# Patient Record
Sex: FEMALE | Race: WHITE | NOT HISPANIC OR LATINO | Employment: OTHER | URBAN - METROPOLITAN AREA
[De-identification: names, ages, dates, MRNs, and addresses within clinical notes are randomized per-mention and may not be internally consistent; named-entity substitution may affect disease eponyms.]

---

## 2017-04-19 ENCOUNTER — TRANSCRIBE ORDERS (OUTPATIENT)
Dept: ADMINISTRATIVE | Facility: HOSPITAL | Age: 77
End: 2017-04-19

## 2017-04-19 ENCOUNTER — HOSPITAL ENCOUNTER (OUTPATIENT)
Dept: MAMMOGRAPHY | Facility: MEDICAL CENTER | Age: 77
Discharge: HOME | End: 2017-04-19
Payer: MEDICARE

## 2017-04-19 DIAGNOSIS — Z12.31 ENCOUNTER FOR MAMMOGRAM TO ESTABLISH BASELINE MAMMOGRAM: Primary | ICD-10-CM

## 2017-04-19 DIAGNOSIS — Z12.31 VISIT FOR SCREENING MAMMOGRAM: ICD-10-CM

## 2017-04-19 PROCEDURE — G0202 SCR MAMMO BI INCL CAD: HCPCS

## 2017-04-19 PROCEDURE — 77063 BREAST TOMOSYNTHESIS BI: CPT

## 2017-06-02 ENCOUNTER — TRANSCRIBE ORDERS (OUTPATIENT)
Dept: ADMINISTRATIVE | Facility: HOSPITAL | Age: 77
End: 2017-06-02

## 2017-06-02 DIAGNOSIS — Z13.820 SCREENING FOR OSTEOPOROSIS: Primary | ICD-10-CM

## 2017-08-09 ENCOUNTER — HOSPITAL ENCOUNTER (OUTPATIENT)
Dept: RADIOLOGY | Age: 77
Discharge: HOME | End: 2017-08-09
Payer: MEDICARE

## 2017-08-09 DIAGNOSIS — Z13.820 SCREENING FOR OSTEOPOROSIS: ICD-10-CM

## 2017-08-09 PROCEDURE — 77080 DXA BONE DENSITY AXIAL: CPT

## 2018-04-20 ENCOUNTER — HOSPITAL ENCOUNTER (OUTPATIENT)
Dept: MAMMOGRAPHY | Facility: MEDICAL CENTER | Age: 78
Discharge: HOME | End: 2018-04-20
Payer: MEDICARE

## 2018-04-20 DIAGNOSIS — Z12.31 ENCOUNTER FOR MAMMOGRAM TO ESTABLISH BASELINE MAMMOGRAM: ICD-10-CM

## 2018-04-20 PROCEDURE — 77063 BREAST TOMOSYNTHESIS BI: CPT

## 2018-04-20 PROCEDURE — 77067 SCR MAMMO BI INCL CAD: CPT

## 2018-05-23 ENCOUNTER — APPOINTMENT (OUTPATIENT)
Dept: RADIOLOGY | Facility: MEDICAL CENTER | Age: 78
End: 2018-05-23
Payer: MEDICARE

## 2018-05-23 ENCOUNTER — TRANSCRIBE ORDERS (OUTPATIENT)
Dept: ADMINISTRATIVE | Facility: HOSPITAL | Age: 78
End: 2018-05-23

## 2018-05-23 DIAGNOSIS — M24.80 OTHER SPECIFIC JOINT DERANGEMENTS OF UNSPECIFIED JOINT, NOT ELSEWHERE CLASSIFIED: Primary | ICD-10-CM

## 2018-05-23 DIAGNOSIS — M24.80 OTHER SPECIFIC JOINT DERANGEMENTS OF UNSPECIFIED JOINT, NOT ELSEWHERE CLASSIFIED: ICD-10-CM

## 2018-05-23 PROCEDURE — 73130 X-RAY EXAM OF HAND: CPT

## 2018-06-14 ENCOUNTER — HOSPITAL ENCOUNTER (OUTPATIENT)
Dept: INFUSION CENTER | Facility: CLINIC | Age: 78
Discharge: HOME | End: 2018-06-14
Payer: MEDICARE

## 2018-06-14 VITALS
DIASTOLIC BLOOD PRESSURE: 68 MMHG | TEMPERATURE: 96.7 F | SYSTOLIC BLOOD PRESSURE: 136 MMHG | RESPIRATION RATE: 18 BRPM | HEART RATE: 73 BPM

## 2018-06-14 PROCEDURE — 96401 CHEMO ANTI-NEOPL SQ/IM: CPT

## 2018-06-14 RX ORDER — QUINIDINE GLUCONATE 324 MG
27 TABLET, EXTENDED RELEASE ORAL DAILY
COMMUNITY

## 2018-06-14 RX ORDER — MELATONIN
1000 DAILY
Status: ON HOLD | COMMUNITY
End: 2019-03-27

## 2018-06-14 RX ORDER — CHOLECALCIFEROL (VITAMIN D3) 125 MCG
TABLET ORAL
COMMUNITY
End: 2018-10-08

## 2018-06-14 RX ORDER — VIT C/HESPERIDIN/BIOFLAVONOIDS 500-100 MG
50 TABLET ORAL DAILY
COMMUNITY

## 2018-06-14 RX ORDER — CLOBETASOL PROPIONATE 0.05 MG/G
GEL TOPICAL 2 TIMES DAILY
COMMUNITY
End: 2018-10-08 | Stop reason: SDUPTHER

## 2018-06-14 RX ORDER — LEVOTHYROXINE SODIUM 0.1 MG/1
100 TABLET ORAL DAILY
COMMUNITY
End: 2020-10-21

## 2018-06-14 RX ORDER — ESTRADIOL 0.03 MG/D
1 FILM, EXTENDED RELEASE TRANSDERMAL 2 TIMES WEEKLY
COMMUNITY
End: 2018-06-14 | Stop reason: CLARIF

## 2018-06-14 RX ORDER — AMOXICILLIN 500 MG
2400 CAPSULE ORAL DAILY
COMMUNITY
End: 2021-01-22 | Stop reason: HOSPADM

## 2018-06-14 RX ADMIN — DENOSUMAB 60 MG: 60 INJECTION SUBCUTANEOUS at 12:15

## 2018-06-14 NOTE — PROGRESS NOTES
Pt  Denies new symptoms or concerns  Prolia ordered today  Prolia given in [facility]  Tolerating well  Future appointment scheduled as ordered  AVS provided  Appointment written on Summary  Annelise Martin

## 2018-06-14 NOTE — PLAN OF CARE

## 2018-10-03 RX ORDER — SIMVASTATIN 20 MG
TABLET ORAL DAILY
COMMUNITY
Start: 2003-09-02 | End: 2018-10-08

## 2018-10-03 RX ORDER — ESTRADIOL 0.1 MG/G
CREAM VAGINAL
COMMUNITY
Start: 2018-02-26 | End: 2018-10-08 | Stop reason: SDUPTHER

## 2018-10-08 ENCOUNTER — ANNUAL EXAM (OUTPATIENT)
Dept: GYNECOLOGY | Facility: CLINIC | Age: 78
End: 2018-10-08
Payer: MEDICARE

## 2018-10-08 VITALS
RESPIRATION RATE: 16 BRPM | SYSTOLIC BLOOD PRESSURE: 130 MMHG | HEIGHT: 63 IN | BODY MASS INDEX: 21.09 KG/M2 | HEART RATE: 72 BPM | DIASTOLIC BLOOD PRESSURE: 68 MMHG | WEIGHT: 119 LBS

## 2018-10-08 DIAGNOSIS — Z12.31 ENCOUNTER FOR SCREENING MAMMOGRAM FOR MALIGNANT NEOPLASM OF BREAST: Primary | ICD-10-CM

## 2018-10-08 DIAGNOSIS — N90.4 LICHEN SCLEROSUS OF FEMALE GENITALIA: ICD-10-CM

## 2018-10-08 DIAGNOSIS — N95.2 POSTMENOPAUSAL ATROPHIC VAGINITIS: ICD-10-CM

## 2018-10-08 PROCEDURE — 99213 OFFICE O/P EST LOW 20 MIN: CPT | Performed by: OBSTETRICS & GYNECOLOGY

## 2018-10-08 RX ORDER — CLOBETASOL PROPIONATE 0.05 MG/G
GEL TOPICAL
Qty: 30 EACH | Refills: 0 | Status: SHIPPED | OUTPATIENT
Start: 2018-10-08 | End: 2019-03-18

## 2018-10-08 RX ORDER — ESTRADIOL 0.1 MG/G
1 CREAM VAGINAL 2 TIMES WEEKLY
Qty: 42.5 G | Refills: 3 | Status: SHIPPED | OUTPATIENT
Start: 2018-10-08 | End: 2019-03-18 | Stop reason: SDUPTHER

## 2018-10-08 NOTE — PROGRESS NOTES
Assessment/Plan:       Diagnoses and all orders for this visit:    Encounter for screening mammogram for malignant neoplasm of breast  -     Mammo screening bilateral w 3d & cad; Future    Lichen sclerosus of female genitalia  -     clobetasol (TEMOVATE) 0 05 % GEL; Apply small amount to affected area once weekly    Postmenopausal atrophic vaginitis  -     estradiol (ESTRACE VAGINAL) 0 1 mg/g vaginal cream; Insert 1 g into the vagina 2 (two) times a week    Other orders  -     CHOLESTYRAMINE PO;   -     Discontinue: estradiol (ESTRACE VAGINAL) 0 1 mg/g vaginal cream; USE EXTERNALLY TWICE WEEKLY  -     Olopatadine HCl (PAZEO) 0 7 % SOLN;   -     Discontinue: simvastatin (ZOCOR) 20 mg tablet; Take by mouth daily          Subjective:      Patient ID: Jayy Hoang is a 70 y o  female  Patient is a 70-year-old who has had a previous hysterectomy for fibroids  She denies any vaginal bleeding, breast or bladder problems  She is not sexually active  She has been using topical estrogen cream and clobetasol ointment for treatment of lichen sclerosis and labial agglutination  The lichen sclerosis is no longer visible and the agglutination has improved  She is no longer taking oral estrogen  The following portions of the patient's history were reviewed and updated as appropriate: allergies, current medications, past family history, past medical history, past social history, past surgical history and problem list     Review of Systems   Constitutional: Negative  Respiratory: Negative for shortness of breath  Cardiovascular: Negative for chest pain  Gastrointestinal: Negative  Genitourinary: Negative  Skin: Negative  Psychiatric/Behavioral: Negative for agitation, behavioral problems and confusion           Objective:      /68 (Cuff Size: Adult)   Pulse 72   Resp 16   Ht 5' 2 5" (1 588 m)   Wt 54 kg (119 lb)   BMI 21 42 kg/m²          Physical Exam   Constitutional: She appears well-developed and well-nourished  No distress  HENT:   Head: Normocephalic  Pulmonary/Chest: Effort normal  No respiratory distress  Abdominal: She exhibits no distension and no mass  There is no tenderness  There is no rebound and no guarding  Genitourinary: Rectal exam shows anal tone abnormal  No breast swelling, tenderness, discharge or bleeding  There is no rash, tenderness, lesion or injury on the right labia  There is no rash, tenderness, lesion or injury on the left labia  No erythema, tenderness or bleeding in the vagina  No foreign body in the vagina  No signs of injury around the vagina  No vaginal discharge found  Genitourinary Comments: There is minimal adhesion of the labia minora to the labia majora  There is no visible lichen sclerosis  The vagina is atrophic  The cuff is clean and well supported  The cervix uterus are surgically absent  There are no adnexal masses  She reports that she has part of 1 ovary, but she is not sure which side  The anal sphincter has no tone  Lymphadenopathy:        Right axillary: No pectoral and no lateral adenopathy present  Left axillary: No pectoral and no lateral adenopathy present  Skin: Skin is warm, dry and intact  She is not diaphoretic  No cyanosis  Nails show no clubbing  Psychiatric: She has a normal mood and affect   Her speech is normal and behavior is normal

## 2018-12-13 ENCOUNTER — HOSPITAL ENCOUNTER (OUTPATIENT)
Dept: INFUSION CENTER | Facility: CLINIC | Age: 78
Discharge: HOME | End: 2018-12-13

## 2019-01-07 ENCOUNTER — HOSPITAL ENCOUNTER (OUTPATIENT)
Dept: INFUSION CENTER | Facility: CLINIC | Age: 79
Discharge: HOME | End: 2019-01-07
Payer: MEDICARE

## 2019-01-07 PROCEDURE — 96401 CHEMO ANTI-NEOPL SQ/IM: CPT

## 2019-01-07 RX ADMIN — DENOSUMAB 60 MG: 60 INJECTION SUBCUTANEOUS at 13:07

## 2019-01-07 NOTE — PROGRESS NOTES
Pt was given Prolia as ordered  Calcium level = 8 9 from [facility] labs    Pt declined AVS but was given future appt in 6 months from today

## 2019-02-04 ENCOUNTER — TRANSCRIBE ORDERS (OUTPATIENT)
Dept: ADMINISTRATIVE | Facility: HOSPITAL | Age: 79
End: 2019-02-04

## 2019-02-04 DIAGNOSIS — I65.21 OCCLUSION OF RIGHT CAROTID ARTERY: ICD-10-CM

## 2019-02-04 DIAGNOSIS — E78.5 HYPERLIPIDEMIA, UNSPECIFIED HYPERLIPIDEMIA TYPE: Primary | ICD-10-CM

## 2019-02-15 ENCOUNTER — HOSPITAL ENCOUNTER (OUTPATIENT)
Dept: NON INVASIVE DIAGNOSTICS | Facility: CLINIC | Age: 79
Discharge: HOME | End: 2019-02-15
Payer: MEDICARE

## 2019-02-15 DIAGNOSIS — E78.5 HYPERLIPIDEMIA, UNSPECIFIED HYPERLIPIDEMIA TYPE: ICD-10-CM

## 2019-02-15 PROCEDURE — 93880 EXTRACRANIAL BILAT STUDY: CPT

## 2019-02-16 PROCEDURE — 93880 EXTRACRANIAL BILAT STUDY: CPT | Performed by: SURGERY

## 2019-03-15 ENCOUNTER — HOSPITAL ENCOUNTER (OUTPATIENT)
Dept: MRI IMAGING | Facility: HOSPITAL | Age: 79
Discharge: HOME | End: 2019-03-15
Attending: INTERNAL MEDICINE
Payer: MEDICARE

## 2019-03-15 DIAGNOSIS — I65.21 OCCLUSION OF RIGHT CAROTID ARTERY: ICD-10-CM

## 2019-03-15 PROCEDURE — 70549 MR ANGIOGRAPH NECK W/O&W/DYE: CPT

## 2019-03-15 PROCEDURE — A9585 GADOBUTROL INJECTION: HCPCS | Performed by: INTERNAL MEDICINE

## 2019-03-15 RX ADMIN — GADOBUTROL 5 ML: 604.72 INJECTION INTRAVENOUS at 13:26

## 2019-03-18 ENCOUNTER — TELEPHONE (OUTPATIENT)
Dept: VASCULAR SURGERY | Facility: CLINIC | Age: 79
End: 2019-03-18

## 2019-03-18 ENCOUNTER — CONSULT (OUTPATIENT)
Dept: VASCULAR SURGERY | Facility: CLINIC | Age: 79
End: 2019-03-18
Payer: MEDICARE

## 2019-03-18 VITALS
TEMPERATURE: 97.9 F | HEART RATE: 68 BPM | HEIGHT: 62 IN | WEIGHT: 113 LBS | SYSTOLIC BLOOD PRESSURE: 118 MMHG | BODY MASS INDEX: 20.8 KG/M2 | DIASTOLIC BLOOD PRESSURE: 60 MMHG

## 2019-03-18 DIAGNOSIS — I65.21 STENOSIS OF RIGHT INTERNAL CAROTID ARTERY: Primary | ICD-10-CM

## 2019-03-18 DIAGNOSIS — N90.4 LICHEN SCLEROSUS OF FEMALE GENITALIA: ICD-10-CM

## 2019-03-18 DIAGNOSIS — N95.2 POSTMENOPAUSAL ATROPHIC VAGINITIS: ICD-10-CM

## 2019-03-18 PROCEDURE — 99204 OFFICE O/P NEW MOD 45 MIN: CPT | Performed by: SURGERY

## 2019-03-18 RX ORDER — CLOBETASOL PROPIONATE 0.05 MG/G
GEL TOPICAL
Qty: 30 EACH | Refills: 0 | Status: ON HOLD | OUTPATIENT
Start: 2019-03-18 | End: 2019-03-27

## 2019-03-18 RX ORDER — ESTRADIOL 0.1 MG/G
1 CREAM VAGINAL 2 TIMES WEEKLY
Qty: 42.5 G | Refills: 3 | Status: SHIPPED | OUTPATIENT
Start: 2019-03-18 | End: 2019-10-16 | Stop reason: SDUPTHER

## 2019-03-18 RX ORDER — CEFAZOLIN SODIUM 2 G/50ML
2000 SOLUTION INTRAVENOUS ONCE
Status: CANCELLED | OUTPATIENT
Start: 2019-03-18 | End: 2019-03-18

## 2019-03-18 NOTE — ASSESSMENT & PLAN NOTE
70yo Female with PMHx of distant gastric bypass, hypothyroidism with Significant Right ICA stenosis noted on ultrasound with PSV//112  This was also confirmed on MRA, demonstrates >80% stenosis  Patient is currently asymptomatic and denies any history, signs or symptoms consistent with stroke or TIA  She lives with her  and is independent able to carry-out all of her own activities  She denies any chest pain or sob with activity or one flight of stairs  She reports allergies to statin medications   -Recommend initiation of 81mg asa daily   -Had an extensive discussion regarding the 5-year risk of stroke being ~12% which can be reduced to <5% with surgical intervention, she unfortunately cannot be on optimal medical therapy given her allergic history to statins  She will benefit from carotid revascularization to minimize risk of stroke  Patient demonstrated understanding and consented to proceed  -She will need cardiac risk stratification prior to her procedure

## 2019-03-18 NOTE — TELEPHONE ENCOUNTER
Patient came into the office today to see Dr Akins Friendly  She will have a R carotid endarterectomy with neuro monitering  How ever patient needs to have a cardiac clearance  Spoke with Vamsi Kuo in office @ [facility] and she scheduled clearance for 4/22/2019 @ T @ 3pm w/ Dr Sandra Espinosa  Gave clearance in hand to CLAUDIA Broussard  Any questions or concerns Cardiology can be reached at [phone]  Patient was given soap and all instructions at check out

## 2019-03-18 NOTE — PROGRESS NOTES
Assessment/Plan:    Stenosis of right internal carotid artery  70yo Female with PMHx of distant gastric bypass, hypothyroidism with Significant Right ICA stenosis noted on ultrasound with PSV//112  This was also confirmed on MRA, demonstrates >80% stenosis  Patient is currently asymptomatic and denies any history, signs or symptoms consistent with stroke or TIA  She lives with her  and is independent able to carry-out all of her own activities  She denies any chest pain or sob with activity or one flight of stairs  She reports allergies to statin medications   -Recommend initiation of 81mg asa daily   -Had an extensive discussion regarding the 5-year risk of stroke being ~12% which can be reduced to <5% with surgical intervention, she unfortunately cannot be on optimal medical therapy given her allergic history to statins  She will benefit from carotid revascularization to minimize risk of stroke  Patient demonstrated understanding and consented to proceed  -She will need cardiac risk stratification prior to her procedure  Problem List Items Addressed This Visit        Cardiovascular and Mediastinum    Stenosis of right internal carotid artery - Primary     70yo Female with PMHx of distant gastric bypass, hypothyroidism with Significant Right ICA stenosis noted on ultrasound with PSV//112  This was also confirmed on MRA, demonstrates >80% stenosis  Patient is currently asymptomatic and denies any history, signs or symptoms consistent with stroke or TIA  She lives with her  and is independent able to carry-out all of her own activities  She denies any chest pain or sob with activity or one flight of stairs     She reports allergies to statin medications   -Recommend initiation of 81mg asa daily   -Had an extensive discussion regarding the 5-year risk of stroke being ~12% which can be reduced to <5% with surgical intervention, she unfortunately cannot be on optimal medical therapy given her allergic history to statins  She will benefit from carotid revascularization to minimize risk of stroke  Patient demonstrated understanding and consented to proceed  -She will need cardiac risk stratification prior to her procedure  Relevant Orders    Case request operating room: ENDARTERECTOMY ARTERY CAROTID with neuro monitoring (Completed)    Type and screen    UA w Reflex to Microscopic w Reflex to Culture    Basic metabolic panel    CBC and differential    Protime-INR    EKG 12 lead    Ambulatory referral to Cardiology            Subjective:      Patient ID: Landon Salvador is a 70 y o  female  Patient is new to the practice and presents today to discuss results of carotid doppler and also MRA carotids  Patient has no history of stroke and denies all TIA/CVA symptoms at this time  The following portions of the patient's history were reviewed and updated as appropriate: allergies, current medications, past family history, past medical history, past social history, past surgical history and problem list     Review of Systems   Constitutional: Negative  HENT: Negative  Eyes: Negative  Respiratory: Negative  Cardiovascular: Negative  Gastrointestinal: Negative  Endocrine: Negative  Genitourinary: Negative  Musculoskeletal: Negative  Skin: Negative  Allergic/Immunologic: Negative  Neurological: Negative  Hematological: Negative  Psychiatric/Behavioral: Negative  Reviewed    Objective:      /60 (BP Location: Left arm, Patient Position: Sitting)   Pulse 68   Temp 97 9 °F (36 6 °C) (Tympanic)   Ht 5' 2" (1 575 m)   Wt 51 3 kg (113 lb)   BMI 20 67 kg/m²          Physical Exam   Constitutional: She is oriented to person, place, and time  She appears well-developed and well-nourished  HENT:   Head: Normocephalic and atraumatic  Neck: Normal range of motion  Neck supple   No thyromegaly present    +carotid bruit b/l, R>L Cardiovascular: Normal rate, regular rhythm, normal heart sounds and intact distal pulses  Pulses:       Carotid pulses are 2+ on the right side, and 2+ on the left side  Radial pulses are 2+ on the right side, and 2+ on the left side  Pulmonary/Chest: Effort normal and breath sounds normal    Abdominal: Soft  Bowel sounds are normal    Musculoskeletal: Normal range of motion  She exhibits no edema or deformity  Neurological: She is alert and oriented to person, place, and time  She displays normal reflexes  No cranial nerve deficit or sensory deficit  She exhibits normal muscle tone  Coordination normal    Skin: Skin is warm and dry  Capillary refill takes less than 2 seconds  Psychiatric: She has a normal mood and affect  Her behavior is normal  Judgment and thought content normal        Operative Scheduling Information:    Hospital:  [facility]    Physician:  Me    Surgery: Right Carotid Endarterectomy with Neuromonitoring    Urgency:  Standard    Case Length:  Normal    Post-op Bed:  Stepdown    OR Table:  Standard    Equipment Needs:  Neuro-monitoring and associated Techs    Medication Instructions: Take all meds including asa 81mg regularly    Hydration:  No    I have spent 35 minutes with Patient  today in which greater than 50% of this time was spent in counseling/coordination of care regarding Diagnostic results, Prognosis, Risks and benefits of tx options, Intructions for management, Importance of tx compliance and Risk factor reductions

## 2019-03-27 ENCOUNTER — APPOINTMENT (EMERGENCY)
Dept: RADIOLOGY | Facility: HOSPITAL | Age: 79
DRG: 092 | End: 2019-03-27
Payer: MEDICARE

## 2019-03-27 ENCOUNTER — APPOINTMENT (OUTPATIENT)
Dept: RADIOLOGY | Facility: HOSPITAL | Age: 79
DRG: 092 | End: 2019-03-27
Payer: MEDICARE

## 2019-03-27 ENCOUNTER — TELEPHONE (OUTPATIENT)
Dept: VASCULAR SURGERY | Facility: CLINIC | Age: 79
End: 2019-03-27

## 2019-03-27 ENCOUNTER — HOSPITAL ENCOUNTER (INPATIENT)
Facility: HOSPITAL | Age: 79
LOS: 1 days | Discharge: HOME | DRG: 092 | End: 2019-03-29
Attending: EMERGENCY MEDICINE | Admitting: INTERNAL MEDICINE
Payer: MEDICARE

## 2019-03-27 DIAGNOSIS — I65.21 STENOSIS OF RIGHT INTERNAL CAROTID ARTERY: ICD-10-CM

## 2019-03-27 DIAGNOSIS — Z98.84 S/P GASTRIC BYPASS: ICD-10-CM

## 2019-03-27 DIAGNOSIS — R20.0 NUMBNESS AND TINGLING IN LEFT ARM: Primary | ICD-10-CM

## 2019-03-27 DIAGNOSIS — R63.6 UNDERWEIGHT: ICD-10-CM

## 2019-03-27 DIAGNOSIS — R20.2 NUMBNESS AND TINGLING IN LEFT ARM: Primary | ICD-10-CM

## 2019-03-27 DIAGNOSIS — D61.818 PANCYTOPENIA (HCC): ICD-10-CM

## 2019-03-27 DIAGNOSIS — G45.9 TIA (TRANSIENT ISCHEMIC ATTACK): ICD-10-CM

## 2019-03-27 PROBLEM — E03.9 HYPOTHYROIDISM: Status: ACTIVE | Noted: 2019-03-27

## 2019-03-27 LAB
ALBUMIN SERPL BCP-MCNC: 2.9 G/DL (ref 3.5–5)
ALP SERPL-CCNC: 99 U/L (ref 46–116)
ALT SERPL W P-5'-P-CCNC: 73 U/L (ref 12–78)
ANION GAP SERPL CALCULATED.3IONS-SCNC: 6 MMOL/L (ref 4–13)
AST SERPL W P-5'-P-CCNC: 75 U/L (ref 5–45)
ATRIAL RATE: 60 BPM
BASOPHILS # BLD AUTO: 0.01 THOUSANDS/ΜL (ref 0–0.1)
BASOPHILS NFR BLD AUTO: 0 % (ref 0–1)
BILIRUB SERPL-MCNC: 0.31 MG/DL (ref 0.2–1)
BUN SERPL-MCNC: 14 MG/DL (ref 5–25)
CALCIUM SERPL-MCNC: 8.1 MG/DL (ref 8.3–10.1)
CHLORIDE SERPL-SCNC: 109 MMOL/L (ref 100–108)
CO2 SERPL-SCNC: 27 MMOL/L (ref 21–32)
CREAT SERPL-MCNC: 0.48 MG/DL (ref 0.6–1.3)
EOSINOPHIL # BLD AUTO: 0.07 THOUSAND/ΜL (ref 0–0.61)
EOSINOPHIL NFR BLD AUTO: 2 % (ref 0–6)
ERYTHROCYTE [DISTWIDTH] IN BLOOD BY AUTOMATED COUNT: 14.6 % (ref 11.6–15.1)
GFR SERPL CREATININE-BSD FRML MDRD: 95 ML/MIN/1.73SQ M
GLUCOSE SERPL-MCNC: 79 MG/DL (ref 65–140)
HCT VFR BLD AUTO: 35.3 % (ref 34.8–46.1)
HGB BLD-MCNC: 11.2 G/DL (ref 11.5–15.4)
IMM GRANULOCYTES # BLD AUTO: 0.02 THOUSAND/UL (ref 0–0.2)
IMM GRANULOCYTES NFR BLD AUTO: 1 % (ref 0–2)
LYMPHOCYTES # BLD AUTO: 1.05 THOUSANDS/ΜL (ref 0.6–4.47)
LYMPHOCYTES NFR BLD AUTO: 31 % (ref 14–44)
MCH RBC QN AUTO: 30.2 PG (ref 26.8–34.3)
MCHC RBC AUTO-ENTMCNC: 31.7 G/DL (ref 31.4–37.4)
MCV RBC AUTO: 95 FL (ref 82–98)
MONOCYTES # BLD AUTO: 0.38 THOUSAND/ΜL (ref 0.17–1.22)
MONOCYTES NFR BLD AUTO: 11 % (ref 4–12)
NEUTROPHILS # BLD AUTO: 1.82 THOUSANDS/ΜL (ref 1.85–7.62)
NEUTS SEG NFR BLD AUTO: 55 % (ref 43–75)
NRBC BLD AUTO-RTO: 0 /100 WBCS
P AXIS: 78 DEGREES
PLATELET # BLD AUTO: 138 THOUSANDS/UL (ref 149–390)
PMV BLD AUTO: 10.9 FL (ref 8.9–12.7)
POTASSIUM SERPL-SCNC: 3.8 MMOL/L (ref 3.5–5.3)
PR INTERVAL: 156 MS
PROT SERPL-MCNC: 6.2 G/DL (ref 6.4–8.2)
QRS AXIS: 73 DEGREES
QRSD INTERVAL: 76 MS
QT INTERVAL: 398 MS
QTC INTERVAL: 398 MS
RBC # BLD AUTO: 3.71 MILLION/UL (ref 3.81–5.12)
SODIUM SERPL-SCNC: 142 MMOL/L (ref 136–145)
T WAVE AXIS: 81 DEGREES
TROPONIN I SERPL-MCNC: <0.02 NG/ML
TSH SERPL DL<=0.05 MIU/L-ACNC: 0.29 UIU/ML (ref 0.36–3.74)
VENTRICULAR RATE: 60 BPM
WBC # BLD AUTO: 3.35 THOUSAND/UL (ref 4.31–10.16)

## 2019-03-27 PROCEDURE — 80053 COMPREHEN METABOLIC PANEL: CPT | Performed by: EMERGENCY MEDICINE

## 2019-03-27 PROCEDURE — 99204 OFFICE O/P NEW MOD 45 MIN: CPT | Performed by: PSYCHIATRY & NEUROLOGY

## 2019-03-27 PROCEDURE — 70496 CT ANGIOGRAPHY HEAD: CPT

## 2019-03-27 PROCEDURE — 93010 ELECTROCARDIOGRAM REPORT: CPT | Performed by: INTERNAL MEDICINE

## 2019-03-27 PROCEDURE — 99285 EMERGENCY DEPT VISIT HI MDM: CPT

## 2019-03-27 PROCEDURE — 85025 COMPLETE CBC W/AUTO DIFF WBC: CPT | Performed by: EMERGENCY MEDICINE

## 2019-03-27 PROCEDURE — 84484 ASSAY OF TROPONIN QUANT: CPT | Performed by: EMERGENCY MEDICINE

## 2019-03-27 PROCEDURE — 84443 ASSAY THYROID STIM HORMONE: CPT | Performed by: STUDENT IN AN ORGANIZED HEALTH CARE EDUCATION/TRAINING PROGRAM

## 2019-03-27 PROCEDURE — 70498 CT ANGIOGRAPHY NECK: CPT

## 2019-03-27 PROCEDURE — 93005 ELECTROCARDIOGRAM TRACING: CPT

## 2019-03-27 PROCEDURE — 36415 COLL VENOUS BLD VENIPUNCTURE: CPT | Performed by: EMERGENCY MEDICINE

## 2019-03-27 PROCEDURE — 70551 MRI BRAIN STEM W/O DYE: CPT

## 2019-03-27 PROCEDURE — 99214 OFFICE O/P EST MOD 30 MIN: CPT | Performed by: PHYSICIAN ASSISTANT

## 2019-03-27 PROCEDURE — 71046 X-RAY EXAM CHEST 2 VIEWS: CPT

## 2019-03-27 RX ORDER — ASPIRIN 81 MG/1
81 TABLET, CHEWABLE ORAL DAILY
Status: DISCONTINUED | OUTPATIENT
Start: 2019-03-28 | End: 2019-03-29 | Stop reason: HOSPADM

## 2019-03-27 RX ORDER — FERROUS SULFATE 325(65) MG
325 TABLET ORAL DAILY
Status: DISCONTINUED | OUTPATIENT
Start: 2019-03-28 | End: 2019-03-29 | Stop reason: HOSPADM

## 2019-03-27 RX ORDER — B-COMPLEX WITH VITAMIN C
1 TABLET ORAL
Status: DISCONTINUED | OUTPATIENT
Start: 2019-03-28 | End: 2019-03-29 | Stop reason: HOSPADM

## 2019-03-27 RX ORDER — ZINC GLUCONATE 50 MG
25 TABLET ORAL DAILY
Status: DISCONTINUED | OUTPATIENT
Start: 2019-03-28 | End: 2019-03-29 | Stop reason: HOSPADM

## 2019-03-27 RX ORDER — ASPIRIN 81 MG/1
81 TABLET, CHEWABLE ORAL DAILY
Status: ON HOLD | COMMUNITY
End: 2021-10-15

## 2019-03-27 RX ORDER — CHOLECALCIFEROL (VITAMIN D3) 125 MCG
500 CAPSULE ORAL DAILY
Status: DISCONTINUED | OUTPATIENT
Start: 2019-03-28 | End: 2019-03-29 | Stop reason: HOSPADM

## 2019-03-27 RX ORDER — ACETAMINOPHEN 325 MG/1
650 TABLET ORAL EVERY 4 HOURS PRN
Status: DISCONTINUED | OUTPATIENT
Start: 2019-03-27 | End: 2019-03-29 | Stop reason: HOSPADM

## 2019-03-27 RX ORDER — HEPARIN SODIUM 5000 [USP'U]/ML
5000 INJECTION, SOLUTION INTRAVENOUS; SUBCUTANEOUS EVERY 8 HOURS SCHEDULED
Status: DISCONTINUED | OUTPATIENT
Start: 2019-03-27 | End: 2019-03-29 | Stop reason: HOSPADM

## 2019-03-27 RX ORDER — LEVOTHYROXINE SODIUM 0.1 MG/1
100 TABLET ORAL DAILY
Status: DISCONTINUED | OUTPATIENT
Start: 2019-03-28 | End: 2019-03-29 | Stop reason: HOSPADM

## 2019-03-27 RX ORDER — CLOPIDOGREL BISULFATE 75 MG/1
75 TABLET ORAL DAILY
Status: DISCONTINUED | OUTPATIENT
Start: 2019-03-28 | End: 2019-03-28

## 2019-03-27 RX ADMIN — HEPARIN SODIUM 5000 UNITS: 5000 INJECTION INTRAVENOUS; SUBCUTANEOUS at 22:48

## 2019-03-27 RX ADMIN — IOHEXOL 85 ML: 350 INJECTION, SOLUTION INTRAVENOUS at 17:57

## 2019-03-27 NOTE — TELEPHONE ENCOUNTER
Patient called with new onset of left arm and hand numbness that lasted about one hour and she had 2 episodes   I called Tressa Rosado NP and then she spoke with DR Prince, and we are going to send patient to ER at [facility] to be evaluated

## 2019-03-28 ENCOUNTER — APPOINTMENT (OUTPATIENT)
Dept: NON INVASIVE DIAGNOSTICS | Facility: HOSPITAL | Age: 79
DRG: 092 | End: 2019-03-28
Payer: MEDICARE

## 2019-03-28 LAB
CHOLEST SERPL-MCNC: 165 MG/DL (ref 50–200)
ERYTHROCYTE [DISTWIDTH] IN BLOOD BY AUTOMATED COUNT: 14.7 % (ref 11.6–15.1)
EST. AVERAGE GLUCOSE BLD GHB EST-MCNC: 105 MG/DL
HBA1C MFR BLD: 5.3 % (ref 4.2–6.3)
HCT VFR BLD AUTO: 34.2 % (ref 34.8–46.1)
HDLC SERPL-MCNC: 51 MG/DL (ref 40–60)
HGB BLD-MCNC: 11 G/DL (ref 11.5–15.4)
LDLC SERPL CALC-MCNC: 94 MG/DL (ref 0–100)
MCH RBC QN AUTO: 30.4 PG (ref 26.8–34.3)
MCHC RBC AUTO-ENTMCNC: 32.2 G/DL (ref 31.4–37.4)
MCV RBC AUTO: 95 FL (ref 82–98)
PLATELET # BLD AUTO: 139 THOUSANDS/UL (ref 149–390)
PMV BLD AUTO: 11.4 FL (ref 8.9–12.7)
RBC # BLD AUTO: 3.62 MILLION/UL (ref 3.81–5.12)
T4 FREE SERPL-MCNC: 1.32 NG/DL (ref 0.76–1.46)
TRIGL SERPL-MCNC: 98 MG/DL
WBC # BLD AUTO: 2.58 THOUSAND/UL (ref 4.31–10.16)

## 2019-03-28 PROCEDURE — 93306 TTE W/DOPPLER COMPLETE: CPT | Performed by: INTERNAL MEDICINE

## 2019-03-28 PROCEDURE — 99225 PR SBSQ OBSERVATION CARE/DAY 25 MINUTES: CPT | Performed by: PSYCHIATRY & NEUROLOGY

## 2019-03-28 PROCEDURE — 99222 1ST HOSP IP/OBS MODERATE 55: CPT | Performed by: INTERNAL MEDICINE

## 2019-03-28 PROCEDURE — 84439 ASSAY OF FREE THYROXINE: CPT | Performed by: INTERNAL MEDICINE

## 2019-03-28 PROCEDURE — 83036 HEMOGLOBIN GLYCOSYLATED A1C: CPT | Performed by: STUDENT IN AN ORGANIZED HEALTH CARE EDUCATION/TRAINING PROGRAM

## 2019-03-28 PROCEDURE — 97166 OT EVAL MOD COMPLEX 45 MIN: CPT

## 2019-03-28 PROCEDURE — G8980 MOBILITY D/C STATUS: HCPCS

## 2019-03-28 PROCEDURE — 97162 PT EVAL MOD COMPLEX 30 MIN: CPT

## 2019-03-28 PROCEDURE — G8987 SELF CARE CURRENT STATUS: HCPCS

## 2019-03-28 PROCEDURE — 80061 LIPID PANEL: CPT | Performed by: STUDENT IN AN ORGANIZED HEALTH CARE EDUCATION/TRAINING PROGRAM

## 2019-03-28 PROCEDURE — G8978 MOBILITY CURRENT STATUS: HCPCS

## 2019-03-28 PROCEDURE — 85027 COMPLETE CBC AUTOMATED: CPT | Performed by: STUDENT IN AN ORGANIZED HEALTH CARE EDUCATION/TRAINING PROGRAM

## 2019-03-28 PROCEDURE — G8979 MOBILITY GOAL STATUS: HCPCS

## 2019-03-28 PROCEDURE — 93306 TTE W/DOPPLER COMPLETE: CPT

## 2019-03-28 RX ADMIN — CYANOCOBALAMIN TAB 500 MCG 500 MCG: 500 TAB at 08:27

## 2019-03-28 RX ADMIN — Medication 1 TABLET: at 08:27

## 2019-03-28 RX ADMIN — FERROUS SULFATE TAB 325 MG (65 MG ELEMENTAL FE) 325 MG: 325 (65 FE) TAB at 08:27

## 2019-03-28 RX ADMIN — LEVOTHYROXINE SODIUM 100 MCG: 100 TABLET ORAL at 08:27

## 2019-03-28 RX ADMIN — HEPARIN SODIUM 5000 UNITS: 5000 INJECTION INTRAVENOUS; SUBCUTANEOUS at 21:37

## 2019-03-28 RX ADMIN — CALCIUM CARBONATE 500 MG (1,250 MG)-VITAMIN D3 200 UNIT TABLET 1 TABLET: at 08:27

## 2019-03-28 RX ADMIN — CLOPIDOGREL BISULFATE 75 MG: 75 TABLET ORAL at 08:27

## 2019-03-28 RX ADMIN — ASPIRIN 81 MG 81 MG: 81 TABLET ORAL at 08:27

## 2019-03-28 RX ADMIN — HEPARIN SODIUM 5000 UNITS: 5000 INJECTION INTRAVENOUS; SUBCUTANEOUS at 14:20

## 2019-03-28 RX ADMIN — HEPARIN SODIUM 5000 UNITS: 5000 INJECTION INTRAVENOUS; SUBCUTANEOUS at 05:29

## 2019-03-28 RX ADMIN — Medication 25 MG: at 08:28

## 2019-03-29 VITALS
HEART RATE: 67 BPM | SYSTOLIC BLOOD PRESSURE: 141 MMHG | DIASTOLIC BLOOD PRESSURE: 60 MMHG | WEIGHT: 113 LBS | BODY MASS INDEX: 20.02 KG/M2 | OXYGEN SATURATION: 98 % | TEMPERATURE: 98.1 F | HEIGHT: 63 IN | RESPIRATION RATE: 20 BRPM

## 2019-03-29 PROBLEM — G45.9 TIA (TRANSIENT ISCHEMIC ATTACK): Status: RESOLVED | Noted: 2019-03-27 | Resolved: 2019-03-29

## 2019-03-29 LAB
ABO GROUP BLD: NORMAL
ANION GAP SERPL CALCULATED.3IONS-SCNC: 5 MMOL/L (ref 4–13)
BASOPHILS # BLD AUTO: 0.01 THOUSANDS/ΜL (ref 0–0.1)
BASOPHILS NFR BLD AUTO: 0 % (ref 0–1)
BLD GP AB SCN SERPL QL: NEGATIVE
BUN SERPL-MCNC: 13 MG/DL (ref 5–25)
CALCIUM SERPL-MCNC: 7.6 MG/DL (ref 8.3–10.1)
CHLORIDE SERPL-SCNC: 110 MMOL/L (ref 100–108)
CO2 SERPL-SCNC: 27 MMOL/L (ref 21–32)
CREAT SERPL-MCNC: 0.42 MG/DL (ref 0.6–1.3)
EOSINOPHIL # BLD AUTO: 0.07 THOUSAND/ΜL (ref 0–0.61)
EOSINOPHIL NFR BLD AUTO: 3 % (ref 0–6)
ERYTHROCYTE [DISTWIDTH] IN BLOOD BY AUTOMATED COUNT: 14.8 % (ref 11.6–15.1)
GFR SERPL CREATININE-BSD FRML MDRD: 99 ML/MIN/1.73SQ M
GLUCOSE SERPL-MCNC: 104 MG/DL (ref 65–140)
GLUCOSE SERPL-MCNC: 40 MG/DL (ref 65–140)
GLUCOSE SERPL-MCNC: 82 MG/DL (ref 65–140)
HCT VFR BLD AUTO: 35.2 % (ref 34.8–46.1)
HGB BLD-MCNC: 11.4 G/DL (ref 11.5–15.4)
IMM GRANULOCYTES # BLD AUTO: 0 THOUSAND/UL (ref 0–0.2)
IMM GRANULOCYTES NFR BLD AUTO: 0 % (ref 0–2)
LYMPHOCYTES # BLD AUTO: 1.1 THOUSANDS/ΜL (ref 0.6–4.47)
LYMPHOCYTES NFR BLD AUTO: 41 % (ref 14–44)
MCH RBC QN AUTO: 30.6 PG (ref 26.8–34.3)
MCHC RBC AUTO-ENTMCNC: 32.4 G/DL (ref 31.4–37.4)
MCV RBC AUTO: 94 FL (ref 82–98)
MONOCYTES # BLD AUTO: 0.35 THOUSAND/ΜL (ref 0.17–1.22)
MONOCYTES NFR BLD AUTO: 13 % (ref 4–12)
NEUTROPHILS # BLD AUTO: 1.17 THOUSANDS/ΜL (ref 1.85–7.62)
NEUTS SEG NFR BLD AUTO: 43 % (ref 43–75)
NRBC BLD AUTO-RTO: 0 /100 WBCS
PLATELET # BLD AUTO: 134 THOUSANDS/UL (ref 149–390)
PMV BLD AUTO: 11.6 FL (ref 8.9–12.7)
POTASSIUM SERPL-SCNC: 3.5 MMOL/L (ref 3.5–5.3)
RBC # BLD AUTO: 3.73 MILLION/UL (ref 3.81–5.12)
RH BLD: POSITIVE
SODIUM SERPL-SCNC: 142 MMOL/L (ref 136–145)
SPECIMEN EXPIRATION DATE: NORMAL
WBC # BLD AUTO: 2.7 THOUSAND/UL (ref 4.31–10.16)

## 2019-03-29 PROCEDURE — 99231 SBSQ HOSP IP/OBS SF/LOW 25: CPT | Performed by: SURGERY

## 2019-03-29 PROCEDURE — 86901 BLOOD TYPING SEROLOGIC RH(D): CPT | Performed by: PHYSICIAN ASSISTANT

## 2019-03-29 PROCEDURE — 82948 REAGENT STRIP/BLOOD GLUCOSE: CPT

## 2019-03-29 PROCEDURE — 80048 BASIC METABOLIC PNL TOTAL CA: CPT | Performed by: INTERNAL MEDICINE

## 2019-03-29 PROCEDURE — 85025 COMPLETE CBC W/AUTO DIFF WBC: CPT | Performed by: INTERNAL MEDICINE

## 2019-03-29 PROCEDURE — 86900 BLOOD TYPING SEROLOGIC ABO: CPT | Performed by: PHYSICIAN ASSISTANT

## 2019-03-29 PROCEDURE — 86850 RBC ANTIBODY SCREEN: CPT | Performed by: PHYSICIAN ASSISTANT

## 2019-03-29 RX ORDER — CLOPIDOGREL BISULFATE 75 MG/1
75 TABLET ORAL DAILY
Qty: 30 TABLET | Refills: 3 | Status: SHIPPED | OUTPATIENT
Start: 2019-03-29 | End: 2019-05-18 | Stop reason: SDUPTHER

## 2019-03-29 RX ADMIN — CYANOCOBALAMIN TAB 500 MCG 500 MCG: 500 TAB at 08:40

## 2019-03-29 RX ADMIN — HEPARIN SODIUM 5000 UNITS: 5000 INJECTION INTRAVENOUS; SUBCUTANEOUS at 06:15

## 2019-03-29 RX ADMIN — CALCIUM CARBONATE 500 MG (1,250 MG)-VITAMIN D3 200 UNIT TABLET 1 TABLET: at 08:40

## 2019-03-29 RX ADMIN — Medication 1 TABLET: at 08:40

## 2019-03-29 RX ADMIN — LEVOTHYROXINE SODIUM 100 MCG: 100 TABLET ORAL at 08:39

## 2019-03-29 RX ADMIN — Medication 25 MG: at 08:39

## 2019-03-29 RX ADMIN — ASPIRIN 81 MG 81 MG: 81 TABLET ORAL at 08:40

## 2019-03-29 RX ADMIN — FERROUS SULFATE TAB 325 MG (65 MG ELEMENTAL FE) 325 MG: 325 (65 FE) TAB at 08:40

## 2019-04-01 ENCOUNTER — ANESTHESIA EVENT (OUTPATIENT)
Dept: PERIOP | Facility: HOSPITAL | Age: 79
DRG: 038 | End: 2019-04-01
Payer: MEDICARE

## 2019-04-01 ENCOUNTER — TELEPHONE (OUTPATIENT)
Dept: VASCULAR SURGERY | Facility: CLINIC | Age: 79
End: 2019-04-01

## 2019-04-02 ENCOUNTER — TELEPHONE (OUTPATIENT)
Dept: NEUROLOGY | Facility: CLINIC | Age: 79
End: 2019-04-02

## 2019-04-03 ENCOUNTER — ANESTHESIA (OUTPATIENT)
Dept: PERIOP | Facility: HOSPITAL | Age: 79
DRG: 038 | End: 2019-04-03
Payer: MEDICARE

## 2019-04-03 ENCOUNTER — HOSPITAL ENCOUNTER (INPATIENT)
Facility: HOSPITAL | Age: 79
LOS: 1 days | Discharge: HOME | DRG: 038 | End: 2019-04-04
Attending: SURGERY | Admitting: SURGERY
Payer: MEDICARE

## 2019-04-03 ENCOUNTER — APPOINTMENT (OUTPATIENT)
Dept: NON INVASIVE DIAGNOSTICS | Facility: HOSPITAL | Age: 79
DRG: 038 | End: 2019-04-03
Payer: MEDICARE

## 2019-04-03 DIAGNOSIS — I65.21 STENOSIS OF RIGHT INTERNAL CAROTID ARTERY: Primary | ICD-10-CM

## 2019-04-03 LAB
ABO GROUP BLD: NORMAL
BLD GP AB SCN SERPL QL: NEGATIVE
GLUCOSE SERPL-MCNC: 149 MG/DL (ref 65–140)
KCT BLD-ACNC: 200 SEC (ref 89–137)
KCT BLD-ACNC: 236 SEC (ref 89–137)
RH BLD: POSITIVE
SPECIMEN EXPIRATION DATE: NORMAL
SPECIMEN SOURCE: ABNORMAL
SPECIMEN SOURCE: ABNORMAL

## 2019-04-03 PROCEDURE — 85347 COAGULATION TIME ACTIVATED: CPT

## 2019-04-03 PROCEDURE — 86850 RBC ANTIBODY SCREEN: CPT | Performed by: SURGERY

## 2019-04-03 PROCEDURE — C1781 MESH (IMPLANTABLE): HCPCS | Performed by: SURGERY

## 2019-04-03 PROCEDURE — 35301 RECHANNELING OF ARTERY: CPT | Performed by: PHYSICIAN ASSISTANT

## 2019-04-03 PROCEDURE — 86900 BLOOD TYPING SEROLOGIC ABO: CPT | Performed by: SURGERY

## 2019-04-03 PROCEDURE — 03CK0ZZ EXTIRPATION OF MATTER FROM RIGHT INTERNAL CAROTID ARTERY, OPEN APPROACH: ICD-10-PCS | Performed by: SURGERY

## 2019-04-03 PROCEDURE — 82948 REAGENT STRIP/BLOOD GLUCOSE: CPT

## 2019-04-03 PROCEDURE — 86901 BLOOD TYPING SEROLOGIC RH(D): CPT | Performed by: SURGERY

## 2019-04-03 PROCEDURE — 03UK0KZ SUPPLEMENT RIGHT INTERNAL CAROTID ARTERY WITH NONAUTOLOGOUS TISSUE SUBSTITUTE, OPEN APPROACH: ICD-10-PCS | Performed by: SURGERY

## 2019-04-03 PROCEDURE — 35301 RECHANNELING OF ARTERY: CPT | Performed by: SURGERY

## 2019-04-03 PROCEDURE — 93882 EXTRACRANIAL UNI/LTD STUDY: CPT

## 2019-04-03 PROCEDURE — ERR1 ERRONEOUS ENCOUNTER-DISREGARD: Performed by: SURGERY

## 2019-04-03 DEVICE — XENOSURE BIOLOGIC PATCH, 0.8CM X 8CM, EIFU
Type: IMPLANTABLE DEVICE | Site: CAROTID | Status: FUNCTIONAL
Brand: XENOSURE BIOLOGIC PATCH

## 2019-04-03 RX ORDER — ACETAMINOPHEN 325 MG/1
650 TABLET ORAL EVERY 6 HOURS PRN
Status: DISCONTINUED | OUTPATIENT
Start: 2019-04-03 | End: 2019-04-04 | Stop reason: HOSPADM

## 2019-04-03 RX ORDER — CLOPIDOGREL BISULFATE 75 MG/1
75 TABLET ORAL DAILY
Status: DISCONTINUED | OUTPATIENT
Start: 2019-04-03 | End: 2019-04-04 | Stop reason: HOSPADM

## 2019-04-03 RX ORDER — ONDANSETRON 2 MG/ML
4 INJECTION INTRAMUSCULAR; INTRAVENOUS EVERY 6 HOURS PRN
Status: DISCONTINUED | OUTPATIENT
Start: 2019-04-03 | End: 2019-04-04 | Stop reason: HOSPADM

## 2019-04-03 RX ORDER — HEPARIN SODIUM 5000 [USP'U]/ML
5000 INJECTION, SOLUTION INTRAVENOUS; SUBCUTANEOUS EVERY 8 HOURS SCHEDULED
Status: DISCONTINUED | OUTPATIENT
Start: 2019-04-03 | End: 2019-04-04 | Stop reason: HOSPADM

## 2019-04-03 RX ORDER — HYDRALAZINE HYDROCHLORIDE 20 MG/ML
15 INJECTION INTRAMUSCULAR; INTRAVENOUS
Status: DISCONTINUED | OUTPATIENT
Start: 2019-04-03 | End: 2019-04-04 | Stop reason: HOSPADM

## 2019-04-03 RX ORDER — OXYCODONE HYDROCHLORIDE 10 MG/1
10 TABLET ORAL EVERY 4 HOURS PRN
Status: DISCONTINUED | OUTPATIENT
Start: 2019-04-03 | End: 2019-04-04

## 2019-04-03 RX ORDER — CEFAZOLIN SODIUM 2 G/50ML
2000 SOLUTION INTRAVENOUS ONCE
Status: COMPLETED | OUTPATIENT
Start: 2019-04-03 | End: 2019-04-03

## 2019-04-03 RX ORDER — NEOSTIGMINE METHYLSULFATE 1 MG/ML
INJECTION INTRAVENOUS AS NEEDED
Status: DISCONTINUED | OUTPATIENT
Start: 2019-04-03 | End: 2019-04-03 | Stop reason: SURG

## 2019-04-03 RX ORDER — FERROUS GLUCONATE 324(37.5)
324 TABLET ORAL DAILY
Status: DISCONTINUED | OUTPATIENT
Start: 2019-04-03 | End: 2019-04-04 | Stop reason: HOSPADM

## 2019-04-03 RX ORDER — OXYCODONE HYDROCHLORIDE 5 MG/1
5 TABLET ORAL EVERY 4 HOURS PRN
Status: DISCONTINUED | OUTPATIENT
Start: 2019-04-03 | End: 2019-04-04 | Stop reason: HOSPADM

## 2019-04-03 RX ORDER — SODIUM CHLORIDE 9 MG/ML
75 INJECTION, SOLUTION INTRAVENOUS CONTINUOUS
Status: DISCONTINUED | OUTPATIENT
Start: 2019-04-03 | End: 2019-04-04

## 2019-04-03 RX ORDER — PROPOFOL 10 MG/ML
INJECTION, EMULSION INTRAVENOUS AS NEEDED
Status: DISCONTINUED | OUTPATIENT
Start: 2019-04-03 | End: 2019-04-03 | Stop reason: SURG

## 2019-04-03 RX ORDER — EPHEDRINE SULFATE 50 MG/ML
INJECTION INTRAVENOUS AS NEEDED
Status: DISCONTINUED | OUTPATIENT
Start: 2019-04-03 | End: 2019-04-03 | Stop reason: SURG

## 2019-04-03 RX ORDER — FENTANYL CITRATE/PF 50 MCG/ML
25 SYRINGE (ML) INJECTION
Status: COMPLETED | OUTPATIENT
Start: 2019-04-03 | End: 2019-04-03

## 2019-04-03 RX ORDER — FENTANYL CITRATE 50 UG/ML
INJECTION, SOLUTION INTRAMUSCULAR; INTRAVENOUS AS NEEDED
Status: DISCONTINUED | OUTPATIENT
Start: 2019-04-03 | End: 2019-04-03

## 2019-04-03 RX ORDER — PROTAMINE SULFATE 10 MG/ML
INJECTION, SOLUTION INTRAVENOUS AS NEEDED
Status: DISCONTINUED | OUTPATIENT
Start: 2019-04-03 | End: 2019-04-03 | Stop reason: SURG

## 2019-04-03 RX ORDER — FENTANYL CITRATE 50 UG/ML
INJECTION, SOLUTION INTRAMUSCULAR; INTRAVENOUS AS NEEDED
Status: DISCONTINUED | OUTPATIENT
Start: 2019-04-03 | End: 2019-04-03 | Stop reason: SURG

## 2019-04-03 RX ORDER — HYDROMORPHONE HCL/PF 1 MG/ML
0.5 SYRINGE (ML) INJECTION
Status: DISCONTINUED | OUTPATIENT
Start: 2019-04-03 | End: 2019-04-04

## 2019-04-03 RX ORDER — SODIUM CHLORIDE, SODIUM LACTATE, POTASSIUM CHLORIDE, CALCIUM CHLORIDE 600; 310; 30; 20 MG/100ML; MG/100ML; MG/100ML; MG/100ML
INJECTION, SOLUTION INTRAVENOUS CONTINUOUS PRN
Status: DISCONTINUED | OUTPATIENT
Start: 2019-04-03 | End: 2019-04-03

## 2019-04-03 RX ORDER — ASPIRIN 81 MG/1
81 TABLET, CHEWABLE ORAL DAILY
Status: DISCONTINUED | OUTPATIENT
Start: 2019-04-03 | End: 2019-04-04 | Stop reason: HOSPADM

## 2019-04-03 RX ORDER — LIDOCAINE HYDROCHLORIDE 10 MG/ML
INJECTION, SOLUTION INFILTRATION; PERINEURAL AS NEEDED
Status: DISCONTINUED | OUTPATIENT
Start: 2019-04-03 | End: 2019-04-03 | Stop reason: SURG

## 2019-04-03 RX ORDER — SODIUM CHLORIDE 9 MG/ML
INJECTION, SOLUTION INTRAVENOUS CONTINUOUS PRN
Status: DISCONTINUED | OUTPATIENT
Start: 2019-04-03 | End: 2019-04-03

## 2019-04-03 RX ORDER — LABETALOL 20 MG/4 ML (5 MG/ML) INTRAVENOUS SYRINGE
5
Status: DISCONTINUED | OUTPATIENT
Start: 2019-04-03 | End: 2019-04-04 | Stop reason: HOSPADM

## 2019-04-03 RX ORDER — ONDANSETRON 2 MG/ML
4 INJECTION INTRAMUSCULAR; INTRAVENOUS ONCE AS NEEDED
Status: COMPLETED | OUTPATIENT
Start: 2019-04-03 | End: 2019-04-03

## 2019-04-03 RX ORDER — SODIUM CHLORIDE, SODIUM LACTATE, POTASSIUM CHLORIDE, CALCIUM CHLORIDE 600; 310; 30; 20 MG/100ML; MG/100ML; MG/100ML; MG/100ML
75 INJECTION, SOLUTION INTRAVENOUS CONTINUOUS
Status: DISCONTINUED | OUTPATIENT
Start: 2019-04-03 | End: 2019-04-04

## 2019-04-03 RX ORDER — BUPIVACAINE HYDROCHLORIDE AND EPINEPHRINE 5; 5 MG/ML; UG/ML
INJECTION, SOLUTION PERINEURAL AS NEEDED
Status: DISCONTINUED | OUTPATIENT
Start: 2019-04-03 | End: 2019-04-03 | Stop reason: HOSPADM

## 2019-04-03 RX ORDER — HEPARIN SODIUM 1000 [USP'U]/ML
INJECTION, SOLUTION INTRAVENOUS; SUBCUTANEOUS AS NEEDED
Status: DISCONTINUED | OUTPATIENT
Start: 2019-04-03 | End: 2019-04-03 | Stop reason: SURG

## 2019-04-03 RX ORDER — CHLORHEXIDINE GLUCONATE 0.12 MG/ML
15 RINSE ORAL ONCE
Status: COMPLETED | OUTPATIENT
Start: 2019-04-03 | End: 2019-04-03

## 2019-04-03 RX ORDER — ROCURONIUM BROMIDE 10 MG/ML
INJECTION, SOLUTION INTRAVENOUS AS NEEDED
Status: DISCONTINUED | OUTPATIENT
Start: 2019-04-03 | End: 2019-04-03 | Stop reason: SURG

## 2019-04-03 RX ORDER — GLYCOPYRROLATE 0.2 MG/ML
INJECTION INTRAMUSCULAR; INTRAVENOUS AS NEEDED
Status: DISCONTINUED | OUTPATIENT
Start: 2019-04-03 | End: 2019-04-03 | Stop reason: SURG

## 2019-04-03 RX ORDER — LEVOTHYROXINE SODIUM 0.1 MG/1
100 TABLET ORAL
Status: DISCONTINUED | OUTPATIENT
Start: 2019-04-04 | End: 2019-04-04 | Stop reason: HOSPADM

## 2019-04-03 RX ADMIN — SODIUM CHLORIDE, SODIUM LACTATE, POTASSIUM CHLORIDE, AND CALCIUM CHLORIDE: .6; .31; .03; .02 INJECTION, SOLUTION INTRAVENOUS at 08:10

## 2019-04-03 RX ADMIN — HEPARIN SODIUM 6000 UNITS: 1000 INJECTION INTRAVENOUS; SUBCUTANEOUS at 09:38

## 2019-04-03 RX ADMIN — FENTANYL CITRATE 25 MCG: 50 INJECTION, SOLUTION INTRAMUSCULAR; INTRAVENOUS at 17:19

## 2019-04-03 RX ADMIN — PHENYLEPHRINE HYDROCHLORIDE 50 MCG/MIN: 10 INJECTION INTRAVENOUS at 09:11

## 2019-04-03 RX ADMIN — SODIUM CHLORIDE 75 ML/HR: 0.9 INJECTION, SOLUTION INTRAVENOUS at 17:00

## 2019-04-03 RX ADMIN — CHLORHEXIDINE GLUCONATE 15 ML: 1.2 RINSE ORAL at 06:44

## 2019-04-03 RX ADMIN — PROTAMINE SULFATE 40 MG: 10 INJECTION, SOLUTION INTRAVENOUS at 10:45

## 2019-04-03 RX ADMIN — FENTANYL CITRATE 25 MCG: 50 INJECTION, SOLUTION INTRAMUSCULAR; INTRAVENOUS at 12:30

## 2019-04-03 RX ADMIN — EPHEDRINE SULFATE 2.5 MG: 50 INJECTION, SOLUTION INTRAVENOUS at 10:36

## 2019-04-03 RX ADMIN — PROPOFOL 100 MG: 10 INJECTION, EMULSION INTRAVENOUS at 08:22

## 2019-04-03 RX ADMIN — LIDOCAINE HYDROCHLORIDE 100 MG: 10 INJECTION, SOLUTION INFILTRATION; PERINEURAL at 08:22

## 2019-04-03 RX ADMIN — FENTANYL CITRATE 50 MCG: 50 INJECTION, SOLUTION INTRAMUSCULAR; INTRAVENOUS at 08:22

## 2019-04-03 RX ADMIN — FENTANYL CITRATE 50 MCG: 50 INJECTION, SOLUTION INTRAMUSCULAR; INTRAVENOUS at 09:39

## 2019-04-03 RX ADMIN — EPHEDRINE SULFATE 5 MG: 50 INJECTION, SOLUTION INTRAVENOUS at 09:11

## 2019-04-03 RX ADMIN — SODIUM CHLORIDE: 0.9 INJECTION, SOLUTION INTRAVENOUS at 08:45

## 2019-04-03 RX ADMIN — ACETAMINOPHEN 650 MG: 325 TABLET ORAL at 20:01

## 2019-04-03 RX ADMIN — EPHEDRINE SULFATE 5 MG: 50 INJECTION, SOLUTION INTRAVENOUS at 07:20

## 2019-04-03 RX ADMIN — NEOSTIGMINE METHYLSULFATE 3 MG: 1 INJECTION, SOLUTION INTRAVENOUS at 11:01

## 2019-04-03 RX ADMIN — PHENYLEPHRINE HYDROCHLORIDE 50 MCG: 10 INJECTION INTRAVENOUS at 09:45

## 2019-04-03 RX ADMIN — ROCURONIUM BROMIDE 40 MG: 10 INJECTION, SOLUTION INTRAVENOUS at 08:22

## 2019-04-03 RX ADMIN — FENTANYL CITRATE 50 MCG: 50 INJECTION, SOLUTION INTRAMUSCULAR; INTRAVENOUS at 09:04

## 2019-04-03 RX ADMIN — GLYCOPYRROLATE 0.4 MG: 0.2 INJECTION, SOLUTION INTRAMUSCULAR; INTRAVENOUS at 11:01

## 2019-04-03 RX ADMIN — HEPARIN SODIUM 5000 UNITS: 5000 INJECTION INTRAVENOUS; SUBCUTANEOUS at 21:21

## 2019-04-03 RX ADMIN — ASPIRIN 81 MG 81 MG: 81 TABLET ORAL at 19:42

## 2019-04-03 RX ADMIN — FENTANYL CITRATE 50 MCG: 50 INJECTION, SOLUTION INTRAMUSCULAR; INTRAVENOUS at 11:03

## 2019-04-03 RX ADMIN — FENTANYL CITRATE 25 MCG: 50 INJECTION, SOLUTION INTRAMUSCULAR; INTRAVENOUS at 12:05

## 2019-04-03 RX ADMIN — CLOPIDOGREL BISULFATE 75 MG: 75 TABLET ORAL at 19:42

## 2019-04-03 RX ADMIN — CEFAZOLIN SODIUM 2000 MG: 2 SOLUTION INTRAVENOUS at 08:30

## 2019-04-03 RX ADMIN — ONDANSETRON 4 MG: 2 INJECTION INTRAMUSCULAR; INTRAVENOUS at 17:24

## 2019-04-03 RX ADMIN — SODIUM CHLORIDE, SODIUM LACTATE, POTASSIUM CHLORIDE, AND CALCIUM CHLORIDE: .6; .31; .03; .02 INJECTION, SOLUTION INTRAVENOUS at 10:01

## 2019-04-03 RX ADMIN — FENTANYL CITRATE 25 MCG: 50 INJECTION, SOLUTION INTRAMUSCULAR; INTRAVENOUS at 12:00

## 2019-04-04 VITALS
HEART RATE: 56 BPM | DIASTOLIC BLOOD PRESSURE: 60 MMHG | TEMPERATURE: 97.8 F | HEIGHT: 62 IN | RESPIRATION RATE: 18 BRPM | SYSTOLIC BLOOD PRESSURE: 125 MMHG | WEIGHT: 121.03 LBS | BODY MASS INDEX: 22.27 KG/M2 | OXYGEN SATURATION: 98 %

## 2019-04-04 LAB
ANION GAP SERPL CALCULATED.3IONS-SCNC: 3 MMOL/L (ref 4–13)
APTT PPP: 26 SECONDS (ref 26–38)
BUN SERPL-MCNC: 7 MG/DL (ref 5–25)
CALCIUM SERPL-MCNC: 6.9 MG/DL (ref 8.3–10.1)
CHLORIDE SERPL-SCNC: 111 MMOL/L (ref 100–108)
CO2 SERPL-SCNC: 27 MMOL/L (ref 21–32)
CREAT SERPL-MCNC: 0.48 MG/DL (ref 0.6–1.3)
ERYTHROCYTE [DISTWIDTH] IN BLOOD BY AUTOMATED COUNT: 15.3 % (ref 11.6–15.1)
GFR SERPL CREATININE-BSD FRML MDRD: 95 ML/MIN/1.73SQ M
GLUCOSE SERPL-MCNC: 101 MG/DL (ref 65–140)
HCT VFR BLD AUTO: 32.6 % (ref 34.8–46.1)
HGB BLD-MCNC: 10.2 G/DL (ref 11.5–15.4)
INR PPP: 1.07 (ref 0.86–1.17)
MCH RBC QN AUTO: 30.5 PG (ref 26.8–34.3)
MCHC RBC AUTO-ENTMCNC: 31.3 G/DL (ref 31.4–37.4)
MCV RBC AUTO: 98 FL (ref 82–98)
PLATELET # BLD AUTO: 142 THOUSANDS/UL (ref 149–390)
PMV BLD AUTO: 11.6 FL (ref 8.9–12.7)
POTASSIUM SERPL-SCNC: 4.2 MMOL/L (ref 3.5–5.3)
PROTHROMBIN TIME: 14 SECONDS (ref 11.8–14.2)
RBC # BLD AUTO: 3.34 MILLION/UL (ref 3.81–5.12)
SODIUM SERPL-SCNC: 141 MMOL/L (ref 136–145)
WBC # BLD AUTO: 3.5 THOUSAND/UL (ref 4.31–10.16)

## 2019-04-04 PROCEDURE — 99024 POSTOP FOLLOW-UP VISIT: CPT | Performed by: PHYSICIAN ASSISTANT

## 2019-04-04 PROCEDURE — NC001 PR NO CHARGE: Performed by: PHYSICIAN ASSISTANT

## 2019-04-04 PROCEDURE — 80048 BASIC METABOLIC PNL TOTAL CA: CPT | Performed by: PHYSICIAN ASSISTANT

## 2019-04-04 PROCEDURE — 85610 PROTHROMBIN TIME: CPT | Performed by: PHYSICIAN ASSISTANT

## 2019-04-04 PROCEDURE — 85730 THROMBOPLASTIN TIME PARTIAL: CPT | Performed by: PHYSICIAN ASSISTANT

## 2019-04-04 PROCEDURE — 85027 COMPLETE CBC AUTOMATED: CPT | Performed by: PHYSICIAN ASSISTANT

## 2019-04-04 RX ORDER — ACETAMINOPHEN 325 MG/1
650 TABLET ORAL EVERY 6 HOURS PRN
Qty: 30 TABLET | Refills: 0 | Status: SHIPPED | OUTPATIENT
Start: 2019-04-04 | End: 2020-12-21

## 2019-04-04 RX ORDER — OXYCODONE HYDROCHLORIDE 5 MG/1
5 TABLET ORAL EVERY 4 HOURS PRN
Qty: 30 TABLET | Refills: 0 | Status: SHIPPED | OUTPATIENT
Start: 2019-04-04 | End: 2019-04-14

## 2019-04-04 RX ADMIN — HEPARIN SODIUM 5000 UNITS: 5000 INJECTION INTRAVENOUS; SUBCUTANEOUS at 05:49

## 2019-04-04 RX ADMIN — ASPIRIN 81 MG 81 MG: 81 TABLET ORAL at 08:31

## 2019-04-04 RX ADMIN — LEVOTHYROXINE SODIUM 100 MCG: 100 TABLET ORAL at 05:49

## 2019-04-04 RX ADMIN — FERROUS GLUCONATE 324 MG: 324 TABLET ORAL at 08:31

## 2019-04-04 RX ADMIN — ACETAMINOPHEN 650 MG: 325 TABLET ORAL at 04:38

## 2019-04-04 RX ADMIN — SODIUM CHLORIDE 75 ML/HR: 0.9 INJECTION, SOLUTION INTRAVENOUS at 04:35

## 2019-04-04 RX ADMIN — Medication 1 TABLET: at 08:31

## 2019-04-04 RX ADMIN — CLOPIDOGREL BISULFATE 75 MG: 75 TABLET ORAL at 08:30

## 2019-04-04 RX ADMIN — ESTROGENS, CONJUGATED 0.62 MG: 0.62 TABLET, FILM COATED ORAL at 08:30

## 2019-04-05 ENCOUNTER — TELEPHONE (OUTPATIENT)
Dept: VASCULAR SURGERY | Facility: CLINIC | Age: 79
End: 2019-04-05

## 2019-04-12 ENCOUNTER — TELEPHONE (OUTPATIENT)
Dept: VASCULAR SURGERY | Facility: CLINIC | Age: 79
End: 2019-04-12

## 2019-04-16 PROBLEM — R20.2 NUMBNESS AND TINGLING IN LEFT ARM: Status: RESOLVED | Noted: 2019-03-27 | Resolved: 2019-04-16

## 2019-04-16 PROBLEM — R20.0 NUMBNESS AND TINGLING IN LEFT ARM: Status: RESOLVED | Noted: 2019-03-27 | Resolved: 2019-04-16

## 2019-04-17 ENCOUNTER — OFFICE VISIT (OUTPATIENT)
Dept: VASCULAR SURGERY | Facility: CLINIC | Age: 79
End: 2019-04-17

## 2019-04-17 VITALS
BODY MASS INDEX: 20.02 KG/M2 | HEIGHT: 63 IN | HEART RATE: 87 BPM | WEIGHT: 113 LBS | DIASTOLIC BLOOD PRESSURE: 68 MMHG | TEMPERATURE: 98.3 F | SYSTOLIC BLOOD PRESSURE: 150 MMHG

## 2019-04-17 DIAGNOSIS — Z98.890 POSTOPERATIVE STATE: ICD-10-CM

## 2019-04-17 DIAGNOSIS — I65.21 SYMPTOMATIC CAROTID ARTERY STENOSIS WITHOUT INFARCTION, RIGHT: Primary | ICD-10-CM

## 2019-04-17 PROCEDURE — 99024 POSTOP FOLLOW-UP VISIT: CPT | Performed by: PHYSICIAN ASSISTANT

## 2019-04-22 ENCOUNTER — HOSPITAL ENCOUNTER (OUTPATIENT)
Dept: MAMMOGRAPHY | Facility: MEDICAL CENTER | Age: 79
Discharge: HOME | End: 2019-04-22
Payer: MEDICARE

## 2019-04-22 VITALS — BODY MASS INDEX: 20.02 KG/M2 | WEIGHT: 113 LBS | HEIGHT: 63 IN

## 2019-04-22 DIAGNOSIS — Z12.31 ENCOUNTER FOR SCREENING MAMMOGRAM FOR MALIGNANT NEOPLASM OF BREAST: ICD-10-CM

## 2019-04-22 PROCEDURE — 77067 SCR MAMMO BI INCL CAD: CPT

## 2019-04-22 PROCEDURE — 77063 BREAST TOMOSYNTHESIS BI: CPT

## 2019-05-18 DIAGNOSIS — G45.9 TIA (TRANSIENT ISCHEMIC ATTACK): ICD-10-CM

## 2019-05-20 RX ORDER — CLOPIDOGREL BISULFATE 75 MG/1
TABLET ORAL
Qty: 60 TABLET | Refills: 0 | Status: SHIPPED | OUTPATIENT
Start: 2019-05-20 | End: 2020-12-21

## 2019-07-08 ENCOUNTER — HOSPITAL ENCOUNTER (OUTPATIENT)
Dept: INFUSION CENTER | Facility: CLINIC | Age: 79
Discharge: HOME | End: 2019-07-08
Payer: MEDICARE

## 2019-07-08 VITALS
SYSTOLIC BLOOD PRESSURE: 122 MMHG | TEMPERATURE: 97.7 F | HEART RATE: 61 BPM | RESPIRATION RATE: 16 BRPM | DIASTOLIC BLOOD PRESSURE: 68 MMHG

## 2019-07-08 PROCEDURE — 96401 CHEMO ANTI-NEOPL SQ/IM: CPT

## 2019-07-08 RX ADMIN — DENOSUMAB 60 MG: 60 INJECTION SUBCUTANEOUS at 14:00

## 2019-07-08 NOTE — PROGRESS NOTES
Prolia given as ordered  Pt offers no complaints  Pt is aware of all future appointments   Refused AVS

## 2019-07-09 ENCOUNTER — HOSPITAL ENCOUNTER (OUTPATIENT)
Dept: NON INVASIVE DIAGNOSTICS | Facility: CLINIC | Age: 79
Discharge: HOME | End: 2019-07-09
Payer: MEDICARE

## 2019-07-09 DIAGNOSIS — I65.21 SYMPTOMATIC CAROTID ARTERY STENOSIS WITHOUT INFARCTION, RIGHT: ICD-10-CM

## 2019-07-09 DIAGNOSIS — Z98.890 POSTOPERATIVE STATE: ICD-10-CM

## 2019-07-09 PROCEDURE — 93880 EXTRACRANIAL BILAT STUDY: CPT

## 2019-07-09 PROCEDURE — 1123F ACP DISCUSS/DSCN MKR DOCD: CPT | Performed by: SURGERY

## 2019-07-09 PROCEDURE — 93880 EXTRACRANIAL BILAT STUDY: CPT | Performed by: SURGERY

## 2019-10-16 ENCOUNTER — ANNUAL EXAM (OUTPATIENT)
Dept: GYNECOLOGY | Facility: CLINIC | Age: 79
End: 2019-10-16
Payer: MEDICARE

## 2019-10-16 DIAGNOSIS — Z01.419 ENCOUNTER FOR GYNECOLOGICAL EXAMINATION (GENERAL) (ROUTINE) WITHOUT ABNORMAL FINDINGS: Primary | ICD-10-CM

## 2019-10-16 DIAGNOSIS — Z12.39 BREAST CANCER SCREENING: ICD-10-CM

## 2019-10-16 DIAGNOSIS — Z11.51 SCREENING FOR HUMAN PAPILLOMAVIRUS (HPV): ICD-10-CM

## 2019-10-16 DIAGNOSIS — N95.2 POSTMENOPAUSAL ATROPHIC VAGINITIS: ICD-10-CM

## 2019-10-16 DIAGNOSIS — N90.4 LICHEN SCLEROSUS ET ATROPHICUS OF THE VULVA: ICD-10-CM

## 2019-10-16 PROCEDURE — G0145 SCR C/V CYTO,THINLAYER,RESCR: HCPCS | Performed by: OBSTETRICS & GYNECOLOGY

## 2019-10-16 PROCEDURE — 87624 HPV HI-RISK TYP POOLED RSLT: CPT | Performed by: OBSTETRICS & GYNECOLOGY

## 2019-10-16 PROCEDURE — G0101 CA SCREEN;PELVIC/BREAST EXAM: HCPCS | Performed by: OBSTETRICS & GYNECOLOGY

## 2019-10-16 RX ORDER — ESTRADIOL 0.1 MG/G
1 CREAM VAGINAL 2 TIMES WEEKLY
Qty: 42.5 G | Refills: 3 | Status: SHIPPED | OUTPATIENT
Start: 2019-10-17 | End: 2021-11-15 | Stop reason: SDUPTHER

## 2019-10-16 RX ORDER — CLOBETASOL PROPIONATE 0.5 MG/G
OINTMENT TOPICAL WEEKLY
Qty: 30 G | Refills: 0 | Status: ON HOLD | OUTPATIENT
Start: 2019-10-16 | End: 2021-10-15 | Stop reason: ALTCHOICE

## 2019-10-16 RX ORDER — AMLODIPINE BESYLATE 2.5 MG/1
2.5 TABLET ORAL DAILY
COMMUNITY
End: 2020-09-02

## 2019-10-16 NOTE — PROGRESS NOTES
Assessment/Plan:       Diagnoses and all orders for this visit:    Encounter for gynecological examination (general) (routine) without abnormal findings    Lichen sclerosus et atrophicus of the vulva  -     clobetasol (TEMOVATE) 0 05 % ointment; Apply topically once a week    Postmenopausal atrophic vaginitis  -     estradiol (ESTRACE VAGINAL) 0 1 mg/g vaginal cream; Insert 1 g into the vagina 2 (two) times a week  -     conjugated estrogens (PREMARIN) 0 625 mg tablet; Take 1 tablet (0 625 mg total) by mouth daily    Breast cancer screening  -     Mammo screening bilateral w 3d & cad; Future    Other orders  -     amLODIPine (NORVASC) 2 5 mg tablet; Take 2 5 mg by mouth daily          Subjective:      Patient ID: Bonny Nelson is a 70 y o  female  The patient is a 22-year-old who presents for a biannual gyn exam    She has had a previous hysterectomy and removal of all but part of 1 ovary  The hysterectomy was done for fibroids  She denies any vaginal bleeding or breast problems  Her only bladder complaint is that she had a recent urinary tract infection  She was placed on antibiotics  Her symptoms improved but the pressure in her bladder recurred several days later  This improved again and then recurred a 3rd time  It has now resolved for the past week  The patient has a history of lichen sclerosis in labial agglutination  She is currently using clobetasol once a week to prevent recurrence  This is working well and she would like to continue  She is also using estradiol cream externally and intravaginally for dryness and the labial agglutination  This also has improved significantly  She will continue to use this also  The patient is back on Premarin 0 625 mg daily  She had stopped last year, but her primary care physician put her back on  [facility] mails away to [facility])  She is aware of a generic but wants to stay on the Premarin    She gets this prescription from her primary care physician  The following portions of the patient's history were reviewed and updated as appropriate: allergies, current medications, past family history, past medical history, past social history, past surgical history and problem list     Review of Systems   Constitutional: Negative  Respiratory: Negative for shortness of breath  Cardiovascular: Negative for chest pain  Gastrointestinal: Positive for diarrhea  Chronic diarrhea   Genitourinary: Positive for dysuria  Negative for pelvic pain, vaginal bleeding, vaginal discharge and vaginal pain  Recent UTI   Skin: Negative  Psychiatric/Behavioral: Negative for agitation, behavioral problems and confusion  Objective: There were no vitals taken for this visit  Physical Exam   Constitutional: She appears well-developed and well-nourished  No distress  HENT:   Head: Normocephalic  Pulmonary/Chest: Effort normal  No respiratory distress  Right breast exhibits no inverted nipple, no mass, no nipple discharge, no skin change and no tenderness  Left breast exhibits no inverted nipple, no mass, no nipple discharge, no skin change and no tenderness  Breasts are symmetrical    Abdominal: She exhibits no distension and no mass  There is no tenderness  There is no rebound and no guarding  Genitourinary: Rectal exam shows anal tone abnormal  Rectal exam shows no external hemorrhoid, no internal hemorrhoid, no fissure, no mass and no tenderness  No labial fusion  There is no rash, tenderness, lesion or injury on the right labia  There is no rash, tenderness, lesion or injury on the left labia  No erythema, tenderness or bleeding in the vagina  No foreign body in the vagina  No signs of injury around the vagina  No vaginal discharge found  Genitourinary Comments: The urethral meatus is normal   Atrophic vaginal changes are present  The cervix and uterus are surgically absent  Adnexa are not palpable     Anal sphincter with poor tone  Lymphadenopathy:        Right axillary: No pectoral and no lateral adenopathy present  Left axillary: No pectoral and no lateral adenopathy present  Skin: Skin is warm, dry and intact  She is not diaphoretic  No cyanosis  Nails show no clubbing  Psychiatric: She has a normal mood and affect   Her speech is normal and behavior is normal

## 2019-10-20 LAB
HPV HR 12 DNA CVX QL NAA+PROBE: NEGATIVE
HPV16 DNA CVX QL NAA+PROBE: NEGATIVE
HPV18 DNA CVX QL NAA+PROBE: NEGATIVE

## 2019-10-21 ENCOUNTER — TELEPHONE (OUTPATIENT)
Dept: GYNECOLOGY | Facility: CLINIC | Age: 79
End: 2019-10-21

## 2019-10-21 NOTE — TELEPHONE ENCOUNTER
Dozier Boeck called and wanted to know why her medications were not called into [facility]  Looks like scripts were sent to [facility] in [place]  stated Dozier Boeck should call [facility] and have transferred to [facility] rx

## 2019-10-23 LAB
LAB AP GYN PRIMARY INTERPRETATION: NORMAL
Lab: NORMAL
PATH INTERP SPEC-IMP: NORMAL

## 2019-10-30 DIAGNOSIS — B37.3 MONILIAL VAGINITIS: Primary | ICD-10-CM

## 2019-10-30 RX ORDER — FLUCONAZOLE 150 MG/1
150 TABLET ORAL ONCE
Qty: 1 TABLET | Refills: 0 | Status: SHIPPED | OUTPATIENT
Start: 2019-10-30 | End: 2019-10-30

## 2019-11-09 ENCOUNTER — TELEPHONE (OUTPATIENT)
Dept: GYNECOLOGY | Facility: CLINIC | Age: 79
End: 2019-11-09

## 2019-11-09 NOTE — TELEPHONE ENCOUNTER
----- Message from Ewa Lambert MD sent at 10/30/2019  5:22 PM EDT -----  Notify pt that her pap and HPV are normal   There is some yeast seen on the pap smear  If she has any burning or itching, she may fill a prescription for fluconazole  Rx sent to her pharmacy

## 2019-12-19 ENCOUNTER — TRANSCRIBE ORDERS (OUTPATIENT)
Dept: ADMINISTRATIVE | Facility: HOSPITAL | Age: 79
End: 2019-12-19

## 2019-12-19 DIAGNOSIS — Z13.820 ENCOUNTER FOR OSTEOPOROSIS SCREENING IN ASYMPTOMATIC POSTMENOPAUSAL PATIENT: ICD-10-CM

## 2019-12-19 DIAGNOSIS — Z13.820 SCREENING FOR OSTEOPOROSIS: Primary | ICD-10-CM

## 2019-12-19 DIAGNOSIS — Z78.0 ENCOUNTER FOR OSTEOPOROSIS SCREENING IN ASYMPTOMATIC POSTMENOPAUSAL PATIENT: ICD-10-CM

## 2019-12-23 ENCOUNTER — HOSPITAL ENCOUNTER (OUTPATIENT)
Dept: RADIOLOGY | Age: 79
Discharge: HOME | End: 2019-12-23
Payer: MEDICARE

## 2019-12-23 DIAGNOSIS — Z13.820 SCREENING FOR OSTEOPOROSIS: ICD-10-CM

## 2019-12-23 DIAGNOSIS — Z13.820 ENCOUNTER FOR OSTEOPOROSIS SCREENING IN ASYMPTOMATIC POSTMENOPAUSAL PATIENT: ICD-10-CM

## 2019-12-23 DIAGNOSIS — Z78.0 ENCOUNTER FOR OSTEOPOROSIS SCREENING IN ASYMPTOMATIC POSTMENOPAUSAL PATIENT: ICD-10-CM

## 2019-12-23 PROCEDURE — 77080 DXA BONE DENSITY AXIAL: CPT

## 2020-01-08 ENCOUNTER — HOSPITAL ENCOUNTER (OUTPATIENT)
Dept: NON INVASIVE DIAGNOSTICS | Facility: CLINIC | Age: 80
Discharge: HOME | End: 2020-01-08
Payer: MEDICARE

## 2020-01-08 DIAGNOSIS — Z98.890 POSTOPERATIVE STATE: ICD-10-CM

## 2020-01-08 DIAGNOSIS — I65.21 SYMPTOMATIC CAROTID ARTERY STENOSIS WITHOUT INFARCTION, RIGHT: ICD-10-CM

## 2020-01-08 PROCEDURE — 93880 EXTRACRANIAL BILAT STUDY: CPT

## 2020-01-08 PROCEDURE — 93880 EXTRACRANIAL BILAT STUDY: CPT | Performed by: SURGERY

## 2020-01-09 ENCOUNTER — TELEPHONE (OUTPATIENT)
Dept: VASCULAR SURGERY | Facility: CLINIC | Age: 80
End: 2020-01-09

## 2020-01-13 ENCOUNTER — OFFICE VISIT (OUTPATIENT)
Dept: VASCULAR SURGERY | Facility: CLINIC | Age: 80
End: 2020-01-13
Payer: MEDICARE

## 2020-01-13 VITALS
SYSTOLIC BLOOD PRESSURE: 118 MMHG | HEIGHT: 63 IN | BODY MASS INDEX: 20.55 KG/M2 | DIASTOLIC BLOOD PRESSURE: 50 MMHG | WEIGHT: 116 LBS | TEMPERATURE: 97.4 F | HEART RATE: 64 BPM

## 2020-01-13 DIAGNOSIS — I65.21 SYMPTOMATIC CAROTID ARTERY STENOSIS WITHOUT INFARCTION, RIGHT: Primary | ICD-10-CM

## 2020-01-13 DIAGNOSIS — I10 ESSENTIAL HYPERTENSION: ICD-10-CM

## 2020-01-13 PROCEDURE — 99214 OFFICE O/P EST MOD 30 MIN: CPT | Performed by: NURSE PRACTITIONER

## 2020-01-13 NOTE — PROGRESS NOTES
Assessment/Plan:    Symptomatic carotid artery stenosis without infarction, right  68-year-old female with a history of HTN, hypothyroidism, gastric bypass, pancytopenia and symptomatic high-grade right carotid artery stenosis with preoperative TIA 3/27/2019 s/p R CEA by Dr Kirstie Hoover 4/3/2019 returns the vascular office for long-term follow-up and review of recent carotid study/risk factor modification  Patient denies any new signs and symptoms of TIA/CVA including amaurosis fugax, unilateral upper or lower extremity weaknesses, speech or swallowing difficulties  She is maintained on aspirin  Statin intolerant  Review of recent carotid artery ultrasound shows that the right CEA site is widely patent and the left ICA with less than 50% stenosis  This was reviewed with the patient at today's visit  Recommendations:  -continue medical management with daily ASA  -continue with BP control per PCP  -continue with healthy lifestyle including low-fat, low-chol diet  -Lipid panel reviewed; statin intolerant  -exercise regularly  -reviewed s/s of TIA/CVA and when to call 911 - pt expressed understanding  -CV duplex in 1 yr  -OV annually after CV duplex unless symptomatic    Essential hypertension  HTN  -stable  -continue medical management per PCP         Diagnoses and all orders for this visit:    Symptomatic carotid artery stenosis without infarction, right  -     VAS carotid complete study; Future    Essential hypertension          Subjective:      Patient ID: Ese Benitez is a 70 y o  female  Patient presents in office to review the results of her  CV done on 1/8/2020  Patient had CEA done on 4/3/2019 by Dr Ting Cerna  Patient denies any TIA stroke symptoms  Patient denies any chills or fever  Patient is taking aspirin      Jesse Powell is a 68-year-old female with a history of HTN, hypothyroidism, gastric bypass, pancytopenia and symptomatic high-grade right carotid artery stenosis with preoperative TIA 3/27/2019 s/p R CEA by Dr Emily French 4/3/2019 returns the vascular office for long-term follow-up and review of recent carotid study/risk factor modification  Patient denies any new signs and symptoms of TIA/CVA including amaurosis fugax, unilateral upper or lower extremity weaknesses, speech or swallowing difficulties  She is maintained on aspirin  Statin intolerant  The following portions of the patient's history were reviewed and updated as appropriate: allergies, current medications, past family history, past medical history, past social history, past surgical history and problem list     Review of Systems   Constitutional: Negative  Negative for chills and fever  HENT: Negative  Eyes: Negative  Negative for visual disturbance  Respiratory: Negative  Cardiovascular: Negative  Gastrointestinal: Negative  Endocrine: Negative  Genitourinary: Negative  Musculoskeletal: Negative  Skin: Negative  Negative for wound  Allergic/Immunologic: Negative  Neurological: Negative  Negative for facial asymmetry, speech difficulty, weakness and headaches  Hematological: Negative  Psychiatric/Behavioral: Negative  Objective:      /50 (BP Location: Right arm, Patient Position: Sitting, Cuff Size: Adult)   Pulse 64   Temp (!) 97 4 °F (36 3 °C) (Tympanic)   Ht 5' 2 5" (1 588 m)   Wt 52 6 kg (116 lb)   BMI 20 88 kg/m²          Physical Exam   Constitutional: She is oriented to person, place, and time  She appears well-developed and well-nourished  HENT:   Head: Normocephalic and atraumatic  Eyes: Pupils are equal, round, and reactive to light  EOM are normal  No scleral icterus  Neck: Normal range of motion  Carotid bruit is present (LEFT)  Cardiovascular: Normal rate, regular rhythm and normal heart sounds  Pulses:       Carotid pulses are 2+ on the right side, and 2+ on the left side  Radial pulses are 2+ on the right side, and 2+ on the left side  Pulmonary/Chest: Effort normal and breath sounds normal    Abdominal: Soft  Normal appearance and bowel sounds are normal    Musculoskeletal: Normal range of motion  Neurological: She is alert and oriented to person, place, and time  She has normal strength  No neuro deficits noted   Skin: Skin is warm, dry and intact  Capillary refill takes less than 2 seconds  Psychiatric: She has a normal mood and affect  Her speech is normal and behavior is normal  Judgment and thought content normal  Cognition and memory are normal    Nursing note and vitals reviewed  I have reviewed and made appropriate changes to the review of systems input by the medical assistant      Vitals:    01/13/20 1151 01/13/20 1153   BP: 114/58 118/50   BP Location: Left arm Right arm   Patient Position: Sitting Sitting   Cuff Size: Adult Adult   Pulse: 64    Temp: (!) 97 4 °F (36 3 °C)    TempSrc: Tympanic    Weight: 52 6 kg (116 lb)    Height: 5' 2 5" (1 588 m)        Patient Active Problem List   Diagnosis    Symptomatic carotid artery stenosis without infarction, right    Hypothyroidism    Pancytopenia ([address] )    Essential hypertension       Past Surgical History:   Procedure Laterality Date    ABDOMINOPLASTY  07/29/2002    TUMMY TUCK    APPENDECTOMY  1970    AUGMENTATION BREAST  01/25/2002    BASAL CELL CARCINOMA EXCISION Left 05/04/2011    cheek    CATARACT EXTRACTION W/  INTRAOCULAR LENS IMPLANT Right 04/15/2014    CATARACT EXTRACTION W/  INTRAOCULAR LENS IMPLANT Left 04/22/2014    COLONOSCOPY W/ POLYPECTOMY  12/17/2008    COLONOSCOPY W/ POLYPECTOMY  04/28/2011    COLONOSCOPY W/ POLYPECTOMY  07/09/2014    COLONOSCOPY W/ POLYPECTOMY  07/26/2017    CYST REMOVAL  1975    vaginal     CYST REMOVAL  10/26/2006    R vulva- Sebaceous    EYE SURGERY Left 08/09/2014    Yag Laser    EYE SURGERY Right 08/26/2014    Yag laser    FINGER SURGERY Right     4th- cyst excision w/ bone debridement    GASTRIC BYPASS  09/28/2000    INCONTINENCE SURGERY  04/19/2014    Solesta bulking agent for fecal incontinence    OOPHORECTOMY Right     age 31    DC Ryan Coyle Right 4/3/2019    Procedure: ENDARTERECTOMY ARTERY CAROTID;  Surgeon: José Miguel Duncan DO;  Location: [facility];  Service: Vascular    RHYTIDECTOMY NECK / Karen Chris / Taffy Little  12/04/2001    Chin & neck    ROTATOR CUFF REPAIR Right 05/01/2003    SQUAMOUS CELL CARCINOMA EXCISION Left 03/05/2013    ABOVE LIP ON LEFT SIDE    SUPERIOR OBLIQUE TUCK  12/30/2002    BUTTOCK TUCK    THIGH LIFT  10/29/2002    THIGH TUCK    TONSILLECTOMY      TOTAL ABDOMINAL HYSTERECTOMY      USO FOR FIBROIDS, OVARIAN CYST - PART OF ONE OVARY LEFT IN     VULVA / PERINEUM BIOPSY  05/27/2015    labia-- "negative"       Family History   Problem Relation Age of Onset    Hodgkin's lymphoma Sister 22    Stroke Mother 36       Social History     Socioeconomic History    Marital status: /Civil Union     Spouse name: Not on file    Number of children: Not on file    Years of education: Not on file    Highest education level: Not on file   Occupational History    Not on file   Social Needs    Financial resource strain: Not on file    Food insecurity:     Worry: Not on file     Inability: Not on file    Transportation needs:     Medical: Not on file     Non-medical: Not on file   Tobacco Use    Smoking status: Never Smoker    Smokeless tobacco: Never Used    Tobacco comment: NO TOBACCO USE   Substance and Sexual Activity    Alcohol use:  Yes     Alcohol/week: 1 0 standard drinks     Types: 1 Glasses of wine per week     Binge frequency: Less than monthly     Comment: monthly occasionally    Drug use: No    Sexual activity: Never   Lifestyle    Physical activity:     Days per week: Not on file     Minutes per session: Not on file    Stress: Not on file   Relationships    Social connections:     Talks on phone: Not on file     Gets together: Not on file     Attends L[employer]-day service: Not on file     Active member of club or organization: Not on file     Attends meetings of clubs or organizations: Not on file     Relationship status: Not on file    Intimate partner violence:     Fear of current or ex partner: Not on file     Emotionally abused: Not on file     Physically abused: Not on file     Forced sexual activity: Not on file   Other Topics Concern    Not on file   Social History Narrative    Not on file       Allergies   Allergen Reactions    Atorvastatin Confusion     Fogginess + disorientation    Codeine Vomiting     Reaction Date: 02Sep2003;     Promethazine Other (See Comments)     Very dry mouth and throat which causes swallowing problems    Statins Other (See Comments)     Bad mental fogginess & disorientation         Current Outpatient Medications:     acetaminophen (TYLENOL) 325 mg tablet, Take 2 tablets (650 mg total) by mouth every 6 (six) hours as needed for mild pain, Disp: 30 tablet, Rfl: 0    amLODIPine (NORVASC) 2 5 mg tablet, Take 2 5 mg by mouth daily, Disp: , Rfl:     aspirin 81 mg chewable tablet, Chew 81 mg daily, Disp: , Rfl:     Bioflavonoid Products (C COMPLEX PO), Take 1,500 mg by mouth daily, Disp: , Rfl:     calcium-vitamin D 250-100 MG-UNIT per tablet, Take 1 tablet by mouth daily , Disp: , Rfl:     clobetasol (TEMOVATE) 0 05 % ointment, Apply topically once a week, Disp: 30 g, Rfl: 0    conjugated estrogens (PREMARIN) 0 625 mg tablet, Take 1 tablet (0 625 mg total) by mouth daily, Disp: 30 tablet, Rfl: 2    CRANBERRY PO, Take 1 tablet by mouth daily, Disp: , Rfl:     Cyanocobalamin (VITAMIN B 12 PO), Place 500 mcg under the tongue daily, Disp: , Rfl:     estradiol (ESTRACE VAGINAL) 0 1 mg/g vaginal cream, Insert 1 g into the vagina 2 (two) times a week, Disp: 42 5 g, Rfl: 3    ferrous gluconate (FERGON) 240 (27 FE) MG tablet, Take 27 mg by mouth daily, Disp: , Rfl:     Garlic 3945 MG CAPS, Take 1,000 mg by mouth daily, Disp: , Rfl:    levothyroxine 100 mcg tablet, Take 100 mcg by mouth daily, Disp: , Rfl:     Multiple Vitamins-Minerals (CENTRUM SILVER PO), Take 1 tablet by mouth daily, Disp: , Rfl:     Omega-3 Fatty Acids (FISH OIL) 1200 MG CAPS, Take 2,400 mg by mouth daily, Disp: , Rfl:     Probiotic Product (DIGESTIVE ADVANTAGE PO), Take 1 capsule by mouth daily, Disp: , Rfl:     Zinc 30 MG TABS, Take 30 mg by mouth daily, Disp: , Rfl:     clopidogrel (PLAVIX) 75 mg tablet, TAKE 1 TABLET BY MOUTH EVERY DAY (Patient not taking: Reported on 1/13/2020), Disp: 60 tablet, Rfl: 0

## 2020-01-13 NOTE — PATIENT INSTRUCTIONS
Your right carotid artery is widely patent since your surgery on 4/3/2019  Continue with Aspirin daily  Continue healthy lifestyle including low-fat, low-cholesterol diet and regular exercise,  Continue with good blood pressure control  If you have any signs or symptoms of stroke, please call 911 and go to the ER for evaluation  Carotid Artery Disease   AMBULATORY CARE:   Carotid artery disease  is a condition that causes narrow or blocked carotid arteries  Your carotid arteries are the blood vessels that supply your brain with most of the blood it needs to work  You have 2 carotid arteries, one on each side of your neck  Call 911 for any of the following:   · You have any of the following signs of a stroke:      ¨ Numbness or drooping on one side of your face     ¨ Weakness in an arm or leg    ¨ Confusion or difficulty speaking    ¨ Dizziness, a severe headache, or vision loss    · You have any of the following signs of a heart attack:      ¨ Squeezing, pressure, or pain in your chest that lasts longer than 5 minutes or returns    ¨ Discomfort or pain in your back, neck, jaw, stomach, or arm     ¨ Trouble breathing    ¨ Nausea or vomiting    ¨ Lightheadedness or a sudden cold sweat, especially with chest pain or trouble breathing  Contact your healthcare provider if:   · You have questions or concerns about your condition or care  Signs and symptoms of carotid artery disease: You may have no signs or symptoms  Most commonly, carotid artery disease causes transient ischemic attacks (TIAs), or mini-strokes  You may have numbness, weakness, lack of movement, or vision or speech problems  A TIA goes away quickly and does not cause permanent damage  A TIA may be a warning sign that you are about to have a stroke  If you have any symptoms of a TIA or stroke, seek care immediately  Warning signs of a stroke: The word F A S T  can help you remember and recognize warning signs of a stroke    · F = Face:  One side of the face droops  · A = Arms:  One arm starts to drop when both arms are raised  · S = Speech:  Speech is slurred or sounds different than usual     · T = Time:  A person who is having a stroke needs to be seen immediately  A stroke is a medical emergency that needs immediate treatment  Some medicines and treatments work best if given within a few hours of a stroke  Treatment  for carotid artery disease depends on how narrow your arteries have become, your symptoms, and your general health  The goal of treatment is to lower your risk for a stroke  You may need any of the following:  · Take aspirin if directed  Your healthcare provider may suggest that you take an aspirin a day to prevent blood clots from forming in the carotid arteries  If your healthcare provider wants you to take aspirin daily, do not take acetaminophen or ibuprofen instead  · Control risk factors  High blood pressure, high cholesterol, heart disease, diabetes, and being overweight increase your risk for atherosclerosis  · Procedures can help open blocked arteries  A carotid endarterectomy is used to cut plaque out of the artery  An angioplasty is used to push the plaque against the artery wall with a balloon device  Sometimes a stent is placed during an angioplasty  A stent is a metal mesh tube that is placed in the artery to keep it open  Manage carotid artery disease:   · Eat a variety of healthy foods  Healthy foods include fruit, vegetables, whole-grain breads, low-fat dairy products, lean meat, and fish  Choose fish that are high in omega-3 fatty acids, such as salmon and fresh tuna  Ask your healthcare provider for more information on a heart healthy diet and the DASH eating plan  · Limit sodium (salt)  Sodium may increase your blood pressure  Add less table salt to your foods  Read food labels and choose foods that are low in sodium   Your healthcare provider may suggest you follow a low sodium diet     · Reach or maintain a healthy weight  Extra weight makes your heart work harder  Ask your healthcare provider how much you should weight  He can help you create a safe weight loss plan  Even a weight loss of 10% of your body weight can help your heart function better  · Exercise as directed  Exercise helps improve heart function and can help you manage your weight  Exercise can also help lower your cholesterol and blood sugar levels  Try to get at least 30 minutes of exercise at least 5 times each week  Try to be active every day  Your healthcare provider can help you create an exercise plan that works best for you  · Limit alcohol  Alcohol can increase your blood pressure and triglyceride levels  Men should limit alcohol to 2 drinks per day  Women should limit alcohol to 1 drink per day  A drink of alcohol is 12 ounces of beer, 5 ounces of wine, or 1½ ounces of liquor  · Do not smoke  Nicotine and other chemicals in cigarettes and cigars can cause heart and lung damage  Ask your healthcare provider for information if you currently smoke and need help to quit  E-cigarettes or smokeless tobacco still contain nicotine  Talk to your healthcare provider before you use these products  Follow up with your healthcare provider as directed:  Write down your questions so you remember to ask them during your visits  © 2017 [employer] Information is for End User's use only and may not be sold, redistributed or otherwise used for commercial purposes  All illustrations and images included in CareNotes® are the copyrighted property of [employer]  or G[employer] Amador  The above information is an  only  It is not intended as medical advice for individual conditions or treatments  Talk to your doctor, nurse or pharmacist before following any medical regimen to see if it is safe and effective for you

## 2020-01-13 NOTE — ASSESSMENT & PLAN NOTE
46-year-old female with a history of HTN, hypothyroidism, gastric bypass, pancytopenia and symptomatic high-grade right carotid artery stenosis with preoperative TIA 3/27/2019 s/p R CEA by Dr Leroy Mccoy 4/3/2019 returns the vascular office for long-term follow-up and review of recent carotid study/risk factor modification  Patient denies any new signs and symptoms of TIA/CVA including amaurosis fugax, unilateral upper or lower extremity weaknesses, speech or swallowing difficulties  She is maintained on aspirin  Statin intolerant  Review of recent carotid artery ultrasound shows that the right CEA site is widely patent and the left ICA with less than 50% stenosis  This was reviewed with the patient at today's visit      Recommendations:  -continue medical management with daily ASA  -continue with BP control per PCP  -continue with healthy lifestyle including low-fat, low-chol diet  -Lipid panel reviewed; statin intolerant  -exercise regularly  -reviewed s/s of TIA/CVA and when to call 911 - pt expressed understanding  -CV duplex in 1 yr  -OV annually after CV duplex unless symptomatic

## 2020-01-15 ENCOUNTER — HOSPITAL ENCOUNTER (OUTPATIENT)
Dept: INFUSION CENTER | Facility: CLINIC | Age: 80
Discharge: HOME | End: 2020-01-15
Payer: MEDICARE

## 2020-01-15 VITALS
DIASTOLIC BLOOD PRESSURE: 65 MMHG | RESPIRATION RATE: 16 BRPM | SYSTOLIC BLOOD PRESSURE: 119 MMHG | TEMPERATURE: 97.6 F | HEART RATE: 61 BPM

## 2020-01-15 LAB
ALBUMIN SERPL BCP-MCNC: 3 G/DL (ref 3.5–5.7)
ALP SERPL-CCNC: 59 U/L (ref 46–116)
ALT SERPL W P-5'-P-CCNC: 48 U/L (ref 12–78)
ANION GAP SERPL CALCULATED.3IONS-SCNC: 8 MMOL/L (ref 4–13)
AST SERPL W P-5'-P-CCNC: 61 U/L (ref 5–45)
BILIRUB SERPL-MCNC: 0.5 MG/DL (ref 0.2–1)
BUN SERPL-MCNC: 17 MG/DL (ref 5–25)
CALCIUM SERPL-MCNC: 9.2 MG/DL (ref 8.3–10.1)
CHLORIDE SERPL-SCNC: 108 MMOL/L (ref 98–108)
CO2 SERPL-SCNC: 26 MMOL/L (ref 21–32)
CREAT SERPL-MCNC: 0.6 MG/DL (ref 0.6–1.3)
GFR SERPL CREATININE-BSD FRML MDRD: 88 ML/MIN/1.73SQ M
GLUCOSE SERPL-MCNC: 77 MG/DL (ref 65–140)
POTASSIUM SERPL-SCNC: 3.8 MMOL/L (ref 3.5–5.3)
PROT SERPL-MCNC: 6.2 G/DL (ref 6.4–8.2)
SODIUM SERPL-SCNC: 142 MMOL/L (ref 136–145)

## 2020-01-15 PROCEDURE — 96401 CHEMO ANTI-NEOPL SQ/IM: CPT

## 2020-01-15 PROCEDURE — 80053 COMPREHEN METABOLIC PANEL: CPT | Performed by: INTERNAL MEDICINE

## 2020-01-15 RX ADMIN — DENOSUMAB 60 MG: 60 INJECTION SUBCUTANEOUS at 14:13

## 2020-01-15 NOTE — PROGRESS NOTES
Patient to the unit for Prolia injection  Called Dr Redd Estrada office spoke with Dheeraj Savage CMP ordered collected, and sent to the lab  Ca+ is 9 2  Patient tolerated Prolia 60mg SQ in right upper arm without adverse reaction  AVS given, aware of future appointment  Voices no questions or concerns at discharge

## 2020-01-15 NOTE — PLAN OF CARE
Problem: PAIN - ADULT  Goal: Verbalizes/displays adequate comfort level or baseline comfort level  Description  Interventions:  - Encourage patient to monitor pain and request assistance  - Assess pain using appropriate pain scale  - Administer analgesics based on type and severity of pain and evaluate response  - Implement non-pharmacological measures as appropriate and evaluate response  - Consider cultural and social influences on pain and pain management  - Notify physician/advanced practitioner if interventions unsuccessful or patient reports new pain  Outcome: Progressing     Problem: SAFETY ADULT  Goal: Patient will remain free of falls  Description  INTERVENTIONS:  - Assess patient frequently for physical needs  -  Identify cognitive and physical deficits and behaviors that affect risk of falls    -  [place] fall precautions as indicated by assessment   - Educate patient/family on patient safety including physical limitations  - Instruct patient to call for assistance with activity based on assessment  - Modify environment to reduce risk of injury  - Consider OT/PT consult to assist with strengthening/mobility  Outcome: Progressing     Problem: DISCHARGE PLANNING  Goal: Discharge to home or other facility with appropriate resources  Description  INTERVENTIONS:  - Identify barriers to discharge w/patient and caregiver  - Arrange for needed discharge resources and transportation as appropriate  - Identify discharge learning needs (meds, wound care, etc )  - Arrange for interpretive services to assist at discharge as needed  - Refer to Case Management Department for coordinating discharge planning if the patient needs post-hospital services based on physician/advanced practitioner order or complex needs related to functional status, cognitive ability, or social support system  Outcome: Progressing     Problem: Knowledge Deficit  Goal: Patient/family/caregiver demonstrates understanding of disease process, treatment plan, medications, and discharge instructions  Description  Complete learning assessment and assess knowledge base    Interventions:  - Provide teaching at level of understanding  - Provide teaching via preferred learning methods  Outcome: Progressing

## 2020-05-28 ENCOUNTER — HOSPITAL ENCOUNTER (OUTPATIENT)
Dept: MAMMOGRAPHY | Facility: MEDICAL CENTER | Age: 80
Discharge: HOME | End: 2020-05-28
Payer: MEDICARE

## 2020-05-28 VITALS — BODY MASS INDEX: 20.55 KG/M2 | HEIGHT: 63 IN | WEIGHT: 116 LBS

## 2020-05-28 DIAGNOSIS — Z12.31 ENCOUNTER FOR SCREENING MAMMOGRAM FOR MALIGNANT NEOPLASM OF BREAST: ICD-10-CM

## 2020-05-28 PROCEDURE — 77063 BREAST TOMOSYNTHESIS BI: CPT

## 2020-05-28 PROCEDURE — 77067 SCR MAMMO BI INCL CAD: CPT

## 2020-07-15 ENCOUNTER — HOSPITAL ENCOUNTER (OUTPATIENT)
Dept: INFUSION CENTER | Facility: CLINIC | Age: 80
Discharge: HOME | End: 2020-07-15
Payer: MEDICARE

## 2020-07-15 VITALS
TEMPERATURE: 97.5 F | DIASTOLIC BLOOD PRESSURE: 66 MMHG | RESPIRATION RATE: 16 BRPM | HEART RATE: 65 BPM | SYSTOLIC BLOOD PRESSURE: 149 MMHG

## 2020-07-15 PROCEDURE — 96401 CHEMO ANTI-NEOPL SQ/IM: CPT

## 2020-07-15 RX ADMIN — DENOSUMAB 60 MG: 60 INJECTION SUBCUTANEOUS at 13:11

## 2020-07-15 NOTE — PLAN OF CARE
Problem: PAIN - ADULT  Goal: Verbalizes/displays adequate comfort level or baseline comfort level  Description  Interventions:  - Encourage patient to monitor pain and request assistance  - Assess pain using appropriate pain scale  - Administer analgesics based on type and severity of pain and evaluate response  - Implement non-pharmacological measures as appropriate and evaluate response  - Consider cultural and social influences on pain and pain management  - Notify physician/advanced practitioner if interventions unsuccessful or patient reports new pain  Outcome: Progressing     Problem: SAFETY ADULT  Goal: Patient will remain free of falls  Description  INTERVENTIONS:  - Assess patient frequently for physical needs  -  Identify cognitive and physical deficits and behaviors that affect risk of falls    -  [place] fall precautions as indicated by assessment   - Educate patient/family on patient safety including physical limitations  - Instruct patient to call for assistance with activity based on assessment  - Modify environment to reduce risk of injury  - Consider OT/PT consult to assist with strengthening/mobility  Outcome: Progressing     Problem: DISCHARGE PLANNING  Goal: Discharge to home or other facility with appropriate resources  Description  INTERVENTIONS:  - Identify barriers to discharge w/patient and caregiver  - Arrange for needed discharge resources and transportation as appropriate  - Identify discharge learning needs (meds, wound care, etc )  - Arrange for interpretive services to assist at discharge as needed  - Refer to Case Management Department for coordinating discharge planning if the patient needs post-hospital services based on physician/advanced practitioner order or complex needs related to functional status, cognitive ability, or social support system  Outcome: Progressing     Problem: Knowledge Deficit  Goal: Patient/family/caregiver demonstrates understanding of disease process, treatment plan, medications, and discharge instructions  Description  Complete learning assessment and assess knowledge base    Interventions:  - Provide teaching at level of understanding  - Provide teaching via preferred learning methods  Outcome: Progressing

## 2020-07-15 NOTE — PROGRESS NOTES
Patient to the unit for Prolia injection  Denies any recent dental work or any upcoming dental procedures  No CMP ordered  Prolia given in the left upper arm  Patient tolerated without adverse reaction  AVS given, aware of future appointments  Voices no questions or concerns at discharge

## 2020-09-02 DIAGNOSIS — I10 ESSENTIAL HYPERTENSION: Primary | ICD-10-CM

## 2020-09-02 RX ORDER — AMLODIPINE BESYLATE 2.5 MG/1
TABLET ORAL
Qty: 90 TABLET | Refills: 3 | Status: SHIPPED | OUTPATIENT
Start: 2020-09-02 | End: 2020-09-29

## 2020-09-15 ENCOUNTER — OFFICE VISIT (OUTPATIENT)
Dept: INTERNAL MEDICINE CLINIC | Facility: CLINIC | Age: 80
End: 2020-09-15
Payer: MEDICARE

## 2020-09-15 VITALS
WEIGHT: 112 LBS | DIASTOLIC BLOOD PRESSURE: 72 MMHG | HEIGHT: 63 IN | HEART RATE: 80 BPM | BODY MASS INDEX: 19.84 KG/M2 | TEMPERATURE: 97.3 F | SYSTOLIC BLOOD PRESSURE: 130 MMHG

## 2020-09-15 DIAGNOSIS — E28.319 MENOPAUSE, PREMATURE: ICD-10-CM

## 2020-09-15 DIAGNOSIS — E03.9 ACQUIRED HYPOTHYROIDISM: ICD-10-CM

## 2020-09-15 DIAGNOSIS — K21.9 GASTROESOPHAGEAL REFLUX DISEASE WITHOUT ESOPHAGITIS: ICD-10-CM

## 2020-09-15 DIAGNOSIS — K58.0 IRRITABLE BOWEL SYNDROME WITH DIARRHEA: ICD-10-CM

## 2020-09-15 DIAGNOSIS — Z23 NEED FOR INFLUENZA VACCINATION: ICD-10-CM

## 2020-09-15 DIAGNOSIS — H90.11 CONDUCTIVE HEARING LOSS OF RIGHT EAR, UNSPECIFIED HEARING STATUS ON CONTRALATERAL SIDE: ICD-10-CM

## 2020-09-15 DIAGNOSIS — I10 ESSENTIAL HYPERTENSION: Primary | ICD-10-CM

## 2020-09-15 DIAGNOSIS — K91.2 POSTSURGICAL MALABSORPTION: ICD-10-CM

## 2020-09-15 PROCEDURE — 90653 IIV ADJUVANT VACCINE IM: CPT

## 2020-09-15 PROCEDURE — 99214 OFFICE O/P EST MOD 30 MIN: CPT | Performed by: INTERNAL MEDICINE

## 2020-09-15 PROCEDURE — G0008 ADMIN INFLUENZA VIRUS VAC: HCPCS

## 2020-09-15 RX ORDER — CHOLECALCIFEROL (VITAMIN D3) 1250 MCG
CAPSULE ORAL
COMMUNITY
End: 2020-10-19 | Stop reason: SDUPTHER
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
## 2020-09-16 ENCOUNTER — TELEPHONE (OUTPATIENT)
Dept: INTERNAL MEDICINE CLINIC | Facility: CLINIC | Age: 80
End: 2020-09-16

## 2020-09-16 NOTE — TELEPHONE ENCOUNTER
Patient send me an email about the medication Premarin   625mg  which she receives from [facility], the pill is a white tablet and the [place] medication is maroon tablet  Dr Vanessa Kirby said it is okay for her to take the medication that she gets from [facility])  Left this message on patient phone

## 2020-09-20 ENCOUNTER — APPOINTMENT (EMERGENCY)
Dept: RADIOLOGY | Facility: HOSPITAL | Age: 80
End: 2020-09-20
Payer: MEDICARE

## 2020-09-20 ENCOUNTER — APPOINTMENT (EMERGENCY)
Dept: CT IMAGING | Facility: HOSPITAL | Age: 80
End: 2020-09-20
Payer: MEDICARE

## 2020-09-20 ENCOUNTER — HOSPITAL ENCOUNTER (EMERGENCY)
Facility: HOSPITAL | Age: 80
Discharge: HOME | End: 2020-09-20
Attending: EMERGENCY MEDICINE | Admitting: EMERGENCY MEDICINE
Payer: MEDICARE

## 2020-09-20 VITALS
TEMPERATURE: 97.5 F | RESPIRATION RATE: 16 BRPM | HEART RATE: 61 BPM | WEIGHT: 114.42 LBS | BODY MASS INDEX: 20.59 KG/M2 | SYSTOLIC BLOOD PRESSURE: 181 MMHG | DIASTOLIC BLOOD PRESSURE: 79 MMHG | OXYGEN SATURATION: 100 %

## 2020-09-20 DIAGNOSIS — R51.9 HEADACHE: Primary | ICD-10-CM

## 2020-09-20 DIAGNOSIS — N39.0 URINARY TRACT INFECTION: ICD-10-CM

## 2020-09-20 DIAGNOSIS — H81.399 PERIPHERAL VERTIGO: ICD-10-CM

## 2020-09-20 LAB
ALBUMIN SERPL BCP-MCNC: 3 G/DL (ref 3.5–5)
ALP SERPL-CCNC: 74 U/L (ref 46–116)
ALT SERPL W P-5'-P-CCNC: 67 U/L (ref 12–78)
ANION GAP SERPL CALCULATED.3IONS-SCNC: 7 MMOL/L (ref 4–13)
AST SERPL W P-5'-P-CCNC: 76 U/L (ref 5–45)
ATRIAL RATE: 60 BPM
BACTERIA UR QL AUTO: ABNORMAL /HPF
BASOPHILS # BLD AUTO: 0.01 THOUSANDS/ΜL (ref 0–0.1)
BASOPHILS NFR BLD AUTO: 0 % (ref 0–1)
BILIRUB SERPL-MCNC: 0.36 MG/DL (ref 0.2–1)
BILIRUB UR QL STRIP: NEGATIVE
BUN SERPL-MCNC: 13 MG/DL (ref 5–25)
CALCIUM ALBUM COR SERPL-MCNC: 9 MG/DL (ref 8.3–10.1)
CALCIUM SERPL-MCNC: 8.2 MG/DL (ref 8.3–10.1)
CHLORIDE SERPL-SCNC: 105 MMOL/L (ref 100–108)
CLARITY UR: ABNORMAL
CLARITY, POC: CLEAR
CO2 SERPL-SCNC: 26 MMOL/L (ref 21–32)
COLOR UR: YELLOW
COLOR, POC: YELLOW
CREAT SERPL-MCNC: 0.61 MG/DL (ref 0.6–1.3)
EOSINOPHIL # BLD AUTO: 0.06 THOUSAND/ΜL (ref 0–0.61)
EOSINOPHIL NFR BLD AUTO: 2 % (ref 0–6)
ERYTHROCYTE [DISTWIDTH] IN BLOOD BY AUTOMATED COUNT: 13.5 % (ref 11.6–15.1)
GFR SERPL CREATININE-BSD FRML MDRD: 87 ML/MIN/1.73SQ M
GLUCOSE SERPL-MCNC: 89 MG/DL (ref 65–140)
GLUCOSE UR STRIP-MCNC: NEGATIVE MG/DL
HCT VFR BLD AUTO: 41.3 % (ref 34.8–46.1)
HGB BLD-MCNC: 13 G/DL (ref 11.5–15.4)
HGB UR QL STRIP.AUTO: ABNORMAL
IMM GRANULOCYTES # BLD AUTO: 0.01 THOUSAND/UL (ref 0–0.2)
IMM GRANULOCYTES NFR BLD AUTO: 0 % (ref 0–2)
KETONES UR STRIP-MCNC: NEGATIVE MG/DL
LEUKOCYTE ESTERASE UR QL STRIP: ABNORMAL
LYMPHOCYTES # BLD AUTO: 1.17 THOUSANDS/ΜL (ref 0.6–4.47)
LYMPHOCYTES NFR BLD AUTO: 32 % (ref 14–44)
MCH RBC QN AUTO: 30.4 PG (ref 26.8–34.3)
MCHC RBC AUTO-ENTMCNC: 31.5 G/DL (ref 31.4–37.4)
MCV RBC AUTO: 97 FL (ref 82–98)
MONOCYTES # BLD AUTO: 0.35 THOUSAND/ΜL (ref 0.17–1.22)
MONOCYTES NFR BLD AUTO: 9 % (ref 4–12)
NEUTROPHILS # BLD AUTO: 2.11 THOUSANDS/ΜL (ref 1.85–7.62)
NEUTS SEG NFR BLD AUTO: 57 % (ref 43–75)
NITRITE UR QL STRIP: POSITIVE
NON-SQ EPI CELLS URNS QL MICRO: ABNORMAL /HPF
NRBC BLD AUTO-RTO: 0 /100 WBCS
P AXIS: 83 DEGREES
PH UR STRIP.AUTO: 5 [PH] (ref 4.5–8)
PLATELET # BLD AUTO: 177 THOUSANDS/UL (ref 149–390)
PMV BLD AUTO: 11.2 FL (ref 8.9–12.7)
POTASSIUM SERPL-SCNC: 4 MMOL/L (ref 3.5–5.3)
PR INTERVAL: 156 MS
PROT SERPL-MCNC: 6.7 G/DL (ref 6.4–8.2)
PROT UR STRIP-MCNC: NEGATIVE MG/DL
QRS AXIS: 73 DEGREES
QRSD INTERVAL: 76 MS
QT INTERVAL: 416 MS
QTC INTERVAL: 416 MS
RBC # BLD AUTO: 4.28 MILLION/UL (ref 3.81–5.12)
RBC #/AREA URNS AUTO: ABNORMAL /HPF
SODIUM SERPL-SCNC: 138 MMOL/L (ref 136–145)
SP GR UR STRIP.AUTO: 1.01 (ref 1–1.03)
T WAVE AXIS: 77 DEGREES
TROPONIN I SERPL-MCNC: <0.02 NG/ML
UROBILINOGEN UR QL STRIP.AUTO: 0.2 E.U./DL
VENTRICULAR RATE: 60 BPM
WBC # BLD AUTO: 3.71 THOUSAND/UL (ref 4.31–10.16)
WBC #/AREA URNS AUTO: ABNORMAL /HPF

## 2020-09-20 PROCEDURE — 93010 ELECTROCARDIOGRAM REPORT: CPT | Performed by: INTERNAL MEDICINE

## 2020-09-20 PROCEDURE — 70450 CT HEAD/BRAIN W/O DYE: CPT

## 2020-09-20 PROCEDURE — 84484 ASSAY OF TROPONIN QUANT: CPT | Performed by: EMERGENCY MEDICINE

## 2020-09-20 PROCEDURE — 36415 COLL VENOUS BLD VENIPUNCTURE: CPT

## 2020-09-20 PROCEDURE — 85025 COMPLETE CBC W/AUTO DIFF WBC: CPT | Performed by: EMERGENCY MEDICINE

## 2020-09-20 PROCEDURE — G1004 CDSM NDSC: HCPCS

## 2020-09-20 PROCEDURE — 71045 X-RAY EXAM CHEST 1 VIEW: CPT

## 2020-09-20 PROCEDURE — 99285 EMERGENCY DEPT VISIT HI MDM: CPT

## 2020-09-20 PROCEDURE — 96374 THER/PROPH/DIAG INJ IV PUSH: CPT

## 2020-09-20 PROCEDURE — 93005 ELECTROCARDIOGRAM TRACING: CPT

## 2020-09-20 PROCEDURE — 80053 COMPREHEN METABOLIC PANEL: CPT | Performed by: EMERGENCY MEDICINE

## 2020-09-20 PROCEDURE — 81001 URINALYSIS AUTO W/SCOPE: CPT

## 2020-09-20 PROCEDURE — 99285 EMERGENCY DEPT VISIT HI MDM: CPT | Performed by: EMERGENCY MEDICINE

## 2020-09-20 RX ORDER — ONDANSETRON 2 MG/ML
4 INJECTION INTRAMUSCULAR; INTRAVENOUS ONCE
Status: COMPLETED | OUTPATIENT
Start: 2020-09-20 | End: 2020-09-20

## 2020-09-20 RX ORDER — CEPHALEXIN 250 MG/1
500 CAPSULE ORAL ONCE
Status: COMPLETED | OUTPATIENT
Start: 2020-09-20 | End: 2020-09-20

## 2020-09-20 RX ORDER — ACETAMINOPHEN 325 MG/1
975 TABLET ORAL ONCE
Status: COMPLETED | OUTPATIENT
Start: 2020-09-20 | End: 2020-09-20

## 2020-09-20 RX ORDER — MECLIZINE HCL 12.5 MG/1
25 TABLET ORAL ONCE
Status: COMPLETED | OUTPATIENT
Start: 2020-09-20 | End: 2020-09-20

## 2020-09-20 RX ORDER — CEPHALEXIN 500 MG/1
500 CAPSULE ORAL EVERY 8 HOURS SCHEDULED
Qty: 15 CAPSULE | Refills: 0 | Status: SHIPPED | OUTPATIENT
Start: 2020-09-20 | End: 2020-09-25

## 2020-09-20 RX ORDER — MECLIZINE HYDROCHLORIDE 25 MG/1
25 TABLET ORAL 3 TIMES DAILY PRN
Qty: 30 TABLET | Refills: 0 | Status: SHIPPED | OUTPATIENT
Start: 2020-09-20 | End: 2020-12-21

## 2020-09-20 RX ADMIN — ACETAMINOPHEN 975 MG: 325 TABLET ORAL at 21:29

## 2020-09-20 RX ADMIN — MECLIZINE 25 MG: 12.5 TABLET ORAL at 20:46

## 2020-09-20 RX ADMIN — CEPHALEXIN 500 MG: 250 CAPSULE ORAL at 21:29

## 2020-09-20 RX ADMIN — ONDANSETRON 4 MG: 2 INJECTION INTRAMUSCULAR; INTRAVENOUS at 20:47
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
## 2020-09-21 NOTE — ED ATTENDING ATTESTATION
9/20/2020  ILety DO, saw and evaluated the patient  I have discussed the patient with the resident/non-physician practitioner and agree with the resident's/non-physician practitioner's findings, Plan of Care, and MDM as documented in the resident's/non-physician practitioner's note, except where noted  All available labs and Radiology studies were reviewed  I was present for key portions of any procedure(s) performed by the resident/non-physician practitioner and I was immediately available to provide assistance  At this point I agree with the current assessment done in the Emergency Department  I have conducted an independent evaluation of this patient a history and physical is as follows:    ED Course         Critical Care Time  Procedures    72-year-old female presents to the emergency department complaining of dizziness and headache  Patient states she woke in her normal state of health today and when she tried to get out of bed felt like everything was spinning  She states when she closed her eyes and felt a little better  She states when she looked down or up she felt like everything was spinning  No ear complaints  She has had she did have some nausea without vomiting  No recent URI  No ear complaints  No prior history of vertigo  She states she is starting to feel slightly better  On exam she is awake alert oriented x3 with normal speech  Pupils are equal and reactive to light  Extraocular muscles are intact  No nystagmus  TMs are normal   Neck is supple without meningeal signs  Heart is regular without murmur  Lungs are clear  Abdomen is soft and nontender  Neurologically, the patient ambulated normally from the bathroom to her room without any assistance  She has normal strength and reflexes of the extremities and normal cerebellar signs    I suspect her symptoms are secondary to peripheral vertigo but given her age will do basic labs, rule out UTI, head CT since she is complaining of headache  Will treat symptoms and reassess

## 2020-09-29 ENCOUNTER — OFFICE VISIT (OUTPATIENT)
Dept: INTERNAL MEDICINE CLINIC | Facility: CLINIC | Age: 80
End: 2020-09-29
Payer: MEDICARE

## 2020-09-29 VITALS
WEIGHT: 116 LBS | DIASTOLIC BLOOD PRESSURE: 90 MMHG | BODY MASS INDEX: 20.55 KG/M2 | SYSTOLIC BLOOD PRESSURE: 155 MMHG | HEART RATE: 79 BPM | TEMPERATURE: 97.2 F | HEIGHT: 63 IN

## 2020-09-29 DIAGNOSIS — K91.2 POSTSURGICAL MALABSORPTION: ICD-10-CM

## 2020-09-29 DIAGNOSIS — K21.9 GASTROESOPHAGEAL REFLUX DISEASE WITHOUT ESOPHAGITIS: ICD-10-CM

## 2020-09-29 DIAGNOSIS — R42 VERTIGO: ICD-10-CM

## 2020-09-29 DIAGNOSIS — D61.818 PANCYTOPENIA (HCC): ICD-10-CM

## 2020-09-29 DIAGNOSIS — D69.6 THROMBOCYTOPENIA, UNSPECIFIED (HCC): ICD-10-CM

## 2020-09-29 DIAGNOSIS — K58.0 IRRITABLE BOWEL SYNDROME WITH DIARRHEA: ICD-10-CM

## 2020-09-29 DIAGNOSIS — I10 ESSENTIAL HYPERTENSION: Primary | ICD-10-CM

## 2020-09-29 DIAGNOSIS — I65.21 SYMPTOMATIC CAROTID ARTERY STENOSIS WITHOUT INFARCTION, RIGHT: ICD-10-CM

## 2020-09-29 PROCEDURE — 99214 OFFICE O/P EST MOD 30 MIN: CPT | Performed by: INTERNAL MEDICINE

## 2020-09-29 RX ORDER — AMLODIPINE BESYLATE 5 MG/1
5 TABLET ORAL DAILY
Qty: 30 TABLET | Refills: 3 | Status: SHIPPED | OUTPATIENT
Start: 2020-09-29 | End: 2020-12-23
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
## 2020-09-29 NOTE — PATIENT INSTRUCTIONS

## 2020-10-02 ENCOUNTER — TELEPHONE (OUTPATIENT)
Dept: INTERNAL MEDICINE CLINIC | Facility: CLINIC | Age: 80
End: 2020-10-02

## 2020-10-02 DIAGNOSIS — R30.0 DYSURIA: Primary | ICD-10-CM

## 2020-10-14 ENCOUNTER — OFFICE VISIT (OUTPATIENT)
Dept: INTERNAL MEDICINE CLINIC | Facility: CLINIC | Age: 80
End: 2020-10-14
Payer: MEDICARE

## 2020-10-14 VITALS
DIASTOLIC BLOOD PRESSURE: 80 MMHG | HEIGHT: 63 IN | SYSTOLIC BLOOD PRESSURE: 126 MMHG | BODY MASS INDEX: 20.02 KG/M2 | TEMPERATURE: 97.3 F | HEART RATE: 74 BPM | WEIGHT: 113 LBS

## 2020-10-14 DIAGNOSIS — R15.9 INCONTINENCE OF FECES, UNSPECIFIED FECAL INCONTINENCE TYPE: ICD-10-CM

## 2020-10-14 DIAGNOSIS — K21.9 GASTROESOPHAGEAL REFLUX DISEASE WITHOUT ESOPHAGITIS: ICD-10-CM

## 2020-10-14 DIAGNOSIS — M81.0 AGE-RELATED OSTEOPOROSIS WITHOUT CURRENT PATHOLOGICAL FRACTURE: ICD-10-CM

## 2020-10-14 DIAGNOSIS — E03.9 ACQUIRED HYPOTHYROIDISM: ICD-10-CM

## 2020-10-14 DIAGNOSIS — Z00.00 MEDICARE ANNUAL WELLNESS VISIT, SUBSEQUENT: Primary | ICD-10-CM

## 2020-10-14 DIAGNOSIS — I10 ESSENTIAL HYPERTENSION: ICD-10-CM

## 2020-10-14 DIAGNOSIS — K91.2 POSTSURGICAL MALABSORPTION: ICD-10-CM

## 2020-10-14 DIAGNOSIS — K58.0 IRRITABLE BOWEL SYNDROME WITH DIARRHEA: ICD-10-CM

## 2020-10-14 PROCEDURE — 99214 OFFICE O/P EST MOD 30 MIN: CPT | Performed by: INTERNAL MEDICINE

## 2020-10-19 ENCOUNTER — ANNUAL EXAM (OUTPATIENT)
Dept: OBGYN CLINIC | Facility: CLINIC | Age: 80
End: 2020-10-19
Payer: MEDICARE

## 2020-10-19 VITALS
DIASTOLIC BLOOD PRESSURE: 72 MMHG | BODY MASS INDEX: 20.91 KG/M2 | HEART RATE: 74 BPM | WEIGHT: 118 LBS | HEIGHT: 63 IN | TEMPERATURE: 97.8 F | SYSTOLIC BLOOD PRESSURE: 118 MMHG

## 2020-10-19 DIAGNOSIS — Z90.710 S/P HYSTERECTOMY: ICD-10-CM

## 2020-10-19 DIAGNOSIS — Z12.31 ENCOUNTER FOR SCREENING MAMMOGRAM FOR BREAST CANCER: ICD-10-CM

## 2020-10-19 DIAGNOSIS — Z01.411 ENCNTR FOR GYN EXAM (GENERAL) (ROUTINE) W ABNORMAL FINDINGS: Primary | ICD-10-CM

## 2020-10-19 DIAGNOSIS — N95.2 VAGINAL ATROPHY: ICD-10-CM

## 2020-10-19 DIAGNOSIS — L90.0 LICHEN SCLEROSUS ET ATROPHICUS: ICD-10-CM

## 2020-10-19 PROCEDURE — G0101 CA SCREEN;PELVIC/BREAST EXAM: HCPCS | Performed by: OBSTETRICS & GYNECOLOGY

## 2020-10-19 RX ORDER — LEVOTHYROXINE SODIUM 0.1 MG/1
TABLET ORAL
COMMUNITY
End: 2020-10-19 | Stop reason: SDUPTHER

## 2020-10-19 RX ORDER — AMLODIPINE BESYLATE 2.5 MG/1
TABLET ORAL
COMMUNITY
End: 2021-01-18 | Stop reason: ALTCHOICE

## 2020-10-19 RX ORDER — MOMETASONE FUROATE 1 MG/ML
SOLUTION TOPICAL
COMMUNITY
Start: 2020-10-10 | End: 2021-11-29 | Stop reason: ALTCHOICE

## 2020-10-19 RX ORDER — DIAZEPAM 2 MG/1
2 TABLET ORAL DAILY PRN
Status: ON HOLD | COMMUNITY
Start: 2020-07-23 | End: 2021-10-15

## 2020-10-19 RX ORDER — ERGOCALCIFEROL 1.25 MG/1
50000 CAPSULE ORAL
COMMUNITY
Start: 2020-08-18 | End: 2021-05-03

## 2020-10-21 ENCOUNTER — TELEPHONE (OUTPATIENT)
Dept: INTERNAL MEDICINE CLINIC | Facility: CLINIC | Age: 80
End: 2020-10-21

## 2020-10-21 DIAGNOSIS — E03.9 ACQUIRED HYPOTHYROIDISM: Primary | ICD-10-CM

## 2020-10-21 RX ORDER — LEVOTHYROXINE SODIUM 0.1 MG/1
TABLET ORAL
Qty: 90 TABLET | Refills: 0 | Status: SHIPPED | OUTPATIENT
Start: 2020-10-21 | End: 2021-03-08

## 2020-12-11 ENCOUNTER — OFFICE VISIT (OUTPATIENT)
Dept: INTERNAL MEDICINE CLINIC | Facility: CLINIC | Age: 80
End: 2020-12-11
Payer: MEDICARE

## 2020-12-11 VITALS
HEIGHT: 63 IN | OXYGEN SATURATION: 98 % | SYSTOLIC BLOOD PRESSURE: 138 MMHG | TEMPERATURE: 97.6 F | HEART RATE: 78 BPM | BODY MASS INDEX: 20.38 KG/M2 | WEIGHT: 115 LBS | DIASTOLIC BLOOD PRESSURE: 70 MMHG

## 2020-12-11 DIAGNOSIS — R11.0 NAUSEA: Primary | ICD-10-CM

## 2020-12-11 DIAGNOSIS — Z20.828 EXPOSURE TO SARS-ASSOCIATED CORONAVIRUS: ICD-10-CM

## 2020-12-11 DIAGNOSIS — K29.70 GASTRITIS WITHOUT BLEEDING, UNSPECIFIED CHRONICITY, UNSPECIFIED GASTRITIS TYPE: ICD-10-CM

## 2020-12-11 DIAGNOSIS — G44.209 TENSION-TYPE HEADACHE, NOT INTRACTABLE, UNSPECIFIED CHRONICITY PATTERN: ICD-10-CM

## 2020-12-11 DIAGNOSIS — I10 ESSENTIAL HYPERTENSION: ICD-10-CM

## 2020-12-11 PROCEDURE — 99214 OFFICE O/P EST MOD 30 MIN: CPT | Performed by: INTERNAL MEDICINE

## 2020-12-11 PROCEDURE — U0003 INFECTIOUS AGENT DETECTION BY NUCLEIC ACID (DNA OR RNA); SEVERE ACUTE RESPIRATORY SYNDROME CORONAVIRUS 2 (SARS-COV-2) (CORONAVIRUS DISEASE [COVID-19]), AMPLIFIED PROBE TECHNIQUE, MAKING USE OF HIGH THROUGHPUT TECHNOLOGIES AS DESCRIBED BY CMS-2020-01-R: HCPCS | Performed by: INTERNAL MEDICINE

## 2020-12-11 RX ORDER — FAMOTIDINE 20 MG/1
20 TABLET, FILM COATED ORAL 2 TIMES DAILY
Qty: 60 TABLET | Refills: 1 | Status: SHIPPED | OUTPATIENT
Start: 2020-12-11 | End: 2021-01-04

## 2020-12-12 LAB — SARS-COV-2 RNA SPEC QL NAA+PROBE: NOT DETECTED

## 2020-12-21 ENCOUNTER — TELEMEDICINE (OUTPATIENT)
Dept: INTERNAL MEDICINE CLINIC | Facility: CLINIC | Age: 80
End: 2020-12-21
Payer: MEDICARE

## 2020-12-21 VITALS
WEIGHT: 115 LBS | SYSTOLIC BLOOD PRESSURE: 148 MMHG | BODY MASS INDEX: 21.16 KG/M2 | HEIGHT: 62 IN | DIASTOLIC BLOOD PRESSURE: 70 MMHG

## 2020-12-21 DIAGNOSIS — M81.0 AGE-RELATED OSTEOPOROSIS WITHOUT CURRENT PATHOLOGICAL FRACTURE: ICD-10-CM

## 2020-12-21 DIAGNOSIS — E03.9 ACQUIRED HYPOTHYROIDISM: ICD-10-CM

## 2020-12-21 DIAGNOSIS — K29.70 GASTRITIS WITHOUT BLEEDING, UNSPECIFIED CHRONICITY, UNSPECIFIED GASTRITIS TYPE: Primary | ICD-10-CM

## 2020-12-21 DIAGNOSIS — I10 ESSENTIAL HYPERTENSION: ICD-10-CM

## 2020-12-21 PROCEDURE — 99214 OFFICE O/P EST MOD 30 MIN: CPT | Performed by: INTERNAL MEDICINE

## 2020-12-23 DIAGNOSIS — I10 ESSENTIAL HYPERTENSION: ICD-10-CM

## 2020-12-23 RX ORDER — AMLODIPINE BESYLATE 5 MG/1
TABLET ORAL
Qty: 90 TABLET | Refills: 1 | Status: SHIPPED | OUTPATIENT
Start: 2020-12-23 | End: 2021-04-28 | Stop reason: SDUPTHER

## 2021-01-03 DIAGNOSIS — K29.70 GASTRITIS WITHOUT BLEEDING, UNSPECIFIED CHRONICITY, UNSPECIFIED GASTRITIS TYPE: ICD-10-CM

## 2021-01-04 RX ORDER — FAMOTIDINE 20 MG/1
TABLET, FILM COATED ORAL
Qty: 60 TABLET | Refills: 1 | Status: SHIPPED | OUTPATIENT
Start: 2021-01-04 | End: 2021-03-10 | Stop reason: SDUPTHER

## 2021-01-05 ENCOUNTER — TRANSCRIBE ORDERS (OUTPATIENT)
Dept: ADMINISTRATIVE | Facility: HOSPITAL | Age: 81
End: 2021-01-05

## 2021-01-05 ENCOUNTER — LAB (OUTPATIENT)
Dept: LAB | Facility: MEDICAL CENTER | Age: 81
End: 2021-01-05
Payer: MEDICARE

## 2021-01-05 DIAGNOSIS — M81.0 SENILE OSTEOPOROSIS: Primary | ICD-10-CM

## 2021-01-05 DIAGNOSIS — M81.0 SENILE OSTEOPOROSIS: ICD-10-CM

## 2021-01-05 LAB — CALCIUM SERPL-MCNC: 9.2 MG/DL (ref 8.3–10.1)

## 2021-01-05 PROCEDURE — 82310 ASSAY OF CALCIUM: CPT

## 2021-01-05 PROCEDURE — 36415 COLL VENOUS BLD VENIPUNCTURE: CPT

## 2021-01-11 ENCOUNTER — HOSPITAL ENCOUNTER (OUTPATIENT)
Dept: NON INVASIVE DIAGNOSTICS | Facility: CLINIC | Age: 81
Discharge: HOME | End: 2021-01-11
Payer: MEDICARE

## 2021-01-11 DIAGNOSIS — I65.21 SYMPTOMATIC CAROTID ARTERY STENOSIS WITHOUT INFARCTION, RIGHT: ICD-10-CM

## 2021-01-11 PROCEDURE — 93880 EXTRACRANIAL BILAT STUDY: CPT | Performed by: SURGERY

## 2021-01-11 PROCEDURE — 93880 EXTRACRANIAL BILAT STUDY: CPT

## 2021-01-18 ENCOUNTER — HOSPITAL ENCOUNTER (OUTPATIENT)
Dept: INFUSION CENTER | Facility: CLINIC | Age: 81
Discharge: HOME | End: 2021-01-18
Payer: MEDICARE

## 2021-01-18 VITALS
RESPIRATION RATE: 18 BRPM | SYSTOLIC BLOOD PRESSURE: 147 MMHG | TEMPERATURE: 97.4 F | DIASTOLIC BLOOD PRESSURE: 72 MMHG | HEART RATE: 67 BPM

## 2021-01-18 PROCEDURE — 96401 CHEMO ANTI-NEOPL SQ/IM: CPT

## 2021-01-18 RX ADMIN — DENOSUMAB 60 MG: 60 INJECTION SUBCUTANEOUS at 13:28

## 2021-01-18 NOTE — PROGRESS NOTES
Patient arrived on unit for Prolia  Denies any present issues/concerns  Denies any dental pain/issues/upcoming appointments  Tolerated injection in [place]  Aware of next appointment

## 2021-01-21 ENCOUNTER — TELEPHONE (OUTPATIENT)
Dept: VASCULAR SURGERY | Facility: CLINIC | Age: 81
End: 2021-01-21

## 2021-01-21 NOTE — ASSESSMENT & PLAN NOTE
70year old female w/ hx of HTN, hypothyroidism, RYGB, pancytopenia and symptomatic high-grade right carotid artery stenosis with preoperative TIA (3/27/2019) s/p R CEA by Dr Gladis Brooks (4/3/2019)  Patient presents for     1/11/21 Carotid Duplex  -FABIÁN widely patent without evidence of restenosis  -LICA with <47% stenosis     -Duplex reviewed with pt   R ICA s/p endarterectomy widely patent without evidence of reccurrence   -Continue ASA 81mg   -Patient is statin intolerant   -Continue healthy diet, regular exercise  -Surveillance duplex in 1 yr

## 2021-01-22 ENCOUNTER — TELEMEDICINE (OUTPATIENT)
Dept: INTERNAL MEDICINE CLINIC | Facility: CLINIC | Age: 81
End: 2021-01-22
Payer: MEDICARE

## 2021-01-22 ENCOUNTER — TELEPHONE (OUTPATIENT)
Dept: INTERNAL MEDICINE CLINIC | Facility: CLINIC | Age: 81
End: 2021-01-22

## 2021-01-22 ENCOUNTER — TELEMEDICINE (OUTPATIENT)
Dept: VASCULAR SURGERY | Facility: CLINIC | Age: 81
End: 2021-01-22
Payer: MEDICARE

## 2021-01-22 VITALS
DIASTOLIC BLOOD PRESSURE: 79 MMHG | SYSTOLIC BLOOD PRESSURE: 147 MMHG | HEIGHT: 62 IN | TEMPERATURE: 97 F | BODY MASS INDEX: 20.72 KG/M2 | WEIGHT: 112.6 LBS | HEART RATE: 91 BPM

## 2021-01-22 VITALS
SYSTOLIC BLOOD PRESSURE: 147 MMHG | TEMPERATURE: 97 F | DIASTOLIC BLOOD PRESSURE: 79 MMHG | BODY MASS INDEX: 20.72 KG/M2 | WEIGHT: 112.6 LBS | HEIGHT: 62 IN

## 2021-01-22 DIAGNOSIS — Z20.828 EXPOSURE TO SARS-ASSOCIATED CORONAVIRUS: Primary | ICD-10-CM

## 2021-01-22 DIAGNOSIS — I10 ESSENTIAL HYPERTENSION: ICD-10-CM

## 2021-01-22 DIAGNOSIS — I65.21 SYMPTOMATIC CAROTID ARTERY STENOSIS WITHOUT INFARCTION, RIGHT: Primary | ICD-10-CM

## 2021-01-22 PROCEDURE — 99213 OFFICE O/P EST LOW 20 MIN: CPT | Performed by: PHYSICIAN ASSISTANT

## 2021-01-22 PROCEDURE — 99213 OFFICE O/P EST LOW 20 MIN: CPT | Performed by: INTERNAL MEDICINE
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
## 2021-01-22 NOTE — PATIENT INSTRUCTIONS
Carotid Artery Disease   AMBULATORY CARE:   Carotid artery disease  is a condition that causes narrow or blocked carotid arteries  Your carotid arteries are the blood vessels that supply your brain with most of the blood it needs to work  You have 2 carotid arteries, one on each side of your neck  Call 911 for any of the following:   · You have any of the following signs of a stroke:      ? Numbness or drooping on one side of your face     ? Weakness in an arm or leg    ? Confusion or difficulty speaking    ? Dizziness, a severe headache, or vision loss    · You have any of the following signs of a heart attack:      ? Squeezing, pressure, or pain in your chest    ? You may  also have any of the following:     § Discomfort or pain in your back, neck, jaw, stomach, or arm    § Shortness of breath    § Nausea or vomiting    § Lightheadedness or a sudden cold sweat    Contact your healthcare provider if:   · You have questions or concerns about your condition or care  Signs and symptoms of carotid artery disease: You may have no signs or symptoms  Most commonly, carotid artery disease causes transient ischemic attacks (TIAs), or mini-strokes  You may have numbness, weakness, lack of movement, or vision or speech problems  A TIA goes away quickly and does not cause permanent damage  A TIA may be a warning sign that you are about to have a stroke  If you have any symptoms of a TIA or stroke, seek care immediately  Warning signs of a stroke: The word F A S T  can help you remember and recognize warning signs of a stroke  · F = Face:  One side of the face droops  · A = Arms:  One arm starts to drop when both arms are raised  · S = Speech:  Speech is slurred or sounds different than usual     · T = Time:  A person who is having a stroke needs to be seen immediately  A stroke is a medical emergency that needs immediate treatment   Some medicines and treatments work best if given within a few hours of a stroke  Treatment  for carotid artery disease depends on how narrow your arteries have become, your symptoms, and your general health  The goal of treatment is to lower your risk for a stroke  You may need any of the following:  · Take aspirin if directed  Your healthcare provider may suggest that you take an aspirin a day to prevent blood clots from forming in the carotid arteries  If your healthcare provider wants you to take aspirin daily, do not take acetaminophen or ibuprofen instead  · Control risk factors  High blood pressure, high cholesterol, heart disease, diabetes, and being overweight increase your risk for atherosclerosis  · Procedures can help open blocked arteries  A carotid endarterectomy is used to cut plaque out of the artery  An angioplasty is used to push the plaque against the artery wall with a balloon device  Sometimes a stent is placed during an angioplasty  A stent is a metal mesh tube that is placed in the artery to keep it open  Manage carotid artery disease:   · Eat a variety of healthy foods  Healthy foods include fruit, vegetables, whole-grain breads, low-fat dairy products, lean meat, and fish  Choose fish that are high in omega-3 fatty acids, such as salmon and fresh tuna  Ask your healthcare provider for more information on a heart healthy diet and the DASH eating plan  · Limit sodium (salt)  Sodium may increase your blood pressure  Add less table salt to your foods  Read food labels and choose foods that are low in sodium  Your healthcare provider may suggest you follow a low sodium diet  · Reach or maintain a healthy weight  Extra weight makes your heart work harder  Ask your healthcare provider how much you should weight  He can help you create a safe weight loss plan  Even a weight loss of 10% of your body weight can help your heart function better  · Exercise as directed    Exercise helps improve heart function and can help you manage your weight  Exercise can also help lower your cholesterol and blood sugar levels  Try to get at least 30 minutes of exercise at least 5 times each week  Try to be active every day  Your healthcare provider can help you create an exercise plan that works best for you  · Limit alcohol  Alcohol can increase your blood pressure and triglyceride levels  Men should limit alcohol to 2 drinks per day  Women should limit alcohol to 1 drink per day  A drink of alcohol is 12 ounces of beer, 5 ounces of wine, or 1½ ounces of liquor  · Do not smoke  Nicotine and other chemicals in cigarettes and cigars can cause heart and lung damage  Ask your healthcare provider for information if you currently smoke and need help to quit  E-cigarettes or smokeless tobacco still contain nicotine  Talk to your healthcare provider before you use these products  Follow up with your healthcare provider as directed:  Write down your questions so you remember to ask them during your visits  © Copyright [address] Information is for End User's use only and may not be sold, redistributed or otherwise used for commercial purposes  All illustrations and images included in CareNotes® are the copyrighted property of A D A M [employer] Inc  or [facility]   The above information is an  only  It is not intended as medical advice for individual conditions or treatments  Talk to your doctor, nurse or pharmacist before following any medical regimen to see if it is safe and effective for you

## 2021-01-22 NOTE — TELEPHONE ENCOUNTER
Patient came in contact with someone who was COVID positive about 5 days ago  She would like to get a covid test done as soon as possible  Would I be able to put her in today?
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
## 2021-01-26 ENCOUNTER — TELEPHONE (OUTPATIENT)
Dept: ADMINISTRATIVE | Facility: OTHER | Age: 81
End: 2021-01-26

## 2021-01-26 NOTE — TELEPHONE ENCOUNTER
Upon review of the In Basket request we were able to locate, review, and update the patient chart as requested for Medicare AW  Any additional questions or concerns should be emailed to the Practice Liaisons via [email or web address] email, please do not reply via In Basket      Thank you  Lexi Cope

## 2021-01-26 NOTE — TELEPHONE ENCOUNTER
----- Message from Tayo Flores sent at 1/25/2021  4:48 PM EST -----  Regarding: AWV  01/25/21 4:48 PM    Hello, our patient Lucina List has had Medicare AWV completed/performed  Please assist in updating the patient chart by pulling the document from ENCOUNTERS Tab within Chart Review  The date of service is 10/14/20 DONE BY DR Ana Ambrose       Thank you,  Wenceslao Medina  [address]

## 2021-01-29 DIAGNOSIS — Z20.828 EXPOSURE TO SARS-ASSOCIATED CORONAVIRUS: ICD-10-CM

## 2021-01-29 PROCEDURE — U0003 INFECTIOUS AGENT DETECTION BY NUCLEIC ACID (DNA OR RNA); SEVERE ACUTE RESPIRATORY SYNDROME CORONAVIRUS 2 (SARS-COV-2) (CORONAVIRUS DISEASE [COVID-19]), AMPLIFIED PROBE TECHNIQUE, MAKING USE OF HIGH THROUGHPUT TECHNOLOGIES AS DESCRIBED BY CMS-2020-01-R: HCPCS | Performed by: INTERNAL MEDICINE

## 2021-01-29 PROCEDURE — U0005 INFEC AGEN DETEC AMPLI PROBE: HCPCS | Performed by: INTERNAL MEDICINE

## 2021-01-30 LAB — SARS-COV-2 RNA RESP QL NAA+PROBE: NEGATIVE

## 2021-02-03 ENCOUNTER — IMMUNIZATIONS (OUTPATIENT)
Dept: FAMILY MEDICINE CLINIC | Facility: HOSPITAL | Age: 81
End: 2021-02-03

## 2021-02-03 DIAGNOSIS — Z23 ENCOUNTER FOR IMMUNIZATION: Primary | ICD-10-CM

## 2021-02-03 PROCEDURE — 0011A SARS-COV-2 / COVID-19 MRNA VACCINE (MODERNA) 100 MCG: CPT

## 2021-02-03 PROCEDURE — 91301 SARS-COV-2 / COVID-19 MRNA VACCINE (MODERNA) 100 MCG: CPT

## 2021-03-02 ENCOUNTER — IMMUNIZATIONS (OUTPATIENT)
Dept: FAMILY MEDICINE CLINIC | Facility: HOSPITAL | Age: 81
End: 2021-03-02

## 2021-03-02 DIAGNOSIS — Z23 ENCOUNTER FOR IMMUNIZATION: Primary | ICD-10-CM

## 2021-03-02 PROCEDURE — 91301 SARS-COV-2 / COVID-19 MRNA VACCINE (MODERNA) 100 MCG: CPT

## 2021-03-02 PROCEDURE — 0012A SARS-COV-2 / COVID-19 MRNA VACCINE (MODERNA) 100 MCG: CPT

## 2021-03-05 DIAGNOSIS — E03.9 ACQUIRED HYPOTHYROIDISM: ICD-10-CM

## 2021-03-05 NOTE — TELEPHONE ENCOUNTER
Spoke to pt 100 mcg is correct, labs are being faxed over from [facility], she had them done in october

## 2021-03-08 RX ORDER — LEVOTHYROXINE SODIUM 0.1 MG/1
TABLET ORAL
Qty: 90 TABLET | Refills: 0 | Status: SHIPPED | OUTPATIENT
Start: 2021-03-08 | End: 2021-05-28 | Stop reason: SDUPTHER

## 2021-03-10 DIAGNOSIS — K29.70 GASTRITIS WITHOUT BLEEDING, UNSPECIFIED CHRONICITY, UNSPECIFIED GASTRITIS TYPE: ICD-10-CM

## 2021-03-10 RX ORDER — FAMOTIDINE 20 MG/1
20 TABLET, FILM COATED ORAL 2 TIMES DAILY
Qty: 180 TABLET | Refills: 0 | Status: SHIPPED | OUTPATIENT
Start: 2021-03-10 | End: 2021-05-11

## 2021-03-12 ENCOUNTER — OFFICE VISIT (OUTPATIENT)
Dept: INTERNAL MEDICINE CLINIC | Facility: CLINIC | Age: 81
End: 2021-03-12
Payer: MEDICARE

## 2021-03-12 VITALS
WEIGHT: 116 LBS | OXYGEN SATURATION: 98 % | SYSTOLIC BLOOD PRESSURE: 128 MMHG | BODY MASS INDEX: 21.35 KG/M2 | HEART RATE: 67 BPM | HEIGHT: 62 IN | TEMPERATURE: 97.9 F | DIASTOLIC BLOOD PRESSURE: 70 MMHG

## 2021-03-12 DIAGNOSIS — K91.2 POSTOPERATIVE MALABSORPTION: ICD-10-CM

## 2021-03-12 DIAGNOSIS — M81.0 AGE-RELATED OSTEOPOROSIS WITHOUT CURRENT PATHOLOGICAL FRACTURE: ICD-10-CM

## 2021-03-12 DIAGNOSIS — E03.9 ACQUIRED HYPOTHYROIDISM: Primary | ICD-10-CM

## 2021-03-12 DIAGNOSIS — R15.9 INCONTINENCE OF FECES, UNSPECIFIED FECAL INCONTINENCE TYPE: ICD-10-CM

## 2021-03-12 DIAGNOSIS — K29.70 GASTRITIS WITHOUT BLEEDING, UNSPECIFIED CHRONICITY, UNSPECIFIED GASTRITIS TYPE: ICD-10-CM

## 2021-03-12 DIAGNOSIS — I10 ESSENTIAL HYPERTENSION: ICD-10-CM

## 2021-03-12 PROCEDURE — 99214 OFFICE O/P EST MOD 30 MIN: CPT | Performed by: INTERNAL MEDICINE
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
## 2021-03-12 NOTE — PATIENT INSTRUCTIONS

## 2021-03-31 ENCOUNTER — TELEPHONE (OUTPATIENT)
Dept: INTERNAL MEDICINE CLINIC | Facility: CLINIC | Age: 81
End: 2021-03-31

## 2021-04-01 DIAGNOSIS — N95.2 POSTMENOPAUSAL ATROPHIC VAGINITIS: ICD-10-CM

## 2021-04-01 NOTE — TELEPHONE ENCOUNTER
I WILL NEEDS TO SEND IT AGAIN TO HAVE IT SENT TO THE MAIL ORDER IN [place] FOR THE 90 DAY SUPPLY   IT WILL NEED TO BE PRINTED AND FAXED TO [phone]

## 2021-04-01 NOTE — TELEPHONE ENCOUNTER
PT NEEDS REFILL SHE IS GETTING IT FROM [place] SO SHE NEEDS 1 MONTH SUPPLY SENT TO LOCAL PHARMACY UNTIL IT GETS HERE

## 2021-04-26 DIAGNOSIS — N95.2 POSTMENOPAUSAL ATROPHIC VAGINITIS: ICD-10-CM

## 2021-04-27 RX ORDER — CONJUGATED ESTROGENS 0.62 MG/1
TABLET, FILM COATED ORAL
Qty: 30 TABLET | Refills: 1 | Status: SHIPPED | OUTPATIENT
Start: 2021-04-27 | End: 2021-10-06 | Stop reason: SDUPTHER

## 2021-04-28 DIAGNOSIS — I10 ESSENTIAL HYPERTENSION: ICD-10-CM

## 2021-04-30 RX ORDER — AMLODIPINE BESYLATE 5 MG/1
5 TABLET ORAL DAILY
Qty: 90 TABLET | Refills: 1 | Status: SHIPPED | OUTPATIENT
Start: 2021-04-30 | End: 2021-10-14 | Stop reason: SDUPTHER

## 2021-05-02 DIAGNOSIS — E55.9 VITAMIN D DEFICIENCY DISEASE: Primary | ICD-10-CM

## 2021-05-03 RX ORDER — ERGOCALCIFEROL 1.25 MG/1
CAPSULE ORAL
Qty: 3 CAPSULE | Refills: 3 | Status: SHIPPED | OUTPATIENT
Start: 2021-05-03 | End: 2022-06-29 | Stop reason: SDUPTHER

## 2021-05-10 ENCOUNTER — APPOINTMENT (OUTPATIENT)
Dept: RADIOLOGY | Facility: MEDICAL CENTER | Age: 81
End: 2021-05-10
Payer: MEDICARE

## 2021-05-10 ENCOUNTER — OCCMED (OUTPATIENT)
Dept: URGENT CARE | Facility: MEDICAL CENTER | Age: 81
End: 2021-05-10
Payer: OTHER MISCELLANEOUS

## 2021-05-10 DIAGNOSIS — S83.91XA SPRAIN OF RIGHT KNEE, UNSPECIFIED LIGAMENT, INITIAL ENCOUNTER: Primary | ICD-10-CM

## 2021-05-10 DIAGNOSIS — S83.91XA SPRAIN OF RIGHT KNEE, UNSPECIFIED LIGAMENT, INITIAL ENCOUNTER: ICD-10-CM

## 2021-05-10 PROCEDURE — 99283 EMERGENCY DEPT VISIT LOW MDM: CPT | Performed by: FAMILY MEDICINE

## 2021-05-10 PROCEDURE — 73564 X-RAY EXAM KNEE 4 OR MORE: CPT

## 2021-05-10 PROCEDURE — G0382 LEV 3 HOSP TYPE B ED VISIT: HCPCS | Performed by: FAMILY MEDICINE
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
## 2021-05-11 DIAGNOSIS — K29.70 GASTRITIS WITHOUT BLEEDING, UNSPECIFIED CHRONICITY, UNSPECIFIED GASTRITIS TYPE: ICD-10-CM

## 2021-05-11 RX ORDER — FAMOTIDINE 20 MG/1
TABLET, FILM COATED ORAL
Qty: 180 TABLET | Refills: 3 | Status: SHIPPED | OUTPATIENT
Start: 2021-05-11 | End: 2022-07-15 | Stop reason: SDUPTHER

## 2021-05-17 ENCOUNTER — APPOINTMENT (OUTPATIENT)
Dept: URGENT CARE | Facility: MEDICAL CENTER | Age: 81
End: 2021-05-17
Payer: OTHER MISCELLANEOUS

## 2021-05-17 PROCEDURE — 99213 OFFICE O/P EST LOW 20 MIN: CPT | Performed by: FAMILY MEDICINE

## 2021-05-28 DIAGNOSIS — E03.9 ACQUIRED HYPOTHYROIDISM: ICD-10-CM

## 2021-05-28 RX ORDER — LEVOTHYROXINE SODIUM 0.1 MG/1
100 TABLET ORAL DAILY
Qty: 90 TABLET | Refills: 0 | Status: SHIPPED | OUTPATIENT
Start: 2021-05-28 | End: 2021-08-27 | Stop reason: SDUPTHER

## 2021-06-01 ENCOUNTER — HOSPITAL ENCOUNTER (OUTPATIENT)
Dept: MAMMOGRAPHY | Facility: MEDICAL CENTER | Age: 81
Discharge: HOME | End: 2021-06-01
Payer: MEDICARE

## 2021-06-01 ENCOUNTER — TRANSCRIBE ORDERS (OUTPATIENT)
Dept: ADMINISTRATIVE | Facility: HOSPITAL | Age: 81
End: 2021-06-01

## 2021-06-01 ENCOUNTER — APPOINTMENT (OUTPATIENT)
Dept: LAB | Facility: MEDICAL CENTER | Age: 81
End: 2021-06-01
Payer: MEDICARE

## 2021-06-01 VITALS — WEIGHT: 116 LBS | HEIGHT: 62 IN | BODY MASS INDEX: 21.35 KG/M2

## 2021-06-01 DIAGNOSIS — Z79.899 ENCOUNTER FOR LONG-TERM (CURRENT) USE OF OTHER MEDICATIONS: ICD-10-CM

## 2021-06-01 DIAGNOSIS — Z12.31 ENCOUNTER FOR SCREENING MAMMOGRAM FOR BREAST CANCER: ICD-10-CM

## 2021-06-01 DIAGNOSIS — Z79.899 ENCOUNTER FOR LONG-TERM (CURRENT) USE OF OTHER MEDICATIONS: Primary | ICD-10-CM

## 2021-06-01 LAB
ALBUMIN SERPL BCP-MCNC: 3.5 G/DL (ref 3.5–5)
ALP SERPL-CCNC: 76 U/L (ref 46–116)
ALT SERPL W P-5'-P-CCNC: 50 U/L (ref 12–78)
ANION GAP SERPL CALCULATED.3IONS-SCNC: 3 MMOL/L (ref 4–13)
AST SERPL W P-5'-P-CCNC: 49 U/L (ref 5–45)
BILIRUB SERPL-MCNC: 0.35 MG/DL (ref 0.2–1)
BUN SERPL-MCNC: 19 MG/DL (ref 5–25)
CALCIUM SERPL-MCNC: 9.7 MG/DL (ref 8.3–10.1)
CHLORIDE SERPL-SCNC: 106 MMOL/L (ref 100–108)
CO2 SERPL-SCNC: 29 MMOL/L (ref 21–32)
CREAT SERPL-MCNC: 0.6 MG/DL (ref 0.6–1.3)
GFR SERPL CREATININE-BSD FRML MDRD: 87 ML/MIN/1.73SQ M
GLUCOSE SERPL-MCNC: 90 MG/DL (ref 65–140)
POTASSIUM SERPL-SCNC: 4.7 MMOL/L (ref 3.5–5.3)
PROT SERPL-MCNC: 7.4 G/DL (ref 6.4–8.2)
SODIUM SERPL-SCNC: 138 MMOL/L (ref 136–145)

## 2021-06-01 PROCEDURE — 36415 COLL VENOUS BLD VENIPUNCTURE: CPT

## 2021-06-01 PROCEDURE — 77063 BREAST TOMOSYNTHESIS BI: CPT

## 2021-06-01 PROCEDURE — 80053 COMPREHEN METABOLIC PANEL: CPT

## 2021-06-01 PROCEDURE — 77067 SCR MAMMO BI INCL CAD: CPT

## 2021-06-02 ENCOUNTER — APPOINTMENT (OUTPATIENT)
Dept: URGENT CARE | Facility: MEDICAL CENTER | Age: 81
End: 2021-06-02
Payer: OTHER MISCELLANEOUS

## 2021-06-02 PROCEDURE — 99213 OFFICE O/P EST LOW 20 MIN: CPT | Performed by: PHYSICIAN ASSISTANT

## 2021-06-28 ENCOUNTER — TELEPHONE (OUTPATIENT)
Dept: INTERNAL MEDICINE CLINIC | Facility: CLINIC | Age: 81
End: 2021-06-28

## 2021-06-28 DIAGNOSIS — R11.0 NAUSEA: Primary | ICD-10-CM

## 2021-06-28 RX ORDER — ONDANSETRON 4 MG/1
4 TABLET, FILM COATED ORAL EVERY 8 HOURS PRN
Qty: 10 TABLET | Refills: 0 | Status: SHIPPED | OUTPATIENT
Start: 2021-06-28 | End: 2021-11-29 | Stop reason: ALTCHOICE

## 2021-06-28 NOTE — TELEPHONE ENCOUNTER
She has some chest pain, indigestion pain on Saturday night  Felt like something was caught there  She vomited a couple of times during the night  Woke up and felt better, still has some nausea feeling and some lightheadedness  Vomited from 9-5 in am, then fell back to sleep and woke up feeling better  They were on vacation last week, does not know if it was food poisoning or a stomach bug   has an appt Wednesday, would like to take his appt  And have him come next week or her appt

## 2021-06-30 ENCOUNTER — OFFICE VISIT (OUTPATIENT)
Dept: INTERNAL MEDICINE CLINIC | Facility: CLINIC | Age: 81
End: 2021-06-30
Payer: MEDICARE

## 2021-06-30 VITALS
OXYGEN SATURATION: 96 % | HEART RATE: 133 BPM | DIASTOLIC BLOOD PRESSURE: 60 MMHG | SYSTOLIC BLOOD PRESSURE: 150 MMHG | TEMPERATURE: 97.2 F | HEIGHT: 62 IN | WEIGHT: 111 LBS | BODY MASS INDEX: 20.43 KG/M2

## 2021-06-30 DIAGNOSIS — M54.6 ACUTE RIGHT-SIDED THORACIC BACK PAIN: ICD-10-CM

## 2021-06-30 DIAGNOSIS — K91.2 POSTSURGICAL MALABSORPTION: ICD-10-CM

## 2021-06-30 DIAGNOSIS — E55.9 VITAMIN D DEFICIENCY DISEASE: ICD-10-CM

## 2021-06-30 DIAGNOSIS — R11.0 NAUSEA: Primary | ICD-10-CM

## 2021-06-30 DIAGNOSIS — E03.9 ACQUIRED HYPOTHYROIDISM: ICD-10-CM

## 2021-06-30 DIAGNOSIS — M81.0 AGE-RELATED OSTEOPOROSIS WITHOUT CURRENT PATHOLOGICAL FRACTURE: ICD-10-CM

## 2021-06-30 PROCEDURE — 99214 OFFICE O/P EST MOD 30 MIN: CPT | Performed by: INTERNAL MEDICINE

## 2021-06-30 RX ORDER — LEVOTHYROXINE SODIUM 100 UG/1
CAPSULE ORAL
COMMUNITY
End: 2021-10-06 | Stop reason: SDUPTHER

## 2021-06-30 RX ORDER — ACETAMINOPHEN 500 MG
1000 TABLET ORAL EVERY 6 HOURS PRN
COMMUNITY
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
## 2021-07-02 ENCOUNTER — APPOINTMENT (OUTPATIENT)
Dept: RADIOLOGY | Facility: MEDICAL CENTER | Age: 81
End: 2021-07-02
Payer: MEDICARE

## 2021-07-02 ENCOUNTER — APPOINTMENT (OUTPATIENT)
Dept: LAB | Facility: MEDICAL CENTER | Age: 81
End: 2021-07-02
Payer: MEDICARE

## 2021-07-02 DIAGNOSIS — M54.6 ACUTE RIGHT-SIDED THORACIC BACK PAIN: ICD-10-CM

## 2021-07-02 DIAGNOSIS — E03.9 ACQUIRED HYPOTHYROIDISM: ICD-10-CM

## 2021-07-02 DIAGNOSIS — K91.2 POSTSURGICAL MALABSORPTION: ICD-10-CM

## 2021-07-02 LAB
ALBUMIN SERPL BCP-MCNC: 2.9 G/DL (ref 3.5–5)
ALP SERPL-CCNC: 82 U/L (ref 46–116)
ALT SERPL W P-5'-P-CCNC: 58 U/L (ref 12–78)
ANION GAP SERPL CALCULATED.3IONS-SCNC: 6 MMOL/L (ref 4–13)
AST SERPL W P-5'-P-CCNC: 64 U/L (ref 5–45)
BASOPHILS # BLD AUTO: 0.02 THOUSANDS/ΜL (ref 0–0.1)
BASOPHILS NFR BLD AUTO: 1 % (ref 0–1)
BILIRUB SERPL-MCNC: 0.34 MG/DL (ref 0.2–1)
BILIRUB UR QL STRIP: NEGATIVE
BUN SERPL-MCNC: 17 MG/DL (ref 5–25)
CALCIUM ALBUM COR SERPL-MCNC: 9.5 MG/DL (ref 8.3–10.1)
CALCIUM SERPL-MCNC: 8.6 MG/DL (ref 8.3–10.1)
CHLORIDE SERPL-SCNC: 106 MMOL/L (ref 100–108)
CHOLEST SERPL-MCNC: 205 MG/DL (ref 50–200)
CLARITY UR: NORMAL
CO2 SERPL-SCNC: 26 MMOL/L (ref 21–32)
COLOR UR: YELLOW
CREAT SERPL-MCNC: 0.64 MG/DL (ref 0.6–1.3)
EOSINOPHIL # BLD AUTO: 0.11 THOUSAND/ΜL (ref 0–0.61)
EOSINOPHIL NFR BLD AUTO: 3 % (ref 0–6)
ERYTHROCYTE [DISTWIDTH] IN BLOOD BY AUTOMATED COUNT: 14.3 % (ref 11.6–15.1)
GFR SERPL CREATININE-BSD FRML MDRD: 85 ML/MIN/1.73SQ M
GLUCOSE P FAST SERPL-MCNC: 86 MG/DL (ref 65–99)
GLUCOSE UR STRIP-MCNC: NEGATIVE MG/DL
HCT VFR BLD AUTO: 40.6 % (ref 34.8–46.1)
HDLC SERPL-MCNC: 55 MG/DL
HGB BLD-MCNC: 12.6 G/DL (ref 11.5–15.4)
HGB UR QL STRIP.AUTO: NEGATIVE
IMM GRANULOCYTES # BLD AUTO: 0.01 THOUSAND/UL (ref 0–0.2)
IMM GRANULOCYTES NFR BLD AUTO: 0 % (ref 0–2)
IRON SATN MFR SERPL: 30 %
IRON SERPL-MCNC: 97 UG/DL (ref 50–170)
KETONES UR STRIP-MCNC: NEGATIVE MG/DL
LDLC SERPL CALC-MCNC: 129 MG/DL (ref 0–100)
LEUKOCYTE ESTERASE UR QL STRIP: NEGATIVE
LYMPHOCYTES # BLD AUTO: 1.33 THOUSANDS/ΜL (ref 0.6–4.47)
LYMPHOCYTES NFR BLD AUTO: 39 % (ref 14–44)
MCH RBC QN AUTO: 29.4 PG (ref 26.8–34.3)
MCHC RBC AUTO-ENTMCNC: 31 G/DL (ref 31.4–37.4)
MCV RBC AUTO: 95 FL (ref 82–98)
MONOCYTES # BLD AUTO: 0.39 THOUSAND/ΜL (ref 0.17–1.22)
MONOCYTES NFR BLD AUTO: 11 % (ref 4–12)
NEUTROPHILS # BLD AUTO: 1.55 THOUSANDS/ΜL (ref 1.85–7.62)
NEUTS SEG NFR BLD AUTO: 46 % (ref 43–75)
NITRITE UR QL STRIP: NEGATIVE
NONHDLC SERPL-MCNC: 150 MG/DL
NRBC BLD AUTO-RTO: 0 /100 WBCS
PH UR STRIP.AUTO: 6.5 [PH]
PLATELET # BLD AUTO: 182 THOUSANDS/UL (ref 149–390)
PMV BLD AUTO: 11.1 FL (ref 8.9–12.7)
POTASSIUM SERPL-SCNC: 4.4 MMOL/L (ref 3.5–5.3)
PROT SERPL-MCNC: 6.9 G/DL (ref 6.4–8.2)
PROT UR STRIP-MCNC: NEGATIVE MG/DL
RBC # BLD AUTO: 4.28 MILLION/UL (ref 3.81–5.12)
SODIUM SERPL-SCNC: 138 MMOL/L (ref 136–145)
SP GR UR STRIP.AUTO: 1.01 (ref 1–1.03)
TIBC SERPL-MCNC: 325 UG/DL (ref 250–450)
TRIGL SERPL-MCNC: 105 MG/DL
TSH SERPL DL<=0.05 MIU/L-ACNC: 0.92 UIU/ML (ref 0.36–3.74)
UROBILINOGEN UR QL STRIP.AUTO: 0.2 E.U./DL
VIT B12 SERPL-MCNC: 928 PG/ML (ref 100–900)
WBC # BLD AUTO: 3.41 THOUSAND/UL (ref 4.31–10.16)

## 2021-07-02 PROCEDURE — 80061 LIPID PANEL: CPT

## 2021-07-02 PROCEDURE — 81003 URINALYSIS AUTO W/O SCOPE: CPT | Performed by: INTERNAL MEDICINE

## 2021-07-02 PROCEDURE — 83550 IRON BINDING TEST: CPT

## 2021-07-02 PROCEDURE — 80053 COMPREHEN METABOLIC PANEL: CPT

## 2021-07-02 PROCEDURE — 71101 X-RAY EXAM UNILAT RIBS/CHEST: CPT

## 2021-07-02 PROCEDURE — 84443 ASSAY THYROID STIM HORMONE: CPT

## 2021-07-02 PROCEDURE — 82607 VITAMIN B-12: CPT

## 2021-07-02 PROCEDURE — 85025 COMPLETE CBC W/AUTO DIFF WBC: CPT

## 2021-07-02 PROCEDURE — 72072 X-RAY EXAM THORAC SPINE 3VWS: CPT

## 2021-07-02 PROCEDURE — 36415 COLL VENOUS BLD VENIPUNCTURE: CPT

## 2021-07-02 PROCEDURE — 83540 ASSAY OF IRON: CPT

## 2021-07-08 ENCOUNTER — TELEPHONE (OUTPATIENT)
Dept: INTERNAL MEDICINE CLINIC | Facility: CLINIC | Age: 81
End: 2021-07-08

## 2021-07-08 DIAGNOSIS — I10 ESSENTIAL HYPERTENSION: Primary | ICD-10-CM

## 2021-07-19 ENCOUNTER — HOSPITAL ENCOUNTER (OUTPATIENT)
Dept: INFUSION CENTER | Facility: CLINIC | Age: 81
Discharge: HOME | End: 2021-07-19
Payer: MEDICARE

## 2021-07-19 VITALS
TEMPERATURE: 98.4 F | SYSTOLIC BLOOD PRESSURE: 116 MMHG | RESPIRATION RATE: 18 BRPM | DIASTOLIC BLOOD PRESSURE: 64 MMHG | HEART RATE: 74 BPM

## 2021-07-19 PROCEDURE — 96401 CHEMO ANTI-NEOPL SQ/IM: CPT

## 2021-07-19 RX ADMIN — DENOSUMAB 60 MG: 60 INJECTION SUBCUTANEOUS at 13:25

## 2021-07-19 NOTE — PLAN OF CARE
Problem: INFECTION - ADULT  Goal: Absence or prevention of progression during hospitalization  Description: INTERVENTIONS:  - Assess and monitor for signs and symptoms of infection  - Monitor lab/diagnostic results  - Monitor all insertion sites, i e  indwelling lines, tubes, and drains  - Monitor endotracheal if appropriate and nasal secretions for changes in amount and color  - [place] appropriate cooling/warming therapies per order  - Administer medications as ordered  - Instruct and encourage patient and family to use good hand hygiene technique  - Identify and instruct in appropriate isolation precautions for identified infection/condition  Outcome: Progressing

## 2021-07-19 NOTE — PROGRESS NOTES
Patient arrived to the unit and reported that she is having a tooth placed at the end of August  Dr Efraín Smith is aware and gave the ok to proceed with Prolia  Patient tolerated her injection well without adverse reaction

## 2021-08-18 ENCOUNTER — OFFICE VISIT (OUTPATIENT)
Dept: INTERNAL MEDICINE CLINIC | Facility: CLINIC | Age: 81
End: 2021-08-18
Payer: MEDICARE

## 2021-08-18 VITALS
HEART RATE: 70 BPM | SYSTOLIC BLOOD PRESSURE: 125 MMHG | DIASTOLIC BLOOD PRESSURE: 68 MMHG | BODY MASS INDEX: 20.98 KG/M2 | TEMPERATURE: 97.7 F | WEIGHT: 114 LBS | HEIGHT: 62 IN | OXYGEN SATURATION: 99 %

## 2021-08-18 DIAGNOSIS — R11.0 NAUSEA: ICD-10-CM

## 2021-08-18 DIAGNOSIS — N95.2 POSTMENOPAUSAL ATROPHIC VAGINITIS: ICD-10-CM

## 2021-08-18 DIAGNOSIS — I10 ESSENTIAL HYPERTENSION: Primary | ICD-10-CM

## 2021-08-18 DIAGNOSIS — E55.9 VITAMIN D DEFICIENCY DISEASE: ICD-10-CM

## 2021-08-18 DIAGNOSIS — K91.2 POSTSURGICAL MALABSORPTION: ICD-10-CM

## 2021-08-18 DIAGNOSIS — M81.0 AGE-RELATED OSTEOPOROSIS WITHOUT CURRENT PATHOLOGICAL FRACTURE: ICD-10-CM

## 2021-08-18 PROCEDURE — 99214 OFFICE O/P EST MOD 30 MIN: CPT | Performed by: INTERNAL MEDICINE
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
## 2021-08-22 DIAGNOSIS — E03.9 ACQUIRED HYPOTHYROIDISM: ICD-10-CM

## 2021-08-27 DIAGNOSIS — E03.9 ACQUIRED HYPOTHYROIDISM: ICD-10-CM

## 2021-08-27 RX ORDER — LEVOTHYROXINE SODIUM 0.1 MG/1
100 TABLET ORAL DAILY
Qty: 90 TABLET | Refills: 3 | Status: SHIPPED | OUTPATIENT
Start: 2021-08-27 | End: 2021-10-06 | Stop reason: SDUPTHER

## 2021-08-27 NOTE — TELEPHONE ENCOUNTER
Needs levothyroxine 100 mcg, 90 day rx from [facility]  If possible please put 3 refills- its a maintenance med    Last seen 8/18/21, next appt 10/25/21

## 2021-08-30 ENCOUNTER — OFFICE VISIT (OUTPATIENT)
Dept: INTERNAL MEDICINE CLINIC | Facility: CLINIC | Age: 81
End: 2021-08-30
Payer: MEDICARE

## 2021-08-30 VITALS
BODY MASS INDEX: 20.61 KG/M2 | SYSTOLIC BLOOD PRESSURE: 120 MMHG | DIASTOLIC BLOOD PRESSURE: 60 MMHG | OXYGEN SATURATION: 98 % | WEIGHT: 112 LBS | TEMPERATURE: 98.4 F | HEIGHT: 62 IN | HEART RATE: 74 BPM

## 2021-08-30 DIAGNOSIS — M12.811 ROTATOR CUFF ARTHROPATHY OF RIGHT SHOULDER: ICD-10-CM

## 2021-08-30 DIAGNOSIS — E44.0 MODERATE PROTEIN-CALORIE MALNUTRITION (HCC): ICD-10-CM

## 2021-08-30 DIAGNOSIS — M25.511 ACUTE PAIN OF RIGHT SHOULDER: Primary | ICD-10-CM

## 2021-08-30 PROCEDURE — 99212 OFFICE O/P EST SF 10 MIN: CPT | Performed by: INTERNAL MEDICINE

## 2021-08-30 RX ORDER — LEVOTHYROXINE SODIUM 0.1 MG/1
TABLET ORAL
Qty: 90 TABLET | Refills: 0 | Status: SHIPPED | OUTPATIENT
Start: 2021-08-30 | End: 2021-12-10 | Stop reason: SDUPTHER

## 2021-08-30 RX ORDER — LIDOCAINE 50 MG/G
1 PATCH TOPICAL DAILY
Qty: 15 PATCH | Refills: 0 | Status: SHIPPED | OUTPATIENT
Start: 2021-08-30 | End: 2021-09-14 | Stop reason: CLARIF
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
## 2021-09-01 ENCOUNTER — TELEPHONE (OUTPATIENT)
Dept: INTERNAL MEDICINE CLINIC | Facility: CLINIC | Age: 81
End: 2021-09-01

## 2021-09-01 NOTE — TELEPHONE ENCOUNTER
You prescribed lidocaine 5% patch for patient  Pharmacist said she can buy a 4% over the counter  Pt would like to know if that's okay or do you want her on the 5%

## 2021-09-28 ENCOUNTER — APPOINTMENT (OUTPATIENT)
Dept: LAB | Facility: MEDICAL CENTER | Age: 81
End: 2021-09-28
Payer: MEDICARE

## 2021-09-28 ENCOUNTER — CLINICAL SUPPORT (OUTPATIENT)
Dept: URGENT CARE | Facility: MEDICAL CENTER | Age: 81
End: 2021-09-28
Payer: MEDICARE

## 2021-09-28 DIAGNOSIS — Z01.89 ENCOUNTER FOR OTHER SPECIFIED SPECIAL EXAMINATIONS: ICD-10-CM

## 2021-09-28 LAB
ALBUMIN SERPL BCP-MCNC: 3.1 G/DL (ref 3.5–5)
ALP SERPL-CCNC: 62 U/L (ref 46–116)
ALT SERPL W P-5'-P-CCNC: 49 U/L (ref 12–78)
ANION GAP SERPL CALCULATED.3IONS-SCNC: 2 MMOL/L (ref 4–13)
AST SERPL W P-5'-P-CCNC: 63 U/L (ref 5–45)
ATRIAL RATE: 70 BPM
ATRIAL RATE: 70 BPM
BASOPHILS # BLD AUTO: 0.03 THOUSANDS/ΜL (ref 0–0.1)
BASOPHILS NFR BLD AUTO: 1 % (ref 0–1)
BILIRUB SERPL-MCNC: 0.31 MG/DL (ref 0.2–1)
BUN SERPL-MCNC: 12 MG/DL (ref 5–25)
CALCIUM ALBUM COR SERPL-MCNC: 9.3 MG/DL (ref 8.3–10.1)
CALCIUM SERPL-MCNC: 8.6 MG/DL (ref 8.3–10.1)
CHLORIDE SERPL-SCNC: 109 MMOL/L (ref 100–108)
CO2 SERPL-SCNC: 27 MMOL/L (ref 21–32)
CREAT SERPL-MCNC: 0.49 MG/DL (ref 0.6–1.3)
EOSINOPHIL # BLD AUTO: 0.13 THOUSAND/ΜL (ref 0–0.61)
EOSINOPHIL NFR BLD AUTO: 3 % (ref 0–6)
ERYTHROCYTE [DISTWIDTH] IN BLOOD BY AUTOMATED COUNT: 15.1 % (ref 11.6–15.1)
GFR SERPL CREATININE-BSD FRML MDRD: 93 ML/MIN/1.73SQ M
GLUCOSE SERPL-MCNC: 72 MG/DL (ref 65–140)
HCT VFR BLD AUTO: 40.4 % (ref 34.8–46.1)
HGB BLD-MCNC: 12.8 G/DL (ref 11.5–15.4)
IMM GRANULOCYTES # BLD AUTO: 0.03 THOUSAND/UL (ref 0–0.2)
IMM GRANULOCYTES NFR BLD AUTO: 1 % (ref 0–2)
INR PPP: 0.97 (ref 0.84–1.19)
LYMPHOCYTES # BLD AUTO: 1.33 THOUSANDS/ΜL (ref 0.6–4.47)
LYMPHOCYTES NFR BLD AUTO: 27 % (ref 14–44)
MCH RBC QN AUTO: 30.4 PG (ref 26.8–34.3)
MCHC RBC AUTO-ENTMCNC: 31.7 G/DL (ref 31.4–37.4)
MCV RBC AUTO: 96 FL (ref 82–98)
MONOCYTES # BLD AUTO: 0.53 THOUSAND/ΜL (ref 0.17–1.22)
MONOCYTES NFR BLD AUTO: 11 % (ref 4–12)
NEUTROPHILS # BLD AUTO: 2.85 THOUSANDS/ΜL (ref 1.85–7.62)
NEUTS SEG NFR BLD AUTO: 57 % (ref 43–75)
NRBC BLD AUTO-RTO: 0 /100 WBCS
P AXIS: 69 DEGREES
P AXIS: 69 DEGREES
PLATELET # BLD AUTO: 212 THOUSANDS/UL (ref 149–390)
PMV BLD AUTO: 10.8 FL (ref 8.9–12.7)
POTASSIUM SERPL-SCNC: 4.7 MMOL/L (ref 3.5–5.3)
PR INTERVAL: 158 MS
PR INTERVAL: 158 MS
PROT SERPL-MCNC: 7.2 G/DL (ref 6.4–8.2)
PROTHROMBIN TIME: 12.5 SECONDS (ref 11.6–14.5)
QRS AXIS: 62 DEGREES
QRS AXIS: 62 DEGREES
QRSD INTERVAL: 70 MS
QRSD INTERVAL: 70 MS
QT INTERVAL: 396 MS
QT INTERVAL: 396 MS
QTC INTERVAL: 427 MS
QTC INTERVAL: 427 MS
RBC # BLD AUTO: 4.21 MILLION/UL (ref 3.81–5.12)
SODIUM SERPL-SCNC: 138 MMOL/L (ref 136–145)
T WAVE AXIS: 67 DEGREES
T WAVE AXIS: 67 DEGREES
VENTRICULAR RATE: 70 BPM
VENTRICULAR RATE: 70 BPM
WBC # BLD AUTO: 4.9 THOUSAND/UL (ref 4.31–10.16)

## 2021-09-28 PROCEDURE — 36415 COLL VENOUS BLD VENIPUNCTURE: CPT

## 2021-09-28 PROCEDURE — 80053 COMPREHEN METABOLIC PANEL: CPT

## 2021-09-28 PROCEDURE — 93010 ELECTROCARDIOGRAM REPORT: CPT | Performed by: INTERNAL MEDICINE

## 2021-09-28 PROCEDURE — 85610 PROTHROMBIN TIME: CPT

## 2021-09-28 PROCEDURE — 93005 ELECTROCARDIOGRAM TRACING: CPT

## 2021-09-28 PROCEDURE — 85025 COMPLETE CBC W/AUTO DIFF WBC: CPT

## 2021-10-01 ENCOUNTER — PREP FOR PROCEDURE (OUTPATIENT)
Dept: OBGYN CLINIC | Facility: OTHER | Age: 81
End: 2021-10-01

## 2021-10-01 DIAGNOSIS — M19.211 SECONDARY OSTEOARTHRITIS OF RIGHT SHOULDER: Primary | ICD-10-CM

## 2021-10-06 ENCOUNTER — ANESTHESIA EVENT (OUTPATIENT)
Dept: PERIOP | Facility: HOSPITAL | Age: 81
End: 2021-10-06
Payer: MEDICARE

## 2021-10-06 ENCOUNTER — OFFICE VISIT (OUTPATIENT)
Dept: INTERNAL MEDICINE CLINIC | Facility: CLINIC | Age: 81
End: 2021-10-06
Payer: MEDICARE

## 2021-10-06 VITALS
WEIGHT: 115.6 LBS | SYSTOLIC BLOOD PRESSURE: 110 MMHG | HEIGHT: 62 IN | HEART RATE: 66 BPM | OXYGEN SATURATION: 98 % | DIASTOLIC BLOOD PRESSURE: 70 MMHG | BODY MASS INDEX: 21.27 KG/M2 | TEMPERATURE: 97.9 F

## 2021-10-06 DIAGNOSIS — M12.811 ROTATOR CUFF ARTHROPATHY OF RIGHT SHOULDER: ICD-10-CM

## 2021-10-06 DIAGNOSIS — I10 ESSENTIAL HYPERTENSION: ICD-10-CM

## 2021-10-06 DIAGNOSIS — K91.2 POSTSURGICAL MALABSORPTION: ICD-10-CM

## 2021-10-06 DIAGNOSIS — M81.0 AGE-RELATED OSTEOPOROSIS WITHOUT CURRENT PATHOLOGICAL FRACTURE: ICD-10-CM

## 2021-10-06 DIAGNOSIS — Z01.818 PREOP EXAM FOR INTERNAL MEDICINE: Primary | ICD-10-CM

## 2021-10-06 DIAGNOSIS — E03.9 ACQUIRED HYPOTHYROIDISM: ICD-10-CM

## 2021-10-06 PROBLEM — M19.219 LOCALIZED, SECONDARY OSTEOARTHRITIS OF THE SHOULDER REGION: Status: ACTIVE | Noted: 2021-09-28

## 2021-10-06 PROCEDURE — 99214 OFFICE O/P EST MOD 30 MIN: CPT | Performed by: INTERNAL MEDICINE

## 2021-10-06 RX ORDER — TRAMADOL HYDROCHLORIDE 50 MG/1
TABLET ORAL
COMMUNITY
End: 2021-11-29 | Stop reason: ALTCHOICE

## 2021-10-07 ENCOUNTER — APPOINTMENT (OUTPATIENT)
Dept: LAB | Facility: MEDICAL CENTER | Age: 81
End: 2021-10-07
Payer: MEDICARE

## 2021-10-07 DIAGNOSIS — M19.211 SECONDARY OSTEOARTHRITIS OF RIGHT SHOULDER: ICD-10-CM

## 2021-10-07 PROCEDURE — 86900 BLOOD TYPING SEROLOGIC ABO: CPT

## 2021-10-07 PROCEDURE — 36415 COLL VENOUS BLD VENIPUNCTURE: CPT

## 2021-10-07 PROCEDURE — 86850 RBC ANTIBODY SCREEN: CPT

## 2021-10-07 PROCEDURE — 86901 BLOOD TYPING SEROLOGIC RH(D): CPT

## 2021-10-08 LAB
ABO GROUP BLD: NORMAL
BLD GP AB SCN SERPL QL: NEGATIVE
RH BLD: POSITIVE
SPECIMEN EXPIRATION DATE: NORMAL

## 2021-10-11 ENCOUNTER — EVALUATION (OUTPATIENT)
Dept: PHYSICAL THERAPY | Facility: CLINIC | Age: 81
End: 2021-10-11
Payer: MEDICARE

## 2021-10-11 ENCOUNTER — TELEPHONE (OUTPATIENT)
Dept: INTERNAL MEDICINE CLINIC | Facility: CLINIC | Age: 81
End: 2021-10-11

## 2021-10-11 ENCOUNTER — APPOINTMENT (OUTPATIENT)
Dept: LAB | Facility: MEDICAL CENTER | Age: 81
End: 2021-10-11
Payer: MEDICARE

## 2021-10-11 VITALS — DIASTOLIC BLOOD PRESSURE: 60 MMHG | SYSTOLIC BLOOD PRESSURE: 117 MMHG

## 2021-10-11 DIAGNOSIS — M19.211 SECONDARY OSTEOARTHRITIS OF RIGHT SHOULDER: ICD-10-CM

## 2021-10-11 DIAGNOSIS — I10 ESSENTIAL HYPERTENSION: ICD-10-CM

## 2021-10-11 DIAGNOSIS — M13.811 OTHER SPECIFIED ARTHRITIS, RIGHT SHOULDER: Primary | ICD-10-CM

## 2021-10-11 DIAGNOSIS — M25.511 RIGHT SHOULDER PAIN, UNSPECIFIED CHRONICITY: ICD-10-CM

## 2021-10-11 LAB
BASOPHILS # BLD AUTO: 0.01 THOUSANDS/ΜL (ref 0–0.1)
BASOPHILS NFR BLD AUTO: 0 % (ref 0–1)
EOSINOPHIL # BLD AUTO: 0.18 THOUSAND/ΜL (ref 0–0.61)
EOSINOPHIL NFR BLD AUTO: 5 % (ref 0–6)
ERYTHROCYTE [DISTWIDTH] IN BLOOD BY AUTOMATED COUNT: 15.4 % (ref 11.6–15.1)
FERRITIN SERPL-MCNC: 73 NG/ML (ref 8–388)
HCT VFR BLD AUTO: 40.5 % (ref 34.8–46.1)
HGB BLD-MCNC: 12.6 G/DL (ref 11.5–15.4)
IMM GRANULOCYTES # BLD AUTO: 0.02 THOUSAND/UL (ref 0–0.2)
IMM GRANULOCYTES NFR BLD AUTO: 1 % (ref 0–2)
IRON SATN MFR SERPL: 16 % (ref 15–50)
IRON SERPL-MCNC: 61 UG/DL (ref 50–170)
LYMPHOCYTES # BLD AUTO: 1.13 THOUSANDS/ΜL (ref 0.6–4.47)
LYMPHOCYTES NFR BLD AUTO: 30 % (ref 14–44)
MCH RBC QN AUTO: 30.7 PG (ref 26.8–34.3)
MCHC RBC AUTO-ENTMCNC: 31.1 G/DL (ref 31.4–37.4)
MCV RBC AUTO: 99 FL (ref 82–98)
MONOCYTES # BLD AUTO: 0.34 THOUSAND/ΜL (ref 0.17–1.22)
MONOCYTES NFR BLD AUTO: 9 % (ref 4–12)
NEUTROPHILS # BLD AUTO: 2.05 THOUSANDS/ΜL (ref 1.85–7.62)
NEUTS SEG NFR BLD AUTO: 55 % (ref 43–75)
NRBC BLD AUTO-RTO: 0 /100 WBCS
PLATELET # BLD AUTO: 191 THOUSANDS/UL (ref 149–390)
PMV BLD AUTO: 10.9 FL (ref 8.9–12.7)
RBC # BLD AUTO: 4.1 MILLION/UL (ref 3.81–5.12)
RETICS # AUTO: NORMAL 10*3/UL (ref 14097–95744)
RETICS # CALC: 1.51 % (ref 0.37–1.87)
TIBC SERPL-MCNC: 373 UG/DL (ref 250–450)
WBC # BLD AUTO: 3.73 THOUSAND/UL (ref 4.31–10.16)

## 2021-10-11 PROCEDURE — 85045 AUTOMATED RETICULOCYTE COUNT: CPT

## 2021-10-11 PROCEDURE — 36415 COLL VENOUS BLD VENIPUNCTURE: CPT

## 2021-10-11 PROCEDURE — 85025 COMPLETE CBC W/AUTO DIFF WBC: CPT

## 2021-10-11 PROCEDURE — 83540 ASSAY OF IRON: CPT

## 2021-10-11 PROCEDURE — 83550 IRON BINDING TEST: CPT

## 2021-10-11 PROCEDURE — 82728 ASSAY OF FERRITIN: CPT

## 2021-10-11 PROCEDURE — 97161 PT EVAL LOW COMPLEX 20 MIN: CPT | Performed by: PHYSICAL THERAPIST

## 2021-10-12 ENCOUNTER — PATIENT MESSAGE (OUTPATIENT)
Dept: INTERNAL MEDICINE CLINIC | Facility: CLINIC | Age: 81
End: 2021-10-12

## 2021-10-12 DIAGNOSIS — I10 ESSENTIAL HYPERTENSION: ICD-10-CM

## 2021-10-14 RX ORDER — AMLODIPINE BESYLATE 5 MG/1
5 TABLET ORAL DAILY
Qty: 90 TABLET | Refills: 1 | Status: ON HOLD | OUTPATIENT
Start: 2021-10-14 | End: 2021-10-15 | Stop reason: SDUPTHER

## 2021-10-15 ENCOUNTER — HOSPITAL ENCOUNTER (OUTPATIENT)
Facility: HOSPITAL | Age: 81
Setting detail: OUTPATIENT SURGERY
Discharge: HOME | End: 2021-10-16
Attending: ORTHOPAEDIC SURGERY | Admitting: ORTHOPAEDIC SURGERY
Payer: MEDICARE

## 2021-10-15 ENCOUNTER — ANESTHESIA (OUTPATIENT)
Dept: PERIOP | Facility: HOSPITAL | Age: 81
End: 2021-10-15
Payer: MEDICARE

## 2021-10-15 ENCOUNTER — APPOINTMENT (OUTPATIENT)
Dept: RADIOLOGY | Facility: HOSPITAL | Age: 81
End: 2021-10-15
Payer: MEDICARE

## 2021-10-15 DIAGNOSIS — M19.211 LOCALIZED SECONDARY OSTEOARTHRITIS OF RIGHT SHOULDER REGION: Primary | ICD-10-CM

## 2021-10-15 PROCEDURE — C1776 JOINT DEVICE (IMPLANTABLE): HCPCS | Performed by: ORTHOPAEDIC SURGERY

## 2021-10-15 PROCEDURE — C1713 ANCHOR/SCREW BN/BN,TIS/BN: HCPCS | Performed by: ORTHOPAEDIC SURGERY

## 2021-10-15 PROCEDURE — 73030 X-RAY EXAM OF SHOULDER: CPT

## 2021-10-15 PROCEDURE — C9290 INJ, BUPIVACAINE LIPOSOME: HCPCS | Performed by: ANESTHESIOLOGY

## 2021-10-15 DEVICE — IMPLANTABLE DEVICE
Type: IMPLANTABLE DEVICE | Site: SHOULDER | Status: FUNCTIONAL
Brand: TRABECULAR METAL®

## 2021-10-15 DEVICE — INVERSE/REVERSE SCREW SYSTEM, 4.5-30
Type: IMPLANTABLE DEVICE | Site: SHOULDER | Status: FUNCTIONAL
Brand: INVERSE/REVERSE

## 2021-10-15 RX ORDER — DOCUSATE SODIUM 100 MG/1
100 CAPSULE, LIQUID FILLED ORAL 2 TIMES DAILY
Status: DISCONTINUED | OUTPATIENT
Start: 2021-10-15 | End: 2021-10-16 | Stop reason: HOSPADM

## 2021-10-15 RX ORDER — CALCIUM CARBONATE 200(500)MG
1000 TABLET,CHEWABLE ORAL DAILY PRN
Status: DISCONTINUED | OUTPATIENT
Start: 2021-10-15 | End: 2021-10-16 | Stop reason: HOSPADM

## 2021-10-15 RX ORDER — OXYCODONE HYDROCHLORIDE 5 MG/1
5 TABLET ORAL EVERY 4 HOURS PRN
Status: DISCONTINUED | OUTPATIENT
Start: 2021-10-15 | End: 2021-10-15

## 2021-10-15 RX ORDER — AMLODIPINE BESYLATE 5 MG/1
5 TABLET ORAL DAILY
Qty: 90 TABLET | Refills: 1 | Status: SHIPPED | OUTPATIENT
Start: 2021-10-15 | End: 2022-03-23

## 2021-10-15 RX ORDER — PROPOFOL 10 MG/ML
INJECTION, EMULSION INTRAVENOUS AS NEEDED
Status: DISCONTINUED | OUTPATIENT
Start: 2021-10-15 | End: 2021-10-15

## 2021-10-15 RX ORDER — MAGNESIUM HYDROXIDE/ALUMINUM HYDROXICE/SIMETHICONE 120; 1200; 1200 MG/30ML; MG/30ML; MG/30ML
30 SUSPENSION ORAL EVERY 6 HOURS PRN
Status: DISCONTINUED | OUTPATIENT
Start: 2021-10-15 | End: 2021-10-16 | Stop reason: HOSPADM

## 2021-10-15 RX ORDER — ROCURONIUM BROMIDE 10 MG/ML
INJECTION, SOLUTION INTRAVENOUS AS NEEDED
Status: DISCONTINUED | OUTPATIENT
Start: 2021-10-15 | End: 2021-10-15

## 2021-10-15 RX ORDER — HYDROMORPHONE HCL/PF 1 MG/ML
0.5 SYRINGE (ML) INJECTION
Status: DISCONTINUED | OUTPATIENT
Start: 2021-10-15 | End: 2021-10-15 | Stop reason: HOSPADM

## 2021-10-15 RX ORDER — CEFAZOLIN SODIUM 1 G/50ML
1000 SOLUTION INTRAVENOUS EVERY 8 HOURS
Status: COMPLETED | OUTPATIENT
Start: 2021-10-15 | End: 2021-10-16

## 2021-10-15 RX ORDER — FENTANYL CITRATE/PF 50 MCG/ML
25 SYRINGE (ML) INJECTION
Status: DISCONTINUED | OUTPATIENT
Start: 2021-10-15 | End: 2021-10-15 | Stop reason: HOSPADM

## 2021-10-15 RX ORDER — SENNOSIDES 8.6 MG
1 TABLET ORAL DAILY
Status: DISCONTINUED | OUTPATIENT
Start: 2021-10-15 | End: 2021-10-16 | Stop reason: HOSPADM

## 2021-10-15 RX ORDER — OXYCODONE HYDROCHLORIDE 5 MG/1
5 TABLET ORAL EVERY 4 HOURS PRN
Status: DISCONTINUED | OUTPATIENT
Start: 2021-10-15 | End: 2021-10-16 | Stop reason: HOSPADM

## 2021-10-15 RX ORDER — OXYCODONE HYDROCHLORIDE 5 MG/1
2.5 TABLET ORAL EVERY 4 HOURS PRN
Status: DISCONTINUED | OUTPATIENT
Start: 2021-10-15 | End: 2021-10-16 | Stop reason: HOSPADM

## 2021-10-15 RX ORDER — SODIUM CHLORIDE, SODIUM LACTATE, POTASSIUM CHLORIDE, CALCIUM CHLORIDE 600; 310; 30; 20 MG/100ML; MG/100ML; MG/100ML; MG/100ML
125 INJECTION, SOLUTION INTRAVENOUS CONTINUOUS
Status: DISCONTINUED | OUTPATIENT
Start: 2021-10-15 | End: 2021-10-15

## 2021-10-15 RX ORDER — AMLODIPINE BESYLATE 5 MG/1
5 TABLET ORAL DAILY
Status: DISCONTINUED | OUTPATIENT
Start: 2021-10-16 | End: 2021-10-16 | Stop reason: HOSPADM

## 2021-10-15 RX ORDER — CEFAZOLIN SODIUM 1 G/50ML
1000 SOLUTION INTRAVENOUS ONCE
Status: COMPLETED | OUTPATIENT
Start: 2021-10-15 | End: 2021-10-15

## 2021-10-15 RX ORDER — ONDANSETRON 2 MG/ML
4 INJECTION INTRAMUSCULAR; INTRAVENOUS ONCE AS NEEDED
Status: DISCONTINUED | OUTPATIENT
Start: 2021-10-15 | End: 2021-10-15 | Stop reason: HOSPADM

## 2021-10-15 RX ORDER — MIDAZOLAM HYDROCHLORIDE 2 MG/2ML
INJECTION, SOLUTION INTRAMUSCULAR; INTRAVENOUS AS NEEDED
Status: DISCONTINUED | OUTPATIENT
Start: 2021-10-15 | End: 2021-10-15

## 2021-10-15 RX ORDER — ACETAMINOPHEN 325 MG/1
650 TABLET ORAL EVERY 6 HOURS PRN
Status: DISCONTINUED | OUTPATIENT
Start: 2021-10-15 | End: 2021-10-16 | Stop reason: HOSPADM

## 2021-10-15 RX ORDER — ASPIRIN 325 MG
325 TABLET ORAL DAILY
Status: DISCONTINUED | OUTPATIENT
Start: 2021-10-15 | End: 2021-10-16 | Stop reason: HOSPADM

## 2021-10-15 RX ORDER — GLYCOPYRROLATE 0.2 MG/ML
INJECTION INTRAMUSCULAR; INTRAVENOUS AS NEEDED
Status: DISCONTINUED | OUTPATIENT
Start: 2021-10-15 | End: 2021-10-15

## 2021-10-15 RX ORDER — ONDANSETRON 2 MG/ML
INJECTION INTRAMUSCULAR; INTRAVENOUS AS NEEDED
Status: DISCONTINUED | OUTPATIENT
Start: 2021-10-15 | End: 2021-10-15

## 2021-10-15 RX ORDER — ONDANSETRON 2 MG/ML
4 INJECTION INTRAMUSCULAR; INTRAVENOUS EVERY 6 HOURS PRN
Status: DISCONTINUED | OUTPATIENT
Start: 2021-10-15 | End: 2021-10-16 | Stop reason: HOSPADM

## 2021-10-15 RX ORDER — FENTANYL CITRATE 50 UG/ML
INJECTION, SOLUTION INTRAMUSCULAR; INTRAVENOUS AS NEEDED
Status: DISCONTINUED | OUTPATIENT
Start: 2021-10-15 | End: 2021-10-15

## 2021-10-15 RX ORDER — LEVOTHYROXINE SODIUM 0.1 MG/1
100 TABLET ORAL
Status: DISCONTINUED | OUTPATIENT
Start: 2021-10-16 | End: 2021-10-16 | Stop reason: HOSPADM

## 2021-10-15 RX ORDER — SODIUM CHLORIDE 9 MG/ML
100 INJECTION, SOLUTION INTRAVENOUS CONTINUOUS
Status: DISCONTINUED | OUTPATIENT
Start: 2021-10-15 | End: 2021-10-16 | Stop reason: HOSPADM

## 2021-10-15 RX ORDER — BUPIVACAINE HYDROCHLORIDE 5 MG/ML
INJECTION, SOLUTION EPIDURAL; INTRACAUDAL AS NEEDED
Status: DISCONTINUED | OUTPATIENT
Start: 2021-10-15 | End: 2021-10-15

## 2021-10-15 RX ORDER — MAGNESIUM HYDROXIDE 1200 MG/15ML
LIQUID ORAL AS NEEDED
Status: DISCONTINUED | OUTPATIENT
Start: 2021-10-15 | End: 2021-10-15 | Stop reason: HOSPADM

## 2021-10-15 RX ORDER — PANTOPRAZOLE SODIUM 40 MG/1
40 TABLET, DELAYED RELEASE ORAL DAILY
Status: DISCONTINUED | OUTPATIENT
Start: 2021-10-15 | End: 2021-10-16 | Stop reason: HOSPADM

## 2021-10-15 RX ORDER — EPHEDRINE SULFATE 50 MG/ML
INJECTION INTRAVENOUS AS NEEDED
Status: DISCONTINUED | OUTPATIENT
Start: 2021-10-15 | End: 2021-10-15

## 2021-10-15 RX ORDER — LIDOCAINE HYDROCHLORIDE 10 MG/ML
INJECTION, SOLUTION EPIDURAL; INFILTRATION; INTRACAUDAL; PERINEURAL AS NEEDED
Status: DISCONTINUED | OUTPATIENT
Start: 2021-10-15 | End: 2021-10-15

## 2021-10-15 RX ADMIN — PHENYLEPHRINE HYDROCHLORIDE 50 MCG/MIN: 10 INJECTION INTRAVENOUS at 07:41

## 2021-10-15 RX ADMIN — ACETAMINOPHEN 650 MG: 325 TABLET, FILM COATED ORAL at 16:30

## 2021-10-15 RX ADMIN — LIDOCAINE HYDROCHLORIDE 100 MG: 10 INJECTION, SOLUTION EPIDURAL; INFILTRATION; INTRACAUDAL; PERINEURAL at 07:32

## 2021-10-15 RX ADMIN — SODIUM CHLORIDE 100 ML/HR: 0.9 INJECTION, SOLUTION INTRAVENOUS at 09:11

## 2021-10-15 RX ADMIN — EPHEDRINE SULFATE 10 MG: 50 INJECTION, SOLUTION INTRAVENOUS at 07:51

## 2021-10-15 RX ADMIN — MIDAZOLAM 2 MG: 1 INJECTION INTRAMUSCULAR; INTRAVENOUS at 06:50

## 2021-10-15 RX ADMIN — ROCURONIUM BROMIDE 30 MG: 50 INJECTION, SOLUTION INTRAVENOUS at 07:32

## 2021-10-15 RX ADMIN — ONDANSETRON 4 MG: 2 INJECTION INTRAMUSCULAR; INTRAVENOUS at 08:15

## 2021-10-15 RX ADMIN — ESTROGENS, CONJUGATED 0.62 MG: 0.62 TABLET, FILM COATED ORAL at 16:30

## 2021-10-15 RX ADMIN — PANTOPRAZOLE SODIUM 40 MG: 40 TABLET, DELAYED RELEASE ORAL at 12:11

## 2021-10-15 RX ADMIN — CEFAZOLIN SODIUM 1000 MG: 1 SOLUTION INTRAVENOUS at 16:34

## 2021-10-15 RX ADMIN — SODIUM CHLORIDE, SODIUM LACTATE, POTASSIUM CHLORIDE, AND CALCIUM CHLORIDE: .6; .31; .03; .02 INJECTION, SOLUTION INTRAVENOUS at 08:14

## 2021-10-15 RX ADMIN — PROPOFOL 30 MG: 10 INJECTION, EMULSION INTRAVENOUS at 07:59

## 2021-10-15 RX ADMIN — GLYCOPYRROLATE 0.2 MG: 0.2 INJECTION, SOLUTION INTRAMUSCULAR; INTRAVENOUS at 07:51

## 2021-10-15 RX ADMIN — PROPOFOL 110 MG: 10 INJECTION, EMULSION INTRAVENOUS at 07:32

## 2021-10-15 RX ADMIN — PROPOFOL 20 MG: 10 INJECTION, EMULSION INTRAVENOUS at 07:57

## 2021-10-15 RX ADMIN — FENTANYL CITRATE 100 MCG: 50 INJECTION INTRAMUSCULAR; INTRAVENOUS at 06:50

## 2021-10-15 RX ADMIN — BUPIVACAINE HYDROCHLORIDE 10 ML: 5 INJECTION, SOLUTION EPIDURAL; INTRACAUDAL at 06:58

## 2021-10-15 RX ADMIN — SODIUM CHLORIDE, SODIUM LACTATE, POTASSIUM CHLORIDE, AND CALCIUM CHLORIDE 125 ML/HR: .6; .31; .03; .02 INJECTION, SOLUTION INTRAVENOUS at 06:16

## 2021-10-15 RX ADMIN — CEFAZOLIN SODIUM 1000 MG: 1 SOLUTION INTRAVENOUS at 07:30

## 2021-10-16 VITALS
TEMPERATURE: 98.9 F | RESPIRATION RATE: 18 BRPM | HEIGHT: 62 IN | DIASTOLIC BLOOD PRESSURE: 60 MMHG | HEART RATE: 71 BPM | WEIGHT: 115.56 LBS | SYSTOLIC BLOOD PRESSURE: 130 MMHG | OXYGEN SATURATION: 99 % | BODY MASS INDEX: 21.27 KG/M2

## 2021-10-16 PROBLEM — D61.818 PANCYTOPENIA (HCC): Status: RESOLVED | Noted: 2019-03-27 | Resolved: 2021-10-16

## 2021-10-16 PROBLEM — D69.6 THROMBOCYTOPENIA, UNSPECIFIED (HCC): Status: RESOLVED | Noted: 2020-09-29 | Resolved: 2021-10-16

## 2021-10-16 LAB
ANION GAP SERPL CALCULATED.3IONS-SCNC: 12 MMOL/L (ref 4–13)
BUN SERPL-MCNC: 16 MG/DL (ref 5–25)
CALCIUM SERPL-MCNC: 7.4 MG/DL (ref 8.3–10.1)
CHLORIDE SERPL-SCNC: 109 MMOL/L (ref 100–108)
CO2 SERPL-SCNC: 20 MMOL/L (ref 21–32)
CREAT SERPL-MCNC: 0.77 MG/DL (ref 0.6–1.3)
GFR SERPL CREATININE-BSD FRML MDRD: 74 ML/MIN/1.73SQ M
GLUCOSE SERPL-MCNC: 153 MG/DL (ref 65–140)
POTASSIUM SERPL-SCNC: 4.1 MMOL/L (ref 3.5–5.3)
SODIUM SERPL-SCNC: 141 MMOL/L (ref 136–145)

## 2021-10-16 PROCEDURE — 97535 SELF CARE MNGMENT TRAINING: CPT

## 2021-10-16 PROCEDURE — 97166 OT EVAL MOD COMPLEX 45 MIN: CPT

## 2021-10-16 PROCEDURE — 99204 OFFICE O/P NEW MOD 45 MIN: CPT | Performed by: INTERNAL MEDICINE

## 2021-10-16 PROCEDURE — 97163 PT EVAL HIGH COMPLEX 45 MIN: CPT

## 2021-10-16 PROCEDURE — 80048 BASIC METABOLIC PNL TOTAL CA: CPT | Performed by: ORTHOPAEDIC SURGERY

## 2021-10-16 RX ADMIN — AMLODIPINE BESYLATE 5 MG: 5 TABLET ORAL at 09:20

## 2021-10-16 RX ADMIN — LEVOTHYROXINE SODIUM 100 MCG: 100 TABLET ORAL at 06:02

## 2021-10-16 RX ADMIN — OXYCODONE HYDROCHLORIDE 5 MG: 5 TABLET ORAL at 09:45

## 2021-10-16 RX ADMIN — MORPHINE SULFATE 2 MG: 2 INJECTION, SOLUTION INTRAMUSCULAR; INTRAVENOUS at 07:39

## 2021-10-16 RX ADMIN — ASPIRIN 325 MG ORAL TABLET 325 MG: 325 PILL ORAL at 09:20

## 2021-10-16 RX ADMIN — OXYCODONE HYDROCHLORIDE 2.5 MG: 5 TABLET ORAL at 06:02

## 2021-10-16 RX ADMIN — SODIUM CHLORIDE 100 ML/HR: 0.9 INJECTION, SOLUTION INTRAVENOUS at 03:49

## 2021-10-16 RX ADMIN — ACETAMINOPHEN 650 MG: 325 TABLET, FILM COATED ORAL at 00:36

## 2021-10-16 RX ADMIN — CEFAZOLIN SODIUM 1000 MG: 1 SOLUTION INTRAVENOUS at 00:26

## 2021-10-16 RX ADMIN — ESTROGENS, CONJUGATED 0.62 MG: 0.62 TABLET, FILM COATED ORAL at 09:20

## 2021-10-16 RX ADMIN — PANTOPRAZOLE SODIUM 40 MG: 40 TABLET, DELAYED RELEASE ORAL at 09:20

## 2021-10-25 ENCOUNTER — OFFICE VISIT (OUTPATIENT)
Dept: RHEUMATOLOGY | Facility: CLINIC | Age: 81
End: 2021-10-25
Payer: MEDICARE

## 2021-10-25 VITALS
WEIGHT: 117.6 LBS | HEIGHT: 63 IN | DIASTOLIC BLOOD PRESSURE: 71 MMHG | SYSTOLIC BLOOD PRESSURE: 163 MMHG | HEART RATE: 71 BPM | BODY MASS INDEX: 20.84 KG/M2

## 2021-10-25 DIAGNOSIS — M19.90 OSTEOARTHRITIS, UNSPECIFIED OSTEOARTHRITIS TYPE, UNSPECIFIED SITE: ICD-10-CM

## 2021-10-25 DIAGNOSIS — M81.0 OSTEOPOROSIS, UNSPECIFIED OSTEOPOROSIS TYPE, UNSPECIFIED PATHOLOGICAL FRACTURE PRESENCE: Primary | ICD-10-CM

## 2021-10-25 DIAGNOSIS — Z79.899 HIGH RISK MEDICATION USE: ICD-10-CM

## 2021-10-25 PROCEDURE — 99204 OFFICE O/P NEW MOD 45 MIN: CPT | Performed by: INTERNAL MEDICINE

## 2021-10-26 ENCOUNTER — TELEMEDICINE (OUTPATIENT)
Dept: INTERNAL MEDICINE CLINIC | Facility: CLINIC | Age: 81
End: 2021-10-26
Payer: MEDICARE

## 2021-10-26 DIAGNOSIS — Z96.611 HISTORY OF RIGHT SHOULDER REPLACEMENT: ICD-10-CM

## 2021-10-26 DIAGNOSIS — J04.10 TRACHEITIS: Primary | ICD-10-CM

## 2021-10-26 DIAGNOSIS — Z03.818 ENCOUNTER FOR OBSERVATION FOR SUSPECTED EXPOSURE TO OTHER BIOLOGICAL AGENTS RULED OUT: ICD-10-CM

## 2021-10-26 PROCEDURE — 99213 OFFICE O/P EST LOW 20 MIN: CPT | Performed by: INTERNAL MEDICINE

## 2021-10-26 PROCEDURE — 0241U HB NFCT DS VIR RESP RNA 4 TRGT: CPT | Performed by: INTERNAL MEDICINE

## 2021-10-26 PROCEDURE — 1123F ACP DISCUSS/DSCN MKR DOCD: CPT | Performed by: INTERNAL MEDICINE

## 2021-11-08 ENCOUNTER — OFFICE VISIT (OUTPATIENT)
Dept: PHYSICAL THERAPY | Facility: CLINIC | Age: 81
End: 2021-11-08
Payer: MEDICARE

## 2021-11-08 DIAGNOSIS — M13.811 OTHER SPECIFIED ARTHRITIS, RIGHT SHOULDER: Primary | ICD-10-CM

## 2021-11-08 DIAGNOSIS — Z48.89 OTHER SPECIFIED AFTERCARE FOLLOWING SURGERY: ICD-10-CM

## 2021-11-08 DIAGNOSIS — M25.511 RIGHT SHOULDER PAIN, UNSPECIFIED CHRONICITY: ICD-10-CM

## 2021-11-08 PROCEDURE — 97164 PT RE-EVAL EST PLAN CARE: CPT | Performed by: PHYSICAL THERAPIST

## 2021-11-08 PROCEDURE — 97140 MANUAL THERAPY 1/> REGIONS: CPT | Performed by: PHYSICAL THERAPIST

## 2021-11-08 PROCEDURE — 97110 THERAPEUTIC EXERCISES: CPT | Performed by: PHYSICAL THERAPIST

## 2021-11-11 ENCOUNTER — OFFICE VISIT (OUTPATIENT)
Dept: PHYSICAL THERAPY | Facility: CLINIC | Age: 81
End: 2021-11-11
Payer: MEDICARE

## 2021-11-11 DIAGNOSIS — Z48.89 OTHER SPECIFIED AFTERCARE FOLLOWING SURGERY: Primary | ICD-10-CM

## 2021-11-11 DIAGNOSIS — M25.511 RIGHT SHOULDER PAIN, UNSPECIFIED CHRONICITY: ICD-10-CM

## 2021-11-11 DIAGNOSIS — M13.811 OTHER SPECIFIED ARTHRITIS, RIGHT SHOULDER: ICD-10-CM

## 2021-11-11 PROCEDURE — 97140 MANUAL THERAPY 1/> REGIONS: CPT | Performed by: PHYSICAL THERAPIST

## 2021-11-11 PROCEDURE — 97110 THERAPEUTIC EXERCISES: CPT | Performed by: PHYSICAL THERAPIST

## 2021-11-15 ENCOUNTER — ANNUAL EXAM (OUTPATIENT)
Dept: OBGYN CLINIC | Facility: CLINIC | Age: 81
End: 2021-11-15
Payer: MEDICARE

## 2021-11-15 ENCOUNTER — OFFICE VISIT (OUTPATIENT)
Dept: PHYSICAL THERAPY | Facility: CLINIC | Age: 81
End: 2021-11-15
Payer: MEDICARE

## 2021-11-15 VITALS
WEIGHT: 116 LBS | TEMPERATURE: 97.1 F | HEART RATE: 78 BPM | BODY MASS INDEX: 20.55 KG/M2 | SYSTOLIC BLOOD PRESSURE: 110 MMHG | HEIGHT: 63 IN | DIASTOLIC BLOOD PRESSURE: 64 MMHG

## 2021-11-15 DIAGNOSIS — M13.811 OTHER SPECIFIED ARTHRITIS, RIGHT SHOULDER: ICD-10-CM

## 2021-11-15 DIAGNOSIS — Z90.710 S/P HYSTERECTOMY: ICD-10-CM

## 2021-11-15 DIAGNOSIS — Z12.31 ENCOUNTER FOR SCREENING MAMMOGRAM FOR BREAST CANCER: ICD-10-CM

## 2021-11-15 DIAGNOSIS — L90.0 LICHEN SCLEROSUS ET ATROPHICUS: Primary | ICD-10-CM

## 2021-11-15 DIAGNOSIS — Z48.89 OTHER SPECIFIED AFTERCARE FOLLOWING SURGERY: Primary | ICD-10-CM

## 2021-11-15 DIAGNOSIS — L90.0 LICHEN SCLEROSUS ET ATROPHICUS: ICD-10-CM

## 2021-11-15 DIAGNOSIS — N95.2 VAGINAL ATROPHY: ICD-10-CM

## 2021-11-15 DIAGNOSIS — M25.511 RIGHT SHOULDER PAIN, UNSPECIFIED CHRONICITY: ICD-10-CM

## 2021-11-15 DIAGNOSIS — N95.2 POSTMENOPAUSAL ATROPHIC VAGINITIS: ICD-10-CM

## 2021-11-15 PROCEDURE — 97140 MANUAL THERAPY 1/> REGIONS: CPT | Performed by: PHYSICAL THERAPIST

## 2021-11-15 PROCEDURE — 97110 THERAPEUTIC EXERCISES: CPT | Performed by: PHYSICAL THERAPIST

## 2021-11-15 PROCEDURE — 99214 OFFICE O/P EST MOD 30 MIN: CPT | Performed by: OBSTETRICS & GYNECOLOGY

## 2021-11-15 RX ORDER — CLOBETASOL PROPIONATE 0.5 MG/G
CREAM TOPICAL 2 TIMES DAILY
Qty: 45 G | Refills: 2 | Status: SHIPPED | OUTPATIENT
Start: 2021-11-15 | End: 2021-11-15 | Stop reason: SDUPTHER

## 2021-11-15 RX ORDER — ESTRADIOL 0.1 MG/G
CREAM VAGINAL
Qty: 42.5 G | Refills: 3 | Status: SHIPPED | OUTPATIENT
Start: 2021-11-15

## 2021-11-15 RX ORDER — ESTRADIOL 0.1 MG/G
CREAM VAGINAL
Qty: 42.5 G | Refills: 3 | Status: SHIPPED | OUTPATIENT
Start: 2021-11-15 | End: 2021-11-15 | Stop reason: SDUPTHER

## 2021-11-15 RX ORDER — CLOBETASOL PROPIONATE 0.5 MG/G
CREAM TOPICAL 2 TIMES DAILY
Qty: 45 G | Refills: 2 | Status: SHIPPED | OUTPATIENT
Start: 2021-11-15

## 2021-11-17 ENCOUNTER — APPOINTMENT (OUTPATIENT)
Dept: PHYSICAL THERAPY | Facility: CLINIC | Age: 81
End: 2021-11-17
Payer: MEDICARE

## 2021-11-18 ENCOUNTER — OFFICE VISIT (OUTPATIENT)
Dept: PHYSICAL THERAPY | Facility: CLINIC | Age: 81
End: 2021-11-18
Payer: MEDICARE

## 2021-11-18 DIAGNOSIS — Z48.89 OTHER SPECIFIED AFTERCARE FOLLOWING SURGERY: Primary | ICD-10-CM

## 2021-11-18 DIAGNOSIS — M13.811 OTHER SPECIFIED ARTHRITIS, RIGHT SHOULDER: ICD-10-CM

## 2021-11-18 DIAGNOSIS — M25.511 RIGHT SHOULDER PAIN, UNSPECIFIED CHRONICITY: ICD-10-CM

## 2021-11-18 PROCEDURE — 97110 THERAPEUTIC EXERCISES: CPT | Performed by: PHYSICAL THERAPIST

## 2021-11-18 PROCEDURE — 97140 MANUAL THERAPY 1/> REGIONS: CPT | Performed by: PHYSICAL THERAPIST

## 2021-11-19 DIAGNOSIS — M81.0 AGE-RELATED OSTEOPOROSIS WITHOUT CURRENT PATHOLOGICAL FRACTURE: Primary | ICD-10-CM

## 2021-11-19 RX ORDER — CONJUGATED ESTROGENS 0.62 MG/1
TABLET, FILM COATED ORAL
Qty: 90 TABLET | Refills: 3 | Status: SHIPPED | OUTPATIENT
Start: 2021-11-19 | End: 2022-02-16 | Stop reason: SDUPTHER

## 2021-11-22 ENCOUNTER — OFFICE VISIT (OUTPATIENT)
Dept: PHYSICAL THERAPY | Facility: CLINIC | Age: 81
End: 2021-11-22
Payer: MEDICARE

## 2021-11-22 DIAGNOSIS — M25.511 RIGHT SHOULDER PAIN, UNSPECIFIED CHRONICITY: ICD-10-CM

## 2021-11-22 DIAGNOSIS — M13.811 OTHER SPECIFIED ARTHRITIS, RIGHT SHOULDER: ICD-10-CM

## 2021-11-22 DIAGNOSIS — Z48.89 OTHER SPECIFIED AFTERCARE FOLLOWING SURGERY: Primary | ICD-10-CM

## 2021-11-22 PROCEDURE — 97140 MANUAL THERAPY 1/> REGIONS: CPT | Performed by: PHYSICAL THERAPIST

## 2021-11-22 PROCEDURE — 97110 THERAPEUTIC EXERCISES: CPT | Performed by: PHYSICAL THERAPIST

## 2021-11-23 ENCOUNTER — OFFICE VISIT (OUTPATIENT)
Dept: PHYSICAL THERAPY | Facility: CLINIC | Age: 81
End: 2021-11-23
Payer: MEDICARE

## 2021-11-23 DIAGNOSIS — M13.811 OTHER SPECIFIED ARTHRITIS, RIGHT SHOULDER: ICD-10-CM

## 2021-11-23 DIAGNOSIS — M25.511 RIGHT SHOULDER PAIN, UNSPECIFIED CHRONICITY: Primary | ICD-10-CM

## 2021-11-23 PROCEDURE — 97140 MANUAL THERAPY 1/> REGIONS: CPT | Performed by: PHYSICAL THERAPIST

## 2021-11-23 PROCEDURE — 97110 THERAPEUTIC EXERCISES: CPT | Performed by: PHYSICAL THERAPIST

## 2021-11-24 ENCOUNTER — APPOINTMENT (OUTPATIENT)
Dept: PHYSICAL THERAPY | Facility: CLINIC | Age: 81
End: 2021-11-24
Payer: MEDICARE

## 2021-11-29 ENCOUNTER — OFFICE VISIT (OUTPATIENT)
Dept: PHYSICAL THERAPY | Facility: CLINIC | Age: 81
End: 2021-11-29
Payer: MEDICARE

## 2021-11-29 ENCOUNTER — OFFICE VISIT (OUTPATIENT)
Dept: INTERNAL MEDICINE CLINIC | Facility: CLINIC | Age: 81
End: 2021-11-29
Payer: MEDICARE

## 2021-11-29 VITALS
HEIGHT: 63 IN | BODY MASS INDEX: 20.06 KG/M2 | HEART RATE: 74 BPM | OXYGEN SATURATION: 99 % | DIASTOLIC BLOOD PRESSURE: 62 MMHG | WEIGHT: 113.2 LBS | TEMPERATURE: 97.8 F | SYSTOLIC BLOOD PRESSURE: 118 MMHG

## 2021-11-29 DIAGNOSIS — M81.0 AGE-RELATED OSTEOPOROSIS WITHOUT CURRENT PATHOLOGICAL FRACTURE: ICD-10-CM

## 2021-11-29 DIAGNOSIS — Z00.00 MEDICARE ANNUAL WELLNESS VISIT, SUBSEQUENT: ICD-10-CM

## 2021-11-29 DIAGNOSIS — Z96.611 HISTORY OF RIGHT SHOULDER REPLACEMENT: ICD-10-CM

## 2021-11-29 DIAGNOSIS — E55.9 VITAMIN D DEFICIENCY DISEASE: ICD-10-CM

## 2021-11-29 DIAGNOSIS — M25.511 RIGHT SHOULDER PAIN, UNSPECIFIED CHRONICITY: Primary | ICD-10-CM

## 2021-11-29 DIAGNOSIS — M13.811 OTHER SPECIFIED ARTHRITIS, RIGHT SHOULDER: ICD-10-CM

## 2021-11-29 DIAGNOSIS — K58.0 IRRITABLE BOWEL SYNDROME WITH DIARRHEA: ICD-10-CM

## 2021-11-29 DIAGNOSIS — E03.9 ACQUIRED HYPOTHYROIDISM: Primary | ICD-10-CM

## 2021-11-29 DIAGNOSIS — Z48.89 OTHER SPECIFIED AFTERCARE FOLLOWING SURGERY: ICD-10-CM

## 2021-11-29 DIAGNOSIS — I10 ESSENTIAL HYPERTENSION: ICD-10-CM

## 2021-11-29 DIAGNOSIS — K91.2 POSTSURGICAL MALABSORPTION: ICD-10-CM

## 2021-11-29 PROCEDURE — 99214 OFFICE O/P EST MOD 30 MIN: CPT | Performed by: INTERNAL MEDICINE

## 2021-11-29 PROCEDURE — G0439 PPPS, SUBSEQ VISIT: HCPCS | Performed by: INTERNAL MEDICINE

## 2021-11-29 PROCEDURE — 97110 THERAPEUTIC EXERCISES: CPT

## 2021-11-29 PROCEDURE — 97530 THERAPEUTIC ACTIVITIES: CPT

## 2021-11-29 PROCEDURE — 97140 MANUAL THERAPY 1/> REGIONS: CPT

## 2021-12-01 ENCOUNTER — APPOINTMENT (OUTPATIENT)
Dept: LAB | Facility: MEDICAL CENTER | Age: 81
End: 2021-12-01
Payer: MEDICARE

## 2021-12-01 DIAGNOSIS — Z03.818 ENCNTR FOR OBS FOR SUSP EXPSR TO OTH BIOLG AGENTS RULED OUT: ICD-10-CM

## 2021-12-01 DIAGNOSIS — I51.9 MYXEDEMA HEART DISEASE: ICD-10-CM

## 2021-12-01 DIAGNOSIS — K91.2 HYPOGLYCEMIA FOLLOWING GASTROINTESTINAL SURGERY: ICD-10-CM

## 2021-12-01 DIAGNOSIS — E55.9 AVITAMINOSIS D: ICD-10-CM

## 2021-12-01 DIAGNOSIS — E03.9 MYXEDEMA HEART DISEASE: ICD-10-CM

## 2021-12-01 DIAGNOSIS — E11.9: ICD-10-CM

## 2021-12-01 DIAGNOSIS — IMO0002 VITAMIN DISEASE: ICD-10-CM

## 2021-12-01 LAB
25(OH)D3 SERPL-MCNC: 56 NG/ML (ref 30–100)
ALBUMIN SERPL BCP-MCNC: 3.1 G/DL (ref 3.5–5)
ALP SERPL-CCNC: 75 U/L (ref 46–116)
ALT SERPL W P-5'-P-CCNC: 40 U/L (ref 12–78)
AMYLASE SERPL-CCNC: 89 IU/L (ref 25–115)
ANION GAP SERPL CALCULATED.3IONS-SCNC: 8 MMOL/L (ref 4–13)
APTT PPP: 23 SECONDS (ref 23–37)
ARTIFACT: PRESENT
AST SERPL W P-5'-P-CCNC: 51 U/L (ref 5–45)
BASOPHILS # BLD MANUAL: 0 THOUSAND/UL (ref 0–0.1)
BASOPHILS NFR MAR MANUAL: 0 % (ref 0–1)
BILIRUB DIRECT SERPL-MCNC: 0.09 MG/DL (ref 0–0.2)
BILIRUB SERPL-MCNC: 0.55 MG/DL (ref 0.2–1)
BUN SERPL-MCNC: 17 MG/DL (ref 5–25)
CALCIUM ALBUM COR SERPL-MCNC: 9.3 MG/DL (ref 8.3–10.1)
CALCIUM SERPL-MCNC: 8.6 MG/DL (ref 8.3–10.1)
CHLORIDE SERPL-SCNC: 107 MMOL/L (ref 100–108)
CHOLEST SERPL-MCNC: 192 MG/DL
CO2 SERPL-SCNC: 25 MMOL/L (ref 21–32)
CREAT SERPL-MCNC: 0.7 MG/DL (ref 0.6–1.3)
EOSINOPHIL # BLD MANUAL: 0.22 THOUSAND/UL (ref 0–0.4)
EOSINOPHIL NFR BLD MANUAL: 6 % (ref 0–6)
ERYTHROCYTE [DISTWIDTH] IN BLOOD BY AUTOMATED COUNT: 13.5 % (ref 11.6–15.1)
EST. AVERAGE GLUCOSE BLD GHB EST-MCNC: 100 MG/DL
FERRITIN SERPL-MCNC: 67 NG/ML (ref 8–388)
FOLATE SERPL-MCNC: >20 NG/ML (ref 3.1–17.5)
GFR SERPL CREATININE-BSD FRML MDRD: 83 ML/MIN/1.73SQ M
GGT SERPL-CCNC: 43 U/L (ref 5–85)
GLUCOSE P FAST SERPL-MCNC: 96 MG/DL (ref 65–99)
HBA1C MFR BLD: 5.1 %
HCT VFR BLD AUTO: 40.7 % (ref 34.8–46.1)
HDLC SERPL-MCNC: 48 MG/DL
HGB BLD-MCNC: 12.6 G/DL (ref 11.5–15.4)
INR PPP: 1.02 (ref 0.84–1.19)
IRON SATN MFR SERPL: 22 % (ref 15–50)
IRON SERPL-MCNC: 80 UG/DL (ref 50–170)
LDLC SERPL CALC-MCNC: 107 MG/DL (ref 0–100)
LIPASE SERPL-CCNC: 108 U/L (ref 73–393)
LYMPHOCYTES # BLD AUTO: 0.97 THOUSAND/UL (ref 0.6–4.47)
LYMPHOCYTES # BLD AUTO: 26 % (ref 14–44)
MCH RBC QN AUTO: 30.3 PG (ref 26.8–34.3)
MCHC RBC AUTO-ENTMCNC: 31 G/DL (ref 31.4–37.4)
MCV RBC AUTO: 98 FL (ref 82–98)
MONOCYTES # BLD AUTO: 0.3 THOUSAND/UL (ref 0–1.22)
MONOCYTES NFR BLD: 8 % (ref 4–12)
NEUTROPHILS # BLD MANUAL: 2.24 THOUSAND/UL (ref 1.85–7.62)
NEUTS SEG NFR BLD AUTO: 60 % (ref 43–75)
NONHDLC SERPL-MCNC: 144 MG/DL
PLATELET # BLD AUTO: 184 THOUSANDS/UL (ref 149–390)
PLATELET BLD QL SMEAR: ADEQUATE
PMV BLD AUTO: 11.4 FL (ref 8.9–12.7)
POLYCHROMASIA BLD QL SMEAR: PRESENT
POTASSIUM SERPL-SCNC: 4.7 MMOL/L (ref 3.5–5.3)
PROT SERPL-MCNC: 6.9 G/DL (ref 6.4–8.2)
PROTHROMBIN TIME: 13 SECONDS (ref 11.6–14.5)
RBC # BLD AUTO: 4.16 MILLION/UL (ref 3.81–5.12)
RBC MORPH BLD: PRESENT
SARS-COV-2 IGG+IGM SERPL QL IA: REACTIVE
SODIUM SERPL-SCNC: 140 MMOL/L (ref 136–145)
T3FREE SERPL-MCNC: 2.1 PG/ML (ref 2.3–4.2)
T4 FREE SERPL-MCNC: 1.32 NG/DL (ref 0.76–1.46)
TIBC SERPL-MCNC: 367 UG/DL (ref 250–450)
TRIGL SERPL-MCNC: 184 MG/DL
TSH SERPL DL<=0.05 MIU/L-ACNC: 1.29 UIU/ML (ref 0.36–3.74)
VIT B12 SERPL-MCNC: 973 PG/ML (ref 100–900)
WBC # BLD AUTO: 3.73 THOUSAND/UL (ref 4.31–10.16)

## 2021-12-01 PROCEDURE — 84439 ASSAY OF FREE THYROXINE: CPT

## 2021-12-01 PROCEDURE — 80053 COMPREHEN METABOLIC PANEL: CPT

## 2021-12-01 PROCEDURE — 82746 ASSAY OF FOLIC ACID SERUM: CPT

## 2021-12-01 PROCEDURE — 82248 BILIRUBIN DIRECT: CPT

## 2021-12-01 PROCEDURE — 82607 VITAMIN B-12: CPT

## 2021-12-01 PROCEDURE — 80061 LIPID PANEL: CPT

## 2021-12-01 PROCEDURE — 83036 HEMOGLOBIN GLYCOSYLATED A1C: CPT

## 2021-12-01 PROCEDURE — 83540 ASSAY OF IRON: CPT

## 2021-12-01 PROCEDURE — 84443 ASSAY THYROID STIM HORMONE: CPT

## 2021-12-01 PROCEDURE — 83550 IRON BINDING TEST: CPT

## 2021-12-01 PROCEDURE — 84207 ASSAY OF VITAMIN B-6: CPT

## 2021-12-01 PROCEDURE — 84481 FREE ASSAY (FT-3): CPT

## 2021-12-01 PROCEDURE — 84630 ASSAY OF ZINC: CPT

## 2021-12-01 PROCEDURE — 83690 ASSAY OF LIPASE: CPT

## 2021-12-01 PROCEDURE — 36415 COLL VENOUS BLD VENIPUNCTURE: CPT | Performed by: INTERNAL MEDICINE

## 2021-12-01 PROCEDURE — 85730 THROMBOPLASTIN TIME PARTIAL: CPT

## 2021-12-01 PROCEDURE — 84425 ASSAY OF VITAMIN B-1: CPT

## 2021-12-01 PROCEDURE — 82728 ASSAY OF FERRITIN: CPT

## 2021-12-01 PROCEDURE — 82306 VITAMIN D 25 HYDROXY: CPT | Performed by: INTERNAL MEDICINE

## 2021-12-01 PROCEDURE — 85007 BL SMEAR W/DIFF WBC COUNT: CPT

## 2021-12-01 PROCEDURE — 85027 COMPLETE CBC AUTOMATED: CPT

## 2021-12-01 PROCEDURE — 82150 ASSAY OF AMYLASE: CPT

## 2021-12-01 PROCEDURE — 86769 SARS-COV-2 COVID-19 ANTIBODY: CPT

## 2021-12-01 PROCEDURE — 82977 ASSAY OF GGT: CPT

## 2021-12-01 PROCEDURE — 85610 PROTHROMBIN TIME: CPT

## 2021-12-01 PROCEDURE — 84590 ASSAY OF VITAMIN A: CPT

## 2021-12-02 ENCOUNTER — OFFICE VISIT (OUTPATIENT)
Dept: PHYSICAL THERAPY | Facility: CLINIC | Age: 81
End: 2021-12-02
Payer: MEDICARE

## 2021-12-02 DIAGNOSIS — M13.811 OTHER SPECIFIED ARTHRITIS, RIGHT SHOULDER: ICD-10-CM

## 2021-12-02 DIAGNOSIS — Z48.89 OTHER SPECIFIED AFTERCARE FOLLOWING SURGERY: ICD-10-CM

## 2021-12-02 DIAGNOSIS — M25.511 RIGHT SHOULDER PAIN, UNSPECIFIED CHRONICITY: Primary | ICD-10-CM

## 2021-12-02 LAB — SARS-COV-2 IGG SERPL QL IA: REACTIVE

## 2021-12-02 PROCEDURE — 97140 MANUAL THERAPY 1/> REGIONS: CPT | Performed by: PHYSICAL THERAPIST

## 2021-12-02 PROCEDURE — 97110 THERAPEUTIC EXERCISES: CPT | Performed by: PHYSICAL THERAPIST

## 2021-12-04 LAB — ZINC SERPL-MCNC: 99 UG/DL (ref 44–115)

## 2021-12-06 ENCOUNTER — OFFICE VISIT (OUTPATIENT)
Dept: PHYSICAL THERAPY | Facility: CLINIC | Age: 81
End: 2021-12-06
Payer: MEDICARE

## 2021-12-06 DIAGNOSIS — Z48.89 OTHER SPECIFIED AFTERCARE FOLLOWING SURGERY: ICD-10-CM

## 2021-12-06 DIAGNOSIS — M25.511 RIGHT SHOULDER PAIN, UNSPECIFIED CHRONICITY: Primary | ICD-10-CM

## 2021-12-06 DIAGNOSIS — M13.811 OTHER SPECIFIED ARTHRITIS, RIGHT SHOULDER: ICD-10-CM

## 2021-12-06 LAB
VIT A SERPL-MCNC: 29.2 UG/DL (ref 22–69.5)
VIT B1 BLD-SCNC: 118.1 NMOL/L (ref 66.5–200)

## 2021-12-06 PROCEDURE — 97530 THERAPEUTIC ACTIVITIES: CPT | Performed by: PHYSICAL THERAPIST

## 2021-12-06 PROCEDURE — 97164 PT RE-EVAL EST PLAN CARE: CPT | Performed by: PHYSICAL THERAPIST

## 2021-12-06 PROCEDURE — 97110 THERAPEUTIC EXERCISES: CPT | Performed by: PHYSICAL THERAPIST

## 2021-12-06 PROCEDURE — 97140 MANUAL THERAPY 1/> REGIONS: CPT | Performed by: PHYSICAL THERAPIST

## 2021-12-09 ENCOUNTER — OFFICE VISIT (OUTPATIENT)
Dept: PHYSICAL THERAPY | Facility: CLINIC | Age: 81
End: 2021-12-09
Payer: MEDICARE

## 2021-12-09 DIAGNOSIS — M13.811 OTHER SPECIFIED ARTHRITIS, RIGHT SHOULDER: ICD-10-CM

## 2021-12-09 DIAGNOSIS — Z48.89 OTHER SPECIFIED AFTERCARE FOLLOWING SURGERY: ICD-10-CM

## 2021-12-09 DIAGNOSIS — M25.511 RIGHT SHOULDER PAIN, UNSPECIFIED CHRONICITY: Primary | ICD-10-CM

## 2021-12-09 PROCEDURE — 97110 THERAPEUTIC EXERCISES: CPT

## 2021-12-09 PROCEDURE — 97530 THERAPEUTIC ACTIVITIES: CPT

## 2021-12-09 PROCEDURE — 97140 MANUAL THERAPY 1/> REGIONS: CPT

## 2021-12-10 DIAGNOSIS — E03.9 ACQUIRED HYPOTHYROIDISM: ICD-10-CM

## 2021-12-10 LAB — VIT B6 SERPL-MCNC: 8.4 UG/L (ref 2–32.8)

## 2021-12-10 RX ORDER — LEVOTHYROXINE SODIUM 0.1 MG/1
100 TABLET ORAL DAILY
Qty: 90 TABLET | Refills: 3 | Status: SHIPPED | OUTPATIENT
Start: 2021-12-10

## 2021-12-14 ENCOUNTER — OFFICE VISIT (OUTPATIENT)
Dept: PHYSICAL THERAPY | Facility: CLINIC | Age: 81
End: 2021-12-14
Payer: MEDICARE

## 2021-12-14 ENCOUNTER — TELEPHONE (OUTPATIENT)
Dept: OBGYN CLINIC | Facility: HOSPITAL | Age: 81
End: 2021-12-14

## 2021-12-14 DIAGNOSIS — Z48.89 OTHER SPECIFIED AFTERCARE FOLLOWING SURGERY: ICD-10-CM

## 2021-12-14 DIAGNOSIS — M13.811 OTHER SPECIFIED ARTHRITIS, RIGHT SHOULDER: ICD-10-CM

## 2021-12-14 DIAGNOSIS — M25.511 RIGHT SHOULDER PAIN, UNSPECIFIED CHRONICITY: Primary | ICD-10-CM

## 2021-12-14 PROCEDURE — 97530 THERAPEUTIC ACTIVITIES: CPT

## 2021-12-14 PROCEDURE — 97110 THERAPEUTIC EXERCISES: CPT

## 2021-12-14 PROCEDURE — 97140 MANUAL THERAPY 1/> REGIONS: CPT

## 2021-12-16 ENCOUNTER — OFFICE VISIT (OUTPATIENT)
Dept: PHYSICAL THERAPY | Facility: CLINIC | Age: 81
End: 2021-12-16
Payer: MEDICARE

## 2021-12-16 DIAGNOSIS — M13.811 OTHER SPECIFIED ARTHRITIS, RIGHT SHOULDER: ICD-10-CM

## 2021-12-16 DIAGNOSIS — Z48.89 OTHER SPECIFIED AFTERCARE FOLLOWING SURGERY: ICD-10-CM

## 2021-12-16 DIAGNOSIS — M25.511 RIGHT SHOULDER PAIN, UNSPECIFIED CHRONICITY: Primary | ICD-10-CM

## 2021-12-16 PROCEDURE — 97110 THERAPEUTIC EXERCISES: CPT | Performed by: PHYSICAL THERAPIST

## 2021-12-16 PROCEDURE — 97530 THERAPEUTIC ACTIVITIES: CPT | Performed by: PHYSICAL THERAPIST

## 2021-12-16 PROCEDURE — 97140 MANUAL THERAPY 1/> REGIONS: CPT | Performed by: PHYSICAL THERAPIST

## 2021-12-20 ENCOUNTER — APPOINTMENT (OUTPATIENT)
Dept: PHYSICAL THERAPY | Facility: CLINIC | Age: 81
End: 2021-12-20
Payer: MEDICARE

## 2021-12-21 ENCOUNTER — OFFICE VISIT (OUTPATIENT)
Dept: PHYSICAL THERAPY | Facility: CLINIC | Age: 81
End: 2021-12-21
Payer: MEDICARE

## 2021-12-21 DIAGNOSIS — M13.811 OTHER SPECIFIED ARTHRITIS, RIGHT SHOULDER: ICD-10-CM

## 2021-12-21 DIAGNOSIS — M25.511 RIGHT SHOULDER PAIN, UNSPECIFIED CHRONICITY: Primary | ICD-10-CM

## 2021-12-21 DIAGNOSIS — Z48.89 OTHER SPECIFIED AFTERCARE FOLLOWING SURGERY: ICD-10-CM

## 2021-12-21 PROCEDURE — 97110 THERAPEUTIC EXERCISES: CPT

## 2021-12-21 PROCEDURE — 97140 MANUAL THERAPY 1/> REGIONS: CPT

## 2021-12-23 ENCOUNTER — OFFICE VISIT (OUTPATIENT)
Dept: PHYSICAL THERAPY | Facility: CLINIC | Age: 81
End: 2021-12-23
Payer: MEDICARE

## 2021-12-23 DIAGNOSIS — Z48.89 OTHER SPECIFIED AFTERCARE FOLLOWING SURGERY: ICD-10-CM

## 2021-12-23 DIAGNOSIS — M25.511 RIGHT SHOULDER PAIN, UNSPECIFIED CHRONICITY: Primary | ICD-10-CM

## 2021-12-23 DIAGNOSIS — M13.811 OTHER SPECIFIED ARTHRITIS, RIGHT SHOULDER: ICD-10-CM

## 2021-12-23 PROCEDURE — 97110 THERAPEUTIC EXERCISES: CPT | Performed by: PHYSICAL THERAPIST

## 2021-12-23 PROCEDURE — 97530 THERAPEUTIC ACTIVITIES: CPT | Performed by: PHYSICAL THERAPIST

## 2021-12-23 PROCEDURE — 97140 MANUAL THERAPY 1/> REGIONS: CPT | Performed by: PHYSICAL THERAPIST

## 2021-12-28 ENCOUNTER — OFFICE VISIT (OUTPATIENT)
Dept: PHYSICAL THERAPY | Facility: CLINIC | Age: 81
End: 2021-12-28
Payer: MEDICARE

## 2021-12-28 DIAGNOSIS — Z48.89 OTHER SPECIFIED AFTERCARE FOLLOWING SURGERY: ICD-10-CM

## 2021-12-28 DIAGNOSIS — M25.511 RIGHT SHOULDER PAIN, UNSPECIFIED CHRONICITY: Primary | ICD-10-CM

## 2021-12-28 DIAGNOSIS — M13.811 OTHER SPECIFIED ARTHRITIS, RIGHT SHOULDER: ICD-10-CM

## 2021-12-28 PROCEDURE — 97530 THERAPEUTIC ACTIVITIES: CPT

## 2021-12-28 PROCEDURE — 97140 MANUAL THERAPY 1/> REGIONS: CPT

## 2021-12-28 PROCEDURE — 97110 THERAPEUTIC EXERCISES: CPT

## 2021-12-30 ENCOUNTER — OFFICE VISIT (OUTPATIENT)
Dept: PHYSICAL THERAPY | Facility: CLINIC | Age: 81
End: 2021-12-30
Payer: MEDICARE

## 2021-12-30 DIAGNOSIS — M25.511 RIGHT SHOULDER PAIN, UNSPECIFIED CHRONICITY: Primary | ICD-10-CM

## 2021-12-30 DIAGNOSIS — M13.811 OTHER SPECIFIED ARTHRITIS, RIGHT SHOULDER: ICD-10-CM

## 2021-12-30 DIAGNOSIS — Z48.89 OTHER SPECIFIED AFTERCARE FOLLOWING SURGERY: ICD-10-CM

## 2021-12-30 PROCEDURE — 97140 MANUAL THERAPY 1/> REGIONS: CPT | Performed by: PHYSICAL THERAPIST

## 2021-12-30 PROCEDURE — 97110 THERAPEUTIC EXERCISES: CPT | Performed by: PHYSICAL THERAPIST

## 2021-12-30 PROCEDURE — 97530 THERAPEUTIC ACTIVITIES: CPT | Performed by: PHYSICAL THERAPIST

## 2022-01-03 ENCOUNTER — OFFICE VISIT (OUTPATIENT)
Dept: PHYSICAL THERAPY | Facility: CLINIC | Age: 82
End: 2022-01-03
Payer: MEDICARE

## 2022-01-03 DIAGNOSIS — M25.511 RIGHT SHOULDER PAIN, UNSPECIFIED CHRONICITY: Primary | ICD-10-CM

## 2022-01-03 DIAGNOSIS — M13.811 OTHER SPECIFIED ARTHRITIS, RIGHT SHOULDER: ICD-10-CM

## 2022-01-03 DIAGNOSIS — Z48.89 OTHER SPECIFIED AFTERCARE FOLLOWING SURGERY: ICD-10-CM

## 2022-01-03 PROCEDURE — 97530 THERAPEUTIC ACTIVITIES: CPT | Performed by: PHYSICAL THERAPIST

## 2022-01-03 PROCEDURE — 97110 THERAPEUTIC EXERCISES: CPT | Performed by: PHYSICAL THERAPIST

## 2022-01-03 PROCEDURE — 97140 MANUAL THERAPY 1/> REGIONS: CPT | Performed by: PHYSICAL THERAPIST

## 2022-01-03 NOTE — PROGRESS NOTES
Daily Note     Today's date: 1/3/2022  Patient name: Colletta Crow  : 1940  MRN: [id number]  Referring provider: Augusto Huff MD  Dx:   Encounter Diagnosis     ICD-10-CM    1  Right shoulder pain, unspecified chronicity  M25 511    2  Other specified arthritis, right shoulder  M13 811    3  Other specified aftercare following surgery  Z48 [id number]        Start Time: 1450  Stop Time: 1545  Total time in clinic (min): 55 minutes    Subjective: Pain free at rest  Some pain with exercise @ worst 3/10  Objective: See treatment diary below      Assessment: Limited AROM, active elevation to 90  Passive flx to 120  Very weak ER  Progressing with strengthening with PRE's isometrics and tbands as tolerated  Continue passive stretch and joint mobs, strengthening with goal of improving function with dominant R UE  Plan: Progress treatment as tolerated         Precautions: R TSA to be 10/15/21, @ 6 weeks PO wean from sling and begin AROM      Manuals 12/9/21 12/14/21 12/16 12/21/21 12/23 12/28/21 12/30 1/3     PROM/stretch STM R shoulder AE  To tolerance CRR CRR AE CRR AE CRR CRR     Rhythmic stabilization    @ 90  CRR @ 90 AE @90 CRR @30, 90, 120   AE @ 30, 90, 60  CRR @ 30, 90, 60  CRR                               Neuro Re-Ed             Pre-op instruction    -                                                                                       Ther Ex             Pulley flexion 5' POS flex 5' POS flex 5' 5'  POS 5' 5' 5' 5'     pendulums 20x all 1# 20x all 1# 20x all 1# 20x all 1# 20x all 1# 20x all 1# 20x all 2# 20x all 2#     AROM elbow F/E  Sup/pron  Wrist F/E 20x 1#    wrist F/E 20x/20x 1# 20x 1#    wrist F/E 20x/20x 1# 20x 2#    20x /20x 2#     20x 2#    20x /20x 2# 20x 2#    elbow only 20x 2#    elbow only 20x 2# 20x 2#     putty -            Shrugs/retract 20x/20x 20x/20x 20x /20x shrug with 1# weight in hands 20x /20x shrug with 1# weight in hands 20x/20x 1# 20x/20x 1# 20x/20x 1# 20x/20x 2# Wall isometrics 10x all 10x all 10x all 10x all 10x all 10x all 10x all 10x all     Place and hold 90/90 in supine 5x hold 10" - - -  -       Triceps  20x 20x     20x 20x 20x 1# 20x 1# 20x 1# 20x 1#     protract 10x x20  CC/C  20x all 20x all 20x 1# 20x 1# 20x 1# 20x 1#, CC/C  20x     S/L ER      20x 0# 20x 0# 20x 0#     therabands     NV  Yellow x 5 ea 5x 6 planes 10x all planes     Ther Activity             Wand flx/bench/Hor add/adb     10x/10x/10x 10x/10x/10x Wand ext/IR  10x  Bench 20x  10x flx/HAb Wand ext/IR  10x  Bench 20x  10x flx/HAb 2# all 20x 2# all 20x 2# 20x 20x all 2#     Cones to shelf     1# weight at wrist  15x 1# weight at wrist  10 cones up/down 1#  10x up/down 10x up/down 1#     B shoulder flx/abd to 90 Flex OH/UH 10x/10x  abd 10x Flex OH/UH 10x/10x  abd 10x 2 x 10 2x10 1 x 10 1#  1 x 10 0# 1 x 10 1#  1 x 10 0# 1 x 10 1#    1 x 10 0# 2 x 10 1#     UBE 5' reverse 5' reverse 5' reverse 5' reverse 5' 5' 2 1/2 For, 2 1/2 back 3 for  3'back     Wall climbs Flx 10x Flx 10x 10x 10x 10x 10x 10x 10x     Seated Hor Add using towel grav eliminated 3 min 2 min 2 min  2 min 2 min 2 min 2 min     Modalities    - - - -

## 2022-01-06 ENCOUNTER — OFFICE VISIT (OUTPATIENT)
Dept: PHYSICAL THERAPY | Facility: CLINIC | Age: 82
End: 2022-01-06
Payer: MEDICARE

## 2022-01-06 DIAGNOSIS — Z48.89 OTHER SPECIFIED AFTERCARE FOLLOWING SURGERY: ICD-10-CM

## 2022-01-06 DIAGNOSIS — M13.811 OTHER SPECIFIED ARTHRITIS, RIGHT SHOULDER: ICD-10-CM

## 2022-01-06 DIAGNOSIS — M25.511 RIGHT SHOULDER PAIN, UNSPECIFIED CHRONICITY: Primary | ICD-10-CM

## 2022-01-06 PROCEDURE — 97530 THERAPEUTIC ACTIVITIES: CPT

## 2022-01-06 PROCEDURE — 97110 THERAPEUTIC EXERCISES: CPT

## 2022-01-06 PROCEDURE — 97140 MANUAL THERAPY 1/> REGIONS: CPT

## 2022-01-06 NOTE — PROGRESS NOTES
Daily Note     Today's date: 2022  Patient name: Shimon Amaya  : 1940  MRN: [id number]  Referring provider: Lisa Hurtado MD  Dx:   Encounter Diagnosis     ICD-10-CM    1  Right shoulder pain, unspecified chronicity  M25 511    2  Other specified arthritis, right shoulder  M13 811    3  Other specified aftercare following surgery  Z48 [id number]        Start Time: 7382  Stop Time: 1545  Total time in clinic (min): 53 minutes    Subjective: pt with 1/10 R shld pain scale currently, but 0/10 if not doing anything  "doing very well"    Objective: See treatment diary below      Assessment: pt with improved technique/ease with standing yellow TB R shld PREs  Sore with manual RS of RUE, but mirtha to complete reps  Patient would benefit from continued PT      Plan: Continue per plan of care        Precautions: R TSA to be 10/15/21, @ 6 weeks PO wean from sling and begin AROM      Manuals 12/9/21 12/14/21 12/16 12/21/21 12/23 12/28/21 12/30 1/3 1/6/22    PROM/stretch STM R shoulder AE  To tolerance CRR CRR AE CRR AE CRR CRR AE    Rhythmic stabilization    @ 90  CRR @ 90 AE @90 CRR @30, 90, 120   AE @ 30, 90, 60  CRR @ 30, 90, 60  CRR @ 30, 90, 60  AE                              Neuro Re-Ed             Pre-op instruction    -                                                                                       Ther Ex             Pulley flexion 5' POS flex 5' POS flex 5' 5'  POS 5' 5' 5' 5' 5'    pendulums 20x all 1# 20x all 1# 20x all 1# 20x all 1# 20x all 1# 20x all 1# 20x all 2# 20x all 2# 20x 2#    AROM elbow F/E  Sup/pron  Wrist F/E 20x 1#    wrist F/E 20x/20x 1# 20x 1#    wrist F/E 20x/20x 1# 20x 2#    20x /20x 2#     20x 2#    20x /20x 2# 20x 2#    elbow only 20x 2#    elbow only 20x 2# 20x 2# 20x 2#    putty -            Shrugs/retract 20x/20x 20x/20x 20x /20x shrug with 1# weight in hands 20x /20x shrug with 1# weight in hands 20x/20x 1# 20x/20x 1# 20x/20x 1# 20x/20x 2# 20x/20x 2#    Wall isometrics 10x all 10x all 10x all 10x all 10x all 10x all 10x all 10x all 10x all    Place and hold 90/90 in supine 5x hold 10" - - -  -   -    Triceps  20x 20x     20x 20x 20x 1# 20x 1# 20x 1# 20x 1# 20x 1#    protract 10x x20  CC/C  20x all 20x all 20x 1# 20x 1# 20x 1# 20x 1#, CC/C  20x 20x 1#, CC/C  20x    S/L ER      20x 0# 20x 0# 20x 0#     therabands     NV  Yellow x 5 ea 5x 6 planes 10x all planes 10x all planes    Ther Activity             Wand flx/bench/Hor add/adb     10x/10x/10x 10x/10x/10x Wand ext/IR  10x  Bench 20x  10x flx/HAb Wand ext/IR  10x  Bench 20x  10x flx/HAb 2# all 20x 2# all 20x 2# 20x 20x all 2# 20x all 2#    Cones to shelf     1# weight at wrist  15x 1# weight at wrist  10 cones up/down 1#  10x up/down 10x up/down 1# cff weight 10x up/down 1# cff weight    B shoulder flx/abd to 90 Flex OH/UH 10x/10x  abd 10x Flex OH/UH 10x/10x  abd 10x 2 x 10 2x10 1 x 10 1#  1 x 10 0# 1 x 10 1#  1 x 10 0# 1 x 10 1#    1 x 10 0# 2 x 10 1# 2 x 10 1#    UBE 5' reverse 5' reverse 5' reverse 5' reverse 5' 5' 2 1/2 For, 2 1/2 back 3 for  3'back 3 for  3'back    Wall climbs Flx 10x Flx 10x 10x 10x 10x 10x 10x 10x 10x    Seated Hor Add using towel grav eliminated 3 min 2 min 2 min  2 min 2 min 2 min 2 min 2 min    Modalities    - - - -

## 2022-01-10 ENCOUNTER — OFFICE VISIT (OUTPATIENT)
Dept: PHYSICAL THERAPY | Facility: CLINIC | Age: 82
End: 2022-01-10
Payer: MEDICARE

## 2022-01-10 DIAGNOSIS — M25.511 RIGHT SHOULDER PAIN, UNSPECIFIED CHRONICITY: Primary | ICD-10-CM

## 2022-01-10 DIAGNOSIS — Z48.89 OTHER SPECIFIED AFTERCARE FOLLOWING SURGERY: ICD-10-CM

## 2022-01-10 DIAGNOSIS — M13.811 OTHER SPECIFIED ARTHRITIS, RIGHT SHOULDER: ICD-10-CM

## 2022-01-10 PROCEDURE — 97530 THERAPEUTIC ACTIVITIES: CPT | Performed by: PHYSICAL THERAPIST

## 2022-01-10 PROCEDURE — 97164 PT RE-EVAL EST PLAN CARE: CPT | Performed by: PHYSICAL THERAPIST

## 2022-01-10 PROCEDURE — 97110 THERAPEUTIC EXERCISES: CPT | Performed by: PHYSICAL THERAPIST

## 2022-01-10 PROCEDURE — 97140 MANUAL THERAPY 1/> REGIONS: CPT | Performed by: PHYSICAL THERAPIST

## 2022-01-10 NOTE — LETTER
January 10, 2022    MD Stephen Gibson [address] [facility]    Patient: Heraclio Lagos   YOB: 1940   Date of Visit: 1/10/2022     Encounter Diagnosis     ICD-10-CM    1  Right shoulder pain, unspecified chronicity  M25 511    2  Other specified arthritis, right shoulder  M13 811    3  Other specified aftercare following surgery  Z48 89        Dear Dr Julio Moritz:    Thank you for your recent referral of Heraclio Lagos  Please review the attached evaluation summary from Yajaira's recent visit  Please verify that you agree with the plan of care by signing the attached order  If you have any questions or concerns, please do not hesitate to call  I sincerely appreciate the opportunity to share in the care of one of your patients and hope to have another opportunity to work with you in the near future  Sincerely,    Oliva Eisenmenger, PT      Referring Provider:      I certify that I have read the below Plan of Care and certify the need for these services furnished under this plan of treatment while under my care  MD Stephen Gibson [address] [place] [address]  [address]          PT Re-Evaluation     Today's date: 1/10/2022  Patient name: Heraclio Lagos  : 1940  MRN: [id number]  Referring provider: Anya Rosales MD  Dx:   Encounter Diagnosis     ICD-10-CM    1  Right shoulder pain, unspecified chronicity  M25 511    2  Other specified arthritis, right shoulder  M13 811    3  Other specified aftercare following surgery  Z48 89        Start Time: 9940  Stop Time: 1255  Total time in clinic (min): 70 minutes    Assessment  Assessment details: 80year old woman 24 days PO R RTSA  Pain range 0-3/10  No use of slingsling  AROM R shoulder: Flx 108, Abd 94, IR and ER @ 90 72/45  Strength Shoulder flx/abd 3-, IR/ER 2/5  Pt independent all self care, driving, laundry, and simple food prep   Sleep in bed, opening doors with greater ease  Difficulty reach L armpit with R hand and back of head to style hair with R hand  DASH 34%  Continue PT progressing with ROM and strengthening as tolerated  Impairments: abnormal or restricted ROM, activity intolerance, impaired physical strength, lacks appropriate home exercise program, pain with function, poor posture  and poor body mechanics  Functional limitations: sleeping in bed, unable to wash L armpit with R hand; I drive, better opening doors, difficulty reaching back of head with R  Symptom irritability: highUnderstanding of Dx/Px/POC: good   Prognosis: good    Goals  STG- 4 weeks 12/6/21  1  I HEP (MET)  2  Decrease pain to 0-3/10(progressing 1-7/10)  3  Increase PROM R shoulder to 100/flx/abd, 30 IR/ER (MET)  4  I dressing/bathing (MET)    STG- 4 weeks 1/3/21  1  Increase AROM flx/abd to 90-95 (MET)  2  Increase strength L shoulder to 3-/5 (MET for elevation, not for rotations)  3  Improved DASH score to under 45% disability (MET)  4  Reach across body to put deodorant on L with R hand (progressing)    LTG- 12 weeks  2/4//22  1  Decrease pain to 0-1/10  2  Increase AROM R shoulder to 90/90 Flx/Abd  Increase strength R shoulder to 3+-4/5  3  I with home chores and driving  4   Improve DASH score to under 15% disability      Plan  Patient would benefit from: PT eval and skilled physical therapy  Planned modality interventions: thermotherapy: hydrocollator packs and cryotherapy  Planned therapy interventions: manual therapy, neuromuscular re-education, therapeutic activities, therapeutic exercise and home exercise program  Frequency: 2x week  Duration in visits: 8  Duration in weeks: 4  Plan of Care beginning date: 1/10/2022  Plan of Care expiration date: 2/8/2022  Treatment plan discussed with: patient        Subjective Evaluation    History of Present Illness  Date of onset: 6/15/2021  Mechanism of injury: Pt ayse R shoulder had RTC tear many years ago, and pain returned 3 months ago  Pt had recent course of PT but no pain relief  Pt Had MRI 21 and it was determined she need TSA  R reverse TSA planned for 10/15/21  Pt referred fro pre-operative session today  21- Pt underwent reverse shoulder arthroplasty on 11/15/21  Pt placed in abd pillow sling and referred for outpatient PT to begin today  21- Pain range 1-7/10  Unable to sleep in bed  Current pain 4/10    1/10/22- Pain range 0-3/10  Sleeping in bed          Recurrent probem    Quality of life: good    Pain  Current pain ratin  At best pain ratin  At worst pain rating: 3  Quality: grinding  Relieving factors: heat  Progression: improved    Social Support  Steps to enter house: yes  2  Stairs in house: no   Lives in: [facility]  Lives with: spouse    Employment status: not working  Hand dominance: right      Diagnostic Tests  MRI studies: abnormal  Treatments  Previous treatment: physical therapy  Current treatment: physical therapy  Patient Goals  Patient goals for therapy: increased strength, independence with ADLs/IADLs, increased motion and decreased pain          Objective     Postural Observations  Seated posture: fair  Standing posture: fair    Additional Postural Observation Details  Forward head, protracted shoulders    Observations     Right Shoulder  Positive for incision  Additional Observation Details  Incision well healed    No sling    Cervical/Thoracic Screen   Cervical range of motion within normal limits with the following exceptions: Minimal limit al planes    Neurological Testing     Sensation     Shoulder   Left Shoulder   Intact: light touch    Right Shoulder   Intact: light touch    Active Range of Motion   Left Shoulder   Normal active range of motion    Right Shoulder   Flexion: 108 degrees   Extension: 35 degrees   Abduction: 94 degrees   External rotation 90°: 68 degrees  Internal rotation 90°: 40 degrees     Left Elbow   Normal active range of motion    Right Elbow   Extension: -45 degrees     Left Wrist   Normal active range of motion    Right Wrist   Normal active range of motion    Additional Active Range of Motion Details  Limited horizontal ADD    Passive Range of Motion   Left Shoulder   Normal passive range of motion    Right Shoulder   Flexion: 142 degrees   Abduction: 112 degrees   External rotation 90°: 72 degrees   Internal rotation 45°: 58 degrees   Internal rotation 90°: 42 degrees     Additional Passive Range of Motion Details  Pure abd /112,     Scapular Mobility     Right Shoulder   Scapular Mobility with Shoulder to 90° FF   Scapular elevation: moderate  Upward rotation: inadequate  Downward rotation: inadequate    Additional Scapular Mobility Details  Mod limit scap elevation    Strength/Myotome Testing     Left Shoulder   Normal muscle strength    Right Shoulder     Planes of Motion   Flexion: 3-   Extension: 3-   Abduction: 2+   Adduction: 2   External rotation at 0°: 2-   Internal rotation at 0°: 3-     General Comments:      Shoulder Comments   DASH 54% disability;   1/10/22- DASH 34% disability      Flowsheet Rows      Most Recent Value   PT/OT G-Codes    Current Score 54   Projected Score 59   Assessment Type Re-evaluation             Precautions: R TSA to be 10/15/21, @ 6 weeks PO wean from sling and begin AROM      Manuals 12/9/21 12/14/21 12/16 12/21/21 12/23 12/28/21 12/30 1/3 1/6/22 1/10   PROM/stretch STM R shoulder AE  To tolerance CRR CRR AE CRR AE CRR CRR AE CRR   Rhythmic stabilization    @ 90  CRR @ 90 AE @90 CRR @30, 90, 120   AE @ 30, 90, 60  CRR @ 30, 90, 60  CRR @ 30, 90, 60  AE @ 30/60/90/120                          FOTO/RE   Neuro Re-Ed             Pre-op instruction    -                                                                                       Ther Ex             Pulley flexion 5' POS flex 5' POS flex 5' 5'  POS 5' 5' 5' 5' 5' 5'   pendulums 20x all 1# 20x all 1# 20x all 1# 20x all 1# 20x all 1# 20x all 1# 20x all 2# 20x all 2# 20x 2# 20x 2#   AROM elbow F/E  Sup/pron  Wrist F/E 20x 1#    wrist F/E 20x/20x 1# 20x 1#    wrist F/E 20x/20x 1# 20x 2#    20x /20x 2#     20x 2#    20x /20x 2# 20x 2#    elbow only 20x 2#    elbow only 20x 2# 20x 2# 20x 2# 20x 2#   putty -            Shrugs/retract 20x/20x 20x/20x 20x /20x shrug with 1# weight in hands 20x /20x shrug with 1# weight in hands 20x/20x 1# 20x/20x 1# 20x/20x 1# 20x/20x 2# 20x/20x 2# 20x 2#/20x 2#   Wall isometrics 10x all 10x all 10x all 10x all 10x all 10x all 10x all 10x all 10x all 10x all   Place and hold 90/90 in supine 5x hold 10" - - -  -   -    Triceps  20x 20x     20x 20x 20x 1# 20x 1# 20x 1# 20x 1# 20x 1# 20x 1#   Chicken wing stretch          2 x 15"   protract 10x x20  CC/C  20x all 20x all 20x 1# 20x 1# 20x 1# 20x 1#, CC/C  20x 20x 1#, CC/C  20x 20x 2#   S/L ER      20x 0# 20x 0# 20x 0#  20x 0#   therabands     NV  Yellow x 5 ea 5x 6 planes 10x all planes 10x all planes 10x all planes  HR ADD 5x   Ther Activity             Wand flx/bench/Hor add/adb     10x/10x/10x 10x/10x/10x Wand ext/IR  10x  Bench 20x  10x flx/HAb Wand ext/IR  10x  Bench 20x  10x flx/HAb 2# all 20x 2# all 20x 2# 20x 20x all 2# 20x all 2# 20x all 3#   Cones to shelf     1# weight at wrist  15x 1# weight at wrist  10 cones up/down 1#  10x up/down 10x up/down 1# cff weight 10x up/down 1# cff weight 2 x 10 up/down 1# cuff wt   B shoulder flx/abd to 90 Flex OH/UH 10x/10x  abd 10x Flex OH/UH 10x/10x  abd 10x 2 x 10 2x10 1 x 10 1#  1 x 10 0# 1 x 10 1#  1 x 10 0# 1 x 10 1#    1 x 10 0# 2 x 10 1# 2 x 10 1# 2 x 10 1#   UBE 5' reverse 5' reverse 5' reverse 5' reverse 5' 5' 2 1/2 For, 2 1/2 back 3 for  3'back 3 for  3'back 3 'for, 3'back   Wall climbs Flx 10x Flx 10x 10x 10x 10x 10x 10x 10x 10x 10x B   Seated Hor Add using towel grav eliminated 3 min 2 min 2 min  2 min 2 min 2 min 2 min 2 min 2 min   Modalities    - - - -

## 2022-01-10 NOTE — PROGRESS NOTES
PT Re-Evaluation     Today's date: 1/10/2022  Patient name: Ayana Clark  : 1940  MRN: [id number]  Referring provider: Yanet Silva MD  Dx:   Encounter Diagnosis     ICD-10-CM    1  Right shoulder pain, unspecified chronicity  M25 511    2  Other specified arthritis, right shoulder  M13 811    3  Other specified aftercare following surgery  Z48 89        Start Time: 2411  Stop Time: 1255  Total time in clinic (min): 70 minutes    Assessment  Assessment details: 80year old woman 24 days PO R RTSA  Pain range 0-3/10  No use of slingsling  AROM R shoulder: Flx 108, Abd 94, IR and ER @ 90 72/45  Strength Shoulder flx/abd 3-, IR/ER 2/5  Pt independent all self care, driving, laundry, and simple food prep  Sleep in bed, opening doors with greater ease  Difficulty reach L armpit with R hand and back of head to style hair with R hand  DASH 34%  Continue PT progressing with ROM and strengthening as tolerated  Impairments: abnormal or restricted ROM, activity intolerance, impaired physical strength, lacks appropriate home exercise program, pain with function, poor posture  and poor body mechanics  Functional limitations: sleeping in bed, unable to wash L armpit with R hand; I drive, better opening doors, difficulty reaching back of head with R  Symptom irritability: highUnderstanding of Dx/Px/POC: good   Prognosis: good    Goals  STG- 4 weeks 21  1  I HEP (MET)  2  Decrease pain to 0-3/10(progressing 1-7/10)  3  Increase PROM R shoulder to 100/flx/abd, 30 IR/ER (MET)  4  I dressing/bathing (MET)    STG- 4 weeks 1/3/21  1  Increase AROM flx/abd to 90-95 (MET)  2  Increase strength L shoulder to 3-/5 (MET for elevation, not for rotations)  3  Improved DASH score to under 45% disability (MET)  4  Reach across body to put deodorant on L with R hand (progressing)    LTG- 12 weeks  22  1  Decrease pain to 0-1/10  2   Increase AROM R shoulder to 90/90 Flx/Abd  Increase strength R shoulder to 3+-4/5  3  I with home chores and driving  4  Improve DASH score to under 15% disability      Plan  Patient would benefit from: PT eval and skilled physical therapy  Planned modality interventions: thermotherapy: hydrocollator packs and cryotherapy  Planned therapy interventions: manual therapy, neuromuscular re-education, therapeutic activities, therapeutic exercise and home exercise program  Frequency: 2x week  Duration in visits: 8  Duration in weeks: 4  Plan of Care beginning date: 1/10/2022  Plan of Care expiration date: 2022  Treatment plan discussed with: patient        Subjective Evaluation    History of Present Illness  Date of onset: 6/15/2021  Mechanism of injury: Pt stkai R shoulder had RTC tear many years ago, and pain returned 3 months ago  Pt had recent course of PT but no pain relief  Pt Had MRI 21 and it was determined she need TSA  R reverse TSA planned for 10/15/21  Pt referred fro pre-operative session today  21- Pt underwent reverse shoulder arthroplasty on 11/15/21  Pt placed in abd pillow sling and referred for outpatient PT to begin today  21- Pain range 1-7/10  Unable to sleep in bed  Current pain 4/10    1/10/22- Pain range 0-3/10    Sleeping in bed          Recurrent probem    Quality of life: good    Pain  Current pain ratin  At best pain ratin  At worst pain rating: 3  Quality: grinding  Relieving factors: heat  Progression: improved    Social Support  Steps to enter house: yes  2  Stairs in house: no   Lives in: [facility]  Lives with: spouse    Employment status: not working  Hand dominance: right      Diagnostic Tests  MRI studies: abnormal  Treatments  Previous treatment: physical therapy  Current treatment: physical therapy  Patient Goals  Patient goals for therapy: increased strength, independence with ADLs/IADLs, increased motion and decreased pain          Objective     Postural Observations  Seated posture: fair  Standing posture: fair    Additional Postural Observation Details  Forward head, protracted shoulders    Observations     Right Shoulder  Positive for incision  Additional Observation Details  Incision well healed    No sling    Cervical/Thoracic Screen   Cervical range of motion within normal limits with the following exceptions: Minimal limit al planes    Neurological Testing     Sensation     Shoulder   Left Shoulder   Intact: light touch    Right Shoulder   Intact: light touch    Active Range of Motion   Left Shoulder   Normal active range of motion    Right Shoulder   Flexion: 108 degrees   Extension: 35 degrees   Abduction: 94 degrees   External rotation 90°: 68 degrees  Internal rotation 90°: 40 degrees     Left Elbow   Normal active range of motion    Right Elbow   Extension: -45 degrees     Left Wrist   Normal active range of motion    Right Wrist   Normal active range of motion    Additional Active Range of Motion Details  Limited horizontal ADD    Passive Range of Motion   Left Shoulder   Normal passive range of motion    Right Shoulder   Flexion: 142 degrees   Abduction: 112 degrees   External rotation 90°: 72 degrees   Internal rotation 45°: 58 degrees   Internal rotation 90°: 42 degrees     Additional Passive Range of Motion Details  Pure abd /112,     Scapular Mobility     Right Shoulder   Scapular Mobility with Shoulder to 90° FF   Scapular elevation: moderate  Upward rotation: inadequate  Downward rotation: inadequate    Additional Scapular Mobility Details  Mod limit scap elevation    Strength/Myotome Testing     Left Shoulder   Normal muscle strength    Right Shoulder     Planes of Motion   Flexion: 3-   Extension: 3-   Abduction: 2+   Adduction: 2   External rotation at 0°: 2-   Internal rotation at 0°: 3-     General Comments:      Shoulder Comments   DASH 54% disability;   1/10/22- DASH 34% disability      Flowsheet Rows      Most Recent Value   PT/OT G-Codes    Current Score 54   Projected Score 59 Assessment Type Re-evaluation             Precautions: R TSA to be 10/15/21, @ 6 weeks PO wean from sling and begin AROM      Manuals 12/9/21 12/14/21 12/16 12/21/21 12/23 12/28/21 12/30 1/3 1/6/22 1/10   PROM/stretch STM R shoulder AE  To tolerance CRR CRR AE CRR AE CRR CRR AE CRR   Rhythmic stabilization    @ 90  CRR @ 80 AE @90 CRR @30, 90, 120   AE @ 30, 90, 60  CRR @ 30, 90, 60  CRR @ 30, 90, 61  AE @ 30/60/90/120                          FOTO/RE   Neuro Re-Ed             Pre-op instruction    -                                                                                       Ther Ex             Pulley flexion 5' POS flex 5' POS flex 5' 5'  POS 5' 5' 5' 5' 5' 5'   pendulums 20x all 1# 20x all 1# 20x all 1# 20x all 1# 20x all 1# 20x all 1# 20x all 2# 20x all 2# 20x 2# 20x 2#   AROM elbow F/E  Sup/pron  Wrist F/E 20x 1#    wrist F/E 20x/20x 1# 20x 1#    wrist F/E 20x/20x 1# 20x 2#    20x /20x 2#     20x 2#    20x /20x 2# 20x 2#    elbow only 20x 2#    elbow only 20x 2# 20x 2# 20x 2# 20x 2#   putty -            Shrugs/retract 20x/20x 20x/20x 20x /20x shrug with 1# weight in hands 20x /20x shrug with 1# weight in hands 20x/20x 1# 20x/20x 1# 20x/20x 1# 20x/20x 2# 20x/20x 2# 20x 2#/20x 2#   Wall isometrics 10x all 10x all 10x all 10x all 10x all 10x all 10x all 10x all 10x all 10x all   Place and hold 90/90 in supine 5x hold 10" - - -  -   -    Triceps  20x 20x     20x 20x 20x 1# 20x 1# 20x 1# 20x 1# 20x 1# 20x 1#   Chicken wing stretch          2 x 15"   protract 10x x20  CC/C  20x all 20x all 20x 1# 20x 1# 20x 1# 20x 1#, CC/C  20x 20x 1#, CC/C  20x 20x 2#   S/L ER      20x 0# 20x 0# 20x 0#  20x 0#   therabands     NV  Yellow x 5 ea 5x 6 planes 10x all planes 10x all planes 10x all planes  HR ADD 5x   Ther Activity             Wand flx/bench/Hor add/adb     10x/10x/10x 10x/10x/10x Wand ext/IR  10x  Bench 20x  10x flx/HAb Wand ext/IR  10x  Bench 20x  10x flx/HAb 2# all 20x 2# all 20x 2# 20x 20x all 2# 20x all 2# 20x all 3#   Cones to shelf     1# weight at wrist  15x 1# weight at wrist  10 cones up/down 1#  10x up/down 10x up/down 1# cff weight 10x up/down 1# cff weight 2 x 10 up/down 1# cuff wt   B shoulder flx/abd to 90 Flex OH/UH 10x/10x  abd 10x Flex OH/UH 10x/10x  abd 10x 2 x 10 2x10 1 x 10 1#  1 x 10 0# 1 x 10 1#  1 x 10 0# 1 x 10 1#    1 x 10 0# 2 x 10 1# 2 x 10 1# 2 x 10 1#   UBE 5' reverse 5' reverse 5' reverse 5' reverse 5' 5' 2 1/2 For, 2 1/2 back 3 for  3'back 3 for  3'back 3 'for, 3'back   Wall climbs Flx 10x Flx 10x 10x 10x 10x 10x 10x 10x 10x 10x B   Seated Hor Add using towel grav eliminated 3 min 2 min 2 min  2 min 2 min 2 min 2 min 2 min 2 min   Modalities    - - - -

## 2022-01-12 ENCOUNTER — OFFICE VISIT (OUTPATIENT)
Dept: PHYSICAL THERAPY | Facility: CLINIC | Age: 82
End: 2022-01-12
Payer: MEDICARE

## 2022-01-12 DIAGNOSIS — M13.811 OTHER SPECIFIED ARTHRITIS, RIGHT SHOULDER: Primary | ICD-10-CM

## 2022-01-12 DIAGNOSIS — Z48.89 OTHER SPECIFIED AFTERCARE FOLLOWING SURGERY: ICD-10-CM

## 2022-01-12 DIAGNOSIS — M25.511 RIGHT SHOULDER PAIN, UNSPECIFIED CHRONICITY: ICD-10-CM

## 2022-01-12 PROCEDURE — 97140 MANUAL THERAPY 1/> REGIONS: CPT | Performed by: PHYSICAL THERAPIST

## 2022-01-12 PROCEDURE — 97530 THERAPEUTIC ACTIVITIES: CPT | Performed by: PHYSICAL THERAPIST

## 2022-01-12 PROCEDURE — 97110 THERAPEUTIC EXERCISES: CPT | Performed by: PHYSICAL THERAPIST

## 2022-01-12 NOTE — PROGRESS NOTES
Daily Note     Today's date: 2022  Patient name: Lou Schwarz  : 1940  MRN: [id number]  Referring provider: Lisa Do MD  Dx:   Encounter Diagnosis     ICD-10-CM    1  Other specified arthritis, right shoulder  M13 811    2  Other specified aftercare following surgery  Z48 89    3  Right shoulder pain, unspecified chronicity  M25 511        Start Time: 1455  Stop Time: 1550  Total time in clinic (min): 55 minutes    Subjective: Painful in am, pain range 0-2/10      Objective: See treatment diary below      Assessment: Stiffness with hor ADD, and IR  Focused stretch in hor ADD and ER to be able to wash L armpit and reach back of head with R to style hair  R shoulder abd with all elevation  Stiffness and weakness with IR/Hor add  Continue stretching and strengthening as tolerated with goal of resuming all pre-morbid activity  Plan: Progress treatment as tolerated         Precautions: R TSA to be 10/15/21, @ 6 weeks PO wean from sling and begin AROM      Manuals 1/12    12/23 12/28/21 12/30 1/3 1/6/22 1/10   PROM/stretch STM R shoulder PROM, stretch, distract, gentle inferior and posterior mobs grade 2, STM pect corby   CRR    CRR AE CRR CRR AE CRR   Rhythmic stabilization  @ 90, 120, 60    @90 CRR @30, 90, 120   AE @ 30, 90, 60  CRR @ 30, 90, 60  CRR @ 30, 90, 60  AE @ 30/60/90/120                          FOTO/RE   Neuro Re-Ed             Pre-op instruction    -                                                                                       Ther Ex             Pulley flexion 5'    5' 5' 5' 5' 5' 5'   pendulums     20x all 1# 20x all 1# 20x all 2# 20x all 2# 20x 2# 20x 2#   AROM elbow F/E   20x elbow flx palm up and down 2#    20x 2#    elbow only 20x 2#    elbow only 20x 2# 20x 2# 20x 2# 20x 2#   putty -            Shrugs/retract 20x/20x holding 2# weights    20x/20x 1# 20x/20x 1# 20x/20x 1# 20x/20x 2# 20x/20x 2# 20x 2#/20x 2#   Wall isometrics 10x all    10x all 10x all 10x all 10x all 10x all 10x all   Place and hold 90/90 in supine      -   -    Triceps  20x 1#    20x 1# 20x 1# 20x 1# 20x 1# 20x 1# 20x 1#   Chicken wing stretch 2 x 15"         2 x 15"   protract 20x 2#, CC/C 20x/20x  Hor add 10x 1#    20x 1# 20x 1# 20x 1# 20x 1#, CC/C  20x 20x 1#, CC/C  20x 20x 2#   S/L ER 20x 0#     20x 0# 20x 0# 20x 0#  20x 0#   Self stretch ho add in supine  2 x 15"            therabands 10x all planes    NV  Yellow x 5 ea 5x 6 planes 10x all planes 10x all planes 10x all planes  HR ADD 5x   Ther Activity             Wand flx/bench/Hor add/adb   20x all 3#    2# all 20x 2# all 20x 2# 20x 20x all 2# 20x all 2# 20x all 3#   Cones to shelf 2 x 10 up/down 1# cuff weight    1# weight at wrist  15x 1# weight at wrist  10 cones up/down 1#  10x up/down 10x up/down 1# cff weight 10x up/down 1# cff weight 2 x 10 up/down 1# cuff wt   B shoulder flx/abd to 90 2 x 10 1#    1 x 10 1#  1 x 10 0# 1 x 10 1#  1 x 10 0# 1 x 10 1#    1 x 10 0# 2 x 10 1# 2 x 10 1# 2 x 10 1#   UBE 5'    5' 5' 2 1/2 For, 2 1/2 back 3 for  3'back 3 for  3'back 3 'for, 3'back   Wall climbs 10x    10x 10x 10x 10x 10x 10x B   Seated Hor Add using towel grav eliminated 2 min    2 min 2 min 2 min 2 min 2 min 2 min   Modalities    - - - -

## 2022-01-17 ENCOUNTER — OFFICE VISIT (OUTPATIENT)
Dept: PHYSICAL THERAPY | Facility: CLINIC | Age: 82
End: 2022-01-17
Payer: MEDICARE

## 2022-01-17 DIAGNOSIS — M13.811 OTHER SPECIFIED ARTHRITIS, RIGHT SHOULDER: Primary | ICD-10-CM

## 2022-01-17 DIAGNOSIS — M25.511 RIGHT SHOULDER PAIN, UNSPECIFIED CHRONICITY: ICD-10-CM

## 2022-01-17 DIAGNOSIS — Z48.89 OTHER SPECIFIED AFTERCARE FOLLOWING SURGERY: ICD-10-CM

## 2022-01-17 PROCEDURE — 97140 MANUAL THERAPY 1/> REGIONS: CPT | Performed by: PHYSICAL THERAPIST

## 2022-01-17 PROCEDURE — 97530 THERAPEUTIC ACTIVITIES: CPT | Performed by: PHYSICAL THERAPIST

## 2022-01-17 PROCEDURE — 97110 THERAPEUTIC EXERCISES: CPT | Performed by: PHYSICAL THERAPIST

## 2022-01-17 NOTE — PROGRESS NOTES
Daily Note     Today's date: 2022  Patient name: Sera Yanez  : 1940  MRN: [id number]  Referring provider: Dario Boateng MD  Dx:   Encounter Diagnosis     ICD-10-CM    1  Other specified arthritis, right shoulder  M13 811    2  Other specified aftercare following surgery  Z48 89    3  Right shoulder pain, unspecified chronicity  M25 511        Start Time: 1420  Stop Time: 1520  Total time in clinic (min): 60 minutes    Subjective: Pain free most of the time, @ worst 3/10 with stretching and AROM across body/overhead  Pt seen by Dr Jeff Ledezma and to continue PT  Released to RTW as  and  at [employer]  Objective: See treatment diary below      Assessment: Progressing with PROM/stretch R shoulder  Graded increase In activity with tbands, PRE's and isometrics  Functional strengthening with OH reach and hor add  Shoulder pulls into IR and abd with active elevation  Continue PT progressing as tolerated with goal of increasing function with minimal pain  Plan: Progress treatment as tolerated         Precautions: R TSA to be 10/15/21, @ 6 weeks PO wean from sling and begin AROM      Manuals 1/12 1/17   12/23 12/28/21 12/30 1/3 1/6/22 1/10   PROM/stretch STM R shoulder PROM, stretch, distract, gentle inferior and posterior mobs grade 2, STM pect corby   CRR PROM, stretch, distract, gentle inferior and posterior mobs grade 2 STM pect major  CRR   CRR AE CRR CRR AE CRR   Rhythmic stabilization  @ 90, 120, 60 90, 120, 60  CRR   @90 CRR @30, 90, 120   AE @ 30, 90, 60  CRR @ 30, 90, 60  CRR @ 30, 90, 60  AE @ 30/60/90/120                          FOTO/RE   Neuro Re-Ed             Pre-op instruction    -                                                                                       Ther Ex             Pulley flexion 5' 5'   5' 5' 5' 5' 5' 5'   pendulums     20x all 1# 20x all 1# 20x all 2# 20x all 2# 20x 2# 20x 2#   AROM elbow F/E   20x elbow flx palm up and down 2# 20x/20x 2# 20x 2#    elbow only 20x 2#    elbow only 20x 2# 20x 2# 20x 2# 20x 2#   putty -            Shrugs/retract 20x/20x holding 2# weights 20x/20x 2#   20x/20x 1# 20x/20x 1# 20x/20x 1# 20x/20x 2# 20x/20x 2# 20x 2#/20x 2#   Wall isometrics 10x all 10x all   10x all 10x all 10x all 10x all 10x all 10x all   Place and hold 90/90 in supine      -   -    Triceps  20x 1# 20x 1#   20x 1# 20x 1# 20x 1# 20x 1# 20x 1# 20x 1#   Chicken wing stretch 2 x 15" 2 x 15"        2 x 15"   protract 20x 2#, CC/C 20x/20x  Hor add 10x 1# 20x 2#  CC/C 20x/20x 2#   20x 1# 20x 1# 20x 1# 20x 1#, CC/C  20x 20x 1#, CC/C  20x 20x 2#   S/L ER 20x 0# 20x 0#    20x 0# 20x 0# 20x 0#  20x 0#   Self stretch ho add in supine  2 x 15" 2 x15"           therabands 10x all planes 15x all yellow   NV  Yellow x 5 ea 5x 6 planes 10x all planes 10x all planes 10x all planes  HR ADD 5x   Ther Activity             OH pulley   5'           Wand flx/bench/Hor add/adb   20x all 3# 20x all 3#   2# all 20x 2# all 20x 2# 20x 20x all 2# 20x all 2# 20x all 3#   Cones to shelf 2 x 10 up/down 1# cuff weight 2 x 10 up/dwon 1# cuff weight   1# weight at wrist  15x 1# weight at wrist  10 cones up/down 1#  10x up/down 10x up/down 1# cff weight 10x up/down 1# cff weight 2 x 10 up/down 1# cuff wt   B shoulder flx/abd to 90 2 x 10 1# 2 x 101#   1 x 10 1#  1 x 10 0# 1 x 10 1#  1 x 10 0# 1 x 10 1#    1 x 10 0# 2 x 10 1# 2 x 10 1# 2 x 10 1#   UBE 5' 2 1/2 for, 2 1/2 back   5' 5' 2 1/2 For, 2 1/2 back 3 for  3'back 3 for  3'back 3 'for, 3'back   Wall climbs 10x 10x   10x 10x 10x 10x 10x 10x B   Seated Hor Add using towel grav eliminated 2 min 3 min   2 min 2 min 2 min 2 min 2 min 2 min   Modalities    - - - -

## 2022-01-20 ENCOUNTER — OFFICE VISIT (OUTPATIENT)
Dept: PHYSICAL THERAPY | Facility: CLINIC | Age: 82
End: 2022-01-20
Payer: MEDICARE

## 2022-01-20 DIAGNOSIS — M25.511 RIGHT SHOULDER PAIN, UNSPECIFIED CHRONICITY: ICD-10-CM

## 2022-01-20 DIAGNOSIS — M13.811 OTHER SPECIFIED ARTHRITIS, RIGHT SHOULDER: Primary | ICD-10-CM

## 2022-01-20 DIAGNOSIS — Z48.89 OTHER SPECIFIED AFTERCARE FOLLOWING SURGERY: ICD-10-CM

## 2022-01-20 PROCEDURE — 97140 MANUAL THERAPY 1/> REGIONS: CPT | Performed by: PHYSICAL THERAPIST

## 2022-01-20 PROCEDURE — 97110 THERAPEUTIC EXERCISES: CPT | Performed by: PHYSICAL THERAPIST

## 2022-01-20 PROCEDURE — 97530 THERAPEUTIC ACTIVITIES: CPT | Performed by: PHYSICAL THERAPIST

## 2022-01-20 NOTE — PROGRESS NOTES
Daily Note     Today's date: 2022  Patient name: Thuy Hooker  : 1940  MRN: [id number]  Referring provider: Aleksandra Moran MD  Dx:   Encounter Diagnosis     ICD-10-CM    1  Other specified arthritis, right shoulder  M13 811    2  Other specified aftercare following surgery  Z48 89    3  Right shoulder pain, unspecified chronicity  M25 511        Start Time: 1350  Stop Time: 1450  Total time in clinic (min): 60 minutes    Subjective: Current pain 0/10, @ worst 2/10  Objective: See treatment diary below      Assessment: Pt RTW as  in a restaurant and states he shoulder was fine but her back was painful as she has not been on her feet for 5 hours for many months  Progressing with flexibility and strengthening R shoulder with goal of resuming all self care: washing L arm pit and styling hair  Continue PT emphasizing functional strengthening  Plan: Progress treatment as tolerated         Precautions: R TSA to be 10/15/21, @ 6 weeks PO wean from sling and begin AROM      Manuals 22 1/10   PROM/stretch STM R shoulder PROM, stretch, distract, gentle inferior and posterior mobs grade 2, STM pect corby   CRR PROM, stretch, distract, gentle inferior and posterior mobs grade 2 STM pect major  CRR PROM/stretch distract, gentle inferior/posterior jt mobs grade 2, STM       AE CRR   Rhythmic stabilization  @ 90, 120, 60 90, 120, 60  CRR 90/120/60  CRR      @ 30, 90, 60  AE @ 30/60/90/120                          FOTO/RE   Neuro Re-Ed             Pre-op instruction    -                                                                                       Ther Ex             Pulley flexion 5' 5' 5'      5' 5'   pendulums         20x 2# 20x 2#   AROM elbow F/E   20x elbow flx palm up and down 2# 20x/20x 2# 20x/20x 2#      20x 2# 20x 2#   putty -            Shrugs/retract 20x/20x holding 2# weights 20x/20x 2# 20x/20x 2#      20x/20x 2# 20x 2#/20x 2#   Wall isometrics 10x all 10x all 10x all      10x all 10x all   Place and hold 90/90 in supine         -    Triceps  20x 1# 20x 1# 20x 1#      20x 1# 20x 1#   Chicken wing stretch 2 x 15" 2 x 15" 2 x 15"       2 x 15"   protract 20x 2#, CC/C 20x/20x  Hor add 10x 1# 20x 2#  CC/C 20x/20x 2# 20x 2#/CC/C 20x 2#  Hor ADD 10x 1#      20x 1#, CC/C  20x 20x 2#   S/L ER 20x 0# 20x 0# 20x 0#       20x 0#   Self stretch ho add in supine  2 x 15" 2 x15" 2 x 15"  Place and hold in ADD 5x hold 5"            therabands 10x all planes 15x all yellow 15x yellow      10x all planes 10x all planes  HR ADD 5x   Ther Activity             OH pulley   5' 5'          Wand flx/bench/Hor add/adb   20x all 3# 20x all 3# 20x all 3#      20x all 2# 20x all 3#   Cones to shelf 2 x 10 up/down 1# cuff weight 2 x 10 up/dwon 1# cuff weight 2 x 10 1# cuff weight      10x up/down 1# cff weight 2 x 10 up/down 1# cuff wt   B shoulder flx/abd to 90 2 x 10 1# 2 x 101# 2 x 101#      2 x 10 1# 2 x 10 1#   UBE 5' 2 1/2 for, 2 1/2 back 2 1/2 F/B      3 for  3'back 3 'for, 3'back   Wall climbs 10x 10x 10x      10x 10x B   Seated Hor Add using towel grav eliminated 2 min 3 min 3 min      2 min 2 min   Modalities    -

## 2022-01-24 ENCOUNTER — OFFICE VISIT (OUTPATIENT)
Dept: PHYSICAL THERAPY | Facility: CLINIC | Age: 82
End: 2022-01-24
Payer: MEDICARE

## 2022-01-24 DIAGNOSIS — M13.811 OTHER SPECIFIED ARTHRITIS, RIGHT SHOULDER: Primary | ICD-10-CM

## 2022-01-24 DIAGNOSIS — Z48.89 OTHER SPECIFIED AFTERCARE FOLLOWING SURGERY: ICD-10-CM

## 2022-01-24 DIAGNOSIS — M25.511 RIGHT SHOULDER PAIN, UNSPECIFIED CHRONICITY: ICD-10-CM

## 2022-01-24 PROCEDURE — 97140 MANUAL THERAPY 1/> REGIONS: CPT | Performed by: PHYSICAL THERAPIST

## 2022-01-24 PROCEDURE — 97110 THERAPEUTIC EXERCISES: CPT | Performed by: PHYSICAL THERAPIST

## 2022-01-24 PROCEDURE — 97530 THERAPEUTIC ACTIVITIES: CPT | Performed by: PHYSICAL THERAPIST

## 2022-01-24 NOTE — PROGRESS NOTES
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 for the first time  Progressing with flexibility and strengthening ex to tolerance  Pt needs frequent VC for exercise,  Does not complete full available range without VC  Continue PT with goal of increasing activity level, specifically to use curling iron on back of hair with R and to wash L armpit with R hand  Plan: Progress treatment as tolerated         Precautions: R TSA to be 10/15/21, @ 6 weeks PO wean from sling and begin AROM      Manuals 22 1/10   PROM/stretch STM R shoulder PROM, stretch, distract, gentle inferior and posterior mobs grade 2, STM pect corby   CRR PROM, stretch, distract, gentle inferior and posterior mobs grade 2 STM pect major  CRR PROM/stretch distract, gentle inferior/posterior jt mobs grade 2, STM  PROM/stretch/distract, gentle inferior/posterior jt mobs  Grade 2  STM  CRR     AE CRR   Rhythmic stabilization  @ 90, 120, 60 90, 120, 60  CRR 90/120/60  CRR      @ 30, 90, 60  AE @ 30/60/90/120                          FOTO/RE   Neuro Re-Ed             Pre-op instruction    -                                                                                       Ther Ex             Pulley flexion 5' 5' 5' 5'     5' 5'   pendulums         20x 2# 20x 2#   AROM elbow F/E   20x elbow flx palm up and down 2# 20x/20x 2# 20x/20x 2# 20x/20x 2#     20x 2# 20x 2#   putty -            Shrugs/retract 20x/20x holding 2# weights 20x/20x 2# 20x/20x 2# 20x/20x     20x/20x 2# 20x 2#/20x 2#   Wall isometrics 10x all 10x all 10x all 10x all     10x all 10x all   Place and hold 90/90 in supine         -    Triceps  20x 1# 20x 1# 20x 1# 20x 1#     20x 1# 20x 1#   Chicken wing stretch 2 x 15" 2 x 15" 2 x 15" 2 x 15"      2 x 15"   protract 20x 2#, CC/C 20x/20x  Hor add 10x 1# 20x 2#  CC/C 20x/20x 2# 20x 2#/CC/C 20x 2#  Hor ADD 10x 1# 20x/20x/20x 2#    Hor ADD 10x 1#     20x 1#, CC/C  20x 20x 2#   S/L ER 20x 0# 20x 0# 20x 0# 20x      20x 0#   Self stretch ho add in supine  2 x 15" 2 x15" 2 x 15"  Place and hold in ADD 5x hold 5"   2 x 15"         therabands 10x all planes 15x all yellow 15x yellow 20x yellow     10x all planes 10x all planes  HR ADD 5x   Ther Activity             OH pulley   5' 5' 5'         Wand flx/bench/Hor add/adb   20x all 3# 20x all 3# 20x all 3# 20x all 3#     20x all 2# 20x all 3#   Cones to shelf 2 x 10 up/down 1# cuff weight 2 x 10 up/dwon 1# cuff weight 2 x 10 1# cuff weight 2 x 10 1# cuff weight     10x up/down 1# cff weight 2 x 10 up/down 1# cuff wt   B shoulder flx/abd to 90 2 x 10 1# 2 x 101# 2 x 101# 2 x 10 1#     2 x 10 1# 2 x 10 1#   UBE 5' 2 1/2 for, 2 1/2 back 2 1/2 F/B 2 1/2 F/B     3 for  3'back 3 'for, 3'back   Wall climbs 10x 10x 10x 10x     10x 10x B   Seated Hor Add using towel grav eliminated 2 min 3 min 3 min 3 min     2 min 2 min   Modalities    -

## 2022-01-26 ENCOUNTER — HOSPITAL ENCOUNTER (OUTPATIENT)
Dept: NON INVASIVE DIAGNOSTICS | Facility: CLINIC | Age: 82
Discharge: HOME | End: 2022-01-26
Payer: MEDICARE

## 2022-01-26 DIAGNOSIS — I65.21 SYMPTOMATIC CAROTID ARTERY STENOSIS WITHOUT INFARCTION, RIGHT: ICD-10-CM

## 2022-01-26 PROCEDURE — 93880 EXTRACRANIAL BILAT STUDY: CPT | Performed by: SURGERY

## 2022-01-26 PROCEDURE — 93880 EXTRACRANIAL BILAT STUDY: CPT

## 2022-01-27 ENCOUNTER — OFFICE VISIT (OUTPATIENT)
Dept: PHYSICAL THERAPY | Facility: CLINIC | Age: 82
End: 2022-01-27
Payer: MEDICARE

## 2022-01-27 DIAGNOSIS — M13.811 OTHER SPECIFIED ARTHRITIS, RIGHT SHOULDER: Primary | ICD-10-CM

## 2022-01-27 DIAGNOSIS — Z48.89 OTHER SPECIFIED AFTERCARE FOLLOWING SURGERY: ICD-10-CM

## 2022-01-27 DIAGNOSIS — M25.511 RIGHT SHOULDER PAIN, UNSPECIFIED CHRONICITY: ICD-10-CM

## 2022-01-27 PROCEDURE — 97110 THERAPEUTIC EXERCISES: CPT | Performed by: PHYSICAL THERAPIST

## 2022-01-27 PROCEDURE — 97530 THERAPEUTIC ACTIVITIES: CPT | Performed by: PHYSICAL THERAPIST

## 2022-01-27 PROCEDURE — 97140 MANUAL THERAPY 1/> REGIONS: CPT | Performed by: PHYSICAL THERAPIST

## 2022-01-27 NOTE — PROGRESS NOTES
Daily Note     Today's date: 2022  Patient name: Geovanna Morin  : 1940  MRN: [id number]  Referring provider: Nino Cabrera MD  Dx:   Encounter Diagnosis     ICD-10-CM    1  Other specified arthritis, right shoulder  M13 811    2  Other specified aftercare following surgery  [id number]    3  Right shoulder pain, unspecified chronicity  M25 [id number]        Start Time: 1055  Stop Time: 1150  Total time in clinic (min): 55 minutes    Subjective: 0-4/10  Pt had difficulty sleeping last night, waking with pain, "couldn't get comfortable "  Achey pain  Objective: See treatment diary below      Assessment:  Pain with end range abd and IR  Pt more painful and fatigued following working 6 hours as   States she was lifting objects quite a bit  Progressing with flexibility and strengthening R shoulder, functional reach and AROM into Hor Abd for armpit hygiene  Continue PT with goal of improving activity level and daily function  Plan: Progress treatment as tolerated         Precautions: R TSA to be 10/15/21, @ 6 weeks PO wean from sling and begin AROM      Manuals 1/12 1/17 1/20 1/24 1/27    1/6/22 1/10   PROM/stretch STM R shoulder PROM, stretch, distract, gentle inferior and posterior mobs grade 2, STM pect corby   CRR PROM, stretch, distract, gentle inferior and posterior mobs grade 2 STM pect major  CRR PROM/stretch distract, gentle inferior/posterior jt mobs grade 2, STM  PROM/stretch/distract, gentle inferior/posterior jt mobs  Grade 2  STM  CRR PROM/stretch/distract, gentle inferiro and posterior jt mobs grade 2  CRR    AE CRR   Rhythmic stabilization  @ 90, 120, 60 90, 120, 60  CRR 90/120/60  CRR  @ 60, 90, 120  CRR    @ 30, 90, 60  AE @ 30/60/90/120                          FOTO/RE   Neuro Re-Ed             Pre-op instruction    -                                                                                       Ther Ex             Pulley flexion 5' 5' 5' 5' 5'    5' 5'   pendulums 20x 2# 20x 2#   AROM elbow F/E   20x elbow flx palm up and down 2# 20x/20x 2# 20x/20x 2# 20x/20x 2# 20x/20x 2#    20x 2# 20x 2#   putty -            Shrugs/retract 20x/20x holding 2# weights 20x/20x 2# 20x/20x 2# 20x/20x 20x/20x 2#    20x/20x 2# 20x 2#/20x 2#   Wall isometrics 10x all 10x all 10x all 10x all 10x all    10x all 10x all   Place and hold 90/90 in supine         -    Triceps  20x 1# 20x 1# 20x 1# 20x 1# 20x 1#    20x 1# 20x 1#   Chicken wing stretch 2 x 15" 2 x 15" 2 x 15" 2 x 15" 2 x 15"     2 x 15"   protract 20x 2#, CC/C 20x/20x  Hor add 10x 1# 20x 2#  CC/C 20x/20x 2# 20x 2#/CC/C 20x 2#  Hor ADD 10x 1# 20x/20x/20x 2#    Hor ADD 10x 1# 20x/20x /20x 2#    Hor ADD 10x    20x 1#, CC/C  20x 20x 2#   S/L ER 20x 0# 20x 0# 20x 0# 20x 20x     20x 0#   Self stretch ho add in supine  2 x 15" 2 x15" 2 x 15"  Place and hold in ADD 5x hold 5"   2 x 15" 2 x 15"        therabands 10x all planes 15x all yellow 15x yellow 20x yellow 20x yellow    10x all planes 10x all planes  HR ADD 5x   Ther Activity             OH pulley   5' 5' 5' 5'        Wand flx/bench/Hor add/adb   20x all 3# 20x all 3# 20x all 3# 20x all 3# 20x all 3#    20x all 2# 20x all 3#   Cones to shelf 2 x 10 up/down 1# cuff weight 2 x 10 up/dwon 1# cuff weight 2 x 10 1# cuff weight 2 x 10 1# cuff weight 2 x 10 1# cuff weight    10x up/down 1# cff weight 2 x 10 up/down 1# cuff wt   B shoulder flx/abd to 90 2 x 10 1# 2 x 101# 2 x 101# 2 x 10 1# 2 x 10 1#    2 x 10 1# 2 x 10 1#   UBE 5' 2 1/2 for, 2 1/2 back 2 1/2 F/B 2 1/2 F/B 2 1/2 F/B    3 for  3'back 3 'for, 3'back   Wall climbs 10x 10x 10x 10x 10x    10x 10x B   Seated Hor Add using towel grav eliminated 2 min 3 min 3 min 3 min 3 min    2 min 2 min   Modalities    -

## 2022-01-31 ENCOUNTER — OFFICE VISIT (OUTPATIENT)
Dept: PHYSICAL THERAPY | Facility: CLINIC | Age: 82
End: 2022-01-31
Payer: MEDICARE

## 2022-01-31 ENCOUNTER — OFFICE VISIT (OUTPATIENT)
Dept: RHEUMATOLOGY | Facility: CLINIC | Age: 82
End: 2022-01-31
Payer: MEDICARE

## 2022-01-31 VITALS
HEIGHT: 63 IN | WEIGHT: 114 LBS | BODY MASS INDEX: 20.2 KG/M2 | SYSTOLIC BLOOD PRESSURE: 120 MMHG | DIASTOLIC BLOOD PRESSURE: 66 MMHG

## 2022-01-31 DIAGNOSIS — M25.511 RIGHT SHOULDER PAIN, UNSPECIFIED CHRONICITY: ICD-10-CM

## 2022-01-31 DIAGNOSIS — M81.0 OSTEOPOROSIS, UNSPECIFIED OSTEOPOROSIS TYPE, UNSPECIFIED PATHOLOGICAL FRACTURE PRESENCE: Primary | ICD-10-CM

## 2022-01-31 DIAGNOSIS — E44.0 MODERATE PROTEIN-CALORIE MALNUTRITION (HCC): ICD-10-CM

## 2022-01-31 DIAGNOSIS — M13.811 OTHER SPECIFIED ARTHRITIS, RIGHT SHOULDER: Primary | ICD-10-CM

## 2022-01-31 DIAGNOSIS — M19.90 OSTEOARTHRITIS, UNSPECIFIED OSTEOARTHRITIS TYPE, UNSPECIFIED SITE: ICD-10-CM

## 2022-01-31 DIAGNOSIS — Z48.89 OTHER SPECIFIED AFTERCARE FOLLOWING SURGERY: ICD-10-CM

## 2022-01-31 DIAGNOSIS — Z79.899 HIGH RISK MEDICATION USE: ICD-10-CM

## 2022-01-31 PROCEDURE — 96372 THER/PROPH/DIAG INJ SC/IM: CPT | Performed by: INTERNAL MEDICINE

## 2022-01-31 PROCEDURE — 97110 THERAPEUTIC EXERCISES: CPT | Performed by: PHYSICAL THERAPIST

## 2022-01-31 PROCEDURE — 97530 THERAPEUTIC ACTIVITIES: CPT | Performed by: PHYSICAL THERAPIST

## 2022-01-31 PROCEDURE — 99214 OFFICE O/P EST MOD 30 MIN: CPT | Performed by: INTERNAL MEDICINE

## 2022-01-31 PROCEDURE — 97140 MANUAL THERAPY 1/> REGIONS: CPT | Performed by: PHYSICAL THERAPIST

## 2022-01-31 NOTE — PROGRESS NOTES
Assessment and Plan:   Dilan Talley is a [de-identified] y o   female who presents for follow up  Patient continues taking calcium 1200mg daily and vit D supplementation  Patient continues on prolia injections every 6 months  We're waiting for DEXA scan that was ordered few month ago, which is scheduled for March  Will continue with prolia injection for now  This is her 9th injection  The next visit will consider proceeding with either continued every 6 month Prolia injections (if still in osteoporosis and results are stable), switching to Fosamax weekly pill (if now in osteopenia range), or monthly Evenity injection (if has significantly worse osteoporosis)  Continue daily dairy intake  Continue Vit  D supplementation - 15,000 units once a week and 50,000 units once a month  Continue calcium 1,200mg daily  Complete next DEXA scan  Do weight-bearing exercises  Prolia injection successfully administered in clinic today    Return to clinic in 6 months for next Prolia injection and provider visit    Plan:  Diagnoses and all orders for this visit:    Osteoporosis, unspecified osteoporosis type, unspecified pathological fracture presence  -     denosumab (PROLIA) subcutaneous injection 60 mg    Moderate protein-calorie malnutrition (HCC)    Osteoarthritis, unspecified osteoarthritis type, unspecified site    High risk medication use    High risk medication use - Prolia (denosumab) is a monoclonal antibody biologic medication that can rapidly utilize the body's calcium and make one susceptible to hypocalcemia; the medication also has a risk of osteonecrosis of the jaw in patients with exposed jaw bone  Patient does not plan on getting any invasive dental procedures in the near future      Follow-up plan:  RTC in 6 months for follow-up + Prolia visit        Rheumatic Disease Summary  Initial visit 10/26/21: Dilan Talley is a 80 y o   female who presented as a Rheumatology consult referred by Cielo Weinberg MD for evaluation of osteoporosis  She was due for a repeat DEXA scan in 12/2021  This was ordered today  She was ordered latest calcium and Vit  D levels as well  She was to get these after her DEXA scan  Continued her current regimen of Vitamin D once a week and once a month for now  I increased her Calcium to 1,2000mg daily  Discussed that there were other sources of supplemental Calcium including Tums and gummies/chewables  Following the results of the next DEXA scan, we will consider proceeding with either continued every 6 month Prolia injections (if still in osteoporosis and results are stable), switching to Fosamax weekly pill (if now in osteopenia range), or monthly Evenity injection (if has significantly worse osteoporosis)  Diclofenac gel was sent to pharmacy today for right ring finger arthritis symptoms to use as needed  HPI  Halima Alanis is a [de-identified] y o   female who presents for follow up  Last clinic visit was 10/26/21 which was her initial visit  Patient had right shoulder replacement in October  She says she is doing "pretty good" with the shoulder and has improvement in ROM  She denies pain in joints, hx of fractures, smoking  The following portions of the patient's history were reviewed and updated as appropriate: allergies, current medications, past family history, past medical history, past social history, past surgical history and problem list     Review of Systems:   Review of Systems   Constitutional: Negative  Negative for chills, fatigue and fever  HENT: Negative for congestion, hearing loss, mouth sores, nosebleeds and trouble swallowing  Eyes: Negative  Negative for redness  Respiratory: Negative for cough, chest tightness, shortness of breath and wheezing  Cardiovascular: Negative for chest pain, palpitations and leg swelling  Gastrointestinal: Negative for abdominal pain, blood in stool, constipation, diarrhea and nausea  Endocrine: Negative      Genitourinary: Negative for dysuria, flank pain and hematuria  Musculoskeletal: Negative for arthralgias and joint swelling  Skin: Negative for pallor and rash  Allergic/Immunologic: Negative  Neurological: Negative for dizziness, numbness and headaches  Hematological: Negative  Psychiatric/Behavioral: Negative for suicidal ideas  The patient is not nervous/anxious          Home Medications:    Current Outpatient Medications:     acetaminophen (TYLENOL) 500 mg tablet, Take 1,000 mg by mouth every 6 (six) hours as needed , Disp: , Rfl:     amLODIPine (NORVASC) 5 mg tablet, Take 1 tablet (5 mg total) by mouth daily, Disp: 90 tablet, Rfl: 1    Bioflavonoid Products (C COMPLEX PO), Take 1,500 mg by mouth daily (Patient not taking: Reported on 11/15/2021 ), Disp: , Rfl:     calcium-vitamin D 250-100 MG-UNIT per tablet, Take 1 tablet by mouth daily  (Patient not taking: Reported on 11/15/2021 ), Disp: , Rfl:     Cholecalciferol (Vitamin D-3) 125 MCG (5000 UT) TABS, Take 15,000 Units by mouth once a week Takes on a Friday every week, Disp: , Rfl:     clobetasol (TEMOVATE) 0 05 % cream, Apply topically 2 (two) times a day, Disp: 45 g, Rfl: 2    CRANBERRY PO, Take 1 tablet by mouth daily, Disp: , Rfl:     Cyanocobalamin (VITAMIN B 12 PO), Take 500 mcg by mouth daily , Disp: , Rfl:     denosumab (PROLIA) 60 mg/mL, Inject 60 mg under the skin every 6 (six) months  (Patient not taking: Reported on 11/15/2021 ), Disp: , Rfl:     ergocalciferol (VITAMIN D2) 50,000 units, TAKE 1 CAPSULE BY MOUTH MONTHLY (Patient not taking: Reported on 11/29/2021), Disp: 3 capsule, Rfl: 3    estradiol (ESTRACE VAGINAL) 0 1 mg/g vaginal cream, Apply small amount to labia 1-2x/week, Disp: 42 5 g, Rfl: 3    famotidine (PEPCID) 20 mg tablet, TAKE 1 TABLET BY MOUTH  TWICE DAILY, Disp: 180 tablet, Rfl: 3    ferrous gluconate (FERGON) 240 (27 FE) MG tablet, Take 27 mg by mouth daily Takes in the am, Disp: , Rfl:     Garlic 4310 MG CAPS, Take 1,000 mg by mouth daily, Disp: , Rfl:     levothyroxine 100 mcg tablet, Take 1 tablet (100 mcg total) by mouth daily, Disp: 90 tablet, Rfl: 3    Multiple Vitamins-Minerals (CENTRUM SILVER PO), Take 1 tablet by mouth daily, Disp: , Rfl:     Premarin 0 625 MG tablet, TAKE 1 TABLET BY MOUTH ONCE DAILY, Disp: 90 tablet, Rfl: 3    Zinc 30 MG TABS, Take 50 mg by mouth daily , Disp: , Rfl:     Objective:    Vitals:    01/31/22 1117   BP: 120/66   Weight: 51 7 kg (114 lb)   Height: 5' 2 5" (1 588 m)       Physical Exam  Vitals reviewed  Constitutional:       Appearance: Normal appearance  HENT:      Head: Normocephalic and atraumatic  Nose: Nose normal       Mouth/Throat:      Mouth: Mucous membranes are moist    Eyes:      General: No scleral icterus  Extraocular Movements: Extraocular movements intact  Conjunctiva/sclera: Conjunctivae normal    Cardiovascular:      Rate and Rhythm: Normal rate and regular rhythm  Pulses: Normal pulses  Heart sounds: Normal heart sounds  No murmur heard  No gallop  Pulmonary:      Effort: Pulmonary effort is normal  No respiratory distress  Breath sounds: Normal breath sounds  No wheezing or rales  Musculoskeletal:         General: No swelling or tenderness  Right lower leg: No edema  Left lower leg: No edema  Skin:     General: Skin is warm  Coloration: Skin is not jaundiced  Findings: No erythema, lesion or rash  Neurological:      Mental Status: She is alert and oriented to person, place, and time  Psychiatric:         Mood and Affect: Mood normal          Behavior: Behavior normal        Reviewed labs and imaging  Imaging:   VAS carotid complete study 1/26/2022  Impression RIGHT: Widely patent internal carotid artery and endarterectomy site with >50% re-stenosis    Vertebral artery flow is antegrade  There is no significant subclavian artery disease  LEFT: There is <50% stenosis noted in the internal carotid artery   Plaque is heterogenous and irregular  Vertebral artery flow is antegrade  There is no significant subclavian artery disease  Compared to previous study on 1/11/2021, there is progression of disease on the right  XR right shoulder 10/15/21  Unremarkable appearance of reverse total shoulder arthroplasty  DEXA scan 12/23/2019  LUMBAR SPINE L1-L4 : BMD  1 003  gm/cm2 / T-score -0 4 / Z score 2 2    LEFT  TOTAL HIP: BMD 0 734  gm/cm2 / T-score -1 7 / Z score 0 2  LEFT  FEMORAL NECK: BMD 0 543  gm/cm2 / T score -2 8 / Z score -0 5     CHANGE: Since the last DXA:  LUMBAR SPINE BMD has INCREASED  0 030 gm/cm2 or 3 1%  This change is statistically significant  HIP BMD has INCREASED  0 025 gm/cm2 or 3 5%  This change is statistically significant      1   Osteoporosis  [Based on the left femoral neck]  2   Since a DXA study from 8/9/2017, [place] has been a STATISTICALLY SIGNIFICANT INCREASE in bone mineral density  in the lumbar spine and hip  3   The 10 year risk of hip fracture is 6 4% with the 10 year risk of major osteoporotic fracture being 17% as calculated by the [facility] FRAX score    Labs:   Appointment on 12/01/2021   Component Date Value Ref Range Status    WBC 12/01/2021 3 73* 4 31 - 10 16 Thousand/uL Final    RBC 12/01/2021 4 16  3 81 - 5 12 Million/uL Final    Hemoglobin 12/01/2021 12 6  11 5 - 15 4 g/dL Final    Hematocrit 12/01/2021 40 7  34 8 - 46 1 % Final    MCV 12/01/2021 98  82 - 98 fL Final    MCH 12/01/2021 30 3  26 8 - 34 3 pg Final    MCHC 12/01/2021 31 0* 31 4 - 37 4 g/dL Final    RDW 12/01/2021 13 5  11 6 - 15 1 % Final    MPV 12/01/2021 11 4  8 9 - 12 7 fL Final    Platelets 76/52/1109 184  149 - 390 Thousands/uL Final    Sodium 12/01/2021 140  136 - 145 mmol/L Final    Potassium 12/01/2021 4 7  3 5 - 5 3 mmol/L Final    Chloride 12/01/2021 107  100 - 108 mmol/L Final    CO2 12/01/2021 25  21 - 32 mmol/L Final    ANION GAP 12/01/2021 8  4 - 13 mmol/L Final    BUN 12/01/2021 17  5 - 25 mg/dL Final    Creatinine 12/01/2021 0 70  0 60 - 1 30 mg/dL Final    Standardized to IDMS reference method    Glucose, Fasting 12/01/2021 96  65 - 99 mg/dL Final    Specimen collection should occur prior to Sulfasalazine administration due to the potential for falsely depressed results  Specimen collection should occur prior to Sulfapyridine administration due to the potential for falsely elevated results   Calcium 12/01/2021 8 6  8 3 - 10 1 mg/dL Final    Corrected Calcium 12/01/2021 9 3  8 3 - 10 1 mg/dL Final    AST 12/01/2021 51* 5 - 45 U/L Final    Specimen collection should occur prior to Sulfasalazine administration due to the potential for falsely depressed results   ALT 12/01/2021 40  12 - 78 U/L Final    Specimen collection should occur prior to Sulfasalazine and/or Sulfapyridine administration due to the potential for falsely depressed results   Alkaline Phosphatase 12/01/2021 75  46 - 116 U/L Final    Total Protein 12/01/2021 6 9  6 4 - 8 2 g/dL Final    Albumin 12/01/2021 3 1* 3 5 - 5 0 g/dL Final    Total Bilirubin 12/01/2021 0 55  0 20 - 1 00 mg/dL Final    Use of this assay is not recommended for patients undergoing treatment with eltrombopag due to the potential for falsely elevated results      eGFR 12/01/2021 83  ml/min/1 73sq m Final    Protime 12/01/2021 13 0  11 6 - 14 5 seconds Final    INR 12/01/2021 1 02  0 84 - 1 19 Final    PTT 12/01/2021 23  23 - 37 seconds Final    Therapeutic Heparin Range =  60-90 seconds    Cholesterol 12/01/2021 192  See Comment mg/dL Final    Cholesterol:         Pediatric <18 Years        Desirable          <170 mg/dL      Borderline High    170-199 mg/dL      High               >=200 mg/dL        Adult >=18 Years            Desirable         <200 mg/dL      Borderline High   200-239 mg/dL      High              >239 mg/dL      Triglycerides 12/01/2021 184* See Comment mg/dL Final    Triglyceride:     0-9Y <75mg/dL     10Y-17Y         <90 mg/dL       >=18Y     Normal          <150 mg/dL     Borderline High 150-199 mg/dL     High            200-499 mg/dL        Very High       >499 mg/dL    Specimen collection should occur prior to N-Acetylcysteine or Metamizole administration due to the potential for falsely depressed results   HDL, Direct 12/01/2021 48* >=50 mg/dL Final    Specimen collection should occur prior to Metamizole administration due to the potential for falsley depressed results   LDL Calculated 12/01/2021 107* 0 - 100 mg/dL Final    LDL Cholesterol:     Optimal           <100 mg/dl     Near Optimal      100-129 mg/dl     Above Optimal       Borderline High 130-159 mg/dl       High            160-189 mg/dl       Very High       >189 mg/dl         This screening LDL is a calculated result  It does not have the accuracy of the Direct Measured LDL in the monitoring of patients with hyperlipidemia and/or statin therapy  Direct Measure LDL (JDZ950) must be ordered separately in these patients   Non-HDL-Chol (CHOL-HDL) 12/01/2021 144  mg/dl Final    TSH 3RD GENERATON 12/01/2021 1 290  0 358 - 3 740 uIU/mL Final    The recommended reference ranges for TSH during pregnancy are as follows:   First trimester 0 1 to 2 5 uIU/mL   Second trimester  0 2 to 3 0 uIU/mL   Third trimester 0 3 to 3 0 uIU/m    Note: Normal ranges may not apply to patients who are transgender, non-binary, or whose legal sex, sex at birth, and gender identity differ   GGT 12/01/2021 43  5 - 85 U/L Final    Hemoglobin A1C 12/01/2021 5 1  Normal 3 8-5 6%; PreDiabetic 5 7-6 4%; Diabetic >=6 5%; Glycemic control for adults with diabetes <7 0% % Final    EAG 12/01/2021 100  mg/dl Final    Iron Saturation 12/01/2021 22  15 - 50 % Final    TIBC 12/01/2021 367  250 - 450 ug/dL Final    Iron 12/01/2021 80  50 - 170 ug/dL Final    Patients treated with metal-binding drugs (ie  Deferoxamine) may have depressed iron values      Ferritin 12/01/2021 67  8 - 388 ng/mL Final    Vitamin A 12/01/2021 29 2  22 0 - 69 5 ug/dL Final    Reference intervals for vitamin A determined from [facility] internal  studies  Individuals with vitamin A less than 20 ug/dL are considered  vitamin A deficient and those with serum concentrations less than  10 ug/dL are considered severely deficient  This test was developed and its performance characteristics  determined by [facility]  It has not been cleared or approved  by the Food and Drug Administration   Vitamin B1, Whole Blood 12/01/2021 118 1  66 5 - 200 0 nmol/L Final    Vitamin B6 12/01/2021 8 4  2 0 - 32 8 ug/L Final    Vitamin B-12 12/01/2021 973* 100 - 900 pg/mL Final    Zinc 12/01/2021 99  44 - 115 ug/dL Final                                    Detection Limit = 5    Folate 12/01/2021 >20 0* 3 1 - 17 5 ng/mL Final    E521    Amylase 12/01/2021 89  25 - 115 IU/L Final    Lipase 12/01/2021 108  73 - 393 u/L Final    SARS-CoV-2 Ab, Total (IgG, IgA, Ig* 12/01/2021 Reactive* Non-Reactive Final    Result suggests recent or prior infection with SARS-COV-2  A reactive serologic result indicates that the individual produced an immune response  It is not known at this time whether detectable antibody correlates with immunity  The results obtained with this test should be interpreted in conjunction with clinical findings, and the results of other laboratory tests and evaluation  See results of SARS-COV-2 Ab, IgG in conjunction with this result  False positive results for  SARS-COV-2 Total Antibody may occur due to past or present infection with non-SARS-CoV-2 coronavirus strains such as coronavirus HKU1, 229E, NL63, or OC43, cross reactivity from pre-existing antibodies or other causes including heterophilic antibodies      To view information from the FDA please go to this website address: [employer] gl    Free T4 12/01/2021 1 32  0 76 - 1 46 ng/dL Final    Specimen collection should occur prior to Sulfasalazine administration due to the potential for falsely elevated results   T3, Free 12/01/2021 2 10* 2 30 - 4 20 pg/mL Final    Bilirubin, Direct 12/01/2021 0 09  0 00 - 0 20 mg/dL Final    Segmented % 12/01/2021 60  43 - 75 % Final    Lymphocytes % 12/01/2021 26  14 - 44 % Final    Monocytes % 12/01/2021 8  4 - 12 % Final    Eosinophils, % 12/01/2021 6  0 - 6 % Final    Basophils % 12/01/2021 0  0 - 1 % Final    Absolute Neutrophils 12/01/2021 2 24  1 85 - 7 62 Thousand/uL Final    Lymphocytes Absolute 12/01/2021 0 97  0 60 - 4 47 Thousand/uL Final    Monocytes Absolute 12/01/2021 0 30  0 00 - 1 22 Thousand/uL Final    Eosinophils Absolute 12/01/2021 0 22  0 00 - 0 40 Thousand/uL Final    Basophils Absolute 12/01/2021 0 00  0 00 - 0 10 Thousand/uL Final    RBC Morphology 12/01/2021 Present   Final    Polychromasia 12/01/2021 Present   Final    Platelet Estimate 41/61/2954 Adequate  Adequate Final    Artifact 12/01/2021 Present   Final    SARS-CoV-2 Ab, IgG 12/01/2021 Reactive* Non-Reactive Final    Result suggests recent or prior infection with SARS-COV-2  The results obtained with this test should only be interpreted in conjunction with the clinical scenario  Isolated serologic testing should not be used to determine immunity or the need for isolation  False positive results may occur due to past or present infection with non-SARS-CoV-2 coronavirus strains such as coronavirus HKU1, 229E, NL63, or OC43, cross reactivity from pre-existing antibodies or other causes including heterophilic antibodies      To view information from the FDA please go to this website address: [employer] [place]   Orders Only on 10/26/2021   Component Date Value Ref Range Status    SARS-CoV-2 10/26/2021 Negative  Negative Final    INFLUENZA A PCR 10/26/2021 Negative  Negative Final    INFLUENZA B PCR 10/26/2021 Negative  Negative Final    RSV PCR 10/26/2021 Negative  Negative Final   Office Visit on 10/25/2021   Component Date Value Ref Range Status    Vit D, 25-Hydroxy 12/01/2021 56 0  30 0 - 100 0 ng/mL Final   Admission on 10/15/2021, Discharged on 10/16/2021   Component Date Value Ref Range Status    Sodium 10/16/2021 141  136 - 145 mmol/L Final    Potassium 10/16/2021 4 1  3 5 - 5 3 mmol/L Final    Chloride 10/16/2021 109* 100 - 108 mmol/L Final    CO2 10/16/2021 20* 21 - 32 mmol/L Final    ANION GAP 10/16/2021 12  4 - 13 mmol/L Final    BUN 10/16/2021 16  5 - 25 mg/dL Final    Creatinine 10/16/2021 0 77  0 60 - 1 30 mg/dL Final    Standardized to IDMS reference method    Glucose 10/16/2021 153* 65 - 140 mg/dL Final    If the patient is fasting, the ADA then defines impaired fasting glucose as > 100 mg/dL and diabetes as > or equal to 123 mg/dL  Specimen collection should occur prior to Sulfasalazine administration due to the potential for falsely depressed results  Specimen collection should occur prior to Sulfapyridine administration due to the potential for falsely elevated results      Calcium 10/16/2021 7 4* 8 3 - 10 1 mg/dL Final    eGFR 10/16/2021 74  ml/min/1 73sq m Final   Appointment on 10/11/2021   Component Date Value Ref Range Status    WBC 10/11/2021 3 73* 4 31 - 10 16 Thousand/uL Final    RBC 10/11/2021 4 10  3 81 - 5 12 Million/uL Final    Hemoglobin 10/11/2021 12 6  11 5 - 15 4 g/dL Final    Hematocrit 10/11/2021 40 5  34 8 - 46 1 % Final    MCV 10/11/2021 99* 82 - 98 fL Final    MCH 10/11/2021 30 7  26 8 - 34 3 pg Final    MCHC 10/11/2021 31 1* 31 4 - 37 4 g/dL Final    RDW 10/11/2021 15 4* 11 6 - 15 1 % Final    MPV 10/11/2021 10 9  8 9 - 12 7 fL Final    Platelets 34/38/1449 191  149 - 390 Thousands/uL Final    nRBC 10/11/2021 0  /100 WBCs Final    Neutrophils Relative 10/11/2021 55  43 - 75 % Final    Immat GRANS % 10/11/2021 1  0 - 2 % Final    Lymphocytes Relative 10/11/2021 30  14 - 44 % Final    Monocytes Relative 10/11/2021 9  4 - 12 % Final    Eosinophils Relative 10/11/2021 5  0 - 6 % Final    Basophils Relative 10/11/2021 0  0 - 1 % Final    Neutrophils Absolute 10/11/2021 2 05  1 85 - 7 62 Thousands/µL Final    Immature Grans Absolute 10/11/2021 0 02  0 00 - 0 20 Thousand/uL Final    Lymphocytes Absolute 10/11/2021 1 13  0 60 - 4 47 Thousands/µL Final    Monocytes Absolute 10/11/2021 0 34  0 17 - 1 22 Thousand/µL Final    Eosinophils Absolute 10/11/2021 0 18  0 00 - 0 61 Thousand/µL Final    Basophils Absolute 10/11/2021 0 01  0 00 - 0 10 Thousands/µL Final    Iron Saturation 10/11/2021 16  15 - 50 % Final    TIBC 10/11/2021 373  250 - 450 ug/dL Final    Iron 10/11/2021 61  50 - 170 ug/dL Final    Patients treated with metal-binding drugs (ie  Deferoxamine) may have depressed iron values      Ferritin 10/11/2021 73  8 - 388 ng/mL Final    Retic Ct Abs 10/11/2021 61,800  87,164-56,297 Final    Retic Ct Pct 10/11/2021 1 51  0 37 - 1 87 % Final   Appointment on 10/07/2021   Component Date Value Ref Range Status    ABO Grouping 10/07/2021 A   Final    Rh Factor 10/07/2021 Positive   Final    Antibody Screen 10/07/2021 Negative   Final    Specimen Expiration Date 10/07/2021 [id number]   Final   Clinical Support on 09/28/2021   Component Date Value Ref Range Status    Ventricular Rate 09/28/2021 70  BPM Final    Atrial Rate 09/28/2021 70  BPM Final    NJ Interval 09/28/2021 158  ms Final    QRSD Interval 09/28/2021 70  ms Final    QT Interval 09/28/2021 396  ms Final    QTC Interval 09/28/2021 427  ms Final    P Axis 09/28/2021 69  degrees Final    QRS Axis 09/28/2021 62  degrees Final    T Wave Axis 09/28/2021 67  degrees Final    Ventricular Rate 09/28/2021 70  BPM Final    Atrial Rate 09/28/2021 70  BPM Final    NJ Interval 09/28/2021 158  ms Final    QRSD Interval 09/28/2021 70  ms Final    QT Interval 09/28/2021 396  ms Final    QTC Interval 09/28/2021 427  ms Final    P Axis 09/28/2021 69  degrees Final    QRS Axis 09/28/2021 62  degrees Final    T Wave Axis 09/28/2021 67  degrees Final   Appointment on 09/28/2021   Component Date Value Ref Range Status    Protime 09/28/2021 12 5  11 6 - 14 5 seconds Final    INR 09/28/2021 0 97  0 84 - 1 19 Final    Sodium 09/28/2021 138  136 - 145 mmol/L Final    Potassium 09/28/2021 4 7  3 5 - 5 3 mmol/L Final    Chloride 09/28/2021 109* 100 - 108 mmol/L Final    CO2 09/28/2021 27  21 - 32 mmol/L Final    ANION GAP 09/28/2021 2* 4 - 13 mmol/L Final    BUN 09/28/2021 12  5 - 25 mg/dL Final    Creatinine 09/28/2021 0 49* 0 60 - 1 30 mg/dL Final    Standardized to IDMS reference method    Glucose 09/28/2021 72  65 - 140 mg/dL Final    If the patient is fasting, the ADA then defines impaired fasting glucose as > 100 mg/dL and diabetes as > or equal to 123 mg/dL  Specimen collection should occur prior to Sulfasalazine administration due to the potential for falsely depressed results  Specimen collection should occur prior to Sulfapyridine administration due to the potential for falsely elevated results   Calcium 09/28/2021 8 6  8 3 - 10 1 mg/dL Final    Corrected Calcium 09/28/2021 9 3  8 3 - 10 1 mg/dL Final    AST 09/28/2021 63* 5 - 45 U/L Final    Specimen collection should occur prior to Sulfasalazine administration due to the potential for falsely depressed results   ALT 09/28/2021 49  12 - 78 U/L Final    Specimen collection should occur prior to Sulfasalazine and/or Sulfapyridine administration due to the potential for falsely depressed results   Alkaline Phosphatase 09/28/2021 62  46 - 116 U/L Final    Total Protein 09/28/2021 7 2  6 4 - 8 2 g/dL Final    Albumin 09/28/2021 3 1* 3 5 - 5 0 g/dL Final    Total Bilirubin 09/28/2021 0 31  0 20 - 1 00 mg/dL Final    Use of this assay is not recommended for patients undergoing treatment with eltrombopag due to the potential for falsely elevated results      eGFR 09/28/2021 93  ml/min/1 73sq m Final    WBC 09/28/2021 4 90  4 31 - 10 16 Thousand/uL Final    RBC 09/28/2021 4 21  3 81 - 5 12 Million/uL Final    Hemoglobin 09/28/2021 12 8  11 5 - 15 4 g/dL Final    Hematocrit 09/28/2021 40 4  34 8 - 46 1 % Final    MCV 09/28/2021 96  82 - 98 fL Final    MCH 09/28/2021 30 4  26 8 - 34 3 pg Final    MCHC 09/28/2021 31 7  31 4 - 37 4 g/dL Final    RDW 09/28/2021 15 1  11 6 - 15 1 % Final    MPV 09/28/2021 10 8  8 9 - 12 7 fL Final    Platelets 97/76/1767 212  149 - 390 Thousands/uL Final    nRBC 09/28/2021 0  /100 WBCs Final    Neutrophils Relative 09/28/2021 57  43 - 75 % Final    Immat GRANS % 09/28/2021 1  0 - 2 % Final    Lymphocytes Relative 09/28/2021 27  14 - 44 % Final    Monocytes Relative 09/28/2021 11  4 - 12 % Final    Eosinophils Relative 09/28/2021 3  0 - 6 % Final    Basophils Relative 09/28/2021 1  0 - 1 % Final    Neutrophils Absolute 09/28/2021 2 85  1 85 - 7 62 Thousands/µL Final    Immature Grans Absolute 09/28/2021 0 03  0 00 - 0 20 Thousand/uL Final    Lymphocytes Absolute 09/28/2021 1 33  0 60 - 4 47 Thousands/µL Final    Monocytes Absolute 09/28/2021 0 53  0 17 - 1 22 Thousand/µL Final    Eosinophils Absolute 09/28/2021 0 13  0 00 - 0 61 Thousand/µL Final    Basophils Absolute 09/28/2021 0 03  0 00 - 0 10 Thousands/µL Final   Transcribe Orders on 09/28/2021   Component Date Value Ref Range Status    Clarity, UA 09/28/2021 Clear   Final    Color, UA 09/28/2021 Yellow   Final    Specific [place], UA 09/28/2021 1 020  1 003 - 1 030 Final    pH, UA 09/28/2021 5 5  4 5, 5 0, 5 5, 6 0, 6 5, 7 0, 7 5, 8 0 Final    Glucose, UA 09/28/2021 Negative  Negative mg/dl Final    Ketones, UA 09/28/2021 Negative  Negative mg/dl Final    Blood, UA 09/28/2021 Negative  Negative Final    Protein, UA 09/28/2021 Negative  Negative mg/dl Final    Nitrite, UA 09/28/2021 Negative  Negative Final    Bilirubin, UA 09/28/2021 Negative  Negative Final    Urobilinogen, UA 09/28/2021 0 2  0 2, 1 0 E U /dl E U /dl Final    Leukocytes, UA 09/28/2021 Negative  Negative Final    WBC, UA 09/28/2021 4-10* None Seen, 2-4, 5-60 /hpf Final    RBC, UA 09/28/2021 None Seen  None Seen, 2-4 /hpf Final    Hyaline Casts, UA 09/28/2021 5-10* None Seen /lpf Final    Bacteria, UA 09/28/2021 Occasional  None Seen, Occasional /hpf Final    Epithelial Cells 09/28/2021 Moderate* None Seen, Occasional /hpf Final

## 2022-01-31 NOTE — PROGRESS NOTES
Daily Note     Today's date: 2022  Patient name: Juan Duran  : 1940  MRN: [id number]  Referring provider: Kim Cruz MD  Dx:   Encounter Diagnosis     ICD-10-CM    1  Other specified arthritis, right shoulder  M13 811    2  Other specified aftercare following surgery  Z48 89    3  Right shoulder pain, unspecified chronicity  M25 511        Start Time: 1345  Stop Time: 1445  Total time in clinic (min): 60 minutes    Subjective: Some anterior shoulder pain today, achy and painful to the touch  Current pain 2/10      Objective: See treatment diary below      Assessment:  Progressing with strengthening and flexibility  Pt using curling iron on hair now wit some difficulty, and still having difficulty washing L armpit  Continue PT progressing with strengthening as tolerated  Plan: Progress treatment as tolerated         Precautions: R TSA to be 10/15/21, @ 6 weeks PO wean from sling and begin AROM      Manuals 1/12 1/17 1/20 1/24 1/27 1/31   1/6/22 1/10   PROM/stretch STM R shoulder PROM, stretch, distract, gentle inferior and posterior mobs grade 2, STM pect corby   CRR PROM, stretch, distract, gentle inferior and posterior mobs grade 2 STM pect major  CRR PROM/stretch distract, gentle inferior/posterior jt mobs grade 2, STM  PROM/stretch/distract, gentle inferior/posterior jt mobs  Grade 2  STM  CRR PROM/stretch/distract, gentle inferiro and posterior jt mobs grade 2  CRR PROM/stretch/distarction , gentle inferior/posterior mobs  Grade 2  CRR   AE CRR   Rhythmic stabilization  @ 90, 120, 60 90, 120, 60  CRR 90/120/60  CRR  @ 60, 90, 120  CRR @ 60, 90, 120  CRR   @ 30, 90, 60  AE @ 30/60/90/120                          FOTO/RE   Neuro Re-Ed             Pre-op instruction    -                                                                                       Ther Ex             Pulley flexion 5' 5' 5' 5' 5' 5'   5' 5'   pendulums         20x 2# 20x 2#   AROM elbow F/E   20x elbow flx palm up and down 2# 20x/20x 2# 20x/20x 2# 20x/20x 2# 20x/20x 2# 20x/20x 2#   20x 2# 20x 2#   putty -            Shrugs/retract 20x/20x holding 2# weights 20x/20x 2# 20x/20x 2# 20x/20x 20x/20x 2# 20x/20x 2#   20x/20x 2# 20x 2#/20x 2#   Wall isometrics 10x all 10x all 10x all 10x all 10x all 10x all   10x all 10x all   Place and hold 90/90 in supine         -    Triceps  20x 1# 20x 1# 20x 1# 20x 1# 20x 1# 20x 1#   20x 1# 20x 1#   Chicken wing stretch 2 x 15" 2 x 15" 2 x 15" 2 x 15" 2 x 15" 2 x 15'    2 x 15"   protract 20x 2#, CC/C 20x/20x  Hor add 10x 1# 20x 2#  CC/C 20x/20x 2# 20x 2#/CC/C 20x 2#  Hor ADD 10x 1# 20x/20x/20x 2#    Hor ADD 10x 1# 20x/20x /20x 2#    Hor ADD 10x 20x/20x 2#    Hor Add 1#  10x   20x 1#, CC/C  20x 20x 2#   S/L ER 20x 0# 20x 0# 20x 0# 20x 20x 20x 0#    20x 0#   Self stretch ho add in supine  2 x 15" 2 x15" 2 x 15"  Place and hold in ADD 5x hold 5"   2 x 15" 2 x 15" 2 x 15"       therabands 10x all planes 15x all yellow 15x yellow 20x yellow 20x yellow 20x yellow   10x all planes 10x all planes  HR ADD 5x   Ther Activity             OH pulley   5' 5' 5' 5' 5'       Wand flx/bench/Hor add/adb   20x all 3# 20x all 3# 20x all 3# 20x all 3# 20x all 3# 20x all 3#   20x all 2# 20x all 3#   Cones to shelf 2 x 10 up/down 1# cuff weight 2 x 10 up/dwon 1# cuff weight 2 x 10 1# cuff weight 2 x 10 1# cuff weight 2 x 10 1# cuff weight 2 x 10 up/down 1#   10x up/down 1# cff weight 2 x 10 up/down 1# cuff wt   B shoulder flx/abd to 90 2 x 10 1# 2 x 101# 2 x 101# 2 x 10 1# 2 x 10 1# 2 x 10 1#   2 x 10 1# 2 x 10 1#   UBE 5' 2 1/2 for, 2 1/2 back 2 1/2 F/B 2 1/2 F/B 2 1/2 F/B 2 1/2 F/B   3 for  3'back 3 'for, 3'back   Wall climbs 10x 10x 10x 10x 10x 10x   10x 10x B   Seated Hor Add using towel grav eliminated 2 min 3 min 3 min 3 min 3 min 3 min   2 min 2 min   Modalities    -

## 2022-01-31 NOTE — PATIENT INSTRUCTIONS
Continue daily dairy intake  Continue Vit  D supplementation - 15,000 units once a week and 50,000 units once a month  Continue calcium 1,200mg daily  Do DEXA scan  Do weight-bearing exercises  Prolia injection due today    Return to clinic in 6 months for next Prolia injection and provider visit    Osteoporosis   AMBULATORY CARE:   Osteoporosis  is a long-term medical condition that causes your bones to become weak, brittle, and more likely to fracture  Osteoporosis occurs when your body absorbs more bone than it makes  It is also caused by a lack of calcium and estrogen (female hormone)  Common symptoms include the following: You may not have any signs or symptoms  You may break a bone after a muscle strain, bump, or fall  A break usually occurs in the hip, spine, or wrist  A collapsed vertebra (bone in your spine) may cause severe back pain or loss of height from bent posture  Call your doctor if:   · You have severe pain  · You have increasing pain after a fall  · You have pain when you do your daily activities  · You have questions or concerns about your condition or care  Diagnosis of osteoporosis:   · Blood and urine tests  measure your calcium, vitamin D, and estrogen levels  · An x-ray or CT  may show thinned bones or a fracture  You may be given contrast liquid to help the bones show up better in the pictures  Tell the healthcare provider if you have ever had an allergic reaction to contrast liquid  Do not enter the MRI room with anything metal  Metal can cause serious injury  Tell the healthcare provider if you have any metal in or on your body  · A bone density test  compares your bone thickness with what is expected for someone of your age, gender, and ethnicity  Treatment for osteoporosis  may include medicines to prevent bone loss, build new bone, and increase estrogen  These medicines help prevent fractures and may be given as a pill or injection   Ask your healthcare provider for more information on these medicines  Prevent bone loss:       · Eat healthy foods that are high in calcium  This helps keep your bones strong  Good sources of calcium are milk, cheese, broccoli, tofu, almonds, and canned salmon and sardines  · Increase your vitamin D intake  Vitamin D is in fish oils, some vegetables, and fortified milk, cereal, and bread  Vitamin D is also formed in the skin when it is exposed to the sun  Ask your healthcare provider how much sunlight is safe for you  · Drink liquids as directed  Ask your healthcare provider how much liquid to drink each day and which liquids are best for you  Do not have alcohol or caffeine  They decrease bone mineral density, which can weaken your bones  · Exercise regularly  Ask your healthcare provider about the best exercise plan for you  Weight bearing exercise for 30 minutes, 3 times a week can help build and strengthen bone  · Do not smoke  Nicotine and other chemicals in cigarettes and cigars can cause lung damage  Ask your healthcare provider for information if you currently smoke and need help to quit  E-cigarettes or smokeless tobacco still contain nicotine  Talk to your healthcare provider before you use these products  · Go to physical therapy as directed  A physical therapist teaches you exercises to help improve movement and muscle strength  Follow up with your doctor as directed:  Write down your questions so you remember to ask them during your visits  © Copyright [employer] 2021 Information is for End User's use only and may not be sold, redistributed or otherwise used for commercial purposes  All illustrations and images included in CareNotes® are the copyrighted property of [employer]  or Danilo Bangura   The above information is an  only  It is not intended as medical advice for individual conditions or treatments   Talk to your doctor, nurse or pharmacist before following any medical regimen to see if it is safe and effective for you

## 2022-02-03 ENCOUNTER — OFFICE VISIT (OUTPATIENT)
Dept: PHYSICAL THERAPY | Facility: CLINIC | Age: 82
End: 2022-02-03
Payer: MEDICARE

## 2022-02-03 ENCOUNTER — APPOINTMENT (OUTPATIENT)
Dept: PHYSICAL THERAPY | Facility: CLINIC | Age: 82
End: 2022-02-03
Payer: MEDICARE

## 2022-02-03 DIAGNOSIS — Z48.89 OTHER SPECIFIED AFTERCARE FOLLOWING SURGERY: ICD-10-CM

## 2022-02-03 DIAGNOSIS — M25.511 RIGHT SHOULDER PAIN, UNSPECIFIED CHRONICITY: ICD-10-CM

## 2022-02-03 DIAGNOSIS — M13.811 OTHER SPECIFIED ARTHRITIS, RIGHT SHOULDER: Primary | ICD-10-CM

## 2022-02-03 PROCEDURE — 97110 THERAPEUTIC EXERCISES: CPT | Performed by: PHYSICAL THERAPIST

## 2022-02-03 PROCEDURE — 97140 MANUAL THERAPY 1/> REGIONS: CPT | Performed by: PHYSICAL THERAPIST

## 2022-02-03 PROCEDURE — 97530 THERAPEUTIC ACTIVITIES: CPT | Performed by: PHYSICAL THERAPIST

## 2022-02-03 NOTE — PROGRESS NOTES
Daily Note     Today's date: 2/3/2022  Patient name: Colletta Crow  : 1940  MRN: [id number]  Referring provider: Augusto Huff MD  Dx:   Encounter Diagnosis     ICD-10-CM    1  Other specified arthritis, right shoulder  M13 811    2  Other specified aftercare following surgery  Z48 89    3  Right shoulder pain, unspecified chronicity  M25 511        Start Time: 1350  Stop Time: 1445  Total time in clinic (min): 55 minutes    Subjective: Pain free most of the time  Pain range 0-2/10,  Painful with hoe add  Objective: See treatment diary below      Assessment: Pt states she is able to complete all home activity and self care with exception of reaching L armpit with R hand  Progressing with flexibility and strengthening program for L shoulder  Pt needs VC for accuracy of exercise, she needs frequent reminders about which exercise and how to do exercise, this therapist questions the accuracy of exercises at home  Continued emphasis on jass interventions to increase ROM  Continue PT/skilled therapy with goal of increasing activity level at home especially reaching L armpit  Plan: Progress treatment as tolerated         Precautions: R TSA to be 10/15/21, @ 6 weeks PO wean from sling and begin AROM      Manuals 1/12 1/17 1/20 1/24 1/27 1/31 2/3  1/6/22 1/10   PROM/stretch STM R shoulder PROM, stretch, distract, gentle inferior and posterior mobs grade 2, STM pect corby   CRR PROM, stretch, distract, gentle inferior and posterior mobs grade 2 STM pect major  CRR PROM/stretch distract, gentle inferior/posterior jt mobs grade 2, STM  PROM/stretch/distract, gentle inferior/posterior jt mobs  Grade 2  STM  CRR PROM/stretch/distract, gentle inferiro and posterior jt mobs grade 2  CRR PROM/stretch/distarction , gentle inferior/posterior mobs  Grade 2  CRR PROM/stretch/distarct R shoulder  AE CRR   Rhythmic stabilization  @ 90, 120, 60 90, 120, 60  CRR 90/120/60  CRR  @ 60, 90, 120  CRR @ 60, 90, 120  CRR @ 60, 90, 120  CRR  @ 30, 90, 60  AE @ 30/60/90/120                          FOTO/RE   Neuro Re-Ed             Pre-op instruction    -                                                                                       Ther Ex             Pulley flexion 5' 5' 5' 5' 5' 5' 5'  5' 5'   pendulums         20x 2# 20x 2#   AROM elbow F/E   20x elbow flx palm up and down 2# 20x/20x 2# 20x/20x 2# 20x/20x 2# 20x/20x 2# 20x/20x 2# 20x/20x 2#  20x 2# 20x 2#   putty -            Shrugs/retract 20x/20x holding 2# weights 20x/20x 2# 20x/20x 2# 20x/20x 20x/20x 2# 20x/20x 2# 20x/20x 2#  20x/20x 2# 20x 2#/20x 2#   Wall isometrics 10x all 10x all 10x all 10x all 10x all 10x all 10x  10x all 10x all   Place and hold 90/90 in supine         -    Triceps  20x 1# 20x 1# 20x 1# 20x 1# 20x 1# 20x 1# 20x 2#  20x 1# 20x 1#   Chicken wing stretch 2 x 15" 2 x 15" 2 x 15" 2 x 15" 2 x 15" 2 x 15' 2 x 15"   2 x 15"   protract 20x 2#, CC/C 20x/20x  Hor add 10x 1# 20x 2#  CC/C 20x/20x 2# 20x 2#/CC/C 20x 2#  Hor ADD 10x 1# 20x/20x/20x 2#    Hor ADD 10x 1# 20x/20x /20x 2#    Hor ADD 10x 20x/20x 2#    Hor Add 1#  10x 20x/20x 2#    Hor ADD 10x  20x 1#, CC/C  20x 20x 2#   S/L ER 20x 0# 20x 0# 20x 0# 20x 20x 20x 0# 20x 0#   20x 0#   Self stretch ho add in supine  2 x 15" 2 x15" 2 x 15"  Place and hold in ADD 5x hold 5"   2 x 15" 2 x 15" 2 x 15" 2 x 15"      therabands 10x all planes 15x all yellow 15x yellow 20x yellow 20x yellow 20x yellow 20x yellow  10x all planes 10x all planes  HR ADD 5x   Ther Activity             OH pulley   5' 5' 5' 5' 5' 5'      Wand flx/bench/Hor add/adb   20x all 3# 20x all 3# 20x all 3# 20x all 3# 20x all 3# 20x all 3# 20x all 3#  20x all 2# 20x all 3#   Cones to shelf 2 x 10 up/down 1# cuff weight 2 x 10 up/dwon 1# cuff weight 2 x 10 1# cuff weight 2 x 10 1# cuff weight 2 x 10 1# cuff weight 2 x 10 up/down 1# 2 x 10 1#  10x up/down 1# cff weight 2 x 10 up/down 1# cuff wt   B shoulder flx/abd to 90 2 x 10 1# 2 x 101# 2 x 101# 2 x 10 1# 2 x 10 1# 2 x 10 1# 2 x 10 1#  2 x 10 1# 2 x 10 1#   UBE 5' 2 1/2 for, 2 1/2 back 2 1/2 F/B 2 1/2 F/B 2 1/2 F/B 2 1/2 F/B 2 1/3 F/B  3 for  3'back 3 'for, 3'back   Wall climbs 10x 10x 10x 10x 10x 10x 10x  10x 10x B   Seated Hor Add using towel grav eliminated 2 min 3 min 3 min 3 min 3 min 3 min 3 min  2 min 2 min   Modalities    -

## 2022-02-07 ENCOUNTER — APPOINTMENT (OUTPATIENT)
Dept: PHYSICAL THERAPY | Facility: CLINIC | Age: 82
End: 2022-02-07
Payer: MEDICARE

## 2022-02-08 ENCOUNTER — OFFICE VISIT (OUTPATIENT)
Dept: PHYSICAL THERAPY | Facility: CLINIC | Age: 82
End: 2022-02-08
Payer: MEDICARE

## 2022-02-08 DIAGNOSIS — Z48.89 OTHER SPECIFIED AFTERCARE FOLLOWING SURGERY: ICD-10-CM

## 2022-02-08 DIAGNOSIS — M13.811 OTHER SPECIFIED ARTHRITIS, RIGHT SHOULDER: Primary | ICD-10-CM

## 2022-02-08 DIAGNOSIS — M25.511 RIGHT SHOULDER PAIN, UNSPECIFIED CHRONICITY: ICD-10-CM

## 2022-02-08 PROCEDURE — 97140 MANUAL THERAPY 1/> REGIONS: CPT

## 2022-02-08 PROCEDURE — 97110 THERAPEUTIC EXERCISES: CPT

## 2022-02-08 NOTE — PROGRESS NOTES
Daily Note     Today's date: 2022  Patient name: Robin Marrufo  : 1940  MRN: [id number]  Referring provider: Shabana Huff MD  Dx:   Encounter Diagnosis     ICD-10-CM    1  Other specified arthritis, right shoulder  M13 811    2  Other specified aftercare following surgery  Z48 89    3  Right shoulder pain, unspecified chronicity  M25 511        Start Time: 1500  Stop Time: 1555  Total time in clinic (min): 55 minutes    Subjective: Pt reports she is not in any pain at the start of the session  Objective: See treatment diary below      Assessment: Pt has a good understanding of her program in clinic and only required minimal cueing for form/positioning during her theraband exercises  Pt asked about purchasing a 3 lb cuff weight for home to put around her cane in order to further promote R shoulder strengthening at home  Pt has used a 3 lb cuff weight on a cane for exercises in clinic and finds that it is challenging but does not severely increase her R shoulder pain, therefore, PT advised pt that it would be appropriate to try at home  Tolerated treatment well  Patient would benefit from continued PT to allow pt to return to her PLOF  Plan: Continue per plan of care        Precautions: R TSA to be 10/15/21, @ 6 weeks PO wean from sling and begin AROM      Manuals 1/12 1/17 1/20 1/24 1/27 1/31 2/3 2/8 1/6/22 1/10   PROM/stretch STM R shoulder PROM, stretch, distract, gentle inferior and posterior mobs grade 2, STM pect corby   CRR PROM, stretch, distract, gentle inferior and posterior mobs grade 2 STM pect major  CRR PROM/stretch distract, gentle inferior/posterior jt mobs grade 2, STM  PROM/stretch/distract, gentle inferior/posterior jt mobs  Grade 2  STM  CRR PROM/stretch/distract, gentle inferiro and posterior jt mobs grade 2  CRR PROM/stretch/distarction , gentle inferior/posterior mobs  Grade 2  CRR PROM/stretch/distarct R shoulder PROM/stretch/distraction R shoulder SB AE CRR   Rhythmic stabilization  @ 90, 120, 60 90, 120, 60  CRR 90/120/60  CRR  @ 60, 90, 120  CRR @ 60, 90, 120  CRR @ 60, 90, 120  CRR @ 60, 90, 120  SB @ 30, 90, 60  AE @ 30/60/90/120                          FOTO/RE   Neuro Re-Ed             Pre-op instruction    -                                                                                       Ther Ex             Pulley flexion 5' 5' 5' 5' 5' 5' 5' 5' 5' 5'   pendulums         20x 2# 20x 2#   AROM elbow F/E   20x elbow flx palm up and down 2# 20x/20x 2# 20x/20x 2# 20x/20x 2# 20x/20x 2# 20x/20x 2# 20x/20x 2# 20x/20x 2# 20x 2# 20x 2#   putty -            Shrugs/retract 20x/20x holding 2# weights 20x/20x 2# 20x/20x 2# 20x/20x 20x/20x 2# 20x/20x 2# 20x/20x 2# 20x/20x 2# 20x/20x 2# 20x 2#/20x 2#   Wall isometrics 10x all 10x all 10x all 10x all 10x all 10x all 10x 10x all 10x all 10x all   Place and hold 90/90 in supine         -    Triceps  20x 1# 20x 1# 20x 1# 20x 1# 20x 1# 20x 1# 20x 2# 20x 2# 20x 1# 20x 1#   Chicken wing stretch 2 x 15" 2 x 15" 2 x 15" 2 x 15" 2 x 15" 2 x 15' 2 x 15"   2 x 15"   protract 20x 2#, CC/C 20x/20x  Hor add 10x 1# 20x 2#  CC/C 20x/20x 2# 20x 2#/CC/C 20x 2#  Hor ADD 10x 1# 20x/20x/20x 2#    Hor ADD 10x 1# 20x/20x /20x 2#    Hor ADD 10x 20x/20x 2#    Hor Add 1#  10x 20x/20x 2#    Hor ADD 10x  20x 1#, CC/C  20x 20x 2#   S/L ER 20x 0# 20x 0# 20x 0# 20x 20x 20x 0# 20x 0#   20x 0#   Self stretch ho add in supine  2 x 15" 2 x15" 2 x 15"  Place and hold in ADD 5x hold 5"   2 x 15" 2 x 15" 2 x 15" 2 x 15"      therabands 10x all planes 15x all yellow 15x yellow 20x yellow 20x yellow 20x yellow 20x yellow  Hor ADD 5x 20x yellow all planes 10x all planes 10x all planes  HR ADD 5x   Ther Activity             OH pulley   5' 5' 5' 5' 5' 5' 5'     Wand flx/bench/Hor add/adb   20x all 3# 20x all 3# 20x all 3# 20x all 3# 20x all 3# 20x all 3# 20x all 3# 20x all , 2# flex/bench  3# hor add/abd 20x all 2# 20x all 3#   Cones to shelf 2 x 10 up/down 1# cuff weight 2 x 10 up/dwon 1# cuff weight 2 x 10 1# cuff weight 2 x 10 1# cuff weight 2 x 10 1# cuff weight 2 x 10 up/down 1# 2 x 10 1# 2 x10 1# 10x up/down 1# cff weight 2 x 10 up/down 1# cuff wt   B shoulder flx/abd to 90 2 x 10 1# 2 x 101# 2 x 101# 2 x 10 1# 2 x 10 1# 2 x 10 1# 2 x 10 1# 2 x 10 1# 2 x 10 1# 2 x 10 1#   UBE 5' 2 1/2 for, 2 1/2 back 2 1/2 F/B 2 1/2 F/B 2 1/2 F/B 2 1/2 F/B 2 1/3 F/B 5' 3 for  3'back 3 'for, 3'back   Wall climbs 10x 10x 10x 10x 10x 10x 10x 10x 10x 10x B   Seated Hor Add using towel grav eliminated 2 min 3 min 3 min 3 min 3 min 3 min 3 min  2 min 2 min   Modalities    -

## 2022-02-10 ENCOUNTER — EVALUATION (OUTPATIENT)
Dept: PHYSICAL THERAPY | Facility: CLINIC | Age: 82
End: 2022-02-10
Payer: MEDICARE

## 2022-02-10 DIAGNOSIS — M25.511 RIGHT SHOULDER PAIN, UNSPECIFIED CHRONICITY: ICD-10-CM

## 2022-02-10 DIAGNOSIS — Z48.89 OTHER SPECIFIED AFTERCARE FOLLOWING SURGERY: ICD-10-CM

## 2022-02-10 DIAGNOSIS — M13.811 OTHER SPECIFIED ARTHRITIS, RIGHT SHOULDER: Primary | ICD-10-CM

## 2022-02-10 PROCEDURE — 97164 PT RE-EVAL EST PLAN CARE: CPT | Performed by: PHYSICAL THERAPIST

## 2022-02-10 PROCEDURE — 97110 THERAPEUTIC EXERCISES: CPT | Performed by: PHYSICAL THERAPIST

## 2022-02-10 PROCEDURE — 97530 THERAPEUTIC ACTIVITIES: CPT | Performed by: PHYSICAL THERAPIST

## 2022-02-10 NOTE — LETTER
2022    MD Stephen Harley [address] [facility]    Patient: Pauline Griffiths   YOB: 1940   Date of Visit: 2/10/2022     Encounter Diagnosis     ICD-10-CM    1  Other specified arthritis, right shoulder  M13 811    2  Other specified aftercare following surgery  Z48 89    3  Right shoulder pain, unspecified chronicity  M25 511        Dear Dr Marv Crury:    Thank you for your recent referral of Pauline Griffiths  Please review the attached evaluation summary from Yajaira's recent visit  Please verify that you agree with the plan of care by signing the attached order  If you have any questions or concerns, please do not hesitate to call  I sincerely appreciate the opportunity to share in the care of one of your patients and hope to have another opportunity to work with you in the near future  Sincerely,    Jair Perkins, PT      Referring Provider:      I certify that I have read the below Plan of Care and certify the need for these services furnished under this plan of treatment while under my care  MD Stephen Harley [address] [place] [address]  Via Fax: [phone]          PT Re-Evaluation     Today's date: 2/10/2022  Patient name: Pauline Griffiths  : 1940  MRN: [id number]  Referring provider: Dominique Johnson MD  Dx:   Encounter Diagnosis     ICD-10-CM    1  Other specified arthritis, right shoulder  M13 811    2  Other specified aftercare following surgery  Z48 89    3  Right shoulder pain, unspecified chronicity  M25 511        Start Time: 1055  Stop Time: 1105  Total time in clinic (min): 10 minutes    Assessment  Assessment details: 80year old woman 24 days PO R RTSA  Pain range 0-1/10  No use of slingsling  AROM R shoulder: Flx 110, Abd 99, ER/IR @ 90 72/45  Strength Shoulder flx/abd 3-, IR/ER 2/5  Pt independent all self care, driving, laundry, and simple food prep   Has RTW as  and able to use curling iron in hair, put deodorant on L armpit but unable to wash L armpit  Drive I, all self care and home chores I  DASH 24% (imporved 10% from alst RE) disability  Continue PT 2 weeks then taper to DC  Impairments: abnormal or restricted ROM, activity intolerance, impaired physical strength, lacks appropriate home exercise program, pain with function, poor posture  and poor body mechanics  Functional limitations: able to reach back of head and use curling iron; unable to reach L armpit to wash but can don deoderant; Symptom irritability: highUnderstanding of Dx/Px/POC: good   Prognosis: good    Goals  STG- 4 weeks 12/6/21  1  I HEP (MET)  2  Decrease pain to 0-3/10(progressing 1-7/10)  3  Increase PROM R shoulder to 100/flx/abd, 30 IR/ER (MET)  4  I dressing/bathing (MET)    STG- 4 weeks 1/3/21  1  Increase AROM flx/abd to 90-95 (MET)  2  Increase strength L shoulder to 3-/5 (MET for elevation, not for rotations)  3  Improved DASH score to under 45% disability (MET)  4  Reach across body to put deodorant on L with R hand (progressing)    LTG- 12 weeks  2/4//22  1  Decrease pain to 0-1/10 MET)  2  Increase AROM R shoulder to 90/90 Flx/Abd (MET)  Increase strength R shoulder to 3+-4/5 (not met)  3  I with home chores and driving (MET)  4  Improve DASH score to under 15% disability (progressing)    STG- 4 weeks 3/10/21  1  Wash L armpit with R hand  2  Improve DASH score to 15% disability  3   Increase strength R shoulder to 3+/5      Plan  Patient would benefit from: PT eval and skilled physical therapy  Planned modality interventions: thermotherapy: hydrocollator packs and cryotherapy  Planned therapy interventions: manual therapy, neuromuscular re-education, therapeutic activities, therapeutic exercise and home exercise program  Frequency: 2x week  Duration in visits: 4  Duration in weeks: 4  Plan of Care beginning date: 2/10/2022  Plan of Care expiration date: 3/10/2022  Treatment plan discussed with: patient        Subjective Evaluation    History of Present Illness  Date of onset: 6/15/2021  Mechanism of injury: Pt staes R shoulder had RTC tear many years ago, and pain returned 3 months ago  Pt had recent course of PT but no pain relief  Pt Had MRI 21 and it was determined she need TSA  R reverse TSA planned for 10/15/21  Pt referred fro pre-operative session today  21- Pt underwent reverse shoulder arthroplasty on 11/15/21  Pt placed in abd pillow sling and referred for outpatient PT to begin today  21- Pain range 1-7/10  Unable to sleep in bed  Current pain 4/10    1/10/22- Pain range 0-3/10  Sleeping in bed    2/10/22- Pain free most of the time  Pt states she has difficulty reaching to L armpit and behind her back  Recurrent probem    Quality of life: good    Pain  Current pain ratin  At best pain ratin  At worst pain ratin  Quality: grinding  Relieving factors: heat  Progression: improved    Social Support  Steps to enter house: yes  2  Stairs in house: no   Lives in: [facility]  Lives with: spouse    Employment status: not working  Hand dominance: right      Diagnostic Tests  MRI studies: abnormal  Treatments  Previous treatment: physical therapy  Current treatment: physical therapy  Patient Goals  Patient goals for therapy: increased strength, independence with ADLs/IADLs, increased motion and decreased pain          Objective     Postural Observations  Seated posture: fair  Standing posture: fair    Additional Postural Observation Details  Forward head, protracted shoulders    Observations     Right Shoulder  Positive for incision  Additional Observation Details  Incision well healed    No sling    Cervical/Thoracic Screen   Cervical range of motion within normal limits with the following exceptions: Minimal limit all planes    Neurological Testing     Sensation     Shoulder   Left Shoulder   Intact: light touch    Right Shoulder   Intact: light touch    Active Range of Motion   Left Shoulder   Normal active range of motion    Right Shoulder   Flexion: 110 degrees   Extension: 35 degrees   Abduction: 98 degrees   External rotation 90°: 70 degrees  Internal rotation 90°: 40 degrees     Left Elbow   Normal active range of motion    Right Elbow   Extension: -45 degrees     Left Wrist   Normal active range of motion    Right Wrist   Normal active range of motion    Additional Active Range of Motion Details  Limited horizontal ADD    Passive Range of Motion   Left Shoulder   Normal passive range of motion    Right Shoulder   Flexion: 152 degrees   Abduction: 112 degrees   External rotation 90°: 75 degrees   Internal rotation 45°: 58 degrees   Internal rotation 90°: 42 degrees     Additional Passive Range of Motion Details  Pure abd /112,     Scapular Mobility     Right Shoulder   Scapular Mobility with Shoulder to 90° FF   Scapular elevation: moderate  Upward rotation: inadequate  Downward rotation: inadequate    Additional Scapular Mobility Details  Mod limit scap elevation    Strength/Myotome Testing     Left Shoulder   Normal muscle strength    Right Shoulder     Planes of Motion   Flexion: 3-   Extension: 3-   Abduction: 3-   Adduction: 2   External rotation at 0°: 2-   Internal rotation at 0°: 3-     General Comments:      Shoulder Comments   DASH 54% disability;   1/10/22- DASH 34% disability  2/10/22- DASH 24% disability             Precautions: R TSA to be 10/15/21, @ 6 weeks PO wean from sling and begin AROM      Manuals 1/12 1/17 1/20 1/24 1/27 1/31 2/3 2/8 2/10    PROM/stretch STM R shoulder PROM, stretch, distract, gentle inferior and posterior mobs grade 2, STM pect corby   CRR PROM, stretch, distract, gentle inferior and posterior mobs grade 2 STM pect major  CRR PROM/stretch distract, gentle inferior/posterior jt mobs grade 2, STM  PROM/stretch/distract, gentle inferior/posterior jt mobs  Grade 2  STM  CRR PROM/stretch/distract, gentle inferiro and posterior jt mobs grade 2  CRR PROM/stretch/distarction , gentle inferior/posterior mobs  Grade 2  CRR PROM/stretch/distarct R shoulder PROM/stretch/distraction R shoulder SB PROM/stretch/distraction R shoulder grade 2 inferior/posterior mobs  CRR    Rhythmic stabilization  @ 90, 120, 60 90, 120, 60  CRR 90/120/60  CRR  @ 60, 90, 120  CRR @ 60, 90, 120  CRR @ 60, 90, 120  CRR @ 60, 90, 120  SB @ 60, 90, 120  CRR                          FOTO/RE    Neuro Re-Ed             Pre-op instruction    -                                                                                       Ther Ex             Pulley flexion 5' 5' 5' 5' 5' 5' 5' 5' 5'    pendulums             AROM elbow F/E   20x elbow flx palm up and down 2# 20x/20x 2# 20x/20x 2# 20x/20x 2# 20x/20x 2# 20x/20x 2# 20x/20x 2# 20x/20x 2# 20x/20x 2#    putty -            Shrugs/retract 20x/20x holding 2# weights 20x/20x 2# 20x/20x 2# 20x/20x 20x/20x 2# 20x/20x 2# 20x/20x 2# 20x/20x 2# 20x/20x 2#    Wall isometrics 10x all 10x all 10x all 10x all 10x all 10x all 10x 10x all 5x hold 5 all planes    Place and hold 90/90 in supine             Triceps  20x 1# 20x 1# 20x 1# 20x 1# 20x 1# 20x 1# 20x 2# 20x 2# 20x 2#    Chicken wing stretch 2 x 15" 2 x 15" 2 x 15" 2 x 15" 2 x 15" 2 x 15' 2 x 15" 2 x 15" 2 x 15"    protract 20x 2#, CC/C 20x/20x  Hor add 10x 1# 20x 2#  CC/C 20x/20x 2# 20x 2#/CC/C 20x 2#  Hor ADD 10x 1# 20x/20x/20x 2#    Hor ADD 10x 1# 20x/20x /20x 2#    Hor ADD 10x 20x/20x 2#    Hor Add 1#  10x 20x/20x 2#    Hor ADD 10x 20x/20x/20x 2#    Hor add 2# 10x 20x/20x/20x 2#    Hor ADD 2# 10x    S/L ER 20x 0# 20x 0# 20x 0# 20x 20x 20x 0# 20x 0# 20x 1# 20x 1#    Self stretch ho add in supine  2 x 15" 2 x15" 2 x 15"  Place and hold in ADD 5x hold 5"   2 x 15" 2 x 15" 2 x 15" 2 x 15" 2 x 15"     therabands 10x all planes 15x all yellow 15x yellow 20x yellow 20x yellow 20x yellow 20x yellow  Hor ADD 5x 20x yellow all planes 20x yellow all planes    Ther Activity             OH pulley   5' 5' 5' 5' 5' 5' 5'     Wand flx/bench/Hor add/adb   20x all 3# 20x all 3# 20x all 3# 20x all 3# 20x all 3# 20x all 3# 20x all 3# 20x all , 2# flex/bench  3# hor add/abd 20x all 2#    Cones to shelf 2 x 10 up/down 1# cuff weight 2 x 10 up/dwon 1# cuff weight 2 x 10 1# cuff weight 2 x 10 1# cuff weight 2 x 10 1# cuff weight 2 x 10 up/down 1# 2 x 10 1# 2 x10 1# 2 x 10 1#    B shoulder flx/abd to 90 2 x 10 1# 2 x 101# 2 x 101# 2 x 10 1# 2 x 10 1# 2 x 10 1# 2 x 10 1# 2 x 10 1# 2 x 10 1#    UBE 5' 2 1/2 for, 2 1/2 back 2 1/2 F/B 2 1/2 F/B 2 1/2 F/B 2 1/2 F/B 2 1/3 F/B 5' 5'    Wall climbs 10x 10x 10x 10x 10x 10x 10x 10x 1-x    Seated Hor Add using towel grav eliminated 2 min 3 min 3 min 3 min 3 min 3 min 3 min 3 min 4 min    Modalities    -

## 2022-02-10 NOTE — PROGRESS NOTES
PT Re-Evaluation     Today's date: 2/10/2022  Patient name: Sera Yanez  : 1940  MRN: [id number]  Referring provider: Dario Boateng MD  Dx:   Encounter Diagnosis     ICD-10-CM    1  Other specified arthritis, right shoulder  M13 811    2  Other specified aftercare following surgery  Z48 89    3  Right shoulder pain, unspecified chronicity  M25 511        Start Time: 1055  Stop Time: 1105  Total time in clinic (min): 10 minutes    Assessment  Assessment details: 80year old woman 24 days PO R RTSA  Pain range 0-1/10  No use of slingsling  AROM R shoulder: Flx 110, Abd 99, ER/IR @ 90 72/45  Strength Shoulder flx/abd 3-, IR/ER 2/5  Pt independent all self care, driving, laundry, and simple food prep  Has RTW as  and able to use curling iron in hair, put deodorant on L armpit but unable to wash L armpit  Drive I, all self care and home chores I  DASH 24% (imporved 10% from alst RE) disability  Continue PT 2 weeks then taper to DC  Impairments: abnormal or restricted ROM, activity intolerance, impaired physical strength, lacks appropriate home exercise program, pain with function, poor posture  and poor body mechanics  Functional limitations: able to reach back of head and use curling iron; unable to reach L armpit to wash but can don deoderant; Symptom irritability: highUnderstanding of Dx/Px/POC: good   Prognosis: good    Goals  STG- 4 weeks 21  1  I HEP (MET)  2  Decrease pain to 0-3/10(progressing -7/10)  3  Increase PROM R shoulder to 100/flx/abd, 30 IR/ER (MET)  4  I dressing/bathing (MET)    STG- 4 weeks 1/3/21  1  Increase AROM flx/abd to 90-95 (MET)  2  Increase strength L shoulder to 3-/5 (MET for elevation, not for rotations)  3  Improved DASH score to under 45% disability (MET)  4  Reach across body to put deodorant on L with R hand (progressing)    LTG- 12 weeks  22  1  Decrease pain to 0-/10 MET)  2   Increase AROM R shoulder to 90/90 Flx/Abd (MET)  Increase strength R shoulder to 3+-4/5 (not met)  3  I with home chores and driving (MET)  4  Improve DASH score to under 15% disability (progressing)    STG- 4 weeks 3/10/21  1  Wash L armpit with R hand  2  Improve DASH score to 15% disability  3  Increase strength R shoulder to 3+/5      Plan  Patient would benefit from: PT eval and skilled physical therapy  Planned modality interventions: thermotherapy: hydrocollator packs and cryotherapy  Planned therapy interventions: manual therapy, neuromuscular re-education, therapeutic activities, therapeutic exercise and home exercise program  Frequency: 2x week  Duration in visits: 4  Duration in weeks: 4  Plan of Care beginning date: 2/10/2022  Plan of Care expiration date: 3/10/2022  Treatment plan discussed with: patient        Subjective Evaluation    History of Present Illness  Date of onset: 6/15/2021  Mechanism of injury: Pt staes R shoulder had RTC tear many years ago, and pain returned 3 months ago  Pt had recent course of PT but no pain relief  Pt Had MRI 21 and it was determined she need TSA  R reverse TSA planned for 10/15/21  Pt referred fro pre-operative session today  21- Pt underwent reverse shoulder arthroplasty on 11/15/21  Pt placed in abd pillow sling and referred for outpatient PT to begin today  21- Pain range 1-7/10  Unable to sleep in bed  Current pain 4/10    1/10/22- Pain range 0-3/10  Sleeping in bed    2/10/22- Pain free most of the time  Pt states she has difficulty reaching to L armpit and behind her back            Recurrent probem    Quality of life: good    Pain  Current pain ratin  At best pain ratin  At worst pain ratin  Quality: grinding  Relieving factors: heat  Progression: improved    Social Support  Steps to enter house: yes  2  Stairs in house: no   Lives in: [facility]  Lives with: spouse    Employment status: not working  Hand dominance: right      Diagnostic Tests  MRI studies: abnormal  Treatments  Previous treatment: physical therapy  Current treatment: physical therapy  Patient Goals  Patient goals for therapy: increased strength, independence with ADLs/IADLs, increased motion and decreased pain          Objective     Postural Observations  Seated posture: fair  Standing posture: fair    Additional Postural Observation Details  Forward head, protracted shoulders    Observations     Right Shoulder  Positive for incision  Additional Observation Details  Incision well healed    No sling    Cervical/Thoracic Screen   Cervical range of motion within normal limits with the following exceptions: Minimal limit all planes    Neurological Testing     Sensation     Shoulder   Left Shoulder   Intact: light touch    Right Shoulder   Intact: light touch    Active Range of Motion   Left Shoulder   Normal active range of motion    Right Shoulder   Flexion: 110 degrees   Extension: 35 degrees   Abduction: 98 degrees   External rotation 90°: 70 degrees  Internal rotation 90°: 40 degrees     Left Elbow   Normal active range of motion    Right Elbow   Extension: -45 degrees     Left Wrist   Normal active range of motion    Right Wrist   Normal active range of motion    Additional Active Range of Motion Details  Limited horizontal ADD    Passive Range of Motion   Left Shoulder   Normal passive range of motion    Right Shoulder   Flexion: 152 degrees   Abduction: 112 degrees   External rotation 90°: 75 degrees   Internal rotation 45°: 58 degrees   Internal rotation 90°: 42 degrees     Additional Passive Range of Motion Details  Pure abd /112,     Scapular Mobility     Right Shoulder   Scapular Mobility with Shoulder to 90° FF   Scapular elevation: moderate  Upward rotation: inadequate  Downward rotation: inadequate    Additional Scapular Mobility Details  Mod limit scap elevation    Strength/Myotome Testing     Left Shoulder   Normal muscle strength    Right Shoulder     Planes of Motion Flexion: 3-   Extension: 3-   Abduction: 3-   Adduction: 2   External rotation at 0°: 2-   Internal rotation at 0°: 3-     General Comments:      Shoulder Comments   DASH 54% disability;   1/10/22- DASH 34% disability  2/10/22- DASH 24% disability             Precautions: R TSA to be 10/15/21, @ 6 weeks PO wean from sling and begin AROM      Manuals 1/12 1/17 1/20 1/24 1/27 1/31 2/3 2/8 2/10    PROM/stretch STM R shoulder PROM, stretch, distract, gentle inferior and posterior mobs grade 2, STM pect corby   CRR PROM, stretch, distract, gentle inferior and posterior mobs grade 2 STM pect major  CRR PROM/stretch distract, gentle inferior/posterior jt mobs grade 2, STM  PROM/stretch/distract, gentle inferior/posterior jt mobs  Grade 2  STM  CRR PROM/stretch/distract, gentle inferiro and posterior jt mobs grade 2  CRR PROM/stretch/distarction , gentle inferior/posterior mobs  Grade 2  CRR PROM/stretch/distarct R shoulder PROM/stretch/distraction R shoulder SB PROM/stretch/distraction R shoulder grade 2 inferior/posterior mobs  CRR    Rhythmic stabilization  @ 90, 120, 60 90, 120, 60  CRR 90/120/60  CRR  @ 60, 90, 120  CRR @ 60, 90, 120  CRR @ 60, 90, 120  CRR @ 60, 90, 120  SB @ 60, 90, 120  CRR                          FOTO/RE    Neuro Re-Ed             Pre-op instruction    -                                                                                       Ther Ex             Pulley flexion 5' 5' 5' 5' 5' 5' 5' 5' 5'    pendulums             AROM elbow F/E   20x elbow flx palm up and down 2# 20x/20x 2# 20x/20x 2# 20x/20x 2# 20x/20x 2# 20x/20x 2# 20x/20x 2# 20x/20x 2# 20x/20x 2#    putty -            Shrugs/retract 20x/20x holding 2# weights 20x/20x 2# 20x/20x 2# 20x/20x 20x/20x 2# 20x/20x 2# 20x/20x 2# 20x/20x 2# 20x/20x 2#    Wall isometrics 10x all 10x all 10x all 10x all 10x all 10x all 10x 10x all 5x hold 5 all planes    Place and hold 90/90 in supine             Triceps  20x 1# 20x 1# 20x 1# 20x 1# 20x 1# 20x 1# 20x 2# 20x 2# 20x 2#    Chicken wing stretch 2 x 15" 2 x 15" 2 x 15" 2 x 15" 2 x 15" 2 x 15' 2 x 15" 2 x 15" 2 x 15"    protract 20x 2#, CC/C 20x/20x  Hor add 10x 1# 20x 2#  CC/C 20x/20x 2# 20x 2#/CC/C 20x 2#  Hor ADD 10x 1# 20x/20x/20x 2#    Hor ADD 10x 1# 20x/20x /20x 2#    Hor ADD 10x 20x/20x 2#    Hor Add 1#  10x 20x/20x 2#    Hor ADD 10x 20x/20x/20x 2#    Hor add 2# 10x 20x/20x/20x 2#    Hor ADD 2# 10x    S/L ER 20x 0# 20x 0# 20x 0# 20x 20x 20x 0# 20x 0# 20x 1# 20x 1#    Self stretch ho add in supine  2 x 15" 2 x15" 2 x 15"  Place and hold in ADD 5x hold 5"   2 x 15" 2 x 15" 2 x 15" 2 x 15" 2 x 15"     therabands 10x all planes 15x all yellow 15x yellow 20x yellow 20x yellow 20x yellow 20x yellow  Hor ADD 5x 20x yellow all planes 20x yellow all planes    Ther Activity             OH pulley   5' 5' 5' 5' 5' 5' 5'     Wand flx/bench/Hor add/adb   20x all 3# 20x all 3# 20x all 3# 20x all 3# 20x all 3# 20x all 3# 20x all 3# 20x all , 2# flex/bench  3# hor add/abd 20x all 2#    Cones to shelf 2 x 10 up/down 1# cuff weight 2 x 10 up/dwon 1# cuff weight 2 x 10 1# cuff weight 2 x 10 1# cuff weight 2 x 10 1# cuff weight 2 x 10 up/down 1# 2 x 10 1# 2 x10 1# 2 x 10 1#    B shoulder flx/abd to 90 2 x 10 1# 2 x 101# 2 x 101# 2 x 10 1# 2 x 10 1# 2 x 10 1# 2 x 10 1# 2 x 10 1# 2 x 10 1#    UBE 5' 2 1/2 for, 2 1/2 back 2 1/2 F/B 2 1/2 F/B 2 1/2 F/B 2 1/2 F/B 2 1/3 F/B 5' 5'    Wall climbs 10x 10x 10x 10x 10x 10x 10x 10x 1-x    Seated Hor Add using towel grav eliminated 2 min 3 min 3 min 3 min 3 min 3 min 3 min 3 min 4 min    Modalities    -

## 2022-02-14 ENCOUNTER — OFFICE VISIT (OUTPATIENT)
Dept: PHYSICAL THERAPY | Facility: CLINIC | Age: 82
End: 2022-02-14
Payer: MEDICARE

## 2022-02-14 DIAGNOSIS — Z48.89 OTHER SPECIFIED AFTERCARE FOLLOWING SURGERY: ICD-10-CM

## 2022-02-14 DIAGNOSIS — M13.811 OTHER SPECIFIED ARTHRITIS, RIGHT SHOULDER: ICD-10-CM

## 2022-02-14 DIAGNOSIS — M25.511 RIGHT SHOULDER PAIN, UNSPECIFIED CHRONICITY: Primary | ICD-10-CM

## 2022-02-14 PROCEDURE — 97530 THERAPEUTIC ACTIVITIES: CPT | Performed by: PHYSICAL THERAPIST

## 2022-02-14 PROCEDURE — 97140 MANUAL THERAPY 1/> REGIONS: CPT | Performed by: PHYSICAL THERAPIST

## 2022-02-14 PROCEDURE — 97110 THERAPEUTIC EXERCISES: CPT | Performed by: PHYSICAL THERAPIST

## 2022-02-14 NOTE — PROGRESS NOTES
Daily Note     Today's date: 2022  Patient name: Pauline Griffiths  : 1940  MRN: [id number]  Referring provider: Dominique Johnson MD  Dx:   Encounter Diagnosis     ICD-10-CM    1  Right shoulder pain, unspecified chronicity  M25 511    2  Other specified aftercare following surgery  Z48 89    3  Other specified arthritis, right shoulder  M13 811        Start Time: 1055  Stop Time: 1155  Total time in clinic (min): 60 minutes    Subjective: Minimal pain, range 0-2/10  Fatigues with PRE's and especially elevation  Objective: See treatment diary below      Assessment: Pt noting reaching l arm pit with some greater ease, although still difficult  Progressing with place and hold in ADD, and diagonal TB for strengthening into hor ADD, pt completed with difficulty but no pain  Very challenged with all elevation activity  Continue PT progressing with endurance, strengthening as tolerated with goal of improving function R UE, reaching armpit L with ease  Plan: Progress note during next visit        Precautions: R TSA to be 10/15/21, @ 6 weeks PO wean from sling and begin AROM      Manuals    1/24 1/27 1/31 2/3 2/8 2/10 2/14   PROM/stretch STM R shoulder    PROM/stretch/distract, gentle inferior/posterior jt mobs  Grade 2  STM  CRR PROM/stretch/distract, gentle inferiro and posterior jt mobs grade 2  CRR PROM/stretch/distarction , gentle inferior/posterior mobs  Grade 2  CRR PROM/stretch/distarct R shoulder PROM/stretch/distraction R shoulder SB PROM/stretch/distraction R shoulder grade 2 inferior/posterior mobs  CRR PROM/stretch/distarction R shoulder grade 2 inferior/posterior mobs  CRR   Rhythmic stabilization      @ 60, 90, 120  CRR @ 60, 90, 120  CRR @ 60, 90, 120  CRR @ 60, 90, 120  SB @ 60, 90, 120  CRR @ 60, 90, 120  CRR                         FOTO/RE    Neuro Re-Ed             Pre-op instruction    -                                                                                       Ther Ex             Pulley flexion    5' 5' 5' 5' 5' 5' 5/'   pendulums             AROM elbow F/E      20x/20x 2# 20x/20x 2# 20x/20x 2# 20x/20x 2# 20x/20x 2# 20x/20x 2# 20x/20x 2#   putty -            Shrugs/retract    20x/20x 20x/20x 2# 20x/20x 2# 20x/20x 2# 20x/20x 2# 20x/20x 2# 20x/20x 2#   Wall isometrics    10x all 10x all 10x all 10x 10x all 5x hold 5 all planes 5x hold 5 all   Place and hold 90/90 in supine             Triceps     20x 1# 20x 1# 20x 1# 20x 2# 20x 2# 20x 2# 20x 2#   Chicken wing stretch    2 x 15" 2 x 15" 2 x 15' 2 x 15" 2 x 15" 2 x 15" 2 x 15"   protract    20x/20x/20x 2#    Hor ADD 10x 1# 20x/20x /20x 2#    Hor ADD 10x 20x/20x 2#    Hor Add 1#  10x 20x/20x 2#    Hor ADD 10x 20x/20x/20x 2#    Hor add 2# 10x 20x/20x/20x 2#    Hor ADD 2# 10x 20x/20x  20x 2#      10x 2#   S/L ER    20x 20x 20x 0# 20x 0# 20x 1# 20x 1# 20x 1#   Self stretch ho add in supine     2 x 15" 2 x 15" 2 x 15" 2 x 15" 2 x 15"  2 x 15"   therabands    20x yellow 20x yellow 20x yellow 20x yellow  Hor ADD 5x 20x yellow all planes 20x yellow all planes 20x YTB all   Ther Activity             OH pulley     5' 5' 5' 5' 5'  5'   Wand flx/bench/Hor add/adb      20x all 3# 20x all 3# 20x all 3# 20x all 3# 20x all , 2# flex/bench  3# hor add/abd 20x all 2# 20x all 3#   Cones to shelf    2 x 10 1# cuff weight 2 x 10 1# cuff weight 2 x 10 up/down 1# 2 x 10 1# 2 x10 1# 2 x 10 1# 2 x 10 1#   B shoulder flx/abd to 90    2 x 10 1# 2 x 10 1# 2 x 10 1# 2 x 10 1# 2 x 10 1# 2 x 10 1# 2 x 10 1#   UBE    2 1/2 F/B 2 1/2 F/B 2 1/2 F/B 2 1/3 F/B 5' 5' 5'   Wall climbs    10x 10x 10x 10x 10x 1-x 10x   Seated Hor Add using towel grav eliminated    3 min 3 min 3 min 3 min 3 min 4 min 5min   Modalities    -

## 2022-02-16 DIAGNOSIS — M81.0 AGE-RELATED OSTEOPOROSIS WITHOUT CURRENT PATHOLOGICAL FRACTURE: ICD-10-CM

## 2022-02-16 NOTE — TELEPHONE ENCOUNTER
Patient stated she is using estradiol (ESTRACE VAGINAL) 0 1 mg/g vaginal cream    She does not remember who prescribed it   She does have a new Dr who refills it, Elisa Macario MD Obstetrics and Gynecology

## 2022-02-16 NOTE — TELEPHONE ENCOUNTER
11/29/21 lov   Nov 2/28/22     Pt wants refill to [facility] in [facility] ([facility]), having a hard time locating in Pharmacy search

## 2022-02-16 NOTE — TELEPHONE ENCOUNTER
Pt called needing premarin 0 625mg sent to [facility]  She needs the 90 days plus 3 refills

## 2022-02-17 ENCOUNTER — OFFICE VISIT (OUTPATIENT)
Dept: PHYSICAL THERAPY | Facility: CLINIC | Age: 82
End: 2022-02-17
Payer: MEDICARE

## 2022-02-17 DIAGNOSIS — M13.811 OTHER SPECIFIED ARTHRITIS, RIGHT SHOULDER: ICD-10-CM

## 2022-02-17 DIAGNOSIS — M25.511 RIGHT SHOULDER PAIN, UNSPECIFIED CHRONICITY: Primary | ICD-10-CM

## 2022-02-17 DIAGNOSIS — Z48.89 OTHER SPECIFIED AFTERCARE FOLLOWING SURGERY: ICD-10-CM

## 2022-02-17 PROCEDURE — 97110 THERAPEUTIC EXERCISES: CPT | Performed by: PHYSICAL THERAPIST

## 2022-02-17 PROCEDURE — 97140 MANUAL THERAPY 1/> REGIONS: CPT | Performed by: PHYSICAL THERAPIST

## 2022-02-17 PROCEDURE — 97530 THERAPEUTIC ACTIVITIES: CPT | Performed by: PHYSICAL THERAPIST

## 2022-02-17 NOTE — PROGRESS NOTES
Daily Note     Today's date: 2022  Patient name: Geovanna Morin  : 1940  MRN: [id number]  Referring provider: Nino Cabrera MD  Dx:   Encounter Diagnosis     ICD-10-CM    1  Right shoulder pain, unspecified chronicity  M25 511    2  Other specified aftercare following surgery  Z48 89    3  Other specified arthritis, right shoulder  M13 811        Start Time: 1015  Stop Time: 1105  Total time in clinic (min): 50 minutes    Subjective: Minimal pain, 0-2/10 pain range      Objective: See treatment diary below      Assessment: Able to reach L armpit with r with some greater ease for washing  Progressing with flexibility and strengthening without pain  Pt needing VC for correct position for exercise  Continue PT for 1 more week then DC to HEP  Plan: Progress treatment as tolerated         Precautions: R TSA to be 10/15/21, @ 6 weeks PO wean from sling and begin AROM      Manuals 2 /17   1/24 1/27 1/31 2/3 2/8 2/10 2/14   PROM/stretch STM R shoulder PROM/stretch/distract R shoulder , grade 2 inferior/posterior mobs  CRR   PROM/stretch/distract, gentle inferior/posterior jt mobs  Grade 2  STM  CRR PROM/stretch/distract, gentle inferiro and posterior jt mobs grade 2  CRR PROM/stretch/distarction , gentle inferior/posterior mobs  Grade 2  CRR PROM/stretch/distarct R shoulder PROM/stretch/distraction R shoulder SB PROM/stretch/distraction R shoulder grade 2 inferior/posterior mobs  CRR PROM/stretch/distarction R shoulder grade 2 inferior/posterior mobs  CRR   Rhythmic stabilization  @ 60, 90, 120  CRR    @ 60, 90, 120  CRR @ 60, 90, 120  CRR @ 60, 90, 120  CRR @ 60, 90, 120  SB @ 60, 90, 120  CRR @ 60, 90, 120  CRR                         FOTO/RE    Neuro Re-Ed             Pre-op instruction    -                                                                                       Ther Ex             Pulley flexion -   5' 5' 5' 5' 5' 5' 5/'   pendulums             AROM elbow F/E   20x/20x 2#   20x/20x 2# 20x/20x 2# 20x/20x 2# 20x/20x 2# 20x/20x 2# 20x/20x 2# 20x/20x 2#   putty -            Shrugs/retract 20x/20x 2#   20x/20x 20x/20x 2# 20x/20x 2# 20x/20x 2# 20x/20x 2# 20x/20x 2# 20x/20x 2#   Wall isometrics 5x all hold 5"   10x all 10x all 10x all 10x 10x all 5x hold 5 all planes 5x hold 5 all   Place and hold 90/90 in supine             Triceps  20x 2#   20x 1# 20x 1# 20x 1# 20x 2# 20x 2# 20x 2# 20x 2#   Chicken wing stretch -   2 x 15" 2 x 15" 2 x 15' 2 x 15" 2 x 15" 2 x 15" 2 x 15"   protract 20x/20x 2#      10x hor ADD 2#   20x/20x/20x 2#    Hor ADD 10x 1# 20x/20x /20x 2#    Hor ADD 10x 20x/20x 2#    Hor Add 1#  10x 20x/20x 2#    Hor ADD 10x 20x/20x/20x 2#    Hor add 2# 10x 20x/20x/20x 2#    Hor ADD 2# 10x 20x/20x  20x 2#      10x 2#   S/L ER 20x 1#   20x 20x 20x 0# 20x 0# 20x 1# 20x 1# 20x 1#   Self stretch ho add in supine  2 x 15"   2 x 15" 2 x 15" 2 x 15" 2 x 15" 2 x 15"  2 x 15"   therabands 20x all YTB   20x yellow 20x yellow 20x yellow 20x yellow  Hor ADD 5x 20x yellow all planes 20x yellow all planes 20x YTB all   Ther Activity             OH pulley  -   5' 5' 5' 5' 5'  5'   Wand flx/bench/Hor add/adb   20x all 3#   20x all 3# 20x all 3# 20x all 3# 20x all 3# 20x all , 2# flex/bench  3# hor add/abd 20x all 2# 20x all 3#   Cones to shelf 2 x 10 1#   2 x 10 1# cuff weight 2 x 10 1# cuff weight 2 x 10 up/down 1# 2 x 10 1# 2 x10 1# 2 x 10 1# 2 x 10 1#   B shoulder flx/abd to 90 2 x 10 1#   2 x 10 1# 2 x 10 1# 2 x 10 1# 2 x 10 1# 2 x 10 1# 2 x 10 1# 2 x 10 1#   UBE 5'   2 1/2 F/B 2 1/2 F/B 2 1/2 F/B 2 1/3 F/B 5' 5' 5'   Wall climbs 10x   10x 10x 10x 10x 10x 1-x 10x   Seated Hor Add using towel grav eliminated 5'   3 min 3 min 3 min 3 min 3 min 4 min 5min   Modalities    -

## 2022-02-22 ENCOUNTER — APPOINTMENT (OUTPATIENT)
Dept: PHYSICAL THERAPY | Facility: CLINIC | Age: 82
End: 2022-02-22
Payer: MEDICARE

## 2022-02-23 ENCOUNTER — OFFICE VISIT (OUTPATIENT)
Dept: PHYSICAL THERAPY | Facility: CLINIC | Age: 82
End: 2022-02-23
Payer: MEDICARE

## 2022-02-23 DIAGNOSIS — M13.811 OTHER SPECIFIED ARTHRITIS, RIGHT SHOULDER: ICD-10-CM

## 2022-02-23 DIAGNOSIS — M25.511 RIGHT SHOULDER PAIN, UNSPECIFIED CHRONICITY: Primary | ICD-10-CM

## 2022-02-23 DIAGNOSIS — Z48.89 OTHER SPECIFIED AFTERCARE FOLLOWING SURGERY: ICD-10-CM

## 2022-02-23 PROCEDURE — 97110 THERAPEUTIC EXERCISES: CPT | Performed by: PHYSICAL THERAPIST

## 2022-02-23 PROCEDURE — 97530 THERAPEUTIC ACTIVITIES: CPT | Performed by: PHYSICAL THERAPIST

## 2022-02-23 NOTE — PROGRESS NOTES
Daily Note     Today's date: 2022  Patient name: Stacia Beckett  : 1940  MRN: [id number]  Referring provider: Angélica Miguel MD  Dx:   Encounter Diagnosis     ICD-10-CM    1  Right shoulder pain, unspecified chronicity  M25 511    2  Other specified aftercare following surgery  Z48 89    3  Other specified arthritis, right shoulder  M13 811        Start Time: 1055  Stop Time: 1145  Total time in clinic (min): 50 minutes    Subjective: Minimal pain, using R UE with greater ease  Objective: See treatment diary below      Assessment: Progressing with strengthening R shoulder  Challenged with all OH reach and hot ADD ,   Stiffness passively at 130, and actively to 90 elevation  Continue PT progressing with strengthening with goal of improving function donning deoderant using R hand to L armpit  Plan: Progress treatment as tolerated         Precautions: R TSA to be 10/15/21, @ 6 weeks PO wean from sling and begin AROM      Manuals 2 /17 2/23  1/24 1/27 1/31 2/3 2/8 2/10 2/14   PROM/stretch STM R shoulder PROM/stretch/distract R shoulder , grade 2 inferior/posterior mobs  CRR PROM/stretch/distract R shoulder grade 2 inferior/posterior mobs  CRR  PROM/stretch/distract, gentle inferior/posterior jt mobs  Grade 2  STM  CRR PROM/stretch/distract, gentle inferiro and posterior jt mobs grade 2  CRR PROM/stretch/distarction , gentle inferior/posterior mobs  Grade 2  CRR PROM/stretch/distarct R shoulder PROM/stretch/distraction R shoulder SB PROM/stretch/distraction R shoulder grade 2 inferior/posterior mobs  CRR PROM/stretch/distarction R shoulder grade 2 inferior/posterior mobs  CRR   Rhythmic stabilization  @ 60, 90, 120  CRR -   @ 60, 90, 120  CRR @ 60, 90, 120  CRR @ 60, 90, 120  CRR @ 60, 90, 120  SB @ 60, 90, 120  CRR @ 60, 90, 120  CRR                         FOTO/RE    Neuro Re-Ed             Pre-op instruction    - Ther Ex             Pulley flexion -   5' 5' 5' 5' 5' 5' 5/'   pendulums             AROM elbow F/E   20x/20x 2#   20x/20x 2# 20x/20x 2# 20x/20x 2# 20x/20x 2# 20x/20x 2# 20x/20x 2# 20x/20x 2#   putty -            Shrugs/retract 20x/20x 2# 20x/20x 2#  20x/20x 20x/20x 2# 20x/20x 2# 20x/20x 2# 20x/20x 2# 20x/20x 2# 20x/20x 2#   Wall isometrics 5x all hold 5" 5x all  10x all 10x all 10x all 10x 10x all 5x hold 5 all planes 5x hold 5 all   Place and hold 90/90 in supine             Triceps  20x 2# 20x 2#  20x 1# 20x 1# 20x 1# 20x 2# 20x 2# 20x 2# 20x 2#   Chicken wing stretch -   2 x 15" 2 x 15" 2 x 15' 2 x 15" 2 x 15" 2 x 15" 2 x 15"   protract 20x/20x 2#      10x hor ADD 2# 20x/20x 2#      10x hor ADD 2#  20x/20x/20x 2#    Hor ADD 10x 1# 20x/20x /20x 2#    Hor ADD 10x 20x/20x 2#    Hor Add 1#  10x 20x/20x 2#    Hor ADD 10x 20x/20x/20x 2#    Hor add 2# 10x 20x/20x/20x 2#    Hor ADD 2# 10x 20x/20x  20x 2#      10x 2#   S/L ER 20x 1# 10x 1#  20x 20x 20x 0# 20x 0# 20x 1# 20x 1# 20x 1#   Self stretch ho add in supine  2 x 15" 2 x 15"  2 x 15" 2 x 15" 2 x 15" 2 x 15" 2 x 15"  2 x 15"   therabands 20x all YTB 20x all YTB  20x yellow 20x yellow 20x yellow 20x yellow  Hor ADD 5x 20x yellow all planes 20x yellow all planes 20x YTB all   Ther Activity             OH pulley  - -  5' 5' 5' 5' 5'  5'   Wand flx/bench/Hor add/adb   20x all 3# 20x all 3#  20x all 3# 20x all 3# 20x all 3# 20x all 3# 20x all , 2# flex/bench  3# hor add/abd 20x all 2# 20x all 3#   Cones to shelf 2 x 10 1# 2 x 10 1#  2 x 10 1# cuff weight 2 x 10 1# cuff weight 2 x 10 up/down 1# 2 x 10 1# 2 x10 1# 2 x 10 1# 2 x 10 1#   B shoulder flx/abd to 90 2 x 10 1# 2 x 10 1#  2 x 10 1# 2 x 10 1# 2 x 10 1# 2 x 10 1# 2 x 10 1# 2 x 10 1# 2 x 10 1#   UBE 5' 5'  2 1/2 F/B 2 1/2 F/B 2 1/2 F/B 2 1/3 F/B 5' 5' 5'   Wall climbs 10x 10x  10x 10x 10x 10x 10x 1-x 10x   Seated Hor Add using towel grav eliminated 5' 5'  3 min 3 min 3 min 3 min 3 min 4 min 5min   Modalities -

## 2022-02-24 ENCOUNTER — OFFICE VISIT (OUTPATIENT)
Dept: PHYSICAL THERAPY | Facility: CLINIC | Age: 82
End: 2022-02-24
Payer: MEDICARE

## 2022-02-24 DIAGNOSIS — M25.511 RIGHT SHOULDER PAIN, UNSPECIFIED CHRONICITY: Primary | ICD-10-CM

## 2022-02-24 DIAGNOSIS — Z48.89 OTHER SPECIFIED AFTERCARE FOLLOWING SURGERY: ICD-10-CM

## 2022-02-24 DIAGNOSIS — M13.811 OTHER SPECIFIED ARTHRITIS, RIGHT SHOULDER: ICD-10-CM

## 2022-02-24 PROCEDURE — 97140 MANUAL THERAPY 1/> REGIONS: CPT | Performed by: PHYSICAL THERAPIST

## 2022-02-24 PROCEDURE — 97110 THERAPEUTIC EXERCISES: CPT | Performed by: PHYSICAL THERAPIST

## 2022-02-24 NOTE — PROGRESS NOTES
Daily Note     Today's date: 2022  Patient name: Stacia Beckett  : 1940  MRN: [id number]  Referring provider: Angélica Miguel MD  Dx:   Encounter Diagnosis     ICD-10-CM    1  Right shoulder pain, unspecified chronicity  M25 511    2  Other specified aftercare following surgery  Z48 89    3  Other specified arthritis, right shoulder  M13 811        Start Time: 1050  Stop Time: 1145  Total time in clinic (min): 55 minutes    Subjective: Pain free most of the time, range 0-2/10  Stiff with IR and hor ADD  Objective: See treatment diary below      Assessment: Pt pleased to finish formal PT  Needs some VC for some ex but states she feels good about her HEP  Painful with end range Hor Add and IR combined  Improved function with styling hair and donning deodorant, but still completed with effort  DC to HEP    Plan: Dc to HEP     Precautions: R TSA to be 10/15/21, @ 6 weeks PO wean from sling and begin AROM      Manuals 2 /17 2/23 2/24 1/24 1/27 1/31 2/3 2/8 2/10 2/14   PROM/stretch STM R shoulder PROM/stretch/distract R shoulder , grade 2 inferior/posterior mobs  CRR PROM/stretch/distract R shoulder grade 2 inferior/posterior mobs  CRR PROM/stretch distract R shoulder grade inferior  posterior mobs  CRR PROM/stretch/distract, gentle inferior/posterior jt mobs  Grade 2  STM  CRR PROM/stretch/distract, gentle inferiro and posterior jt mobs grade 2  CRR PROM/stretch/distarction , gentle inferior/posterior mobs  Grade 2  CRR PROM/stretch/distarct R shoulder PROM/stretch/distraction R shoulder SB PROM/stretch/distraction R shoulder grade 2 inferior/posterior mobs  CRR PROM/stretch/distarction R shoulder grade 2 inferior/posterior mobs  CRR   Rhythmic stabilization  @ 60, 90, 120  CRR - @ 60, 90-, 120  CRR  @ 60, 90, 120  CRR @ 60, 90, 120  CRR @ 60, 90, 120  CRR @ 60, 90, 120  SB @ 60, 90, 120  CRR @ 60, 90, 120  CRR                         FOTO/RE    Neuro Re-Ed             Pre-op instruction    - Ther Ex             Pulley flexion -  5' 5' 5' 5' 5' 5' 5' 5/'   pendulums             AROM elbow F/E   20x/20x 2#  20x/20x 2# 20x/20x 2# 20x/20x 2# 20x/20x 2# 20x/20x 2# 20x/20x 2# 20x/20x 2# 20x/20x 2#   putty -            Shrugs/retract 20x/20x 2# 20x/20x 2# 20x/20x 2# 20x/20x 20x/20x 2# 20x/20x 2# 20x/20x 2# 20x/20x 2# 20x/20x 2# 20x/20x 2#   Wall isometrics 5x all hold 5" 5x all 5x all 10x all 10x all 10x all 10x 10x all 5x hold 5 all planes 5x hold 5 all   Place and hold 90/90 in supine             Triceps  20x 2# 20x 2# 20x 2# 20x 1# 20x 1# 20x 1# 20x 2# 20x 2# 20x 2# 20x 2#   Chicken wing stretch -   2 x 15" 2 x 15" 2 x 15' 2 x 15" 2 x 15" 2 x 15" 2 x 15"   protract 20x/20x 2#      10x hor ADD 2# 20x/20x 2#      10x hor ADD 2# 20x/20x 2#    10x 2# 20x/20x/20x 2#    Hor ADD 10x 1# 20x/20x /20x 2#    Hor ADD 10x 20x/20x 2#    Hor Add 1#  10x 20x/20x 2#    Hor ADD 10x 20x/20x/20x 2#    Hor add 2# 10x 20x/20x/20x 2#    Hor ADD 2# 10x 20x/20x  20x 2#      10x 2#   S/L ER 20x 1# 10x 1# 10x 1# 20x 20x 20x 0# 20x 0# 20x 1# 20x 1# 20x 1#   Self stretch ho add in supine  2 x 15" 2 x 15" 2 x 15" 2 x 15" 2 x 15" 2 x 15" 2 x 15" 2 x 15"  2 x 15"   therabands 20x all YTB 20x all YTB 20x all YTB 20x yellow 20x yellow 20x yellow 20x yellow  Hor ADD 5x 20x yellow all planes 20x yellow all planes 20x YTB all   Ther Activity             OH pulley  - -  5' 5' 5' 5' 5'  5'   Wand flx/bench/Hor add/adb   20x all 3# 20x all 3#  20x all 3# 20x all 3# 20x all 3# 20x all 3# 20x all , 2# flex/bench  3# hor add/abd 20x all 2# 20x all 3#   Cones to shelf 2 x 10 1# 2 x 10 1#  2 x 10 1# cuff weight 2 x 10 1# cuff weight 2 x 10 up/down 1# 2 x 10 1# 2 x10 1# 2 x 10 1# 2 x 10 1#   B shoulder flx/abd to 90 2 x 10 1# 2 x 10 1#  2 x 10 1# 2 x 10 1# 2 x 10 1# 2 x 10 1# 2 x 10 1# 2 x 10 1# 2 x 10 1#   UBE 5' 5'  2 1/2 F/B 2 1/2 F/B 2 1/2 F/B 2 1/3 F/B 5' 5' 5' Wall climbs 10x 10x  10x 10x 10x 10x 10x 1-x 10x   Seated Hor Add using towel grav eliminated 5' 5'  3 min 3 min 3 min 3 min 3 min 4 min 5min   Modalities    -

## 2022-02-28 ENCOUNTER — OFFICE VISIT (OUTPATIENT)
Dept: INTERNAL MEDICINE CLINIC | Facility: CLINIC | Age: 82
End: 2022-02-28
Payer: MEDICARE

## 2022-02-28 VITALS
HEIGHT: 62 IN | TEMPERATURE: 98.2 F | WEIGHT: 112 LBS | SYSTOLIC BLOOD PRESSURE: 110 MMHG | HEART RATE: 67 BPM | BODY MASS INDEX: 20.61 KG/M2 | OXYGEN SATURATION: 100 % | DIASTOLIC BLOOD PRESSURE: 60 MMHG

## 2022-02-28 DIAGNOSIS — M81.0 AGE-RELATED OSTEOPOROSIS WITHOUT CURRENT PATHOLOGICAL FRACTURE: ICD-10-CM

## 2022-02-28 DIAGNOSIS — L72.9 SCALP CYST: Primary | ICD-10-CM

## 2022-02-28 DIAGNOSIS — D17.24 LIPOMA OF LEFT LOWER EXTREMITY: ICD-10-CM

## 2022-02-28 DIAGNOSIS — I10 ESSENTIAL HYPERTENSION: ICD-10-CM

## 2022-02-28 DIAGNOSIS — K91.2 POSTSURGICAL MALABSORPTION: ICD-10-CM

## 2022-02-28 DIAGNOSIS — R13.12 OROPHARYNGEAL DYSPHAGIA: ICD-10-CM

## 2022-02-28 DIAGNOSIS — M70.61 TROCHANTERIC BURSITIS OF RIGHT HIP: ICD-10-CM

## 2022-02-28 PROCEDURE — 99215 OFFICE O/P EST HI 40 MIN: CPT | Performed by: INTERNAL MEDICINE
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
## 2022-03-01 ENCOUNTER — OFFICE VISIT (OUTPATIENT)
Dept: VASCULAR SURGERY | Facility: CLINIC | Age: 82
End: 2022-03-01
Payer: MEDICARE

## 2022-03-01 VITALS
SYSTOLIC BLOOD PRESSURE: 120 MMHG | WEIGHT: 115 LBS | BODY MASS INDEX: 21.16 KG/M2 | RESPIRATION RATE: 18 BRPM | HEIGHT: 62 IN | HEART RATE: 70 BPM | DIASTOLIC BLOOD PRESSURE: 68 MMHG

## 2022-03-01 DIAGNOSIS — I10 ESSENTIAL HYPERTENSION: ICD-10-CM

## 2022-03-01 DIAGNOSIS — I65.21 SYMPTOMATIC CAROTID ARTERY STENOSIS WITHOUT INFARCTION, RIGHT: Primary | ICD-10-CM

## 2022-03-01 PROCEDURE — 99214 OFFICE O/P EST MOD 30 MIN: CPT | Performed by: PHYSICIAN ASSISTANT

## 2022-03-01 NOTE — PROGRESS NOTES
Assessment/Plan:    Symptomatic carotid artery stenosis without infarction, right  70year old female w/ hx of HTN, hypothyroidism, RYGB, pancytopenia and symptomatic high-grade right carotid artery stenosis with preoperative TIA (3/27/2019) s/p R CEA by Dr Ramin Tellez (4/3/2019)  Patient presents for review of recent noninvasive imaging study and for risk factor modification      -Duplex reviewed with pt  R ICA s/p endarterectomy widely patent without evidence of recurrent stenosis with stable mild <50% contralateral disease    -Reviewed pathophysiology and indications for treatment of carotid disease  -No surgical intervention indicated   -Patient is statin intolerant     -Continue healthy diet, regular exercise  -Repeat Carotid duplex in 1 year  If results remain stable without change, can follow up in 2 years with continued yearly surveillance     Essential hypertension  -BP stable in the office today  -Continue adequate BP control  -Medical management per PCP       Diagnoses and all orders for this visit:    Symptomatic carotid artery stenosis without infarction, right  -     VAS carotid complete study; Future    Essential hypertension          Subjective:      Patient ID: Colletta Crow is a [de-identified] y o  female  Patient had a CV on 1/26/22  Pt denies TIA or CVA symptoms  Pt had a TIA in 219      79-year-old female with history of TIA s/p right carotid endarterectomy in 2019 presents for review of recent carotid duplex and risk factor modification  Patient has been doing well and offers no complaints  Imaging reviewed with suggest stable right internal carotid artery without evidence of restenoses  Mild less than 50% contralateral disease  Patient denies signs or symptoms of recurrent TIA or stroke  Overall has been doing well and offers no new complaints    Denies claudication, rest pain or nonhealing wounds      The following portions of the patient's history were reviewed and updated as appropriate: allergies, current medications, past family history, past medical history, past social history, past surgical history and problem list     Review of Systems   Constitutional: Negative  HENT: Negative  Eyes: Negative for visual disturbance  Respiratory: Negative  Cardiovascular: Negative  Gastrointestinal: Negative  Endocrine: Negative  Genitourinary: Negative  Musculoskeletal: Negative  Skin: Negative  Neurological: Negative for dizziness, facial asymmetry, speech difficulty, weakness and numbness  Hematological: Negative  Psychiatric/Behavioral: Negative  I have reviewed and made appropriate changes to the review of systems input by the medical assistant      Vitals:    03/01/22 1354 03/01/22 1355   BP: 116/76 120/68   BP Location: Right arm Left arm   Patient Position: Sitting Sitting   Pulse: 70    Resp: 18    Weight: 52 2 kg (115 lb)    Height: 5' 2" (1 575 m)        Patient Active Problem List   Diagnosis    Symptomatic carotid artery stenosis without infarction, right    Hypothyroidism    Essential hypertension    Postoperative malabsorption    Menopause, premature    Gastroesophageal reflux disease without esophagitis    Irritable bowel syndrome with diarrhea    Osteoporosis    Gastritis without bleeding    Incontinence of feces    Moderate protein-calorie malnutrition (HCC)    Rotator cuff arthropathy of right shoulder    Localized, secondary osteoarthritis of the shoulder region    Hx of gastric bypass    History of carotid endarterectomy    History of right shoulder replacement       Past Surgical History:   Procedure Laterality Date    ABDOMINOPLASTY  07/29/2002    TUMMY TUCK    APPENDECTOMY  1970    AUGMENTATION BREAST  01/25/2002    AUGMENTATION MAMMAPLASTY  2002    implants    BASAL CELL CARCINOMA EXCISION Left 05/04/2011    cheek    CATARACT EXTRACTION W/  INTRAOCULAR LENS IMPLANT Right 04/15/2014    CATARACT EXTRACTION W/  INTRAOCULAR LENS IMPLANT Left 04/22/2014    COLONOSCOPY W/ POLYPECTOMY  12/17/2008    COLONOSCOPY W/ POLYPECTOMY  04/28/2011    COLONOSCOPY W/ POLYPECTOMY  07/09/2014    COLONOSCOPY W/ POLYPECTOMY  07/26/2017    CYST REMOVAL  1975    vaginal     CYST REMOVAL  10/26/2006    R vulva- Sebaceous    EYE SURGERY Left 08/09/2014    Yag Laser    EYE SURGERY Right 08/26/2014    Yag laser    FINGER SURGERY Right     4th- cyst excision w/ bone debridement    GASTRIC BYPASS  09/28/2000    INCONTINENCE SURGERY  04/19/2014    Solesta bulking agent for fecal incontinence    MAMMO (HISTORICAL)  04/13/2016 7/30/2015, 4/13/2016 - As per [employer]    OOPHORECTOMY Right     age 31    [address] IMPLANT Right 10/15/2021    Procedure: TOTAL REVERSE SHOULDER REPLACEMENT;  Surgeon: Lakshmi Patel MD;  Location: [facility];  Service: Orthopedics    GA THROMBOENDARTECTMY Elise Kimble Right 4/3/2019    Procedure: ENDARTERECTOMY ARTERY CAROTID;  Surgeon: Shahid Elkins DO;  Location: [facility];  Service: Vascular    RHYTIDECTOMY NECK / Flakito Donato / Jazmin Sawyer  12/04/2001    Chin & neck    ROTATOR CUFF REPAIR Right 05/01/2003    SQUAMOUS CELL CARCINOMA EXCISION Left 03/05/2013    ABOVE LIP ON LEFT SIDE    SUPERIOR OBLIQUE TUCK  12/30/2002    BUTTOCK TUCK    THIGH LIFT  10/29/2002    THIGH TUCK    TONSILLECTOMY      TOTAL ABDOMINAL HYSTERECTOMY      USO FOR FIBROIDS, OVARIAN CYST - PART OF ONE OVARY LEFT IN     TOTAL SHOULDER REPLACEMENT      right     VULVA / PERINEUM BIOPSY  05/27/2015    labia-- "negative"       Family History   Problem Relation Age of Onset    Hodgkin's lymphoma Sister 22    Stroke Mother 36    Other Father         heart problems     No Known Problems Daughter     Stroke Maternal Grandmother     No Known Problems Maternal Grandfather     Heart attack Paternal Grandmother     No Known Problems Paternal Grandfather     No Known Problems Sister     No Known Problems Maternal Aunt  No Known Problems Maternal Aunt     No Known Problems Paternal Aunt     No Known Problems Paternal Aunt     Heart attack Brother     No Known Problems Son     No Known Problems Son     No Known Problems Son        Social History     Socioeconomic History    Marital status: /Civil Union     Spouse name: Not on file    Number of children: Not on file    Years of education: Not on file    Highest education level: Not on file   Occupational History    Not on file   Tobacco Use    Smoking status: Never Smoker    Smokeless tobacco: Never Used    Tobacco comment: NO TOBACCO USE   Vaping Use    Vaping Use: Never used   Substance and Sexual Activity    Alcohol use:  Yes     Alcohol/week: 1 0 standard drink     Types: 1 Glasses of wine per week     Comment: rare    Drug use: No     Comment: Denies    Sexual activity: Not Currently     Partners: Male   Other Topics Concern    Not on file   Social History Narrative    No alcohol use - As per [employer]      Social Determinants of Health     Financial Resource Strain: Not on file   Food Insecurity: Not on file   Transportation Needs: Not on file   Physical Activity: Not on file   Stress: Not on file   Social Connections: Not on file   Intimate Partner Violence: Not on file   Housing Stability: Not on file       Allergies   Allergen Reactions    Atorvastatin Confusion     Fogginess + disorientation    Codeine Vomiting     Reaction Date: 02Sep2003;     Promethazine Other (See Comments)     Very dry mouth and throat which causes swallowing problems    Statins Other (See Comments)     Bad mental fogginess & disorientation         Current Outpatient Medications:     acetaminophen (TYLENOL) 500 mg tablet, Take 1,000 mg by mouth every 6 (six) hours as needed , Disp: , Rfl:     amLODIPine (NORVASC) 5 mg tablet, Take 1 tablet (5 mg total) by mouth daily, Disp: 90 tablet, Rfl: 1    calcium-vitamin D 250-100 MG-UNIT per tablet, Take 1 tablet by mouth daily  , Disp: , Rfl:     Cholecalciferol (Vitamin D-3) 125 MCG (5000 UT) TABS, Take 15,000 Units by mouth once a week Takes on a Friday every week, Disp: , Rfl:     conjugated estrogens (Premarin) 0 625 mg tablet, Take 1 tablet (0 625 mg total) by mouth daily Take daily for 21 days then do not take for 7 days  , Disp: 90 tablet, Rfl: 3    CRANBERRY PO, Take 1 tablet by mouth daily, Disp: , Rfl:     Cyanocobalamin (VITAMIN B 12 PO), Take 500 mcg by mouth daily , Disp: , Rfl:     denosumab (PROLIA) 60 mg/mL, Inject 60 mg under the skin every 6 (six) months  , Disp: , Rfl:     ergocalciferol (VITAMIN D2) 50,000 units, TAKE 1 CAPSULE BY MOUTH MONTHLY, Disp: 3 capsule, Rfl: 3    estradiol (ESTRACE VAGINAL) 0 1 mg/g vaginal cream, Apply small amount to labia 1-2x/week, Disp: 42 5 g, Rfl: 3    ferrous gluconate (FERGON) 240 (27 FE) MG tablet, Take 27 mg by mouth daily Takes in the am, Disp: , Rfl:     Garlic 4141 MG CAPS, Take 1,000 mg by mouth daily, Disp: , Rfl:     levothyroxine 100 mcg tablet, Take 1 tablet (100 mcg total) by mouth daily, Disp: 90 tablet, Rfl: 3    Multiple Vitamins-Minerals (CENTRUM SILVER PO), Take 1 tablet by mouth daily, Disp: , Rfl:     Zinc 30 MG TABS, Take 50 mg by mouth daily , Disp: , Rfl:     clobetasol (TEMOVATE) 0 05 % cream, Apply topically 2 (two) times a day (Patient not taking: Reported on 3/1/2022 ), Disp: 45 g, Rfl: 2    famotidine (PEPCID) 20 mg tablet, TAKE 1 TABLET BY MOUTH  TWICE DAILY (Patient not taking: Reported on 3/1/2022), Disp: 180 tablet, Rfl: 3    Objective:      /68 (BP Location: Left arm, Patient Position: Sitting)   Pulse 70   Resp 18   Ht 5' 2" (1 575 m)   Wt 52 2 kg (115 lb)   LMP  (LMP Unknown)   BMI 21 03 kg/m²          Physical Exam  Constitutional:       General: She is not in acute distress  Appearance: Normal appearance  She is not ill-appearing, toxic-appearing or diaphoretic  HENT:      Head: Normocephalic and atraumatic  Right Ear: External ear normal       Left Ear: External ear normal       Nose: Nose normal       Mouth/Throat:      Mouth: Mucous membranes are moist       Pharynx: Oropharynx is clear  Eyes:      General: No scleral icterus  Extraocular Movements: Extraocular movements intact  Conjunctiva/sclera: Conjunctivae normal    Cardiovascular:      Rate and Rhythm: Normal rate and regular rhythm  Pulses:           Carotid pulses are 2+ on the right side and 2+ on the left side  Radial pulses are 2+ on the right side and 2+ on the left side  Heart sounds: Normal heart sounds  Pulmonary:      Effort: Pulmonary effort is normal  No respiratory distress  Breath sounds: Normal breath sounds  Abdominal:      General: Abdomen is flat  Palpations: Abdomen is soft  Tenderness: There is no abdominal tenderness  Musculoskeletal:         General: Normal range of motion  Cervical back: Normal range of motion and neck supple  Right lower leg: No edema  Left lower leg: No edema  Skin:     General: Skin is warm and dry  Neurological:      General: No focal deficit present  Mental Status: She is alert and oriented to person, place, and time  Mental status is at baseline  Cranial Nerves: No cranial nerve deficit  Sensory: No sensory deficit  Motor: No weakness     Psychiatric:         Mood and Affect: Mood normal          Behavior: Behavior normal

## 2022-03-01 NOTE — PATIENT INSTRUCTIONS
Carotid Artery Disease   AMBULATORY CARE:   Carotid artery disease (CAD)  means the major blood vessels in your neck are narrowed or becoming blocked  These 2 major blood vessels are called the carotid arteries  They supply your brain with blood  The narrow or blocked blood vessels increase your risk for a stroke  CAD is also called carotid artery stenosis  Have someone call your local emergency number (911 in the [address] [facility]) if:   · You have any of the following signs of a stroke:      ? Numbness or drooping on one side of your face     ? Weakness in an arm or leg    ? Confusion or difficulty speaking    ? Dizziness, a severe headache, or vision loss    · You have any of the following signs of a heart attack:      ? Squeezing, pressure, or pain in your chest    ? You may  also have any of the following:     § Discomfort or pain in your back, neck, jaw, stomach, or arm    § Shortness of breath    § Nausea or vomiting    § Lightheadedness or a sudden cold sweat    Call your doctor if:   · You have questions or concerns about your condition or care  Common signs and symptoms:  CAD develops slowly  You may have no signs or symptoms until you have a mini-stroke, or transient ischemic attack (TIA)  A TIA is a temporary lack of blood flow to your brain  A TIA goes away quickly and does not cause permanent damage  A TIA may be a warning sign that you are about to have a stroke  If you have any symptoms of a TIA or stroke, seek care immediately  Warning signs of a stroke: The words BE FAST can help you remember and recognize warning signs of a stroke:  · B = Balance:  Sudden loss of balance    · E = Eyes:  Loss of vision in one or both eyes    · F = Face:  Face droops on one side    · A = Arms:  Arm drops when both arms are raised    · S = Speech:  Speech is slurred or sounds different    · T = Time:  Time to get help immediately       Treatment  depends on how narrow your arteries have become   Treatment also depends on your symptoms and your general health  The goal of treatment is to lower your risk for a stroke  You may need any of the following:  · Medicines:      ? Aspirin,  or other blood thinner, may be recommended  These will help prevent blood clots from forming in your carotid arteries  If your healthcare provider wants you to take aspirin, do not take acetaminophen or ibuprofen instead  ? Cholesterol medicine  lowers your cholesterol level  ? Blood pressure medicine  lowers or helps control your blood pressure  · Procedures  can help open blocked arteries:     ? Carotid endarterectomy (CEA)  is used to cut and remove plaque buildup from your arteries  ? Carotid angioplasty and stenting (GLADIS)  is used to push the plaque against the artery wall with a balloon device  Then, a stent is placed to keep the artery open  A stent is a small metal mesh tube  Prevent a stroke:   · Do not smoke, and avoid secondhand smoke  Nicotine and other chemicals in cigarettes and cigars increase your risk for a stroke  Ask your healthcare provider for information if you currently smoke and need help to quit  E-cigarettes or smokeless tobacco still contain nicotine  Talk to your healthcare provider before you use these products  · Eat a variety of healthy foods  Healthy foods include fruit, vegetables, whole-grain breads, low-fat dairy products, chicken, and fish  Choose fish that are high in omega-3 fatty acids, such as salmon and fresh tuna  Ask your healthcare provider for more information on a heart healthy diet and the DASH eating plan  · Limit sodium (salt)  Sodium may increase your blood pressure  Add less table salt to your foods  Read food labels and choose foods that are low in sodium  Your healthcare provider may suggest you follow a low sodium diet  · Reach or maintain a healthy weight  Extra weight makes your heart work harder  Ask your healthcare provider what a healthy weight is for you  He or she can help you create a safe weight loss plan  Even a weight loss of 10% of your extra body weight can help your heart function better  · Exercise regularly  Exercise helps improve heart function and can help you manage your weight  Exercise can also help lower your cholesterol and blood sugar levels  Try to get at least 30 minutes of exercise 5 times each week  Try to be physically active every day  This may include walking, riding a bicycle, or swimming  Your healthcare provider can help you create an exercise plan that works best for you  · Limit alcohol  Alcohol can increase your blood pressure and triglyceride levels  A drink of alcohol is 12 ounces of beer, 5 ounces of wine, or 1½ ounces of liquor  Follow up with your doctor as directed:  Write down your questions so you remember to ask them during your visits  © Copyright [employer] 2022 Information is for End User's use only and may not be sold, redistributed or otherwise used for commercial purposes  All illustrations and images included in CareNotes® are the copyrighted property of [employer]  or [facility]   The above information is an  only  It is not intended as medical advice for individual conditions or treatments  Talk to your doctor, nurse or pharmacist before following any medical regimen to see if it is safe and effective for you

## 2022-03-01 NOTE — ASSESSMENT & PLAN NOTE
70year old female w/ hx of HTN, hypothyroidism, RYGB, pancytopenia and symptomatic high-grade right carotid artery stenosis with preoperative TIA (3/27/2019) s/p R CEA by Dr Lopez Pain (4/3/2019)  Patient presents for review of recent noninvasive imaging study and for risk factor modification      -Duplex reviewed with pt  R ICA s/p endarterectomy widely patent without evidence of recurrent stenosis with stable mild <50% contralateral disease    -Reviewed pathophysiology and indications for treatment of carotid disease  -No surgical intervention indicated   -Patient is statin intolerant     -Continue healthy diet, regular exercise  -Repeat Carotid duplex in 1 year   If results remain stable without change, can follow up in 2 years with continued yearly surveillance

## 2022-03-02 ENCOUNTER — TELEPHONE (OUTPATIENT)
Dept: HEMATOLOGY ONCOLOGY | Facility: CLINIC | Age: 82
End: 2022-03-02

## 2022-03-02 NOTE — TELEPHONE ENCOUNTER
New Patient Intake Form   Patient Details:    Juan Duran  1940  [id number]    Appointment Information   Who is calling to schedule? Patient   If not self, what is the caller's name? Please put name of [facility] nurse as well  Referring provider Riky Hunt MD   What is the diagnosis? Lipoma of left lower extremity     Is there a confirmed tissue diagnosis? No   Is there a biopsy ordered or pending? Please specify dates   No     Is patient aware of diagnosis? Yes   Have you had any imaging or labs done? If yes, where? (If imaging done outside of [facility], please remind patient to bring a disk ) No     If imaging done at outside facility, did you instruct patient to obtain discs and bring to visit? Have you been seen by another Oncologist/Hematologist?  If so, who and where? No   Are the records in [facility] or Care Everywhere? Yes   Does the patient have records at another facility/hospital? No   If yes, Name of facility, city and state where facility is located  Did you instruct patient to have records faxed to rightx and provide rightfax number? Preferred [place]   Is the patient willing to be seen by another provider?   (This is for breast patients only)    Miscellaneous Information:     Appointment made for 3/30/22 at  with Dr Froy Adams

## 2022-03-10 ENCOUNTER — HOSPITAL ENCOUNTER (OUTPATIENT)
Dept: RADIOLOGY | Facility: HOSPITAL | Age: 82
Discharge: HOME | End: 2022-03-10
Attending: INTERNAL MEDICINE
Payer: MEDICARE

## 2022-03-10 DIAGNOSIS — R13.12 OROPHARYNGEAL DYSPHAGIA: ICD-10-CM

## 2022-03-10 PROCEDURE — 92611 MOTION FLUOROSCOPY/SWALLOW: CPT

## 2022-03-10 PROCEDURE — 74230 X-RAY XM SWLNG FUNCJ C+: CPT
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
## 2022-03-23 ENCOUNTER — RA CDI HCC (OUTPATIENT)
Dept: OTHER | Facility: HOSPITAL | Age: 82
End: 2022-03-23

## 2022-03-23 NOTE — PROGRESS NOTES
Platelet count ok  [facility]  coding opportunities       Chart reviewed, no opportunity found:   [address]        Patients Insurance     Medicare Insurance: [employer] Ad[employer]

## 2022-03-29 ENCOUNTER — HOSPITAL ENCOUNTER (OUTPATIENT)
Dept: RADIOLOGY | Age: 82
Discharge: HOME | End: 2022-03-29
Payer: MEDICARE

## 2022-03-29 DIAGNOSIS — M81.0 OSTEOPOROSIS, UNSPECIFIED OSTEOPOROSIS TYPE, UNSPECIFIED PATHOLOGICAL FRACTURE PRESENCE: ICD-10-CM

## 2022-03-29 PROBLEM — D17.24 LIPOMA OF LEFT THIGH: Status: ACTIVE | Noted: 2022-03-29

## 2022-03-29 PROCEDURE — 77080 DXA BONE DENSITY AXIAL: CPT

## 2022-03-30 ENCOUNTER — CONSULT (OUTPATIENT)
Dept: SURGICAL ONCOLOGY | Facility: CLINIC | Age: 82
End: 2022-03-30
Payer: MEDICARE

## 2022-03-30 VITALS
HEIGHT: 62 IN | WEIGHT: 115.2 LBS | TEMPERATURE: 97.9 F | BODY MASS INDEX: 21.2 KG/M2 | SYSTOLIC BLOOD PRESSURE: 120 MMHG | DIASTOLIC BLOOD PRESSURE: 70 MMHG

## 2022-03-30 DIAGNOSIS — D17.24 LIPOMA OF LEFT LOWER EXTREMITY: ICD-10-CM

## 2022-03-30 DIAGNOSIS — D17.24 LIPOMA OF LEFT THIGH: Primary | ICD-10-CM

## 2022-03-30 PROCEDURE — 99203 OFFICE O/P NEW LOW 30 MIN: CPT | Performed by: SURGERY

## 2022-03-30 RX ORDER — LORAZEPAM 1 MG/1
1 TABLET ORAL 2 TIMES DAILY
Qty: 3 TABLET | Refills: 0 | Status: SHIPPED | OUTPATIENT
Start: 2022-03-30
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
## 2022-04-19 ENCOUNTER — APPOINTMENT (OUTPATIENT)
Dept: LAB | Facility: MEDICAL CENTER | Age: 82
End: 2022-04-19
Payer: MEDICARE

## 2022-04-19 DIAGNOSIS — D17.24 LIPOMA OF LEFT THIGH: ICD-10-CM

## 2022-04-19 LAB
ANION GAP SERPL CALCULATED.3IONS-SCNC: 1 MMOL/L (ref 4–13)
BUN SERPL-MCNC: 13 MG/DL (ref 5–25)
CALCIUM SERPL-MCNC: 8.3 MG/DL (ref 8.3–10.1)
CHLORIDE SERPL-SCNC: 111 MMOL/L (ref 100–108)
CO2 SERPL-SCNC: 29 MMOL/L (ref 21–32)
CREAT SERPL-MCNC: 0.69 MG/DL (ref 0.6–1.3)
GFR SERPL CREATININE-BSD FRML MDRD: 82 ML/MIN/1.73SQ M
GLUCOSE P FAST SERPL-MCNC: 97 MG/DL (ref 65–99)
POTASSIUM SERPL-SCNC: 4.5 MMOL/L (ref 3.5–5.3)
SODIUM SERPL-SCNC: 141 MMOL/L (ref 136–145)

## 2022-04-19 PROCEDURE — 36415 COLL VENOUS BLD VENIPUNCTURE: CPT

## 2022-04-19 PROCEDURE — 80048 BASIC METABOLIC PNL TOTAL CA: CPT

## 2022-04-22 ENCOUNTER — OFFICE VISIT (OUTPATIENT)
Dept: INTERNAL MEDICINE CLINIC | Facility: CLINIC | Age: 82
End: 2022-04-22
Payer: MEDICARE

## 2022-04-22 VITALS
WEIGHT: 115.4 LBS | SYSTOLIC BLOOD PRESSURE: 136 MMHG | OXYGEN SATURATION: 98 % | HEART RATE: 73 BPM | DIASTOLIC BLOOD PRESSURE: 70 MMHG | HEIGHT: 62 IN | BODY MASS INDEX: 21.23 KG/M2 | TEMPERATURE: 98.2 F

## 2022-04-22 DIAGNOSIS — E03.9 ACQUIRED HYPOTHYROIDISM: ICD-10-CM

## 2022-04-22 DIAGNOSIS — I10 ESSENTIAL HYPERTENSION: Primary | ICD-10-CM

## 2022-04-22 DIAGNOSIS — M81.0 AGE-RELATED OSTEOPOROSIS WITHOUT CURRENT PATHOLOGICAL FRACTURE: ICD-10-CM

## 2022-04-22 DIAGNOSIS — K91.2 POSTSURGICAL MALABSORPTION: ICD-10-CM

## 2022-04-22 DIAGNOSIS — M54.50 ACUTE LEFT-SIDED LOW BACK PAIN WITHOUT SCIATICA: ICD-10-CM

## 2022-04-22 PROCEDURE — 99214 OFFICE O/P EST MOD 30 MIN: CPT | Performed by: INTERNAL MEDICINE
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
## 2022-04-26 ENCOUNTER — HOSPITAL ENCOUNTER (OUTPATIENT)
Dept: MRI IMAGING | Facility: HOSPITAL | Age: 82
Discharge: HOME | End: 2022-04-26
Attending: SURGERY
Payer: MEDICARE

## 2022-04-26 DIAGNOSIS — D17.24 LIPOMA OF LEFT THIGH: ICD-10-CM

## 2022-04-26 PROCEDURE — G1004 CDSM NDSC: HCPCS

## 2022-04-26 PROCEDURE — 73720 MRI LWR EXTREMITY W/O&W/DYE: CPT

## 2022-04-26 PROCEDURE — A9585 GADOBUTROL INJECTION: HCPCS | Performed by: SURGERY

## 2022-04-26 RX ADMIN — GADOBUTROL 5 ML: 604.72 INJECTION INTRAVENOUS at 09:15

## 2022-05-04 ENCOUNTER — OFFICE VISIT (OUTPATIENT)
Dept: SURGICAL ONCOLOGY | Facility: CLINIC | Age: 82
End: 2022-05-04
Payer: MEDICARE

## 2022-05-04 VITALS
SYSTOLIC BLOOD PRESSURE: 140 MMHG | RESPIRATION RATE: 17 BRPM | OXYGEN SATURATION: 99 % | WEIGHT: 112.2 LBS | DIASTOLIC BLOOD PRESSURE: 60 MMHG | HEART RATE: 81 BPM | HEIGHT: 62 IN | TEMPERATURE: 97.5 F | BODY MASS INDEX: 20.65 KG/M2

## 2022-05-04 DIAGNOSIS — D17.24 LIPOMA OF LEFT THIGH: Primary | ICD-10-CM

## 2022-05-04 PROCEDURE — 99213 OFFICE O/P EST LOW 20 MIN: CPT | Performed by: SURGERY

## 2022-05-04 RX ORDER — METHYLPREDNISOLONE 4 MG/1
TABLET ORAL
COMMUNITY
Start: 2022-05-02
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
## 2022-05-06 ENCOUNTER — TELEPHONE (OUTPATIENT)
Dept: INTERNAL MEDICINE CLINIC | Facility: CLINIC | Age: 82
End: 2022-05-06

## 2022-05-06 NOTE — TELEPHONE ENCOUNTER
Called patient and left message letting her know Dr Catherine Hand called her today but not ablt to reach her  Dr Catherine Hand will try to call again on Monday

## 2022-05-25 ENCOUNTER — CONSULT (OUTPATIENT)
Dept: PLASTIC SURGERY | Facility: CLINIC | Age: 82
End: 2022-05-25
Payer: MEDICARE

## 2022-05-25 VITALS
HEIGHT: 62 IN | DIASTOLIC BLOOD PRESSURE: 75 MMHG | BODY MASS INDEX: 20.61 KG/M2 | WEIGHT: 112 LBS | TEMPERATURE: 98.2 F | HEART RATE: 80 BPM | SYSTOLIC BLOOD PRESSURE: 135 MMHG

## 2022-05-25 DIAGNOSIS — D17.24 LIPOMA OF LEFT THIGH: ICD-10-CM

## 2022-05-25 PROCEDURE — 99203 OFFICE O/P NEW LOW 30 MIN: CPT | Performed by: PHYSICIAN ASSISTANT
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
## 2022-06-02 ENCOUNTER — HOSPITAL ENCOUNTER (OUTPATIENT)
Dept: MAMMOGRAPHY | Facility: MEDICAL CENTER | Age: 82
Discharge: HOME | End: 2022-06-02
Payer: MEDICARE

## 2022-06-02 VITALS — HEIGHT: 62 IN | BODY MASS INDEX: 20.61 KG/M2 | WEIGHT: 112 LBS

## 2022-06-02 DIAGNOSIS — Z12.31 ENCOUNTER FOR SCREENING MAMMOGRAM FOR BREAST CANCER: ICD-10-CM

## 2022-06-02 PROCEDURE — 77063 BREAST TOMOSYNTHESIS BI: CPT

## 2022-06-02 PROCEDURE — 77067 SCR MAMMO BI INCL CAD: CPT

## 2022-06-08 ENCOUNTER — EVALUATION (OUTPATIENT)
Dept: PHYSICAL THERAPY | Facility: CLINIC | Age: 82
End: 2022-06-08
Payer: MEDICARE

## 2022-06-08 VITALS — SYSTOLIC BLOOD PRESSURE: 133 MMHG | DIASTOLIC BLOOD PRESSURE: 52 MMHG

## 2022-06-08 DIAGNOSIS — M54.40 LOW BACK PAIN WITH SCIATICA, SCIATICA LATERALITY UNSPECIFIED, UNSPECIFIED BACK PAIN LATERALITY, UNSPECIFIED CHRONICITY: ICD-10-CM

## 2022-06-08 DIAGNOSIS — M47.896 OTHER OSTEOARTHRITIS OF SPINE, LUMBAR REGION: Primary | ICD-10-CM

## 2022-06-08 PROCEDURE — 97161 PT EVAL LOW COMPLEX 20 MIN: CPT | Performed by: PHYSICAL THERAPIST

## 2022-06-08 PROCEDURE — 97140 MANUAL THERAPY 1/> REGIONS: CPT | Performed by: PHYSICAL THERAPIST

## 2022-06-08 PROCEDURE — 97110 THERAPEUTIC EXERCISES: CPT | Performed by: PHYSICAL THERAPIST

## 2022-06-08 NOTE — LETTER
2022    Tracie Thurston   [address]  [address] [place], [place] [id number] [facility] [address] [facility]    Patient: Rickford Romberg   YOB: 1940   Date of Visit: 2022     Encounter Diagnosis     ICD-10-CM    1  Other osteoarthritis of spine, lumbar region  M47 896    2  Low back pain with sciatica, sciatica laterality unspecified, unspecified back pain laterality, unspecified chronicity  M54 40        Dear Dr Arnoldo Nelson: Thank you for your recent referral of Rickford Romberg  Please review the attached evaluation summary from Yajaira's recent visit  Please verify that you agree with the plan of care by signing the attached order  If you have any questions or concerns, please do not hesitate to call  I sincerely appreciate the opportunity to share in the care of one of your patients and hope to have another opportunity to work with you in the near future  Sincerely,    Mara Dubin, PT      Referring Provider:      I certify that I have read the below Plan of Care and certify the need for these services furnished under this plan of treatment while under my care  Tracie ThurstonDO  [address]  [address] [place], [place] [id number] [facility] [address] [facility]  Via Fax: [phone]          PT Evaluation     Today's date: 2022  Patient name: Rickford Romberg  : 1940  MRN: [id number]  Referring provider: Shraddha Baltazar DO  Dx:   Encounter Diagnosis     ICD-10-CM    1  Other osteoarthritis of spine, lumbar region  M47 896    2  Low back pain with sciatica, sciatica laterality unspecified, unspecified back pain laterality, unspecified chronicity  M54 40        Start Time: 1000  Stop Time: 1100  Total time in clinic (min): 60 minutes    Assessment  Assessment details: [de-identified]year old female with 1 month hx of L hip and LBP  Pain range 0-10/10  Pt received cortisone injection L hip 2 days ago and hashahd significant pain relief    Weakness evident B hands, more pronounced L especially hip abd 3-/5   + Salo sign L  Minimal pain L LS spine, however exhibits stiffness and trunk planes especially L trunk ext  Significant LE weakness limiting patient to 25% squat  Pt reports waking at night with pain, and very painful after prolonged sitting with sit to stand transfers  Oswestry 23 7  Pt would benefit from a course of outpatient PT with goal of Decreaseing pain and improving ROM/strength and function in L hip and LS spine, andin order to prevent future episodes of hip and back pain  Impairments: abnormal or restricted ROM, activity intolerance, impaired physical strength, lacks appropriate home exercise program and pain with function  Functional limitations: wakes with pain, pain after sitting, transfers painful  Symptom irritability: moderateUnderstanding of Dx/Px/POC: fair   Prognosis: good    Goals  STG- 4 weeks 7/6/22  1  I HEP  2  Increase AROM L hip abd to 30  3  Increase strength L hip abd to 4-/5  4  Improve squat to 50%  5  Decrease pain to 0-5/10 range    LTG- 8 weeks  1  Pain range 0-2/10  2  Increase trunk ROM to min limit all planes  3  Improve L hip strength to 4/5  4  Improve STS test by 5-10 "  5  Improve B squat to 75%  6  Improve Oswestry by 5-10 points    Plan  Patient would benefit from: PT eval and skilled physical therapy  Planned modality interventions: cryotherapy  Planned therapy interventions: manual therapy, neuromuscular re-education, therapeutic activities, therapeutic exercise, patient education and home exercise program  Frequency: 2x week  Duration in visits: 16  Duration in weeks: 8  Plan of Care beginning date: 6/8/2022  Plan of Care expiration date: 8/3/2022  Treatment plan discussed with: patient        Subjective Evaluation    History of Present Illness  Date of onset: 5/1/2022  Mechanism of injury: Pt reports onset of L hip/LBP 1 month ago  X-rays of lumbar spine reveal arhtritis  L hip pain is chief complaint  Pt received cortisone injection L hip on 6/6/22    Pain has resolved since injection  Not a recurrent problem   Quality of life: good    Pain  Current pain ratin  At best pain ratin  At worst pain rating: 10  Quality: knife-like and throbbing  Aggravating factors: sitting  Progression: improved    Social Support  Steps to enter house: yes  6  Stairs in house: no   Lives in: [place]  Lives with: spouse    Employment status: working ( at [employer] 14 hours/week)  Hand dominance: right      Diagnostic Tests  X-ray: abnormal  Treatments  Current treatment: physical therapy  Patient Goals  Patient goals for therapy: increased strength, independence with ADLs/IADLs and increased motion          Objective     Tenderness     Additional Tenderness Details  Tenderness L buttock, posterior iliac crest    Active Range of Motion     Lumbar   Flexion:  Restriction level: moderate  Extension:  Restriction level: minimal  Left lateral flexion:  Restriction level: minimal  Right lateral flexion:  Restriction level: minimal  Left rotation:  Restriction level: moderate  Right rotation:  Restriction level: moderate  Left Hip   Flexion: 118 degrees   Abduction: 24 degrees     Right Hip   Flexion: 108 degrees   Abduction: 24 degrees     Additional Active Range of Motion Details  SB L/50cm, SB R/51cm,  FF/31cm  Mod limit ext L, min limit R  Min limit with pain ER, mod limit IR L  Min limit IR/ER R    Passive SLR ; R/70, L/62    Strength/Myotome Testing     Left Hip   Planes of Motion   Flexion: 4-  Extension: 4-  Abduction: 3-    Right Hip   Planes of Motion   Flexion: 4-  Extension: 4  Abduction: 4    Additional Strength Details  B squat 25%, HT walk intact with hand hold for balance    Tests     Left Hip   Positive Salo  SLR: Negative       General Comments:      Lumbar Comments  22- Oswestry 23 7    Hip Comments   22- TUG 13", 5x STS/25"      Flowsheet Rows    Flowsheet Row Most Recent Value   PT/OT G-Codes    Current Score 56   Projected Score 68 FOTO information reviewed Yes   Assessment Type Evaluation             Precautions: Fall risk      Manuals 6/8/22            Foam roller massage to L hip/IT band CRR            PROM/stretch L hip NV                                      Neuro Re-Ed                                                                                                        Ther Ex             K to C  LTR  PPT 5x all            S/L L hip abd stretch 2 x 15"            Hip iso abd/add  Hip flx/LAQ   10x all GTB  10x LAQ/hip flx 1#                                                                             Ther Activity             Stationary bike NV            Stand hip ROM  squat  march  HT raises NV            Sit to stand no hands 5x                                      Modalities CP L hip in R S/L  10'

## 2022-06-08 NOTE — PROGRESS NOTES
PT Evaluation     Today's date: 2022  Patient name: Cecily Perez  : 1940  MRN: [id number]  Referring provider: Lourdes Rasheed DO  Dx:   Encounter Diagnosis     ICD-10-CM    1  Other osteoarthritis of spine, lumbar region  M47 896    2  Low back pain with sciatica, sciatica laterality unspecified, unspecified back pain laterality, unspecified chronicity  M54 40        Start Time: 1000  Stop Time: 1100  Total time in clinic (min): 60 minutes    Assessment  Assessment details: [de-identified]year old female with 1 month hx of L hip and LBP  Pain range 0-10/10  Pt received cortisone injection L hip 2 days ago and hashahd significant pain relief  Weakness evident B hands, more pronounced L especially hip abd 3-/5   + Salo sign L  Minimal pain L LS spine, however exhibits stiffness and trunk planes especially L trunk ext  Significant LE weakness limiting patient to 25% squat  Pt reports waking at night with pain, and very painful after prolonged sitting with sit to stand transfers  Oswestry 23 7  Pt would benefit from a course of outpatient PT with goal of Decreaseing pain and improving ROM/strength and function in L hip and LS spine, andin order to prevent future episodes of hip and back pain  Impairments: abnormal or restricted ROM, activity intolerance, impaired physical strength, lacks appropriate home exercise program and pain with function  Functional limitations: wakes with pain, pain after sitting, transfers painful  Symptom irritability: moderateUnderstanding of Dx/Px/POC: fair   Prognosis: good    Goals  STG- 4 weeks 22  1  I HEP  2  Increase AROM L hip abd to 30  3  Increase strength L hip abd to 4-/5  4  Improve squat to 50%  5  Decrease pain to 0-5/10 range    LTG- 8 weeks  1  Pain range 0-2/10  2  Increase trunk ROM to min limit all planes  3  Improve L hip strength to 4/5  4  Improve STS test by 5-10 "  5  Improve B squat to 75%  6   Improve Oswestry by 5-10 points    Plan  Patient would benefit from: PT eval and skilled physical therapy  Planned modality interventions: cryotherapy  Planned therapy interventions: manual therapy, neuromuscular re-education, therapeutic activities, therapeutic exercise, patient education and home exercise program  Frequency: 2x week  Duration in visits: 16  Duration in weeks: 8  Plan of Care beginning date: 2022  Plan of Care expiration date: 8/3/2022  Treatment plan discussed with: patient        Subjective Evaluation    History of Present Illness  Date of onset: 2022  Mechanism of injury: Pt reports onset of L hip/LBP 1 month ago  X-rays of lumbar spine reveal arhtritis  L hip pain is chief complaint  Pt received cortisone injection L hip on 22  Pain has resolved since injection            Not a recurrent problem   Quality of life: good    Pain  Current pain ratin  At best pain ratin  At worst pain rating: 10  Quality: knife-like and throbbing  Aggravating factors: sitting  Progression: improved    Social Support  Steps to enter house: yes  6  Stairs in house: no   Lives in: [facility]  Lives with: spouse    Employment status: working ( at [employer] 14 hours/week)  Hand dominance: right      Diagnostic Tests  X-ray: abnormal  Treatments  Current treatment: physical therapy  Patient Goals  Patient goals for therapy: increased strength, independence with ADLs/IADLs and increased motion          Objective     Tenderness     Additional Tenderness Details  Tenderness L buttock, posterior iliac crest    Active Range of Motion     Lumbar   Flexion:  Restriction level: moderate  Extension:  Restriction level: minimal  Left lateral flexion:  Restriction level: minimal  Right lateral flexion:  Restriction level: minimal  Left rotation:  Restriction level: moderate  Right rotation:  Restriction level: moderate  Left Hip   Flexion: 118 degrees   Abduction: 24 degrees     Right Hip   Flexion: 108 degrees   Abduction: 24 degrees     Additional Active Range of Motion Details  SB L/50cm, SB R/51cm,  FF/31cm  Mod limit ext L, min limit R  Min limit with pain ER, mod limit IR L  Min limit IR/ER R    Passive SLR ; R/70, L/62    Strength/Myotome Testing     Left Hip   Planes of Motion   Flexion: 4-  Extension: 4-  Abduction: 3-    Right Hip   Planes of Motion   Flexion: 4-  Extension: 4  Abduction: 4    Additional Strength Details  B squat 25%, HT walk intact with hand hold for balance    Tests     Left Hip   Positive Salo  SLR: Negative       General Comments:      Lumbar Comments  6/8/22- Oswestry 23 7    Hip Comments   6/6/22- TUG 13", 5x STS/25"      Flowsheet Rows    Flowsheet Row Most Recent Value   PT/OT G-Codes    Current Score 56   Projected Score 68   FOTO information reviewed Yes   Assessment Type Evaluation             Precautions: Fall risk      Manuals 6/8/22            Foam roller massage to L hip/IT band CRR            PROM/stretch L hip NV                                      Neuro Re-Ed                                                                                                        Ther Ex             K to C  LTR  PPT 5x all            S/L L hip abd stretch 2 x 15"            Hip iso abd/add  Hip flx/LAQ   10x all GTB  10x LAQ/hip flx 1#                                                                             Ther Activity             Stationary bike NV            Stand hip ROM  squat  march  HT raises NV            Sit to stand no hands 5x                                      Modalities CP L hip in R S/L  10'

## 2022-06-13 ENCOUNTER — OFFICE VISIT (OUTPATIENT)
Dept: PHYSICAL THERAPY | Facility: CLINIC | Age: 82
End: 2022-06-13
Payer: MEDICARE

## 2022-06-13 DIAGNOSIS — M54.40 LOW BACK PAIN WITH SCIATICA, SCIATICA LATERALITY UNSPECIFIED, UNSPECIFIED BACK PAIN LATERALITY, UNSPECIFIED CHRONICITY: Primary | ICD-10-CM

## 2022-06-13 DIAGNOSIS — M47.896 OTHER OSTEOARTHRITIS OF SPINE, LUMBAR REGION: ICD-10-CM

## 2022-06-13 PROCEDURE — 97110 THERAPEUTIC EXERCISES: CPT | Performed by: PHYSICAL THERAPIST

## 2022-06-13 PROCEDURE — 97140 MANUAL THERAPY 1/> REGIONS: CPT | Performed by: PHYSICAL THERAPIST

## 2022-06-13 PROCEDURE — 97530 THERAPEUTIC ACTIVITIES: CPT | Performed by: PHYSICAL THERAPIST

## 2022-06-13 NOTE — PROGRESS NOTES
Daily Note     Today's date: 2022  Patient name: Erica Rey  : 1940  MRN: [id number]  Referring provider: Shant Mortensen DO  Dx:   Encounter Diagnosis     ICD-10-CM    1  Low back pain with sciatica, sciatica laterality unspecified, unspecified back pain laterality, unspecified chronicity  M54 40    2  Other osteoarthritis of spine, lumbar region  M47 896        Start Time: 0900  Stop Time: 0940  Total time in clinic (min): 40 minutes    Subjective:  L hip pain free, only painful to the touch  Objective: See treatment diary below      Assessment:  Painful with palpation L Greater trochanter and IT band  Weakness B quad persists  Initiated close chain strengthening without pain, only able to complete 1/4 squat  Sit to stand no hands with great difficulty  Continue PT emphasizing LE strengthening with goal of resuming all activity without pain  Plan: Progress treatment as tolerated         Precautions: Fall risk      Manuals 22           Foam roller massage to L hip/IT band CRR CRR           PROM/stretch L hip NV CRR                                     Neuro Re-Ed                                                                                                        Ther Ex             K to C  LTR  PPT 5x all 5x  10x  10x           S/L L hip abd stretch 2 x 15" 2 x 15"           Hip iso abd/add  Hip flx/LAQ   10x all GTB  10x LAQ/hip flx 1# seated 20x/20x GTB  20x/20x           R S/l clamshells  10x                                                               Ther Activity             Stationary bike NV 5' seat 11           Stand hip ROM  squat  march  HT raises NV march 10x  Hip abd L/R 10x  Ext 10x  HT raises 20x           Sit to stand no hands 5x 5x                                     Modalities CP L hip in R S/L  10' -

## 2022-06-15 ENCOUNTER — OFFICE VISIT (OUTPATIENT)
Dept: PHYSICAL THERAPY | Facility: CLINIC | Age: 82
End: 2022-06-15
Payer: MEDICARE

## 2022-06-15 DIAGNOSIS — M54.40 LOW BACK PAIN WITH SCIATICA, SCIATICA LATERALITY UNSPECIFIED, UNSPECIFIED BACK PAIN LATERALITY, UNSPECIFIED CHRONICITY: ICD-10-CM

## 2022-06-15 DIAGNOSIS — M47.896 OTHER OSTEOARTHRITIS OF SPINE, LUMBAR REGION: Primary | ICD-10-CM

## 2022-06-15 PROCEDURE — 97530 THERAPEUTIC ACTIVITIES: CPT | Performed by: PHYSICAL THERAPIST

## 2022-06-15 PROCEDURE — 97110 THERAPEUTIC EXERCISES: CPT | Performed by: PHYSICAL THERAPIST

## 2022-06-15 NOTE — PROGRESS NOTES
Daily Note     Today's date: 6/15/2022  Patient name: Marianne Crook  : 1940  MRN: [id number]  Referring provider: Kay Espinosa DO  Dx:   Encounter Diagnosis     ICD-10-CM    1  Other osteoarthritis of spine, lumbar region  M47 896    2  Low back pain with sciatica, sciatica laterality unspecified, unspecified back pain laterality, unspecified chronicity  M54 40        Start Time: 1150  Stop Time: 1230  Total time in clinic (min): 40 minutes    Subjective: L hip is pain free  Objective: See treatment diary below      Assessment: Tenderness with foam roller massage over L IT band  Pt with difficulty completing sit to stand without hands due to hip/quad weakness  Progressing with jass interventions, open and close chain LE strengthening with goal of resuming all pre-morbid activity with minimal pain  Plan: Progress treatment as tolerated         Precautions: Fall risk      Manuals 6/8/22 6/13 6/15          Foam roller massage to L hip/IT band CRR CRR CRR          PROM/stretch L hip NV CRR CRR                                    Neuro Re-Ed                                                                                                        Ther Ex             K to C  LTR  PPT 5x all 5x  10x  10x 5x  10x  10x          S/L L hip abd stretch 2 x 15" 2 x 15" 2 x 15"          Hip iso abd/add  Hip flx/LAQ   10x all GTB  10x LAQ/hip flx 1# seated 20x/20x GTB  20x/20x 20x all GTB          R S/l clamshells  10x 20x          Bridge    2 x 10                                                 Ther Activity             Stationary bike NV 5' seat 11           Stand hip ROM  squat  march  HT raises NV march 10x  Hip abd L/R 10x  Ext 10x  HT raises 20x 20x all          Sit to stand no hands 5x 5x 5x                                    Modalities CP L hip in R S/L  10' -

## 2022-06-20 ENCOUNTER — OFFICE VISIT (OUTPATIENT)
Dept: PHYSICAL THERAPY | Facility: CLINIC | Age: 82
End: 2022-06-20
Payer: MEDICARE

## 2022-06-20 DIAGNOSIS — M47.896 OTHER OSTEOARTHRITIS OF SPINE, LUMBAR REGION: ICD-10-CM

## 2022-06-20 DIAGNOSIS — M54.40 LOW BACK PAIN WITH SCIATICA, SCIATICA LATERALITY UNSPECIFIED, UNSPECIFIED BACK PAIN LATERALITY, UNSPECIFIED CHRONICITY: Primary | ICD-10-CM

## 2022-06-20 PROCEDURE — 97140 MANUAL THERAPY 1/> REGIONS: CPT | Performed by: PHYSICAL THERAPIST

## 2022-06-20 PROCEDURE — 97530 THERAPEUTIC ACTIVITIES: CPT | Performed by: PHYSICAL THERAPIST

## 2022-06-20 PROCEDURE — 97110 THERAPEUTIC EXERCISES: CPT | Performed by: PHYSICAL THERAPIST

## 2022-06-20 NOTE — PROGRESS NOTES
Daily Note     Today's date: 2022  Patient name: Bobbi Huntley  : 1940  MRN: [id number]  Referring provider: Zhao Whitt DO  Dx:   Encounter Diagnosis     ICD-10-CM    1  Low back pain with sciatica, sciatica laterality unspecified, unspecified back pain laterality, unspecified chronicity  M54 40    2  Other osteoarthritis of spine, lumbar region  M47 916                   Subjective: L hip pain free except when it is bumped or pt touches it  Objective: See treatment diary below      Assessment: Progressing with L hip/LE strengthening  Rise to stand from chair with greater ease, able to complete 10 reps today without use of hands  Painful with palpation mid lateral thigh over IT band  Continue PT emphasizing strengthening with goal of returning to all pre-morbid activity and preventing recurrence  Plan: Progress treatment as tolerated         Precautions: Fall risk      Manuals 6/8/22 6/13 6/15 6/20         Foam roller massage to L hip/IT band CRR CRR CRR CRR         PROM/stretch L hip NV CRR CRR CRR                                   Neuro Re-Ed                                                                                                        Ther Ex             K to C  LTR  PPT 5x all 5x  10x  10x 5x  10x  10x 5x  10x  10x         S/L L hip abd stretch 2 x 15" 2 x 15" 2 x 15" 2  x15"         Hip iso abd/add  Hip flx/LAQ   10x all GTB  10x LAQ/hip flx 1# seated 20x/20x GTB  20x/20x 20x all GTB  1# LAQ/hip flx over dowel 20x all GTB , 1# PRE's         R S/l clamshells  10x 20x 20x         Bridge    2 x 10 20x                                                Ther Activity             Stationary bike NV 5' seat 11  5'         Stand hip ROM  squat  march  HT raises NV march 10x  Hip abd L/R 10x  Ext 10x  HT raises 20x 20x all 20x all         Sit to stand no hands 5x 5x 5x 10x         Step up 4"     10x L                      Modalities CP L hip in R S/L  10' -

## 2022-06-21 ENCOUNTER — APPOINTMENT (OUTPATIENT)
Dept: PHYSICAL THERAPY | Facility: CLINIC | Age: 82
End: 2022-06-21
Payer: MEDICARE

## 2022-06-22 ENCOUNTER — OFFICE VISIT (OUTPATIENT)
Dept: PHYSICAL THERAPY | Facility: CLINIC | Age: 82
End: 2022-06-22
Payer: MEDICARE

## 2022-06-22 DIAGNOSIS — M54.40 LOW BACK PAIN WITH SCIATICA, SCIATICA LATERALITY UNSPECIFIED, UNSPECIFIED BACK PAIN LATERALITY, UNSPECIFIED CHRONICITY: ICD-10-CM

## 2022-06-22 DIAGNOSIS — M47.896 OTHER OSTEOARTHRITIS OF SPINE, LUMBAR REGION: Primary | ICD-10-CM

## 2022-06-22 PROCEDURE — 97110 THERAPEUTIC EXERCISES: CPT | Performed by: PHYSICAL THERAPIST

## 2022-06-22 PROCEDURE — 97140 MANUAL THERAPY 1/> REGIONS: CPT | Performed by: PHYSICAL THERAPIST

## 2022-06-22 PROCEDURE — 97530 THERAPEUTIC ACTIVITIES: CPT | Performed by: PHYSICAL THERAPIST

## 2022-06-22 NOTE — PROGRESS NOTES
Daily Note     Today's date: 2022  Patient name: Bhargav Lizarraga  : 1940  MRN: [id number]  Referring provider: Zohra Reeder DO  Dx:   Encounter Diagnosis     ICD-10-CM    1  Other osteoarthritis of spine, lumbar region  M47 896    2  Low back pain with sciatica, sciatica laterality unspecified, unspecified back pain laterality, unspecified chronicity  M54 40        Start Time: 1350  Stop Time: 1435  Total time in clinic (min): 45 minutes    Subjective: Pain free most of the time, tenderness when touched over IT band  Pt seen by MD for follow-up today and suggests tapering to HEP  Will see 2 more visits then DC to HEP    Objective: See treatment diary below      Assessment: Initiated spinal stabilization ex without increase in pain  Progressing with strengthening  Plan: Progress treatment as tolerated         Precautions: Fall risk      Manuals 6/8/22 6/13 6/15 6/20 6/22        Foam roller massage to L hip/IT band CRR CRR CRR CRR CRR        PROM/stretch L hip NV CRR CRR CRR CRR                          FOTO        Neuro Re-Ed                                                                                                        Ther Ex             K to C  LTR  PPT 5x all 5x  10x  10x 5x  10x  10x 5x  10x  10x 5x  10x  10x        S/L L hip abd stretch 2 x 15" 2 x 15" 2 x 15" 2  x15" 2 x 15"        Hip iso abd/add  Hip flx/LAQ   10x all GTB  10x LAQ/hip flx 1# seated 20x/20x GTB  20x/20x 20x all GTB  1# LAQ/hip flx over dowel 20x all GTB , 1# PRE's 20x all GTB  20x/20x 1#        R S/l clamshells  10x 20x 20x 20x        Bridge    2 x 10 20x 20x                                               Ther Activity             Stationary bike NV 5' seat 11  5' 6'        Stand hip ROM  squat  march  HT raises NV march 10x  Hip abd L/R 10x  Ext 10x  HT raises 20x 20x all 20x all 20x all        Sit to stand no hands 5x 5x 5x 10x 10x        Sidestep 4 lengths     4 lengths with hand hold        Step up 4"     10x L 10x up/side        B shoulder/elbow tband F/E DLS     25S all RTB        Modalities CP L hip in R S/L  10' -

## 2022-06-23 DIAGNOSIS — E55.9 VITAMIN D DEFICIENCY DISEASE: ICD-10-CM

## 2022-06-23 RX ORDER — ERGOCALCIFEROL 1.25 MG/1
50000 CAPSULE ORAL
Qty: 3 CAPSULE | Refills: 3 | OUTPATIENT
Start: 2022-06-23 | End: 2022-09-21

## 2022-06-24 ENCOUNTER — RA CDI HCC (OUTPATIENT)
Dept: OTHER | Facility: HOSPITAL | Age: 82
End: 2022-06-24

## 2022-06-24 NOTE — PROGRESS NOTES
[address]  coding opportunities       Chart reviewed, no opportunity found: CHART REVIEWED, NO OPPORTUNITY FOUND        Patients Insurance     Medicare Insurance: Medicare

## 2022-06-27 ENCOUNTER — OFFICE VISIT (OUTPATIENT)
Dept: PHYSICAL THERAPY | Facility: CLINIC | Age: 82
End: 2022-06-27
Payer: MEDICARE

## 2022-06-27 DIAGNOSIS — M47.896 OTHER OSTEOARTHRITIS OF SPINE, LUMBAR REGION: ICD-10-CM

## 2022-06-27 DIAGNOSIS — M54.40 LOW BACK PAIN WITH SCIATICA, SCIATICA LATERALITY UNSPECIFIED, UNSPECIFIED BACK PAIN LATERALITY, UNSPECIFIED CHRONICITY: Primary | ICD-10-CM

## 2022-06-27 PROCEDURE — 97530 THERAPEUTIC ACTIVITIES: CPT | Performed by: PHYSICAL THERAPIST

## 2022-06-27 PROCEDURE — 97110 THERAPEUTIC EXERCISES: CPT | Performed by: PHYSICAL THERAPIST

## 2022-06-27 PROCEDURE — 97140 MANUAL THERAPY 1/> REGIONS: CPT | Performed by: PHYSICAL THERAPIST

## 2022-06-27 NOTE — PROGRESS NOTES
Daily Note     Today's date: 2022  Patient name: Gregg Mckeon  : 1940  MRN: [id number]  Referring provider: Glen Norwood DO  Dx:   Encounter Diagnosis     ICD-10-CM    1  Low back pain with sciatica, sciatica laterality unspecified, unspecified back pain laterality, unspecified chronicity  M54 40    2  Other osteoarthritis of spine, lumbar region  M47 896        Start Time: 1200  Stop Time: 1240  Total time in clinic (min): 40 minutes    Subjective: Pt states L hip is painfree  She  Feels her legs are stronger      Objective: See treatment diary below      Assessment: Continue with jass interventions, foam roller massage and stretch L hip and IT band  Progressing wit open and closed chain strengthening L LE, and spinal stabilization ex  Continue PT 1 more vist then DC to HEP      Plan: Potential discharge next visit       Precautions: Fall risk      Manuals 6/8/22 6/13 6/15 6/20 6/22 6/27       Foam roller massage to L hip/IT band CRR CRR CRR CRR CRR CRR       PROM/stretch L hip NV CRR CRR CRR CRR CRR                         FOTO        Neuro Re-Ed                                                                                                        Ther Ex             K to C  LTR  PPT 5x all 5x  10x  10x 5x  10x  10x 5x  10x  10x 5x  10x  10x 5x  10x  10x       S/L L hip abd stretch 2 x 15" 2 x 15" 2 x 15" 2  x15" 2 x 15" 2 x 15"       Hip iso abd/add  Hip flx/LAQ   10x all GTB  10x LAQ/hip flx 1# seated 20x/20x GTB  20x/20x 20x all GTB  1# LAQ/hip flx over dowel 20x all GTB , 1# PRE's 20x all GTB  20x/20x 1# 20x all GTB  1#       R S/l clamshells  10x 20x 20x 20x 20x  20x       Bridge    2 x 10 20x 20x 10x       S/L hip abd                                       Ther Activity             Stationary bike NV 5' seat 11  5' 6' 6'       Stand hip ROM  squat  march  HT raises NV march 10x  Hip abd L/R 10x  Ext 10x  HT raises 20x 20x all 20x all 20x all 20x all       Sit to stand no hands 5x 5x 5x 10x 10x 10x       Sidestep 4 lengths     4 lengths with hand hold 4 lengths       Step up 4"     10x L 10x up/side -       B shoulder/elbow tband F/E DLS     06F all RTB 84V all       Modalities CP L hip in R S/L  10' -

## 2022-06-29 ENCOUNTER — APPOINTMENT (OUTPATIENT)
Dept: PHYSICAL THERAPY | Facility: CLINIC | Age: 82
End: 2022-06-29
Payer: MEDICARE

## 2022-06-29 RX ORDER — ERGOCALCIFEROL 1.25 MG/1
50000 CAPSULE ORAL
Qty: 3 CAPSULE | Refills: 3 | Status: SHIPPED | OUTPATIENT
Start: 2022-06-29 | End: 2022-07-22 | Stop reason: SDUPTHER

## 2022-06-29 NOTE — TELEPHONE ENCOUNTER
Medication Refill Request     Name Vitamin D2   Dose/Frequency 50,000 units monthly  Quantity 3 capsules with 3 refills   Verified pharmacy   [x]  Verified ordering Provider   [x]  Verified enough for 3 days  [x]    Patient states she takes 1 capsule monthly and that she has been getting this as a prescription for years  Patient states she can not buy it over the counter and a prescription needs to be sent to the pharmacy

## 2022-06-30 ENCOUNTER — EVALUATION (OUTPATIENT)
Dept: PHYSICAL THERAPY | Facility: CLINIC | Age: 82
End: 2022-06-30
Payer: MEDICARE

## 2022-06-30 DIAGNOSIS — M54.40 LOW BACK PAIN WITH SCIATICA, SCIATICA LATERALITY UNSPECIFIED, UNSPECIFIED BACK PAIN LATERALITY, UNSPECIFIED CHRONICITY: ICD-10-CM

## 2022-06-30 DIAGNOSIS — M47.896 OTHER OSTEOARTHRITIS OF SPINE, LUMBAR REGION: Primary | ICD-10-CM

## 2022-06-30 PROCEDURE — 97530 THERAPEUTIC ACTIVITIES: CPT | Performed by: PHYSICAL THERAPIST

## 2022-06-30 PROCEDURE — 97140 MANUAL THERAPY 1/> REGIONS: CPT | Performed by: PHYSICAL THERAPIST

## 2022-06-30 PROCEDURE — 97110 THERAPEUTIC EXERCISES: CPT | Performed by: PHYSICAL THERAPIST

## 2022-06-30 PROCEDURE — 97164 PT RE-EVAL EST PLAN CARE: CPT | Performed by: PHYSICAL THERAPIST

## 2022-06-30 NOTE — PROGRESS NOTES
PT Discharge    Today's date: 2022  Patient name: Patt Morgan  : 1940  MRN: [id number]  Referring provider: Zuleima Leonard DO  Dx:   Encounter Diagnosis     ICD-10-CM    1  Other osteoarthritis of spine, lumbar region  M47 896    2  Low back pain with sciatica, sciatica laterality unspecified, unspecified back pain laterality, unspecified chronicity  M54 40        Start Time: 1055  Stop Time: 1150  Total time in clinic (min): 55 minutes    Assessment  Assessment details: [de-identified]year old female with 1 month hx of L hip and LBP  Pain range 0/10  Pt states she has been pain free, feels much stronger  Improved strength L hip noted, hip strength 4/5  B squat limited to 25%  Sit to stand with greater ease, no pain with transfers  Walks I without assistive device without limp  TUG improved from 11" to 9", 5x STS improved from 25" to 16"  Pt given HEP  Will DC to HEP at this time  Impairments: abnormal or restricted ROM, activity intolerance, impaired physical strength, lacks appropriate home exercise program and pain with function  Functional limitations: no pain with sleeping, no pain with transfers  Symptom irritability: moderateUnderstanding of Dx/Px/POC: fair   Prognosis: good    Goals  STG- 4 weeks 22  1  I HEP (MET)  2  Increase AROM L hip abd to 30 (progressing)  3  Increase strength L hip abd to 4-/5 (MET)  4  Improve squat to 50% (not met)  5  Decrease pain to 0-5/10 range (MET)    LTG- 8 weeks  1  Pain range 0-2/10 (MET)  2  Increase trunk ROM to min limit all planes (MET)  3  Improve L hip strength to 4/5 (MET)  4  Improve STS test by 5-10 " (MET)  5  Improve B squat to 75% (NOT MET)  6   Improve Oswestry by 5-10 points (not assessed, no C/O back pain    Plan  Plan details: DC to HEP  Patient would benefit from: PT eval and skilled physical therapy  Planned modality interventions: cryotherapy  Planned therapy interventions: manual therapy, neuromuscular re-education, therapeutic activities, therapeutic exercise, patient education and home exercise program  Frequency: 2x week  Treatment plan discussed with: patient        Subjective Evaluation    History of Present Illness  Date of onset: 2022  Mechanism of injury: 22- Pain free  Pt is pleased with progress  Pt reports onset of L hip/LBP 1 month ago  X-rays of lumbar spine reveal arhtritis  L hip pain is chief complaint  Pt received cortisone injection L hip on 22  Pain has resolved since injection  Not a recurrent problem   Quality of life: good    Pain  Current pain ratin  At best pain ratin  At worst pain ratin  Aggravating factors: sitting  Progression: improved    Social Support  Steps to enter house: yes  6  Stairs in house: no   Lives in: [place] house  Lives with: spouse    Employment status: working ( at [employer] 14 hours/week)  Hand dominance: right      Diagnostic Tests  X-ray: abnormal  Treatments  Current treatment: physical therapy  Patient Goals  Patient goals for therapy: increased strength, independence with ADLs/IADLs and increased motion          Objective     Tenderness     Additional Tenderness Details  Min pain L buttock, mid L thigh over IT band      Active Range of Motion     Lumbar   Flexion:  Restriction level: minimal  Extension:  Restriction level: minimal  Left lateral flexion:  Restriction level: minimal  Right lateral flexion:  Restriction level: minimal  Left rotation:  Restriction level: minimal  Right rotation:  Restriction level: minimal  Left Hip   Flexion: 112 degrees   Abduction: 24 degrees     Right Hip   Flexion: 110 degrees   Abduction: 24 degrees     Additional Active Range of Motion Details  SB L/50cm, SB R/51cm,  FF24cm  Mod limit ext L, min limit R  Min limit with pain ER, mod limit IR L  Min limit IR/ER R    Passive SLR ; R/70, L/66    Strength/Myotome Testing     Left Hip   Planes of Motion   Flexion: 4  Extension: 4  Abduction: 4    Right Hip Planes of Motion   Flexion: 4-  Extension: 4  Abduction: 4    Additional Strength Details  B squat 25%, HT walk intact with hand hold for balance    Tests     Left Hip   Positive Salo  SLR: Negative       General Comments:      Lumbar Comments  6/8/22- Oswestry 23 7    Hip Comments   6/6/22- TUG 13", 5x STS/25"  6/30/22- TUG 9", 5x STS 16"        Flowsheet Rows    Flowsheet Row Most Recent Value   PT/OT G-Codes    Current Score 54   Projected Score 68   FOTO information reviewed Yes   Assessment Type Discharge             Precautions: Fall risk      Manuals 6/8/22 6/13 6/15 6/20 6/22 6/27 6/30      Foam roller massage to L hip/IT band CRR CRR CRR CRR CRR CRR CRR      PROM/stretch L hip NV CRR CRR CRR CRR CRR CRR                        FOTO        Neuro Re-Ed                                                                                                        Ther Ex             K to C  LTR  PPT 5x all 5x  10x  10x 5x  10x  10x 5x  10x  10x 5x  10x  10x 5x  10x  10x 5x  10x  10x      S/L L hip abd stretch 2 x 15" 2 x 15" 2 x 15" 2  x15" 2 x 15" 2 x 15" 2 x 15"      Hip iso abd/add  Hip flx/LAQ   10x all GTB  10x LAQ/hip flx 1# seated 20x/20x GTB  20x/20x 20x all GTB  1# LAQ/hip flx over dowel 20x all GTB , 1# PRE's 20x all GTB  20x/20x 1# 20x all GTB  1# 20x all  1#      R S/l clamshells  10x 20x 20x 20x 20x  20x 20x  20x      Bridge    2 x 10 20x 20x 10x 20x      S/L hip abd                                       Ther Activity             Stationary bike NV 5' seat 11  5' 6' 6' 6'      Stand hip ROM  squat  march  HT raises NV march 10x  Hip abd L/R 10x  Ext 10x  HT raises 20x 20x all 20x all 20x all 20x all 20x      Sit to stand no hands 5x 5x 5x 10x 10x 10x 10x      Sidestep 4 lengths     4 lengths with hand hold 4 lengths 4 lengths      Step up 4"     10x L 10x up/side - Up/side 10x L      B shoulder/elbow tband F/E DLS     72L all RTB 43L all 10x all RTB      Modalities CP L hip in R S/L  10' -

## 2022-06-30 NOTE — LETTER
2022    Julienne Rodrigez DO  [address] [id number] [facility] [address] [facility]    Patient: Harpal Booker   YOB: 1940   Date of Visit: 2022     Encounter Diagnosis     ICD-10-CM    1  Other osteoarthritis of spine, lumbar region  M47 896    2  Low back pain with sciatica, sciatica laterality unspecified, unspecified back pain laterality, unspecified chronicity  M54 40        Dear Dr Edi Carolina: Thank you for your recent referral of Harpal Booker  Please review the attached evaluation summary from Yajaira's recent visit  Please verify that you agree with the plan of care by signing the attached order  If you have any questions or concerns, please do not hesitate to call  I sincerely appreciate the opportunity to share in the care of one of your patients and hope to have another opportunity to work with you in the near future  Sincerely,    Fanny Chapin, PT      Referring Provider:      I certify that I have read the below Plan of Care and certify the need for these services furnished under this plan of treatment while under my care  Julienne Rodrigez DO  [address] [id number] [place] [address] [facility]  Via Fax: [phone]          PT Discharge    Today's date: 2022  Patient name: Harpal Booker  : 1940  MRN: [id number]  Referring provider: David Moser DO  Dx:   Encounter Diagnosis     ICD-10-CM    1  Other osteoarthritis of spine, lumbar region  M47 896    2  Low back pain with sciatica, sciatica laterality unspecified, unspecified back pain laterality, unspecified chronicity  M54 40        Start Time: 1055  Stop Time: 1150  Total time in clinic (min): 55 minutes    Assessment  Assessment details: [de-identified]year old female with 1 month hx of L hip and LBP  Pain range 0/10  Pt states she has been pain free, feels much stronger  Improved strength L hip noted, hip strength 4/5  B squat limited to 25%    Sit to stand with greater ease, no pain with transfers  Walks I without assistive device without limp  TUG improved from 11" to 9", 5x STS improved from 25" to 16"  Pt given HEP  Will DC to HEP at this time  Impairments: abnormal or restricted ROM, activity intolerance, impaired physical strength, lacks appropriate home exercise program and pain with function  Functional limitations: no pain with sleeping, no pain with transfers  Symptom irritability: moderateUnderstanding of Dx/Px/POC: fair   Prognosis: good    Goals  STG- 4 weeks 22  1  I HEP (MET)  2  Increase AROM L hip abd to 30 (progressing)  3  Increase strength L hip abd to 4-/5 (MET)  4  Improve squat to 50% (not met)  5  Decrease pain to 0-5/10 range (MET)    LTG- 8 weeks  1  Pain range 0-2/10 (MET)  2  Increase trunk ROM to min limit all planes (MET)  3  Improve L hip strength to 4/5 (MET)  4  Improve STS test by 5-10 " (MET)  5  Improve B squat to 75% (NOT MET)  6  Improve Oswestry by 5-10 points (not assessed, no C/O back pain    Plan  Plan details: DC to HEP  Patient would benefit from: PT eval and skilled physical therapy  Planned modality interventions: cryotherapy  Planned therapy interventions: manual therapy, neuromuscular re-education, therapeutic activities, therapeutic exercise, patient education and home exercise program  Frequency: 2x week  Treatment plan discussed with: patient        Subjective Evaluation    History of Present Illness  Date of onset: 2022  Mechanism of injury: 22- Pain free  Pt is pleased with progress  Pt reports onset of L hip/LBP 1 month ago  X-rays of lumbar spine reveal arhtritis  L hip pain is chief complaint  Pt received cortisone injection L hip on 22  Pain has resolved since injection            Not a recurrent problem   Quality of life: good    Pain  Current pain ratin  At best pain ratin  At worst pain ratin  Aggravating factors: sitting  Progression: improved    Social Support  Steps to enter house: yes  6  Stairs in house: no   Lives in: [place] house  Lives with: spouse    Employment status: working ( at [employer] 14 hours/week)  Hand dominance: right      Diagnostic Tests  X-ray: abnormal  Treatments  Current treatment: physical therapy  Patient Goals  Patient goals for therapy: increased strength, independence with ADLs/IADLs and increased motion          Objective     Tenderness     Additional Tenderness Details  Min pain L buttock, mid L thigh over IT band  Active Range of Motion     Lumbar   Flexion:  Restriction level: minimal  Extension:  Restriction level: minimal  Left lateral flexion:  Restriction level: minimal  Right lateral flexion:  Restriction level: minimal  Left rotation:  Restriction level: minimal  Right rotation:  Restriction level: minimal  Left Hip   Flexion: 112 degrees   Abduction: 24 degrees     Right Hip   Flexion: 110 degrees   Abduction: 24 degrees     Additional Active Range of Motion Details  SB L/50cm, SB R/51cm,  FF24cm  Mod limit ext L, min limit R  Min limit with pain ER, mod limit IR L  Min limit IR/ER R    Passive SLR ; R/70, L/66    Strength/Myotome Testing     Left Hip   Planes of Motion   Flexion: 4  Extension: 4  Abduction: 4    Right Hip   Planes of Motion   Flexion: 4-  Extension: 4  Abduction: 4    Additional Strength Details  B squat 25%, HT walk intact with hand hold for balance    Tests     Left Hip   Positive Salo  SLR: Negative       General Comments:      Lumbar Comments  6/8/22- Oswestry 23 7    Hip Comments   6/6/22- TUG 13", 5x STS/25"  6/30/22- TUG 9", 5x STS 16"        Flowsheet Rows    Flowsheet Row Most Recent Value   PT/OT G-Codes    Current Score 54   Projected Score 68   FOTO information reviewed Yes   Assessment Type Discharge             Precautions: Fall risk      Manuals 6/8/22 6/13 6/15 6/20 6/22 6/27 6/30      Foam roller massage to L hip/IT band CRR CRR CRR CRR CRR CRR CRR      PROM/stretch L hip NV CRR CRR CRR CRR CRR CRR                        FOTO        Neuro Re-Ed                                                                                                        Ther Ex             K to C  LTR  PPT 5x all 5x  10x  10x 5x  10x  10x 5x  10x  10x 5x  10x  10x 5x  10x  10x 5x  10x  10x      S/L L hip abd stretch 2 x 15" 2 x 15" 2 x 15" 2  x15" 2 x 15" 2 x 15" 2 x 15"      Hip iso abd/add  Hip flx/LAQ   10x all GTB  10x LAQ/hip flx 1# seated 20x/20x GTB  20x/20x 20x all GTB  1# LAQ/hip flx over dowel 20x all GTB , 1# PRE's 20x all GTB  20x/20x 1# 20x all GTB  1# 20x all  1#      R S/l clamshells  10x 20x 20x 20x 20x  20x 20x  20x      Bridge    2 x 10 20x 20x 10x 20x      S/L hip abd                                       Ther Activity             Stationary bike NV 5' seat 11  5' 6' 6' 6'      Stand hip ROM  squat  march  HT raises NV march 10x  Hip abd L/R 10x  Ext 10x  HT raises 20x 20x all 20x all 20x all 20x all 20x      Sit to stand no hands 5x 5x 5x 10x 10x 10x 10x      Sidestep 4 lengths     4 lengths with hand hold 4 lengths 4 lengths      Step up 4"     10x L 10x up/side - Up/side 10x L      B shoulder/elbow tband F/E DLS     94G all RTB 33H all 10x all RTB      Modalities CP L hip in R S/L  10' -

## 2022-07-15 DIAGNOSIS — K29.70 GASTRITIS WITHOUT BLEEDING, UNSPECIFIED CHRONICITY, UNSPECIFIED GASTRITIS TYPE: ICD-10-CM

## 2022-07-15 RX ORDER — FAMOTIDINE 20 MG/1
20 TABLET, FILM COATED ORAL 2 TIMES DAILY
Qty: 180 TABLET | Refills: 1 | Status: SHIPPED | OUTPATIENT
Start: 2022-07-15

## 2022-07-22 ENCOUNTER — OFFICE VISIT (OUTPATIENT)
Dept: INTERNAL MEDICINE CLINIC | Facility: CLINIC | Age: 82
End: 2022-07-22
Payer: MEDICARE

## 2022-07-22 VITALS
BODY MASS INDEX: 21.02 KG/M2 | WEIGHT: 114.2 LBS | DIASTOLIC BLOOD PRESSURE: 78 MMHG | OXYGEN SATURATION: 98 % | SYSTOLIC BLOOD PRESSURE: 130 MMHG | HEIGHT: 62 IN | HEART RATE: 74 BPM | TEMPERATURE: 97.4 F

## 2022-07-22 DIAGNOSIS — K91.2 POSTOPERATIVE MALABSORPTION: ICD-10-CM

## 2022-07-22 DIAGNOSIS — R05.9 COUGH: ICD-10-CM

## 2022-07-22 DIAGNOSIS — K58.0 IRRITABLE BOWEL SYNDROME WITH DIARRHEA: ICD-10-CM

## 2022-07-22 DIAGNOSIS — M81.0 AGE-RELATED OSTEOPOROSIS WITHOUT CURRENT PATHOLOGICAL FRACTURE: ICD-10-CM

## 2022-07-22 DIAGNOSIS — I10 ESSENTIAL HYPERTENSION: Primary | ICD-10-CM

## 2022-07-22 DIAGNOSIS — E55.9 VITAMIN D DEFICIENCY DISEASE: ICD-10-CM

## 2022-07-22 DIAGNOSIS — Z90.710 HISTORY OF TOTAL HYSTERECTOMY: ICD-10-CM

## 2022-07-22 DIAGNOSIS — Z90.710 HISTORY OF ABDOMINAL HYSTERECTOMY: ICD-10-CM

## 2022-07-22 PROCEDURE — 99214 OFFICE O/P EST MOD 30 MIN: CPT | Performed by: INTERNAL MEDICINE

## 2022-07-22 RX ORDER — ERGOCALCIFEROL 1.25 MG/1
50000 CAPSULE ORAL
Qty: 3 CAPSULE | Refills: 3 | Status: SHIPPED | OUTPATIENT
Start: 2022-07-22

## 2022-07-22 RX ORDER — COVID-19 ANTIGEN TEST
KIT MISCELLANEOUS
COMMUNITY
Start: 2022-05-26

## 2022-07-22 RX ORDER — BUPIVACAINE HYDROCHLORIDE 5 MG/ML
2 INJECTION, SOLUTION EPIDURAL; INTRACAUDAL
COMMUNITY
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
## 2022-07-29 DIAGNOSIS — Z90.710 HISTORY OF TOTAL HYSTERECTOMY: ICD-10-CM

## 2022-07-29 NOTE — TELEPHONE ENCOUNTER
Refill  For Premarin cream   Needs to be printed and faxed manually to [facility]            LA:  7-22-22   NA:   10-21-22

## 2022-08-01 ENCOUNTER — OFFICE VISIT (OUTPATIENT)
Dept: RHEUMATOLOGY | Facility: CLINIC | Age: 82
End: 2022-08-01
Payer: MEDICARE

## 2022-08-01 VITALS
DIASTOLIC BLOOD PRESSURE: 66 MMHG | SYSTOLIC BLOOD PRESSURE: 129 MMHG | HEIGHT: 62 IN | HEART RATE: 78 BPM | WEIGHT: 112 LBS | BODY MASS INDEX: 20.61 KG/M2

## 2022-08-01 DIAGNOSIS — M19.90 OSTEOARTHRITIS, UNSPECIFIED OSTEOARTHRITIS TYPE, UNSPECIFIED SITE: ICD-10-CM

## 2022-08-01 DIAGNOSIS — Z79.899 HIGH RISK MEDICATION USE: ICD-10-CM

## 2022-08-01 DIAGNOSIS — M81.0 OSTEOPOROSIS, UNSPECIFIED OSTEOPOROSIS TYPE, UNSPECIFIED PATHOLOGICAL FRACTURE PRESENCE: Primary | ICD-10-CM

## 2022-08-01 PROCEDURE — 96372 THER/PROPH/DIAG INJ SC/IM: CPT | Performed by: INTERNAL MEDICINE

## 2022-08-01 PROCEDURE — 99214 OFFICE O/P EST MOD 30 MIN: CPT | Performed by: INTERNAL MEDICINE

## 2022-08-01 NOTE — PROGRESS NOTES
Assessment and Plan:   Sean Simon is a [de-identified] y o   female who presents for follow up of osteoporosis  Had reverse right shoulder replacement surgery in October, which she has tolerated well  Try diclofenac gel as needed for right 4th finger pain  Continue daily dairy intake  Continue Vit  D supplementation - 15,000 units once a week and 50,000 units once a month  Increase calcium to 6 chewables a day  Do weight-bearing exercises  Prolia injection successfully administered in clinic today    Return to clinic in 6 months for next Prolia injection and provider visit    Plan:  Diagnoses and all orders for this visit:    Osteoporosis, unspecified osteoporosis type, unspecified pathological fracture presence  -     denosumab (PROLIA) subcutaneous injection 60 mg    Osteoarthritis, unspecified osteoarthritis type, unspecified site    High risk medication use   High risk medication use - Prolia (denosumab) is a monoclonal antibody biologic medication that can rapidly utilize the body's calcium and make one susceptible to hypocalcemia; the medication also has a risk of osteonecrosis of the jaw in patients with exposed jaw bone  Patient does not plan on getting any invasive dental procedures in the near future  Follow-up plan:  RTC in 6 months for follow-up + Prolia visit        Rheumatic Disease Summary  Initial visit 10/26/21: Sean Simon is a [de-identified] y o   female who presented as a Rheumatology consult referred by Tami Whyte MD for evaluation of osteoporosis  She was due for a repeat DEXA scan in 12/2021  This was ordered today  She was ordered latest calcium and Vit  D levels as well  She was to get these after her DEXA scan  Continued her current regimen of Vitamin D once a week and once a month for now  I increased her Calcium to 1,2000mg daily  Discussed that there were other sources of supplemental Calcium including Tums and gummies/chewables   Following the results of the next DEXA scan, we will consider proceeding with either continued every 6 month Prolia injections (if still in osteoporosis and results are stable), switching to Fosamax weekly pill (if now in osteopenia range), or monthly Evenity injection (if has significantly worse osteoporosis)  Diclofenac gel was sent to pharmacy today for right ring finger arthritis symptoms to use as needed  1/31/22: Patient continues taking calcium 1200mg daily and vit D supplementation  Patient continues on prolia injections every 6 months  We're waiting for DEXA scan that was ordered few month ago, which is scheduled for March  Will continue with prolia injection for now  This is her 9th injection  The next visit will consider proceeding with either continued every 6 month Prolia injections (if still in osteoporosis and results are stable), switching to Fosamax weekly pill (if now in osteopenia range), or monthly Evenity injection (if has significantly worse osteoporosis)  TAHMINA Yanez is a [de-identified] y o   female who presents for follow up  Last clinic visit and Prolia injection was 1/31/22  She had right reverse shoulder replacement surgery in October 2021  She has tolerated the surgery well, but is unable to put her right arm behind her back anymore  The following portions of the patient's history were reviewed and updated as appropriate: allergies, current medications, past family history, past medical history, past social history, past surgical history and problem list     Review of Systems:   Review of Systems   Constitutional: Negative for fatigue  HENT: Negative for mouth sores  Eyes: Negative for pain  Respiratory: Negative for shortness of breath  Cardiovascular: Negative for leg swelling  Musculoskeletal: Positive for arthralgias  Negative for joint swelling  Skin: Negative for rash  Neurological: Negative for weakness  Hematological: Negative for adenopathy  Psychiatric/Behavioral: Negative for sleep disturbance         Home Medications:    Current Outpatient Medications:     acetaminophen (TYLENOL) 500 mg tablet, Take 1,000 mg by mouth every 6 (six) hours as needed , Disp: , Rfl:     amLODIPine (NORVASC) 5 mg tablet, TAKE 1 TABLET BY MOUTH  DAILY, Disp: 90 tablet, Rfl: 1    benzonatate (TESSALON PERLES) 100 mg capsule, Take 1 capsule (100 mg total) by mouth 3 (three) times a day as needed for cough, Disp: 20 capsule, Rfl: 0    bupivacaine, PF, (MARCAINE) 0 5 %, 2 mL (Patient not taking: Reported on 8/21/2022), Disp: , Rfl:     calcium-vitamin D 250-100 MG-UNIT per tablet, Take 1 tablet by mouth daily  , Disp: , Rfl:     Cholecalciferol (Vitamin D-3) 125 MCG (5000 UT) TABS, Take 15,000 Units by mouth once a week Takes on a Friday every week, Disp: , Rfl:     clobetasol (TEMOVATE) 0 05 % cream, Apply topically 2 (two) times a day (Patient taking differently: Apply topically once a week), Disp: 45 g, Rfl: 2    conjugated estrogens (Premarin) 0 625 mg tablet, Take 1 tablet (0 625 mg total) by mouth daily Take daily   , Disp: 90 tablet, Rfl: 3    COVID-19 At Home Antigen Test KIT, Flowflex COVID-19 Antigen Home Test kit  REFER TO  INSCTRUCTIONS INCLUDED IN PACKAGING (Patient not taking: Reported on 8/21/2022), Disp: , Rfl:     CRANBERRY PO, Take 1 tablet by mouth daily (Patient not taking: Reported on 8/21/2022), Disp: , Rfl:     Cyanocobalamin (VITAMIN B 12 PO), Take 500 mcg by mouth daily , Disp: , Rfl:     denosumab (PROLIA) 60 mg/mL, Inject 60 mg under the skin every 6 (six) months   (Patient not taking: Reported on 8/21/2022), Disp: , Rfl:     ergocalciferol (VITAMIN D2) 50,000 units, Take 1 capsule (50,000 Units total) by mouth every 28 days, Disp: 3 capsule, Rfl: 3    estradiol (ESTRACE VAGINAL) 0 1 mg/g vaginal cream, Apply small amount to labia 1-2x/week, Disp: 42 5 g, Rfl: 3    famotidine (PEPCID) 20 mg tablet, Take 1 tablet (20 mg total) by mouth 2 (two) times a day, Disp: 180 tablet, Rfl: 1    ferrous gluconate (FERGON) 240 (27 FE) MG tablet, Take 27 mg by mouth daily Takes in the am, Disp: , Rfl:     Flowflex COVID-19 Ag Home Test KIT, REFER TO  INSCTRUCTIONS INCLUDED IN PACKAGING, Disp: , Rfl:     Garlic 4438 MG CAPS, Take 1,000 mg by mouth daily, Disp: , Rfl:     levothyroxine 100 mcg tablet, Take 1 tablet (100 mcg total) by mouth daily, Disp: 90 tablet, Rfl: 3    LORazepam (ATIVAN) 1 mg tablet, Take 1 tablet (1 mg total) by mouth 2 (two) times a day Take 1 tablet 30 minutes before MRI, and second tablet right before MRI , Disp: 3 tablet, Rfl: 0    methylPREDNISolone 4 MG tablet therapy pack, , Disp: , Rfl:     Multiple Vitamins-Minerals (CENTRUM SILVER PO), Take 1 tablet by mouth daily, Disp: , Rfl:     Zinc 30 MG TABS, Take 50 mg by mouth daily , Disp: , Rfl:     Objective:    Vitals:    08/01/22 1138   BP: 129/66   Pulse: 78   Weight: 50 8 kg (112 lb)   Height: 5' 2" (1 575 m)       Physical Exam  Constitutional:       General: She is not in acute distress  HENT:      Head: Normocephalic and atraumatic  Eyes:      Conjunctiva/sclera: Conjunctivae normal    Cardiovascular:      Rate and Rhythm: Normal rate and regular rhythm  Heart sounds: S1 normal and S2 normal      No friction rub  Pulmonary:      Effort: Pulmonary effort is normal  No respiratory distress  Breath sounds: Normal breath sounds  No wheezing, rhonchi or rales  Musculoskeletal:         General: Swelling and tenderness present  Cervical back: Neck supple  Comments: Right 4th PIP swelling/tenderness, gets trigger finger every 2 months in that area   Skin:     Coloration: Skin is not pale  Neurological:      Mental Status: She is alert  Mental status is at baseline  Psychiatric:         Mood and Affect: Mood normal          Behavior: Behavior normal        Reviewed labs and imaging      Imaging:   DXA scan 3/29/22  RESULTS:      LUMBAR SPINE L1-L4 (L3 vertebra excluded from analysis due to local structural abnormalities or artifact): BMD  0 988  gm/cm2   T-score -0 4      LEFT  TOTAL HIP:   BMD:  0 744  gm/cm2   T-score:  -1 6     LEFT  FEMORAL NECK:   BMD:  0 553  gm/cm2   T score: -2 7      IMPRESSION:  1  Osteoporosis  [Based on the left femoral neck]    VAS carotid complete study 1/26/2022  Impression RIGHT: Widely patent internal carotid artery and endarterectomy site with >50% re-stenosis    Vertebral artery flow is antegrade  There is no significant subclavian artery disease  LEFT: There is <50% stenosis noted in the internal carotid artery  Plaque is heterogenous and irregular  Vertebral artery flow is antegrade  There is no significant subclavian artery disease  Compared to previous study on 1/11/2021, there is progression of disease on the right  XR right shoulder 10/15/21  Unremarkable appearance of reverse total shoulder arthroplasty  DEXA scan 12/23/2019  LUMBAR SPINE L1-L4 : BMD  1 003  gm/cm2 / T-score -0 4 / Z score 2 2    LEFT  TOTAL HIP: BMD 0 734  gm/cm2 / T-score -1 7 / Z score 0 2  LEFT  FEMORAL NECK: BMD 0 543  gm/cm2 / T score -2 8 / Z score -0 5     CHANGE: Since the last DXA:  LUMBAR SPINE BMD has INCREASED  0 030 gm/cm2 or 3 1%  This change is statistically significant  HIP BMD has INCREASED  0 025 gm/cm2 or 3 5%  This change is statistically significant      1   Osteoporosis  [Based on the left femoral neck]  2   Since a DXA study from 8/9/2017, Minor Oliveros has been a STATISTICALLY SIGNIFICANT INCREASE in bone mineral density  in the lumbar spine and hip  3   The 10 year risk of hip fracture is 6 4% with the 10 year risk of major osteoporotic fracture being 17% as calculated by the [facility] CTR FRAX score    Labs:   Appointment on 04/19/2022   Component Date Value Ref Range Status    Sodium 04/19/2022 141  136 - 145 mmol/L Final    Potassium 04/19/2022 4 5  3 5 - 5 3 mmol/L Final    Chloride 04/19/2022 111 (A) 100 - 108 mmol/L Final    CO2 04/19/2022 29 21 - 32 mmol/L Final    ANION GAP 04/19/2022 1 (A) 4 - 13 mmol/L Final    BUN 04/19/2022 13  5 - 25 mg/dL Final    Creatinine 04/19/2022 0 69  0 60 - 1 30 mg/dL Final    Standardized to IDMS reference method    Glucose, Fasting 04/19/2022 97  65 - 99 mg/dL Final    Specimen collection should occur prior to Sulfasalazine administration due to the potential for falsely depressed results  Specimen collection should occur prior to Sulfapyridine administration due to the potential for falsely elevated results      Calcium 04/19/2022 8 3  8 3 - 10 1 mg/dL Final    eGFR 04/19/2022 82  ml/min/1 73sq m Final

## 2022-08-01 NOTE — PATIENT INSTRUCTIONS
Try diclofenac gel as needed for right 4th finger pain  Continue daily dairy intake  Continue Vit   D supplementation - 15,000 units once a week and 50,000 units once a month  Increase calcium to 6 chewables a day  Do weight-bearing exercises  Prolia injection successfully administered in clinic today     Return to clinic in 6 months for next Prolia injection and provider visit

## 2022-08-21 ENCOUNTER — OFFICE VISIT (OUTPATIENT)
Dept: URGENT CARE | Facility: MEDICAL CENTER | Age: 82
End: 2022-08-21
Payer: MEDICARE

## 2022-08-21 VITALS
HEIGHT: 62 IN | SYSTOLIC BLOOD PRESSURE: 154 MMHG | OXYGEN SATURATION: 99 % | RESPIRATION RATE: 20 BRPM | BODY MASS INDEX: 20.24 KG/M2 | DIASTOLIC BLOOD PRESSURE: 68 MMHG | TEMPERATURE: 100.2 F | WEIGHT: 110 LBS | HEART RATE: 81 BPM

## 2022-08-21 DIAGNOSIS — U07.1 POSITIVE SELF-ADMINISTERED ANTIGEN TEST FOR COVID-19: Primary | ICD-10-CM

## 2022-08-21 PROCEDURE — G0463 HOSPITAL OUTPT CLINIC VISIT: HCPCS | Performed by: FAMILY MEDICINE

## 2022-08-21 PROCEDURE — 99213 OFFICE O/P EST LOW 20 MIN: CPT | Performed by: FAMILY MEDICINE

## 2022-08-21 RX ORDER — BENZONATATE 100 MG/1
100 CAPSULE ORAL 3 TIMES DAILY PRN
Qty: 20 CAPSULE | Refills: 0 | Status: SHIPPED | OUTPATIENT
Start: 2022-08-21 | End: 2022-10-21 | Stop reason: HOSPADM

## 2022-08-21 RX ORDER — NIRMATRELVIR AND RITONAVIR 300-100 MG
3 KIT ORAL 2 TIMES DAILY
Qty: 30 TABLET | Refills: 0 | Status: CANCELLED | OUTPATIENT
Start: 2022-08-21 | End: 2022-08-26

## 2022-08-21 NOTE — PROGRESS NOTES
[facility]    NAME: Genet Enamorado is a [de-identified] y o  female  : 1940    MRN: [id number]  DATE: 2022  TIME: 6:18 PM    Assessment and Plan   Positive self-administered antigen test for COVID-19 [U07 1]  1  Positive self-administered antigen test for COVID-19  benzonatate (TESSALON PERLES) 100 mg capsule     Four days since onset of symptoms  Will manage the patient conservatively  Advised the patient to stay well hydrated  Advised patient if there p o  Intake is to decrease, worsening of cough, temperature is uncontrolled with Tylenol to return to our office report to the emergency department immediate     Patient Instructions   There are no Patient Instructions on file for this visit  Follow up with PCP in 3-5 days  Proceed to ER if symptoms worsen  Chief Complaint     Chief Complaint   Patient presents with    covid positive     Patient states she had a positive covid test at home today  She started Thursday or Friday with body ache, chills, headache, sore throat, and cough  She is unsure if she had a temperature at home     History of Present Illness   URI   This is a new problem  The current episode started in the past 7 days  Maximum temperature: Subjective fever  Associated symptoms include coughing, headaches and a sore throat  Pertinent negatives include no abdominal pain, congestion, diarrhea, dysuria, nausea, neck pain, rash, rhinorrhea, sinus pain, sneezing or vomiting  Associated symptoms comments: +Chills, myalgia  She has tried acetaminophen (Robitussin) for the symptoms  The treatment provided moderate relief  Review of Systems   Review of Systems   HENT: Positive for sore throat  Negative for congestion, rhinorrhea, sinus pain and sneezing  Respiratory: Positive for cough  Gastrointestinal: Negative for abdominal pain, diarrhea, nausea and vomiting  Genitourinary: Negative for dysuria  Musculoskeletal: Negative for neck pain  Skin: Negative for rash  Neurological: Positive for headaches  All other systems reviewed and are negative  The following portions of the patient's history were reviewed and updated as appropriate: allergies, current medications, past family history, past medical history, past social history, past surgical history and problem list      Medications have been verified  Objective   /68   Pulse 81   Temp 100 2 °F (37 9 °C)   Resp 20   Ht 5' 2" (1 575 m)   Wt 49 9 kg (110 lb)   LMP  (LMP Unknown)   SpO2 99%   BMI 20 12 kg/m²     Physical Exam  Vitals reviewed  Constitutional:       General: She is not in acute distress  Appearance: She is well-developed  She is not diaphoretic  HENT:      Head: Normocephalic and atraumatic  Right Ear: Tympanic membrane, ear canal and external ear normal  There is no impacted cerumen  Left Ear: Tympanic membrane, ear canal and external ear normal  There is no impacted cerumen  Nose: Nose normal  No congestion or rhinorrhea  Mouth/Throat:      Mouth: Mucous membranes are moist       Pharynx: Oropharynx is clear  Posterior oropharyngeal erythema present  No oropharyngeal exudate  Eyes:      Extraocular Movements: Extraocular movements intact  Conjunctiva/sclera: Conjunctivae normal       Pupils: Pupils are equal, round, and reactive to light  Cardiovascular:      Rate and Rhythm: Normal rate and regular rhythm  Heart sounds: Normal heart sounds  No murmur heard  No friction rub  No gallop  Pulmonary:      Effort: Pulmonary effort is normal  No respiratory distress  Breath sounds: Normal breath sounds  No wheezing or rales  Abdominal:      General: Bowel sounds are normal  There is no distension  Palpations: Abdomen is soft  Tenderness: There is no abdominal tenderness  Musculoskeletal:         General: Normal range of motion  Cervical back: Normal range of motion and neck supple     Lymphadenopathy:      Cervical: No cervical adenopathy  Skin:     General: Skin is warm and dry  Findings: No erythema or rash  Neurological:      Mental Status: She is alert and oriented to person, place, and time         Jo Smith MD

## 2022-08-21 NOTE — LETTER
August 21, 2022     Patient: Robin Marrufo   YOB: 1940   Date of Visit: 8/21/2022       To Whom it May Concern:    Sowmya Navarrete was seen in my clinic on 8/21/2022  She may return to work on 8/27/22  If you have any questions or concerns, please don't hesitate to call           Sincerely,          Bandar Deng MD  08/21/22  6:10 PM

## 2022-10-14 ENCOUNTER — RA CDI HCC (OUTPATIENT)
Dept: OTHER | Facility: HOSPITAL | Age: 82
End: 2022-10-14

## 2022-10-21 ENCOUNTER — OFFICE VISIT (OUTPATIENT)
Dept: INTERNAL MEDICINE CLINIC | Facility: CLINIC | Age: 82
End: 2022-10-21
Payer: MEDICARE

## 2022-10-21 VITALS
SYSTOLIC BLOOD PRESSURE: 110 MMHG | HEART RATE: 69 BPM | BODY MASS INDEX: 19.69 KG/M2 | HEIGHT: 62 IN | TEMPERATURE: 97.7 F | OXYGEN SATURATION: 100 % | DIASTOLIC BLOOD PRESSURE: 64 MMHG | WEIGHT: 107 LBS

## 2022-10-21 DIAGNOSIS — E55.9 VITAMIN D DEFICIENCY DISEASE: ICD-10-CM

## 2022-10-21 DIAGNOSIS — K91.2 POSTOPERATIVE MALABSORPTION: ICD-10-CM

## 2022-10-21 DIAGNOSIS — M81.0 AGE-RELATED OSTEOPOROSIS WITHOUT CURRENT PATHOLOGICAL FRACTURE: ICD-10-CM

## 2022-10-21 DIAGNOSIS — K91.2 POSTSURGICAL MALABSORPTION: ICD-10-CM

## 2022-10-21 DIAGNOSIS — E03.9 ACQUIRED HYPOTHYROIDISM: ICD-10-CM

## 2022-10-21 DIAGNOSIS — R15.9 INCONTINENCE OF FECES, UNSPECIFIED FECAL INCONTINENCE TYPE: ICD-10-CM

## 2022-10-21 DIAGNOSIS — Z23 NEEDS FLU SHOT: ICD-10-CM

## 2022-10-21 DIAGNOSIS — I10 ESSENTIAL HYPERTENSION: Primary | ICD-10-CM

## 2022-10-21 PROCEDURE — 90662 IIV NO PRSV INCREASED AG IM: CPT

## 2022-10-21 PROCEDURE — 99214 OFFICE O/P EST MOD 30 MIN: CPT | Performed by: INTERNAL MEDICINE

## 2022-10-21 PROCEDURE — G0008 ADMIN INFLUENZA VIRUS VAC: HCPCS

## 2022-10-21 RX ORDER — LEVOTHYROXINE SODIUM 0.1 MG/1
100 TABLET ORAL DAILY
Qty: 90 TABLET | Refills: 1 | Status: SHIPPED | OUTPATIENT
Start: 2022-10-21

## 2022-10-21 RX ORDER — AMLODIPINE BESYLATE 5 MG/1
5 TABLET ORAL DAILY
Qty: 90 TABLET | Refills: 1 | Status: SHIPPED | OUTPATIENT
Start: 2022-10-21
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
## 2022-10-25 ENCOUNTER — HOSPITAL ENCOUNTER (OUTPATIENT)
Dept: RADIOLOGY | Age: 82
Discharge: HOME | End: 2022-10-25
Payer: MEDICARE

## 2022-10-25 DIAGNOSIS — M25.552 LEFT HIP PAIN: ICD-10-CM

## 2022-10-25 PROCEDURE — G1004 CDSM NDSC: HCPCS

## 2022-10-25 PROCEDURE — 73721 MRI JNT OF LWR EXTRE W/O DYE: CPT

## 2022-11-04 ENCOUNTER — TELEPHONE (OUTPATIENT)
Dept: INTERNAL MEDICINE CLINIC | Facility: CLINIC | Age: 82
End: 2022-11-04

## 2022-11-04 ENCOUNTER — APPOINTMENT (OUTPATIENT)
Dept: LAB | Facility: MEDICAL CENTER | Age: 82
End: 2022-11-04

## 2022-11-04 DIAGNOSIS — K91.2 POSTOPERATIVE MALABSORPTION: ICD-10-CM

## 2022-11-04 DIAGNOSIS — N30.00 ACUTE CYSTITIS WITHOUT HEMATURIA: Primary | ICD-10-CM

## 2022-11-04 DIAGNOSIS — E03.9 ACQUIRED HYPOTHYROIDISM: ICD-10-CM

## 2022-11-04 LAB
25(OH)D3 SERPL-MCNC: 48.4 NG/ML (ref 30–100)
ALBUMIN SERPL BCP-MCNC: 3.2 G/DL (ref 3.5–5)
ALP SERPL-CCNC: 73 U/L (ref 46–116)
ALT SERPL W P-5'-P-CCNC: 50 U/L (ref 12–78)
ANION GAP SERPL CALCULATED.3IONS-SCNC: 3 MMOL/L (ref 4–13)
AST SERPL W P-5'-P-CCNC: 62 U/L (ref 5–45)
BACTERIA UR QL AUTO: ABNORMAL /HPF
BASOPHILS # BLD AUTO: 0.04 THOUSANDS/ÂΜL (ref 0–0.1)
BASOPHILS NFR BLD AUTO: 1 % (ref 0–1)
BILIRUB SERPL-MCNC: 0.41 MG/DL (ref 0.2–1)
BILIRUB UR QL STRIP: NEGATIVE
BUN SERPL-MCNC: 18 MG/DL (ref 5–25)
CALCIUM ALBUM COR SERPL-MCNC: 9.1 MG/DL (ref 8.3–10.1)
CALCIUM SERPL-MCNC: 8.5 MG/DL (ref 8.3–10.1)
CHLORIDE SERPL-SCNC: 110 MMOL/L (ref 96–108)
CLARITY UR: CLEAR
CO2 SERPL-SCNC: 26 MMOL/L (ref 21–32)
COLOR UR: YELLOW
CREAT SERPL-MCNC: 0.66 MG/DL (ref 0.6–1.3)
EOSINOPHIL # BLD AUTO: 0.23 THOUSAND/ÂΜL (ref 0–0.61)
EOSINOPHIL NFR BLD AUTO: 6 % (ref 0–6)
ERYTHROCYTE [DISTWIDTH] IN BLOOD BY AUTOMATED COUNT: 14.3 % (ref 11.6–15.1)
FERRITIN SERPL-MCNC: 66 NG/ML (ref 8–388)
GFR SERPL CREATININE-BSD FRML MDRD: 83 ML/MIN/1.73SQ M
GLUCOSE P FAST SERPL-MCNC: 95 MG/DL (ref 65–99)
GLUCOSE UR STRIP-MCNC: NEGATIVE MG/DL
HCT VFR BLD AUTO: 37.8 % (ref 34.8–46.1)
HGB BLD-MCNC: 11.6 G/DL (ref 11.5–15.4)
HGB UR QL STRIP.AUTO: NEGATIVE
HYALINE CASTS #/AREA URNS LPF: ABNORMAL /LPF
IMM GRANULOCYTES # BLD AUTO: 0.01 THOUSAND/UL (ref 0–0.2)
IMM GRANULOCYTES NFR BLD AUTO: 0 % (ref 0–2)
IRON SATN MFR SERPL: 26 % (ref 15–50)
IRON SERPL-MCNC: 88 UG/DL (ref 50–170)
KETONES UR STRIP-MCNC: NEGATIVE MG/DL
LEUKOCYTE ESTERASE UR QL STRIP: ABNORMAL
LYMPHOCYTES # BLD AUTO: 1.44 THOUSANDS/ÂΜL (ref 0.6–4.47)
LYMPHOCYTES NFR BLD AUTO: 34 % (ref 14–44)
MCH RBC QN AUTO: 30.1 PG (ref 26.8–34.3)
MCHC RBC AUTO-ENTMCNC: 30.7 G/DL (ref 31.4–37.4)
MCV RBC AUTO: 98 FL (ref 82–98)
MONOCYTES # BLD AUTO: 0.48 THOUSAND/ÂΜL (ref 0.17–1.22)
MONOCYTES NFR BLD AUTO: 11 % (ref 4–12)
MUCOUS THREADS UR QL AUTO: ABNORMAL
NEUTROPHILS # BLD AUTO: 2 THOUSANDS/ÂΜL (ref 1.85–7.62)
NEUTS SEG NFR BLD AUTO: 48 % (ref 43–75)
NITRITE UR QL STRIP: POSITIVE
NON-SQ EPI CELLS URNS QL MICRO: ABNORMAL /HPF
NRBC BLD AUTO-RTO: 0 /100 WBCS
PH UR STRIP.AUTO: 5.5 [PH]
PLATELET # BLD AUTO: 188 THOUSANDS/UL (ref 149–390)
PMV BLD AUTO: 11 FL (ref 8.9–12.7)
POTASSIUM SERPL-SCNC: 4.3 MMOL/L (ref 3.5–5.3)
PROT SERPL-MCNC: 6.8 G/DL (ref 6.4–8.4)
PROT UR STRIP-MCNC: ABNORMAL MG/DL
RBC # BLD AUTO: 3.86 MILLION/UL (ref 3.81–5.12)
RBC #/AREA URNS AUTO: ABNORMAL /HPF
SODIUM SERPL-SCNC: 139 MMOL/L (ref 135–147)
SP GR UR STRIP.AUTO: 1.02 (ref 1–1.03)
T4 FREE SERPL-MCNC: 1.26 NG/DL (ref 0.76–1.46)
TIBC SERPL-MCNC: 344 UG/DL (ref 250–450)
TSH SERPL DL<=0.05 MIU/L-ACNC: 0.58 UIU/ML (ref 0.45–4.5)
UROBILINOGEN UR STRIP-ACNC: <2 MG/DL
VIT B12 SERPL-MCNC: 799 PG/ML (ref 100–900)
WBC # BLD AUTO: 4.2 THOUSAND/UL (ref 4.31–10.16)
WBC #/AREA URNS AUTO: ABNORMAL /HPF

## 2022-11-04 RX ORDER — NITROFURANTOIN 25; 75 MG/1; MG/1
100 CAPSULE ORAL 2 TIMES DAILY
Qty: 10 CAPSULE | Refills: 0 | Status: SHIPPED | OUTPATIENT
Start: 2022-11-04 | End: 2022-11-08

## 2022-11-04 NOTE — TELEPHONE ENCOUNTER
Pt says yes she is having burning and pressure, urgency but when she goes only a little bit of urine comes out

## 2022-11-04 NOTE — TELEPHONE ENCOUNTER
----- Message from Jesus Salinas MD sent at 11/4/2022  5:32 PM EDT -----  Please ask her if she has any urinary symptoms    Thank you

## 2022-11-07 ENCOUNTER — APPOINTMENT (OUTPATIENT)
Dept: LAB | Facility: MEDICAL CENTER | Age: 82
End: 2022-11-07

## 2022-11-08 ENCOUNTER — TELEPHONE (OUTPATIENT)
Dept: INTERNAL MEDICINE CLINIC | Facility: CLINIC | Age: 82
End: 2022-11-08

## 2022-11-08 LAB — VIT B1 BLD-SCNC: 105.7 NMOL/L (ref 66.5–200)

## 2022-11-09 ENCOUNTER — TELEMEDICINE (OUTPATIENT)
Dept: INTERNAL MEDICINE CLINIC | Facility: CLINIC | Age: 82
End: 2022-11-09

## 2022-11-09 VITALS — BODY MASS INDEX: 19.51 KG/M2 | HEIGHT: 62 IN | WEIGHT: 106 LBS

## 2022-11-09 DIAGNOSIS — N30.00 ACUTE CYSTITIS WITHOUT HEMATURIA: Primary | ICD-10-CM

## 2022-11-09 RX ORDER — CEPHALEXIN 500 MG/1
500 CAPSULE ORAL EVERY 6 HOURS SCHEDULED
Qty: 12 CAPSULE | Refills: 0 | Status: SHIPPED | OUTPATIENT
Start: 2022-11-09 | End: 2022-11-12

## 2022-11-09 NOTE — PROGRESS NOTES
Virtual Brief Visit    Patient is located in the following state in which I hold an active license [place]      Assessment/Plan:    Problem List Items Addressed This Visit    None     Visit Diagnoses     Acute cystitis without hematuria    -  Primary    Relevant Medications    cephalexin (KEFLEX) 500 mg capsule          Recent Visits  Date Type Provider Dept   11/04/22 Telephone [address] Internal Med   Showing recent visits within past 7 days and meeting all other requirements  Today's Visits  Date Type Provider Dept   11/09/22 Telemedicine Letty Pacheco MD [facility]  [facility] Internal Med   Showing today's visits and meeting all other requirements  Future Appointments  No visits were found meeting these conditions    Showing future appointments within next 150 days and meeting all other requirements         Visit Time    Visit Start Time: 6:15 PM  Visit Stop Time: 6:25 PM  Total Visit Duration: 15 minutes

## 2022-11-13 LAB — VIT A SERPL-MCNC: 41.8 UG/DL (ref 22–69.5)

## 2022-11-18 ENCOUNTER — ANNUAL EXAM (OUTPATIENT)
Dept: OBGYN CLINIC | Facility: CLINIC | Age: 82
End: 2022-11-18

## 2022-11-18 VITALS
SYSTOLIC BLOOD PRESSURE: 112 MMHG | HEART RATE: 60 BPM | DIASTOLIC BLOOD PRESSURE: 60 MMHG | BODY MASS INDEX: 19.94 KG/M2 | WEIGHT: 109 LBS

## 2022-11-18 DIAGNOSIS — Z01.411 ENCNTR FOR GYN EXAM (GENERAL) (ROUTINE) W ABNORMAL FINDINGS: Primary | ICD-10-CM

## 2022-11-18 DIAGNOSIS — N95.2 VAGINAL ATROPHY: ICD-10-CM

## 2022-11-18 DIAGNOSIS — Z12.31 ENCOUNTER FOR SCREENING MAMMOGRAM FOR BREAST CANCER: ICD-10-CM

## 2022-11-18 DIAGNOSIS — Z90.710 HISTORY OF TOTAL HYSTERECTOMY: ICD-10-CM

## 2022-11-18 DIAGNOSIS — L90.0 LICHEN SCLEROSUS ET ATROPHICUS: ICD-10-CM

## 2022-11-18 NOTE — PATIENT INSTRUCTIONS
Wellness Visit for Adults   AMBULATORY CARE:   A wellness visit  is when you see your healthcare provider to get screened for health problems  Your healthcare provider will also give you advice on how to stay healthy  Write down your questions so you remember to ask them  Ask your healthcare provider how often you should have a wellness visit  What happens at a wellness visit:  Your healthcare provider will ask about your health, and your family history of health problems  This includes high blood pressure, heart disease, and cancer  He or she will ask if you have symptoms that concern you, if you smoke, and about your mood  You may also be asked about your intake of medicines, supplements, food, and alcohol  Any of the following may be done: Your weight  will be checked  Your height may also be checked so your body mass index (BMI) can be calculated  Your BMI shows if you are at a healthy weight  Your blood pressure  and heart rate will be checked  Your temperature may also be checked  Blood and urine tests  may be done  Blood tests may be done to check your cholesterol levels  Abnormal cholesterol levels increase your risk for heart disease and stroke  You may also need a blood or urine test to check for diabetes if you are at increased risk  Urine tests may be done to look for signs of an infection or kidney disease  A physical exam  includes checking your heartbeat and lungs with a stethoscope  Your healthcare provider may also check your skin to look for sun damage  Screening tests  may be recommended  A screening test is done to check for diseases that may not cause symptoms  The screening tests you may need depend on your age, gender, family history, and lifestyle habits  For example, colorectal screening may be recommended if you are 48years old or older  Screening tests you need if you are a woman:   A Pap smear  is used to screen for cervical cancer   Pap smears are usually done every 3 to 5 years depending on your age  You may need them more often if you have had abnormal Pap smear test results in the past  Ask your healthcare provider how often you should have a Pap smear  A mammogram  is an x-ray of your breasts to screen for breast cancer  Experts recommend mammograms every 2 years starting at age 48 years  You may need a mammogram at age 52 years or younger if you have an increased risk for breast cancer  Talk to your healthcare provider about when you should start having mammograms and how often you need them  Vaccines you may need:   Get an influenza vaccine  every year  The influenza vaccine protects you from the flu  Several types of viruses cause the flu  The viruses change over time, so new vaccines are made each year  Get a tetanus-diphtheria (Td) booster vaccine  every 10 years  This vaccine protects you against tetanus and diphtheria  Tetanus is a severe infection that may cause painful muscle spasms and lockjaw  Diphtheria is a severe bacterial infection that causes a thick covering in the back of your mouth and throat  Get a human papillomavirus (HPV) vaccine  if you are female and aged 23 to 32 or male 23 to 24 and never received it  This vaccine protects you from HPV infection  HPV is the most common infection spread by sexual contact  HPV may also cause vaginal, penile, and anal cancers  Get a pneumococcal vaccine  if you are aged 72 years or older  The pneumococcal vaccine is an injection given to protect you from pneumococcal disease  Pneumococcal disease is an infection caused by pneumococcal bacteria  The infection may cause pneumonia, meningitis, or an ear infection  Get a shingles vaccine  if you are 60 or older, even if you have had shingles before  The shingles vaccine is an injection to protect you from the varicella-zoster virus  This is the same virus that causes chickenpox   Shingles is a painful rash that develops in people who had chickenpox or have been exposed to the virus  How to eat healthy:  My Plate is a model for planning healthy meals  It shows the types and amounts of foods that should go on your plate  Fruits and vegetables make up about half of your plate, and grains and protein make up the other half  A serving of dairy is included on the side of your plate  The amount of calories and serving sizes you need depends on your age, gender, weight, and height  Examples of healthy foods are listed below:  Eat a variety of vegetables  such as dark green, red, and orange vegetables  You can also include canned vegetables low in sodium (salt) and frozen vegetables without added butter or sauces  Eat a variety of fresh fruits , canned fruit in 100% juice, frozen fruit, and dried fruit  Include whole grains  At least half of the grains you eat should be whole grains  Examples include whole-wheat bread, wheat pasta, brown rice, and whole-grain cereals such as oatmeal     Eat a variety of protein foods such as seafood (fish and shellfish), lean meat, and poultry without skin (turkey and chicken)  Examples of lean meats include pork leg, shoulder, or tenderloin, and beef round, sirloin, tenderloin, and extra lean ground beef  Other protein foods include eggs and egg substitutes, beans, peas, soy products, nuts, and seeds  Choose low-fat dairy products such as skim or 1% milk or low-fat yogurt, cheese, and cottage cheese  Limit unhealthy fats  such as butter, hard margarine, and shortening  Exercise:  Exercise at least 30 minutes per day on most days of the week  Some examples of exercise include walking, biking, dancing, and swimming  You can also fit in more physical activity by taking the stairs instead of the elevator or parking farther away from stores  Include muscle strengthening activities 2 days each week  Regular exercise provides many health benefits   It helps you manage your weight, and decreases your risk for type 2 diabetes, heart disease, stroke, and high blood pressure  Exercise can also help improve your mood  Ask your healthcare provider about the best exercise plan for you  General health and safety guidelines:   Do not smoke  Nicotine and other chemicals in cigarettes and cigars can cause lung damage  Ask your healthcare provider for information if you currently smoke and need help to quit  E-cigarettes or smokeless tobacco still contain nicotine  Talk to your healthcare provider before you use these products  Limit alcohol  A drink of alcohol is 12 ounces of beer, 5 ounces of wine, or 1½ ounces of liquor  Lose weight, if needed  Being overweight increases your risk of certain health conditions  These include heart disease, high blood pressure, type 2 diabetes, and certain types of cancer  Protect your skin  Do not sunbathe or use tanning beds  Use sunscreen with a SPF 15 or higher  Apply sunscreen at least 15 minutes before you go outside  Reapply sunscreen every 2 hours  Wear protective clothing, hats, and sunglasses when you are outside  Drive safely  Always wear your seatbelt  Make sure everyone in your car wears a seatbelt  A seatbelt can save your life if you are in an accident  Do not use your cell phone when you are driving  This could distract you and cause an accident  Pull over if you need to make a call or send a text message  Practice safe sex  Use latex condoms if are sexually active and have more than one partner  Your healthcare provider may recommend screening tests for sexually transmitted infections (STIs)  Wear helmets, lifejackets, and protective gear  Always wear a helmet when you ride a bike or motorcycle, go skiing, or play sports that could cause a head injury  Wear protective equipment when you play sports  Wear a lifejacket when you are on a boat or doing water sports      © Copyright [employer] 2022 Information is for End User's use only and may not be sold, redistributed or otherwise used for commercial purposes  All illustrations and images included in CareNotes® are the copyrighted property of [employer]  or Danilo Galindo  The above information is an  only  It is not intended as medical advice for individual conditions or treatments  Talk to your doctor, nurse or pharmacist before following any medical regimen to see if it is safe and effective for you
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
## 2022-11-23 ENCOUNTER — RA CDI HCC (OUTPATIENT)
Dept: OTHER | Facility: HOSPITAL | Age: 82
End: 2022-11-23

## 2022-11-28 ENCOUNTER — TELEPHONE (OUTPATIENT)
Dept: INTERNAL MEDICINE CLINIC | Facility: CLINIC | Age: 82
End: 2022-11-28

## 2022-11-28 ENCOUNTER — TELEMEDICINE (OUTPATIENT)
Dept: INTERNAL MEDICINE CLINIC | Facility: CLINIC | Age: 82
End: 2022-11-28

## 2022-11-28 DIAGNOSIS — R11.0 NAUSEA: ICD-10-CM

## 2022-11-28 DIAGNOSIS — R42 DIZZINESS: Primary | ICD-10-CM

## 2022-11-28 RX ORDER — MECLIZINE HCL 12.5 MG/1
12.5 TABLET ORAL 3 TIMES DAILY PRN
Qty: 30 TABLET | Refills: 0 | Status: SHIPPED | OUTPATIENT
Start: 2022-11-28

## 2022-11-28 NOTE — TELEPHONE ENCOUNTER
patient called and stated she has been dizzy for 4 days and feel nashua when she eats , you gave her medication for bladder infection not sure if that has something she can do could you call her
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
## 2022-12-02 ENCOUNTER — OFFICE VISIT (OUTPATIENT)
Dept: INTERNAL MEDICINE CLINIC | Facility: CLINIC | Age: 82
End: 2022-12-02
Payer: MEDICARE

## 2022-12-02 VITALS
TEMPERATURE: 97.5 F | WEIGHT: 108 LBS | HEIGHT: 62 IN | BODY MASS INDEX: 19.88 KG/M2 | DIASTOLIC BLOOD PRESSURE: 68 MMHG | HEART RATE: 69 BPM | SYSTOLIC BLOOD PRESSURE: 122 MMHG | OXYGEN SATURATION: 99 %

## 2022-12-02 DIAGNOSIS — Z00.00 MEDICARE ANNUAL WELLNESS VISIT, SUBSEQUENT: ICD-10-CM

## 2022-12-02 DIAGNOSIS — R25.2 CRAMPS OF LOWER EXTREMITY: ICD-10-CM

## 2022-12-02 DIAGNOSIS — K91.2 POSTSURGICAL MALABSORPTION: ICD-10-CM

## 2022-12-02 DIAGNOSIS — I10 ESSENTIAL HYPERTENSION: ICD-10-CM

## 2022-12-02 DIAGNOSIS — R42 DIZZINESS: Primary | ICD-10-CM

## 2022-12-02 DIAGNOSIS — E03.9 ACQUIRED HYPOTHYROIDISM: ICD-10-CM

## 2022-12-02 PROCEDURE — 99214 OFFICE O/P EST MOD 30 MIN: CPT | Performed by: INTERNAL MEDICINE

## 2022-12-02 PROCEDURE — G0439 PPPS, SUBSEQ VISIT: HCPCS | Performed by: INTERNAL MEDICINE
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
## 2022-12-28 ENCOUNTER — HOSPITAL ENCOUNTER (OUTPATIENT)
Dept: RADIOLOGY | Age: 82
Discharge: HOME | End: 2022-12-28

## 2022-12-28 DIAGNOSIS — R93.89 ABNORMAL FINDINGS ON DIAGNOSTIC IMAGING OF OTHER SPECIFIED BODY STRUCTURES: ICD-10-CM

## 2022-12-28 RX ADMIN — GADOBUTROL 5 ML: 604.72 INJECTION INTRAVENOUS at 12:00

## 2023-02-01 ENCOUNTER — TELEPHONE (OUTPATIENT)
Dept: OBGYN CLINIC | Facility: HOSPITAL | Age: 83
End: 2023-02-01

## 2023-02-01 NOTE — TELEPHONE ENCOUNTER
Caller: Patient    Doctor: Dr Nina Fernández    Reason for call: Patient calling in to verify location of her appointment with Dr Nina Fernández for tomorrow  Able to verify appt time but schedule shows that Dr Nina Fernández is unavailable  Patient asking to speak to clinical team to verify appointment for tomorrow      Call back#: [phone]

## 2023-02-13 ENCOUNTER — OFFICE VISIT (OUTPATIENT)
Dept: RHEUMATOLOGY | Facility: CLINIC | Age: 83
End: 2023-02-13

## 2023-02-13 VITALS
SYSTOLIC BLOOD PRESSURE: 132 MMHG | WEIGHT: 105 LBS | HEIGHT: 62 IN | DIASTOLIC BLOOD PRESSURE: 70 MMHG | BODY MASS INDEX: 19.32 KG/M2

## 2023-02-13 DIAGNOSIS — M81.0 OSTEOPOROSIS, UNSPECIFIED OSTEOPOROSIS TYPE, UNSPECIFIED PATHOLOGICAL FRACTURE PRESENCE: Primary | ICD-10-CM

## 2023-02-13 DIAGNOSIS — M19.90 OSTEOARTHRITIS, UNSPECIFIED OSTEOARTHRITIS TYPE, UNSPECIFIED SITE: ICD-10-CM

## 2023-02-13 DIAGNOSIS — Z79.899 HIGH RISK MEDICATION USE: ICD-10-CM
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
## 2023-02-20 ENCOUNTER — OFFICE VISIT (OUTPATIENT)
Dept: INTERNAL MEDICINE CLINIC | Facility: CLINIC | Age: 83
End: 2023-02-20

## 2023-02-20 VITALS
BODY MASS INDEX: 20.06 KG/M2 | DIASTOLIC BLOOD PRESSURE: 69 MMHG | SYSTOLIC BLOOD PRESSURE: 144 MMHG | WEIGHT: 109 LBS | HEIGHT: 62 IN | TEMPERATURE: 97.7 F | HEART RATE: 75 BPM | OXYGEN SATURATION: 96 %

## 2023-02-20 DIAGNOSIS — Z98.890 HISTORY OF CAROTID ENDARTERECTOMY: Chronic | ICD-10-CM

## 2023-02-20 DIAGNOSIS — R42 DIZZINESS: ICD-10-CM

## 2023-02-20 DIAGNOSIS — E03.9 ACQUIRED HYPOTHYROIDISM: ICD-10-CM

## 2023-02-20 DIAGNOSIS — I10 ESSENTIAL HYPERTENSION: ICD-10-CM

## 2023-02-20 DIAGNOSIS — E55.9 VITAMIN D DEFICIENCY DISEASE: ICD-10-CM

## 2023-02-20 DIAGNOSIS — H81.10 BENIGN PAROXYSMAL POSITIONAL VERTIGO, UNSPECIFIED LATERALITY: Primary | ICD-10-CM

## 2023-02-20 RX ORDER — MECLIZINE HCL 12.5 MG/1
12.5 TABLET ORAL 3 TIMES DAILY PRN
Qty: 90 TABLET | Refills: 0 | Status: SHIPPED | OUTPATIENT
Start: 2023-02-20
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
## 2023-02-21 ENCOUNTER — APPOINTMENT (OUTPATIENT)
Dept: LAB | Facility: CLINIC | Age: 83
End: 2023-02-21

## 2023-02-21 DIAGNOSIS — H81.10 BENIGN PAROXYSMAL POSITIONAL VERTIGO, UNSPECIFIED LATERALITY: ICD-10-CM

## 2023-02-21 DIAGNOSIS — I10 ESSENTIAL HYPERTENSION: ICD-10-CM

## 2023-02-21 LAB
ALBUMIN SERPL BCP-MCNC: 3.1 G/DL (ref 3.5–5)
ALP SERPL-CCNC: 84 U/L (ref 46–116)
ALT SERPL W P-5'-P-CCNC: 58 U/L (ref 12–78)
ANION GAP SERPL CALCULATED.3IONS-SCNC: 5 MMOL/L (ref 4–13)
AST SERPL W P-5'-P-CCNC: 59 U/L (ref 5–45)
BACTERIA UR QL AUTO: ABNORMAL /HPF
BASOPHILS # BLD AUTO: 0.02 THOUSANDS/ÂΜL (ref 0–0.1)
BASOPHILS NFR BLD AUTO: 0 % (ref 0–1)
BILIRUB SERPL-MCNC: 0.35 MG/DL (ref 0.2–1)
BILIRUB UR QL STRIP: NEGATIVE
BUN SERPL-MCNC: 14 MG/DL (ref 5–25)
CALCIUM ALBUM COR SERPL-MCNC: 9 MG/DL (ref 8.3–10.1)
CALCIUM SERPL-MCNC: 8.3 MG/DL (ref 8.3–10.1)
CHLORIDE SERPL-SCNC: 106 MMOL/L (ref 96–108)
CLARITY UR: ABNORMAL
CO2 SERPL-SCNC: 25 MMOL/L (ref 21–32)
COLOR UR: YELLOW
CREAT SERPL-MCNC: 0.6 MG/DL (ref 0.6–1.3)
EOSINOPHIL # BLD AUTO: 0.15 THOUSAND/ÂΜL (ref 0–0.61)
EOSINOPHIL NFR BLD AUTO: 3 % (ref 0–6)
ERYTHROCYTE [DISTWIDTH] IN BLOOD BY AUTOMATED COUNT: 14.4 % (ref 11.6–15.1)
GFR SERPL CREATININE-BSD FRML MDRD: 85 ML/MIN/1.73SQ M
GLUCOSE P FAST SERPL-MCNC: 89 MG/DL (ref 65–99)
GLUCOSE UR STRIP-MCNC: NEGATIVE MG/DL
HCT VFR BLD AUTO: 40.8 % (ref 34.8–46.1)
HGB BLD-MCNC: 12.6 G/DL (ref 11.5–15.4)
HGB UR QL STRIP.AUTO: ABNORMAL
IMM GRANULOCYTES # BLD AUTO: 0.01 THOUSAND/UL (ref 0–0.2)
IMM GRANULOCYTES NFR BLD AUTO: 0 % (ref 0–2)
KETONES UR STRIP-MCNC: NEGATIVE MG/DL
LEUKOCYTE ESTERASE UR QL STRIP: ABNORMAL
LYMPHOCYTES # BLD AUTO: 1.5 THOUSANDS/ÂΜL (ref 0.6–4.47)
LYMPHOCYTES NFR BLD AUTO: 30 % (ref 14–44)
MCH RBC QN AUTO: 29.7 PG (ref 26.8–34.3)
MCHC RBC AUTO-ENTMCNC: 30.9 G/DL (ref 31.4–37.4)
MCV RBC AUTO: 96 FL (ref 82–98)
MONOCYTES # BLD AUTO: 0.43 THOUSAND/ÂΜL (ref 0.17–1.22)
MONOCYTES NFR BLD AUTO: 9 % (ref 4–12)
MUCOUS THREADS UR QL AUTO: ABNORMAL
NEUTROPHILS # BLD AUTO: 2.9 THOUSANDS/ÂΜL (ref 1.85–7.62)
NEUTS SEG NFR BLD AUTO: 58 % (ref 43–75)
NITRITE UR QL STRIP: POSITIVE
NON-SQ EPI CELLS URNS QL MICRO: ABNORMAL /HPF
NRBC BLD AUTO-RTO: 0 /100 WBCS
PH UR STRIP.AUTO: 5 [PH]
PLATELET # BLD AUTO: 215 THOUSANDS/UL (ref 149–390)
PMV BLD AUTO: 11.1 FL (ref 8.9–12.7)
POTASSIUM SERPL-SCNC: 4.2 MMOL/L (ref 3.5–5.3)
PROT SERPL-MCNC: 6.7 G/DL (ref 6.4–8.4)
PROT UR STRIP-MCNC: ABNORMAL MG/DL
RBC # BLD AUTO: 4.24 MILLION/UL (ref 3.81–5.12)
RBC #/AREA URNS AUTO: ABNORMAL /HPF
SODIUM SERPL-SCNC: 136 MMOL/L (ref 135–147)
SP GR UR STRIP.AUTO: 1.02 (ref 1–1.03)
UROBILINOGEN UR STRIP-ACNC: <2 MG/DL
WBC # BLD AUTO: 5.01 THOUSAND/UL (ref 4.31–10.16)
WBC #/AREA URNS AUTO: ABNORMAL /HPF
WBC CLUMPS # UR AUTO: PRESENT /UL

## 2023-02-22 DIAGNOSIS — N30.00 ACUTE CYSTITIS WITHOUT HEMATURIA: Primary | ICD-10-CM

## 2023-02-23 ENCOUNTER — HOSPITAL ENCOUNTER (OUTPATIENT)
Dept: NON INVASIVE DIAGNOSTICS | Facility: CLINIC | Age: 83
Discharge: HOME | End: 2023-02-23

## 2023-02-23 ENCOUNTER — APPOINTMENT (OUTPATIENT)
Dept: LAB | Facility: CLINIC | Age: 83
End: 2023-02-23

## 2023-02-23 DIAGNOSIS — N30.00 ACUTE CYSTITIS WITHOUT HEMATURIA: ICD-10-CM

## 2023-02-23 DIAGNOSIS — I65.21 SYMPTOMATIC CAROTID ARTERY STENOSIS WITHOUT INFARCTION, RIGHT: ICD-10-CM

## 2023-02-23 NOTE — TELEPHONE ENCOUNTER
Called patient to let her know to get urine culture done , she stated she is going today to get it done

## 2023-02-24 RX ORDER — CEPHALEXIN 500 MG/1
500 CAPSULE ORAL EVERY 8 HOURS SCHEDULED
Qty: 15 CAPSULE | Refills: 0 | Status: SHIPPED | OUTPATIENT
Start: 2023-02-24 | End: 2023-03-01

## 2023-02-26 LAB
BACTERIA UR CULT: ABNORMAL
BACTERIA UR CULT: ABNORMAL

## 2023-02-27 ENCOUNTER — TELEPHONE (OUTPATIENT)
Dept: INTERNAL MEDICINE CLINIC | Facility: CLINIC | Age: 83
End: 2023-02-27

## 2023-02-27 NOTE — TELEPHONE ENCOUNTER
----- Message from Alok Pereira MD sent at 2/27/2023  8:59 AM EST -----  Please ask her if she is feeling better with the antibiotic   ----- Message -----  From: Lab, Background User  Sent: 2/24/2023   1:28 PM EST  To: Alok Pereira MD

## 2023-03-07 ENCOUNTER — TRANSCRIBE ORDERS (OUTPATIENT)
Dept: ADMINISTRATIVE | Facility: HOSPITAL | Age: 83
End: 2023-03-07

## 2023-03-07 ENCOUNTER — TELEPHONE (OUTPATIENT)
Dept: VASCULAR SURGERY | Facility: CLINIC | Age: 83
End: 2023-03-07

## 2023-03-07 DIAGNOSIS — I65.21 SYMPTOMATIC CAROTID ARTERY STENOSIS WITHOUT INFARCTION, RIGHT: Primary | ICD-10-CM

## 2023-03-07 NOTE — TELEPHONE ENCOUNTER
Pt notified of same  She would like to schedule doppler for next year now  Transf to Gonzalo in call center, she will schedule cv doppler and place in recall for OV

## 2023-03-07 NOTE — TELEPHONE ENCOUNTER
Pt called to request results of her carotid duplex from 2/23 and to see when it should be repeated  No letter has been generated in consensus  Per last OV from Brian Camara 3/1/22, "-Repeat Carotid duplex in 1 year  If results remain stable without change, can follow up in 2 years with continued yearly surveillance " Please advise

## 2023-03-07 NOTE — TELEPHONE ENCOUNTER
Reviewed study  No significant change from prior, bilateral <50% stenosis  Tasked to consensus pool to attach NC 12M to letter for patient  She should follow up in office after her next CV duplex to review

## 2023-03-20 ENCOUNTER — TELEPHONE (OUTPATIENT)
Dept: INTERNAL MEDICINE CLINIC | Facility: CLINIC | Age: 83
End: 2023-03-20

## 2023-03-20 DIAGNOSIS — T75.3XXA MOTION SICKNESS, INITIAL ENCOUNTER: Primary | ICD-10-CM

## 2023-03-20 RX ORDER — SCOLOPAMINE TRANSDERMAL SYSTEM 1 MG/1
1 PATCH, EXTENDED RELEASE TRANSDERMAL
Qty: 5 PATCH | Refills: 0 | Status: SHIPPED | OUTPATIENT
Start: 2023-03-20

## 2023-03-20 NOTE — TELEPHONE ENCOUNTER
patient called and stated she is going on a 12 day lisa and would like the patches for motion sickness called into [facility] on [facility]

## 2023-03-21 NOTE — TELEPHONE ENCOUNTER
Please have patient go over the side effects with the pharmacist if she has not been on this medication before

## 2023-04-19 ENCOUNTER — OFFICE VISIT (OUTPATIENT)
Dept: INTERNAL MEDICINE CLINIC | Facility: CLINIC | Age: 83
End: 2023-04-19

## 2023-04-19 VITALS
BODY MASS INDEX: 20.09 KG/M2 | WEIGHT: 109.2 LBS | SYSTOLIC BLOOD PRESSURE: 130 MMHG | DIASTOLIC BLOOD PRESSURE: 76 MMHG | OXYGEN SATURATION: 99 % | HEART RATE: 61 BPM | HEIGHT: 62 IN | TEMPERATURE: 97.5 F

## 2023-04-19 DIAGNOSIS — E03.9 ACQUIRED HYPOTHYROIDISM: ICD-10-CM

## 2023-04-19 DIAGNOSIS — K91.2 POSTSURGICAL MALABSORPTION: ICD-10-CM

## 2023-04-19 DIAGNOSIS — E44.0 MODERATE PROTEIN-CALORIE MALNUTRITION (HCC): ICD-10-CM

## 2023-04-19 DIAGNOSIS — I10 ESSENTIAL HYPERTENSION: Primary | ICD-10-CM

## 2023-04-19 DIAGNOSIS — K29.70 GASTRITIS WITHOUT BLEEDING, UNSPECIFIED CHRONICITY, UNSPECIFIED GASTRITIS TYPE: ICD-10-CM

## 2023-04-19 RX ORDER — FAMOTIDINE 20 MG/1
20 TABLET, FILM COATED ORAL DAILY
Qty: 90 TABLET | Refills: 1 | Status: SHIPPED | OUTPATIENT
Start: 2023-04-19

## 2023-04-19 RX ORDER — AMLODIPINE BESYLATE 5 MG/1
5 TABLET ORAL DAILY
Qty: 90 TABLET | Refills: 1 | Status: SHIPPED | OUTPATIENT
Start: 2023-04-19

## 2023-04-19 RX ORDER — LEVOTHYROXINE SODIUM 0.1 MG/1
100 TABLET ORAL DAILY
Qty: 90 TABLET | Refills: 1 | Status: SHIPPED | OUTPATIENT
Start: 2023-04-19
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
## 2023-05-31 ENCOUNTER — TELEPHONE (OUTPATIENT)
Dept: OBGYN CLINIC | Facility: CLINIC | Age: 83
End: 2023-05-31

## 2023-05-31 DIAGNOSIS — Z12.31 ENCOUNTER FOR SCREENING MAMMOGRAM FOR MALIGNANT NEOPLASM OF BREAST: Primary | ICD-10-CM

## 2023-06-01 DIAGNOSIS — Z12.31 ENCOUNTER FOR SCREENING MAMMOGRAM FOR MALIGNANT NEOPLASM OF BREAST: Primary | ICD-10-CM

## 2023-06-06 ENCOUNTER — HOSPITAL ENCOUNTER (OUTPATIENT)
Dept: MAMMOGRAPHY | Facility: MEDICAL CENTER | Age: 83
Discharge: HOME | End: 2023-06-06
Payer: MEDICARE

## 2023-06-06 VITALS — WEIGHT: 109.13 LBS | BODY MASS INDEX: 20.08 KG/M2 | HEIGHT: 62 IN

## 2023-06-06 DIAGNOSIS — Z12.31 ENCOUNTER FOR SCREENING MAMMOGRAM FOR MALIGNANT NEOPLASM OF BREAST: ICD-10-CM

## 2023-06-06 PROCEDURE — 77063 BREAST TOMOSYNTHESIS BI: CPT

## 2023-06-06 PROCEDURE — 77067 SCR MAMMO BI INCL CAD: CPT

## 2023-06-20 ENCOUNTER — TELEPHONE (OUTPATIENT)
Dept: INTERNAL MEDICINE CLINIC | Facility: CLINIC | Age: 83
End: 2023-06-20

## 2023-06-20 DIAGNOSIS — Z90.710 HISTORY OF TOTAL HYSTERECTOMY: Primary | ICD-10-CM

## 2023-06-20 DIAGNOSIS — Z90.710 HISTORY OF TOTAL HYSTERECTOMY: ICD-10-CM

## 2023-06-20 NOTE — TELEPHONE ENCOUNTER
Medication was discontinued at [facility] [place] visit on 11/18/22- Per note, patient states premarin tablets 0 625 is managed by PCP  Patient will call office back pharmacy information  Patient has about a month left of medication         Please advise, thank you

## 2023-06-20 NOTE — TELEPHONE ENCOUNTER
Patient wants to use [id number]- phone # [phone] Fax- [phone]  Patient asked for script to be faxed to the pharmacy and script must include patient ID: [id number]  Spoke with Manager, Gildardo Fernandez, advised to have script printed and have patient  script  Per website patient can email, fax or mail script in      Please have Dr Quince Libman sign script and call patient when ready  Left message on machine for patient

## 2023-06-20 NOTE — TELEPHONE ENCOUNTER
She takes premarin 0 625mg once a day but says its not on her med list when she looked in my chart   She asked if this can be added to her med list

## 2023-07-26 ENCOUNTER — APPOINTMENT (OUTPATIENT)
Dept: LAB | Facility: CLINIC | Age: 83
End: 2023-07-26
Payer: MEDICARE

## 2023-07-26 DIAGNOSIS — K91.2 POSTSURGICAL MALABSORPTION: ICD-10-CM

## 2023-07-26 LAB
ALBUMIN SERPL BCP-MCNC: 3.2 G/DL (ref 3.5–5)
ALP SERPL-CCNC: 66 U/L (ref 46–116)
ALT SERPL W P-5'-P-CCNC: 49 U/L (ref 12–78)
AMORPH URATE CRY URNS QL MICRO: ABNORMAL
ANION GAP SERPL CALCULATED.3IONS-SCNC: 3 MMOL/L
AST SERPL W P-5'-P-CCNC: 62 U/L (ref 5–45)
BACTERIA UR QL AUTO: ABNORMAL /HPF
BASOPHILS # BLD AUTO: 0.01 THOUSANDS/ÂΜL (ref 0–0.1)
BASOPHILS NFR BLD AUTO: 0 % (ref 0–1)
BILIRUB SERPL-MCNC: 0.37 MG/DL (ref 0.2–1)
BILIRUB UR QL STRIP: NEGATIVE
BUN SERPL-MCNC: 15 MG/DL (ref 5–25)
CALCIUM ALBUM COR SERPL-MCNC: 9.5 MG/DL (ref 8.3–10.1)
CALCIUM SERPL-MCNC: 8.9 MG/DL (ref 8.3–10.1)
CHLORIDE SERPL-SCNC: 110 MMOL/L (ref 96–108)
CLARITY UR: ABNORMAL
CO2 SERPL-SCNC: 26 MMOL/L (ref 21–32)
COLOR UR: ABNORMAL
CREAT SERPL-MCNC: 0.72 MG/DL (ref 0.6–1.3)
EOSINOPHIL # BLD AUTO: 0.2 THOUSAND/ÂΜL (ref 0–0.61)
EOSINOPHIL NFR BLD AUTO: 5 % (ref 0–6)
ERYTHROCYTE [DISTWIDTH] IN BLOOD BY AUTOMATED COUNT: 14.3 % (ref 11.6–15.1)
FERRITIN SERPL-MCNC: 58 NG/ML (ref 11–307)
GFR SERPL CREATININE-BSD FRML MDRD: 78 ML/MIN/1.73SQ M
GLUCOSE P FAST SERPL-MCNC: 93 MG/DL (ref 65–99)
GLUCOSE UR STRIP-MCNC: NEGATIVE MG/DL
HCT VFR BLD AUTO: 39.3 % (ref 34.8–46.1)
HGB BLD-MCNC: 12.2 G/DL (ref 11.5–15.4)
HGB UR QL STRIP.AUTO: NEGATIVE
IMM GRANULOCYTES # BLD AUTO: 0.02 THOUSAND/UL (ref 0–0.2)
IMM GRANULOCYTES NFR BLD AUTO: 1 % (ref 0–2)
IRON SATN MFR SERPL: 35 % (ref 15–50)
IRON SERPL-MCNC: 120 UG/DL (ref 50–170)
KETONES UR STRIP-MCNC: NEGATIVE MG/DL
LEUKOCYTE ESTERASE UR QL STRIP: ABNORMAL
LYMPHOCYTES # BLD AUTO: 1.41 THOUSANDS/ÂΜL (ref 0.6–4.47)
LYMPHOCYTES NFR BLD AUTO: 32 % (ref 14–44)
MCH RBC QN AUTO: 29.4 PG (ref 26.8–34.3)
MCHC RBC AUTO-ENTMCNC: 31 G/DL (ref 31.4–37.4)
MCV RBC AUTO: 95 FL (ref 82–98)
MONOCYTES # BLD AUTO: 0.44 THOUSAND/ÂΜL (ref 0.17–1.22)
MONOCYTES NFR BLD AUTO: 10 % (ref 4–12)
NEUTROPHILS # BLD AUTO: 2.3 THOUSANDS/ÂΜL (ref 1.85–7.62)
NEUTS SEG NFR BLD AUTO: 52 % (ref 43–75)
NITRITE UR QL STRIP: NEGATIVE
NON-SQ EPI CELLS URNS QL MICRO: ABNORMAL /HPF
NRBC BLD AUTO-RTO: 0 /100 WBCS
PH UR STRIP.AUTO: 6.5 [PH]
PLATELET # BLD AUTO: 172 THOUSANDS/UL (ref 149–390)
PMV BLD AUTO: 12.4 FL (ref 8.9–12.7)
POTASSIUM SERPL-SCNC: 5 MMOL/L (ref 3.5–5.3)
PROT SERPL-MCNC: 6.9 G/DL (ref 6.4–8.4)
PROT UR STRIP-MCNC: ABNORMAL MG/DL
RBC # BLD AUTO: 4.15 MILLION/UL (ref 3.81–5.12)
RBC #/AREA URNS AUTO: ABNORMAL /HPF
SODIUM SERPL-SCNC: 139 MMOL/L (ref 135–147)
SP GR UR STRIP.AUTO: 1.01 (ref 1–1.03)
TIBC SERPL-MCNC: 347 UG/DL (ref 250–450)
UROBILINOGEN UR STRIP-ACNC: <2 MG/DL
WBC # BLD AUTO: 4.38 THOUSAND/UL (ref 4.31–10.16)
WBC #/AREA URNS AUTO: ABNORMAL /HPF

## 2023-07-26 PROCEDURE — 83540 ASSAY OF IRON: CPT

## 2023-07-26 PROCEDURE — 85025 COMPLETE CBC W/AUTO DIFF WBC: CPT

## 2023-07-26 PROCEDURE — 83550 IRON BINDING TEST: CPT

## 2023-07-26 PROCEDURE — 36415 COLL VENOUS BLD VENIPUNCTURE: CPT

## 2023-07-26 PROCEDURE — 82728 ASSAY OF FERRITIN: CPT

## 2023-07-26 PROCEDURE — 80053 COMPREHEN METABOLIC PANEL: CPT

## 2023-08-11 ENCOUNTER — RA CDI HCC (OUTPATIENT)
Dept: OTHER | Facility: HOSPITAL | Age: 83
End: 2023-08-11

## 2023-08-11 NOTE — PROGRESS NOTES
[facility] coding opportunities       Chart reviewed, no opportunity found: CHART REVIEWED, NO OPPORTUNITY FOUND        Patients Insurance     Medicare Insurance: Medicare

## 2023-08-21 ENCOUNTER — OFFICE VISIT (OUTPATIENT)
Dept: INTERNAL MEDICINE CLINIC | Facility: CLINIC | Age: 83
End: 2023-08-21
Payer: MEDICARE

## 2023-08-21 VITALS
HEIGHT: 62 IN | HEART RATE: 62 BPM | TEMPERATURE: 98 F | SYSTOLIC BLOOD PRESSURE: 130 MMHG | OXYGEN SATURATION: 99 % | DIASTOLIC BLOOD PRESSURE: 62 MMHG | WEIGHT: 111.8 LBS | BODY MASS INDEX: 20.57 KG/M2

## 2023-08-21 DIAGNOSIS — I10 ESSENTIAL HYPERTENSION: Primary | ICD-10-CM

## 2023-08-21 DIAGNOSIS — E55.9 VITAMIN D DEFICIENCY DISEASE: ICD-10-CM

## 2023-08-21 DIAGNOSIS — E03.9 ACQUIRED HYPOTHYROIDISM: ICD-10-CM

## 2023-08-21 DIAGNOSIS — K91.2 POSTSURGICAL MALABSORPTION: ICD-10-CM

## 2023-08-21 PROCEDURE — 99214 OFFICE O/P EST MOD 30 MIN: CPT | Performed by: INTERNAL MEDICINE

## 2023-08-21 RX ORDER — CEPHALEXIN 500 MG/1
500 CAPSULE ORAL AS NEEDED
COMMUNITY
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
## 2023-08-25 ENCOUNTER — OFFICE VISIT (OUTPATIENT)
Dept: RHEUMATOLOGY | Facility: CLINIC | Age: 83
End: 2023-08-25
Payer: MEDICARE

## 2023-08-25 DIAGNOSIS — M19.90 INFLAMMATORY ARTHRITIS: ICD-10-CM

## 2023-08-25 DIAGNOSIS — Z79.899 HIGH RISK MEDICATION USE: ICD-10-CM

## 2023-08-25 DIAGNOSIS — M81.0 OSTEOPOROSIS, UNSPECIFIED OSTEOPOROSIS TYPE, UNSPECIFIED PATHOLOGICAL FRACTURE PRESENCE: Primary | ICD-10-CM

## 2023-08-25 DIAGNOSIS — M15.4 EROSIVE OSTEOARTHRITIS OF BOTH HANDS: ICD-10-CM

## 2023-08-25 PROCEDURE — 99214 OFFICE O/P EST MOD 30 MIN: CPT | Performed by: INTERNAL MEDICINE

## 2023-08-25 PROCEDURE — 96372 THER/PROPH/DIAG INJ SC/IM: CPT

## 2023-08-25 RX ORDER — HYDROXYCHLOROQUINE SULFATE 200 MG/1
200 TABLET, FILM COATED ORAL DAILY
Qty: 30 TABLET | Refills: 5 | Status: SHIPPED | OUTPATIENT
Start: 2023-08-25 | End: 2023-09-20 | Stop reason: SDUPTHER

## 2023-08-25 NOTE — PATIENT INSTRUCTIONS
Continue daily dairy intake  Continue Vit.  D supplementation   Take 2 Tums daily  Do weight-bearing exercises  Prolia injection successfully administered in clinic today  Schedule DEXA scan  Hand surgery referral made  Do labs  Do hand x-rays  Start hydroxychloroquine daily     Return to clinic in 6 months for next Prolia injection and provider visit

## 2023-08-25 NOTE — PROGRESS NOTES
Assessment and Plan:  Torrie Campos is a 82 y.o.  female who presents for follow-up of osteoporosis and erosive OA. Continue daily dairy intake  Continue Vit. D supplementation   Take 2 Tums daily  Do weight-bearing exercises  Prolia injection successfully administered in clinic today  Schedule DEXA scan  Hand surgery referral made  Do labs  Do hand x-rays  Start hydroxychloroquine daily    1. Erosive osteoarthritis  We will repeat hand x-rays and order lab work. 2. Osteoporosis  We gave her Prolia injection today and we are going to continue that every 6 months. DEXA scan order placed; patient will schedule an appointment as soon as possible. Patient will continue taking vitamin D as per current management. It is recommended to take 2 chewable tablets of Tums once a day for calcium supplement. We recommended doing weightbearing exercises, significance explained. Patient verbalizes understanding. 3. Swelling right ring finger   We provided referral to Dr. Ash Rios, hand surgery, for joint aspiration and steroid injection. Plan:  Problem List Items Addressed This Visit     Osteoporosis - Primary    Relevant Orders    DXA bone density spine hip and pelvis   Other Visit Diagnoses     Erosive osteoarthritis of both hands        Relevant Medications    hydroxychloroquine (PLAQUENIL) 200 mg tablet    Other Relevant Orders    Ambulatory Referral to Hand Surgery    XR hand 3+ vw left    XR hand 3+ vw right    Inflammatory arthritis        Relevant Orders    Sedimentation rate, automated    C-reactive protein    RF Screen w/ Reflex to Titer    Cyclic citrul peptide antibody, IgG      High risk medication use - Benefits and risks of hydroxychloroquine, including but not limited to retinal toxicity, corneal deposits, gastrointestinal side effects, and headaches were discussed with the patient.  The need for a regular eye exam to monitor for ocular toxicity while on this medication was also explained to the patient. Prolia (denosumab) is a monoclonal antibody biologic medication that can rapidly utilize the body's calcium and make one susceptible to hypocalcemia; the medication also has a risk of osteonecrosis of the jaw in patients with exposed jaw bone. Return to clinic in 6 months for next Prolia injection and provider visit        Chief Complaint  Follow-up of her osteoporosis. TAHMINA Short is an 45-year-old female who presents today for a follow-up of her osteoporosis. The patient consents to the use of RUBY. The following portions of the patient's history were reviewed and updated as appropriate: allergies, current medications, past family history, past medical history, past social history, past surgical history, and problem list.    Interval History    She reports a new onset of swelling to the right ring finger PIP joint and associated pain. Symptoms are presents for the past 6 months. She notes constant swelling in her right ring finger PIP joint. She reports pain when she uses her right hand and tries to flex her ring finger. She also notes pain in the entire right hand which is sporadic. She also notes a small cyst or some kind of deformity at the right ring finger joint. She recalls she had cyst drained from the finger in the past several years ago by an orthopedic hand specialist; but she could not recall the site of the cyst. She is not sure about receiving steroid injection in the finger. She did not try Voltaren gel application on her hand since the pain is bearable. The patient states that she has a family history of hand arthritis. The patient was found to have erosive osteoarthritis on the x-rays of the hand back in 05/2018. She denies doing any weightbearing exercises. She continues to take Vitamin D 15,000 units once a week and 50,000 units once a month. She has discontinued taking calcium due to difficulty in swallowing big tablets.  She recalls she did not take Tums since she had gastric bypass surgery in 2000 as she never had heartburn symptoms since then. Her last DEXA scan was in 03/2022. Family history  Family history is significant for arthritis. Her daughter has rheumatoid arthritis. Review of Systems:   Pertinent ROS positive for joint pain and swelling. Rest of 14 point ROS reviewed and were negative.     Home Medications:    Current Outpatient Medications:   •  hydroxychloroquine (PLAQUENIL) 200 mg tablet, Take 1 tablet (200 mg total) by mouth in the morning, Disp: 30 tablet, Rfl: 5  •  acetaminophen (TYLENOL) 500 mg tablet, Take 1,000 mg by mouth every 6 (six) hours as needed , Disp: , Rfl:   •  amLODIPine (NORVASC) 5 mg tablet, Take 1 tablet (5 mg total) by mouth daily, Disp: 90 tablet, Rfl: 1  •  calcium-vitamin D 250-100 MG-UNIT per tablet, Take 1 tablet by mouth daily  , Disp: , Rfl:   •  cephalexin (KEFLEX) 500 mg capsule, Take 500 mg by mouth if needed (1 hour prior to dental procedures), Disp: , Rfl:   •  Cholecalciferol (Vitamin D-3) 125 MCG (5000 UT) TABS, Take 15,000 Units by mouth once a week Takes on a Friday every week, Disp: , Rfl:   •  conjugated estrogens (Premarin) 0.625 mg tablet, Take 1 tablet (0.625 mg total) by mouth daily (Patient taking differently: Take 0.625 mg by mouth daily Started in 1990), Disp: 90 tablet, Rfl: 1  •  Cyanocobalamin (VITAMIN B 12 PO), Take 500 mcg by mouth daily , Disp: , Rfl:   •  denosumab (PROLIA) 60 mg/mL, Inject 60 mg under the skin every 6 (six) months, Disp: , Rfl:   •  ergocalciferol (VITAMIN D2) 50,000 units, Take 1 capsule (50,000 Units total) by mouth every 28 days, Disp: 3 capsule, Rfl: 3  •  famotidine (PEPCID) 20 mg tablet, Take 1 tablet (20 mg total) by mouth in the morning, Disp: 90 tablet, Rfl: 1  •  ferrous gluconate (FERGON) 240 (27 FE) MG tablet, Take 27 mg by mouth daily Takes in the am, Disp: , Rfl:   •  Flowflex COVID-19 Ag Home Test KIT, , Disp: , Rfl:   •  fluorouracil (EFUDEX) 5 % cream, APPLY TWICE DAILY TO LEFT CHEEK AS DIRECTED. [address] HANDS AFTER APPLICATION. (Patient not taking: Reported on 8/21/2023), Disp: , Rfl:   •  Garlic 6844 MG CAPS, Take 1,000 mg by mouth daily, Disp: , Rfl:   •  levothyroxine 100 mcg tablet, Take 1 tablet (100 mcg total) by mouth daily, Disp: 90 tablet, Rfl: 1  •  meclizine (ANTIVERT) 12.5 MG tablet, Take 1 tablet (12.5 mg total) by mouth 3 (three) times a day as needed for dizziness, Disp: 90 tablet, Rfl: 0  •  Multiple Vitamins-Minerals (CENTRUM SILVER PO), Take 1 tablet by mouth daily, Disp: , Rfl:   •  Zinc 30 MG TABS, Take 50 mg by mouth daily , Disp: , Rfl:     Objective: There were no vitals filed for this visit. Physical Exam:  Constitutional:       General: She is not in acute distress. HENT:      Head: Normocephalic and atraumatic. Eyes:      Conjunctiva/sclera: Conjunctivae normal.   Cardiovascular:      Rate and Rhythm: Normal rate and regular rhythm. Heart sounds: S1 normal and S2 normal.      No friction rub. Pulmonary:      Effort: Pulmonary effort is normal. No respiratory distress. Breath sounds: Normal breath sounds. No wheezing, rhonchi or rales. Musculoskeletal:      Cervical back: Neck supple. Hand: Right fourth PIP swelling and tenderness and Intermittent Heberden's nodes. Skin:     Coloration: Skin is not pale. Neurological:      Mental Status: She is alert. Mental status is at baseline. Psychiatric:         Mood and Affect: Mood normal.         Behavior: Behavior normal.    I have personally reviewed results with the patient.     Labs:   Appointment on 07/26/2023   Component Date Value Ref Range Status   • WBC 07/26/2023 4.38  4.31 - 10.16 Thousand/uL Final   • RBC 07/26/2023 4.15  3.81 - 5.12 Million/uL Final   • Hemoglobin 07/26/2023 12.2  11.5 - 15.4 g/dL Final   • Hematocrit 07/26/2023 39.3  34.8 - 46.1 % Final   • MCV 07/26/2023 95  82 - 98 fL Final   • MCH 07/26/2023 29.4  26.8 - 34.3 pg Final   • MCHC 07/26/2023 31.0 (L)  31.4 - 37.4 g/dL Final   • RDW 07/26/2023 14.3  11.6 - 15.1 % Final   • MPV 07/26/2023 12.4  8.9 - 12.7 fL Final   • Platelets 63/79/1432 172  149 - 390 Thousands/uL Final   • nRBC 07/26/2023 0  /100 WBCs Final   • Neutrophils Relative 07/26/2023 52  43 - 75 % Final   • Immat GRANS % 07/26/2023 1  0 - 2 % Final   • Lymphocytes Relative 07/26/2023 32  14 - 44 % Final   • Monocytes Relative 07/26/2023 10  4 - 12 % Final   • Eosinophils Relative 07/26/2023 5  0 - 6 % Final   • Basophils Relative 07/26/2023 0  0 - 1 % Final   • Neutrophils Absolute 07/26/2023 2.30  1.85 - 7.62 Thousands/µL Final   • Immature Grans Absolute 07/26/2023 0.02  0.00 - 0.20 Thousand/uL Final   • Lymphocytes Absolute 07/26/2023 1.41  0.60 - 4.47 Thousands/µL Final   • Monocytes Absolute 07/26/2023 0.44  0.17 - 1.22 Thousand/µL Final   • Eosinophils Absolute 07/26/2023 0.20  0.00 - 0.61 Thousand/µL Final   • Basophils Absolute 07/26/2023 0.01  0.00 - 0.10 Thousands/µL Final   • Sodium 07/26/2023 139  135 - 147 mmol/L Final   • Potassium 07/26/2023 5.0  3.5 - 5.3 mmol/L Final   • Chloride 07/26/2023 110 (H)  96 - 108 mmol/L Final   • CO2 07/26/2023 26  21 - 32 mmol/L Final   • ANION GAP 07/26/2023 3  mmol/L Final   • BUN 07/26/2023 15  5 - 25 mg/dL Final   • Creatinine 07/26/2023 0.72  0.60 - 1.30 mg/dL Final    Standardized to IDMS reference method   • Glucose, Fasting 07/26/2023 93  65 - 99 mg/dL Final    Specimen collection should occur prior to Sulfasalazine administration due to the potential for falsely depressed results. Specimen collection should occur prior to Sulfapyridine administration due to the potential for falsely elevated results. • Calcium 07/26/2023 8.9  8.3 - 10.1 mg/dL Final   • Corrected Calcium 07/26/2023 9.5  8.3 - 10.1 mg/dL Final   • AST 07/26/2023 62 (H)  5 - 45 U/L Final    Specimen collection should occur prior to Sulfasalazine administration due to the potential for falsely depressed results.     • ALT 07/26/2023 49  12 - 78 U/L Final    Specimen collection should occur prior to Sulfasalazine and/or Sulfapyridine administration due to the potential for falsely depressed results. • Alkaline Phosphatase 07/26/2023 66  46 - 116 U/L Final   • Total Protein 07/26/2023 6.9  6.4 - 8.4 g/dL Final   • Albumin 07/26/2023 3.2 (L)  3.5 - 5.0 g/dL Final   • Total Bilirubin 07/26/2023 0.37  0.20 - 1.00 mg/dL Final    Use of this assay is not recommended for patients undergoing treatment with eltrombopag due to the potential for falsely elevated results. • eGFR 07/26/2023 78  ml/min/1.73sq m Final   • Iron Saturation 07/26/2023 35  15 - 50 % Final   • TIBC 07/26/2023 347  250 - 450 ug/dL Final   • Iron 07/26/2023 120  50 - 170 ug/dL Final    Patients treated with metal-binding drugs (ie. Deferoxamine) may have depressed iron values.    • Ferritin 07/26/2023 58  11 - 307 ng/mL Final   Office Visit on 04/19/2023   Component Date Value Ref Range Status   • Color, UA 07/26/2023 Light Yellow   Final   • Clarity, UA 07/26/2023 Turbid   Final   • Specific Gravity, UA 07/26/2023 1.015  1.003 - 1.030 Final   • pH, UA 07/26/2023 6.5  4.5, 5.0, 5.5, 6.0, 6.5, 7.0, 7.5, 8.0 Final   • Leukocytes, UA 07/26/2023 Trace (A)  Negative Final   • Nitrite, UA 07/26/2023 Negative  Negative Final   • Protein, UA 07/26/2023 70 (1+) (A)  Negative mg/dl Final   • Glucose, UA 07/26/2023 Negative  Negative mg/dl Final   • Ketones, UA 07/26/2023 Negative  Negative mg/dl Final   • Urobilinogen, UA 07/26/2023 <2.0  <2.0 mg/dl mg/dl Final   • Bilirubin, UA 07/26/2023 Negative  Negative Final   • Occult Blood, UA 07/26/2023 Negative  Negative Final   • RBC, UA 07/26/2023 2-4 (A)  None Seen, 1-2 /hpf Final   • WBC, UA 07/26/2023 2-4 (A)  None Seen, 1-2 /hpf Final   • Epithelial Cells 07/26/2023 Occasional  None Seen, Occasional /hpf Final   • Bacteria, UA 07/26/2023 Moderate (A)  None Seen, Occasional /hpf Final   • Amorphous Crystals, UA 07/26/2023 Moderate Final       Transcribed for Darlin Campos MD, by Dale Hutchinson on 08/25/23 at 5:00 PM. Powered by [employer]. Reviewed by Darrick Asher.

## 2023-08-29 ENCOUNTER — APPOINTMENT (OUTPATIENT)
Dept: RADIOLOGY | Facility: MEDICAL CENTER | Age: 83
End: 2023-08-29
Payer: MEDICARE

## 2023-08-29 DIAGNOSIS — M15.4 EROSIVE OSTEOARTHRITIS OF BOTH HANDS: ICD-10-CM

## 2023-08-29 PROCEDURE — 73130 X-RAY EXAM OF HAND: CPT

## 2023-09-08 ENCOUNTER — TELEPHONE (OUTPATIENT)
Age: 83
End: 2023-09-08

## 2023-09-08 NOTE — TELEPHONE ENCOUNTER
Caller: Patient    Doctor: Zoie Solorio    Reason for call: Pt gets Prolia injections and is now in need of a tooth extraction. Pt states she was told she has to wait 2 months after getting prolia to have a procedure like this completed. Pt would like to know if this is correct?   Please advise    Call back#: [phone]

## 2023-09-11 NOTE — TELEPHONE ENCOUNTER
Please let her know that she should actually wait 3 months after her last Prolia injection to get a tooth extraction and make sure she is fully healed before she receives her next Prolia injection

## 2023-09-14 ENCOUNTER — OFFICE VISIT (OUTPATIENT)
Dept: OBGYN CLINIC | Facility: CLINIC | Age: 83
End: 2023-09-14
Payer: MEDICARE

## 2023-09-14 VITALS
HEIGHT: 62 IN | WEIGHT: 111 LBS | DIASTOLIC BLOOD PRESSURE: 61 MMHG | BODY MASS INDEX: 20.43 KG/M2 | SYSTOLIC BLOOD PRESSURE: 110 MMHG

## 2023-09-14 DIAGNOSIS — M15.4 EROSIVE OSTEOARTHRITIS OF BOTH HANDS: ICD-10-CM

## 2023-09-14 PROCEDURE — 99203 OFFICE O/P NEW LOW 30 MIN: CPT | Performed by: SURGERY

## 2023-09-14 RX ORDER — LATANOPROST 50 UG/ML
SOLUTION/ DROPS OPHTHALMIC
COMMUNITY
Start: 2023-09-14
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
## 2023-09-18 ENCOUNTER — CLINICAL SUPPORT (OUTPATIENT)
Dept: INTERNAL MEDICINE CLINIC | Facility: CLINIC | Age: 83
End: 2023-09-18
Payer: MEDICARE

## 2023-09-18 DIAGNOSIS — Z23 FLU VACCINE NEED: Primary | ICD-10-CM

## 2023-09-18 PROCEDURE — G0008 ADMIN INFLUENZA VIRUS VAC: HCPCS

## 2023-09-18 PROCEDURE — 90662 IIV NO PRSV INCREASED AG IM: CPT

## 2023-09-19 ENCOUNTER — TELEPHONE (OUTPATIENT)
Age: 83
End: 2023-09-19

## 2023-09-19 NOTE — TELEPHONE ENCOUNTER
Caller: patient    Doctor: Anaya Alexander    Reason for call: patient is calling for a new script for the hydroxychloroquine to be sent to [facility]    Please call patient when script is sent over    Call back#: [phone]

## 2023-09-20 ENCOUNTER — TELEPHONE (OUTPATIENT)
Dept: OBGYN CLINIC | Facility: HOSPITAL | Age: 83
End: 2023-09-20

## 2023-09-20 DIAGNOSIS — M15.4 EROSIVE OSTEOARTHRITIS OF BOTH HANDS: ICD-10-CM

## 2023-09-20 RX ORDER — HYDROXYCHLOROQUINE SULFATE 200 MG/1
200 TABLET, FILM COATED ORAL DAILY
Qty: 90 TABLET | Refills: 1 | Status: SHIPPED | OUTPATIENT
Start: 2023-09-20 | End: 2024-03-18

## 2023-09-25 DIAGNOSIS — E55.9 VITAMIN D DEFICIENCY DISEASE: ICD-10-CM

## 2023-09-25 RX ORDER — ERGOCALCIFEROL 1.25 MG/1
50000 CAPSULE ORAL
Qty: 3 CAPSULE | Refills: 3 | Status: SHIPPED | OUTPATIENT
Start: 2023-09-25 | End: 2023-12-24

## 2023-10-12 ENCOUNTER — OFFICE VISIT (OUTPATIENT)
Dept: INTERNAL MEDICINE CLINIC | Facility: CLINIC | Age: 83
End: 2023-10-12
Payer: MEDICARE

## 2023-10-12 VITALS
OXYGEN SATURATION: 99 % | DIASTOLIC BLOOD PRESSURE: 62 MMHG | BODY MASS INDEX: 19.88 KG/M2 | SYSTOLIC BLOOD PRESSURE: 116 MMHG | HEART RATE: 76 BPM | WEIGHT: 108 LBS | HEIGHT: 62 IN | TEMPERATURE: 98 F

## 2023-10-12 DIAGNOSIS — E03.9 ACQUIRED HYPOTHYROIDISM: ICD-10-CM

## 2023-10-12 DIAGNOSIS — K29.70 GASTRITIS WITHOUT BLEEDING, UNSPECIFIED CHRONICITY, UNSPECIFIED GASTRITIS TYPE: ICD-10-CM

## 2023-10-12 DIAGNOSIS — R51.9 HEADACHE DISORDER: Primary | ICD-10-CM

## 2023-10-12 DIAGNOSIS — I10 ESSENTIAL HYPERTENSION: ICD-10-CM

## 2023-10-12 PROCEDURE — 99214 OFFICE O/P EST MOD 30 MIN: CPT | Performed by: INTERNAL MEDICINE

## 2023-10-12 RX ORDER — AMLODIPINE BESYLATE 5 MG/1
5 TABLET ORAL DAILY
Qty: 90 TABLET | Refills: 1 | Status: SHIPPED | OUTPATIENT
Start: 2023-10-12

## 2023-10-12 RX ORDER — SCOLOPAMINE TRANSDERMAL SYSTEM 1 MG/1
PATCH, EXTENDED RELEASE TRANSDERMAL
COMMUNITY

## 2023-10-12 RX ORDER — FAMOTIDINE 20 MG/1
20 TABLET, FILM COATED ORAL DAILY
Qty: 90 TABLET | Refills: 1 | Status: SHIPPED | OUTPATIENT
Start: 2023-10-12

## 2023-10-12 RX ORDER — LEVOTHYROXINE SODIUM 0.1 MG/1
100 TABLET ORAL DAILY
Qty: 90 TABLET | Refills: 1 | Status: SHIPPED | OUTPATIENT
Start: 2023-10-12
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
## 2023-11-28 ENCOUNTER — OFFICE VISIT (OUTPATIENT)
Dept: OBGYN CLINIC | Facility: CLINIC | Age: 83
End: 2023-11-28
Payer: MEDICARE

## 2023-11-28 VITALS
DIASTOLIC BLOOD PRESSURE: 62 MMHG | HEART RATE: 70 BPM | SYSTOLIC BLOOD PRESSURE: 112 MMHG | HEIGHT: 62 IN | WEIGHT: 108 LBS | BODY MASS INDEX: 19.88 KG/M2

## 2023-11-28 DIAGNOSIS — Z12.31 ENCOUNTER FOR SCREENING MAMMOGRAM FOR BREAST CANCER: ICD-10-CM

## 2023-11-28 DIAGNOSIS — L90.0 LICHEN SCLEROSUS ET ATROPHICUS: ICD-10-CM

## 2023-11-28 DIAGNOSIS — N95.2 VAGINAL ATROPHY: Primary | ICD-10-CM

## 2023-11-28 PROCEDURE — 99213 OFFICE O/P EST LOW 20 MIN: CPT | Performed by: OBSTETRICS & GYNECOLOGY

## 2023-12-05 ENCOUNTER — TELEPHONE (OUTPATIENT)
Dept: INTERNAL MEDICINE CLINIC | Facility: CLINIC | Age: 83
End: 2023-12-05

## 2023-12-05 ENCOUNTER — APPOINTMENT (OUTPATIENT)
Dept: LAB | Facility: CLINIC | Age: 83
End: 2023-12-05
Payer: MEDICARE

## 2023-12-05 DIAGNOSIS — K91.2 POSTSURGICAL MALABSORPTION: ICD-10-CM

## 2023-12-05 DIAGNOSIS — R19.7 DIARRHEA, UNSPECIFIED TYPE: ICD-10-CM

## 2023-12-05 DIAGNOSIS — I10 ESSENTIAL HYPERTENSION: ICD-10-CM

## 2023-12-05 DIAGNOSIS — E03.9 ACQUIRED HYPOTHYROIDISM: ICD-10-CM

## 2023-12-05 LAB
25(OH)D3 SERPL-MCNC: 43.8 NG/ML (ref 30–100)
ALBUMIN SERPL BCP-MCNC: 3.8 G/DL (ref 3.5–5)
ALP SERPL-CCNC: 59 U/L (ref 34–104)
ALT SERPL W P-5'-P-CCNC: 48 U/L (ref 7–52)
ANION GAP SERPL CALCULATED.3IONS-SCNC: 6 MMOL/L
AST SERPL W P-5'-P-CCNC: 71 U/L (ref 13–39)
BACTERIA UR QL AUTO: ABNORMAL /HPF
BASOPHILS # BLD AUTO: 0.01 THOUSANDS/ÂΜL (ref 0–0.1)
BASOPHILS NFR BLD AUTO: 0 % (ref 0–1)
BILIRUB SERPL-MCNC: 0.34 MG/DL (ref 0.2–1)
BILIRUB UR QL STRIP: NEGATIVE
BUN SERPL-MCNC: 13 MG/DL (ref 5–25)
CALCIUM SERPL-MCNC: 8.5 MG/DL (ref 8.4–10.2)
CHLORIDE SERPL-SCNC: 107 MMOL/L (ref 96–108)
CLARITY UR: ABNORMAL
CO2 SERPL-SCNC: 27 MMOL/L (ref 21–32)
COLOR UR: YELLOW
CREAT SERPL-MCNC: 0.62 MG/DL (ref 0.6–1.3)
CRP SERPL QL: <1 MG/L
EOSINOPHIL # BLD AUTO: 0.17 THOUSAND/ÂΜL (ref 0–0.61)
EOSINOPHIL NFR BLD AUTO: 6 % (ref 0–6)
ERYTHROCYTE [DISTWIDTH] IN BLOOD BY AUTOMATED COUNT: 14.1 % (ref 11.6–15.1)
ERYTHROCYTE [SEDIMENTATION RATE] IN BLOOD: 25 MM/HOUR (ref 0–29)
FERRITIN SERPL-MCNC: 56 NG/ML (ref 11–307)
GFR SERPL CREATININE-BSD FRML MDRD: 84 ML/MIN/1.73SQ M
GLUCOSE P FAST SERPL-MCNC: 87 MG/DL (ref 65–99)
GLUCOSE UR STRIP-MCNC: NEGATIVE MG/DL
HCT VFR BLD AUTO: 36.4 % (ref 34.8–46.1)
HGB BLD-MCNC: 11.8 G/DL (ref 11.5–15.4)
HGB UR QL STRIP.AUTO: NEGATIVE
IMM GRANULOCYTES # BLD AUTO: 0.01 THOUSAND/UL (ref 0–0.2)
IMM GRANULOCYTES NFR BLD AUTO: 0 % (ref 0–2)
IRON SATN MFR SERPL: 29 % (ref 15–50)
IRON SERPL-MCNC: 81 UG/DL (ref 50–212)
KETONES UR STRIP-MCNC: NEGATIVE MG/DL
LEUKOCYTE ESTERASE UR QL STRIP: ABNORMAL
LYMPHOCYTES # BLD AUTO: 0.97 THOUSANDS/ÂΜL (ref 0.6–4.47)
LYMPHOCYTES NFR BLD AUTO: 32 % (ref 14–44)
MCH RBC QN AUTO: 30.1 PG (ref 26.8–34.3)
MCHC RBC AUTO-ENTMCNC: 32.4 G/DL (ref 31.4–37.4)
MCV RBC AUTO: 93 FL (ref 82–98)
MONOCYTES # BLD AUTO: 0.4 THOUSAND/ÂΜL (ref 0.17–1.22)
MONOCYTES NFR BLD AUTO: 13 % (ref 4–12)
MUCOUS THREADS UR QL AUTO: ABNORMAL
NEUTROPHILS # BLD AUTO: 1.46 THOUSANDS/ÂΜL (ref 1.85–7.62)
NEUTS SEG NFR BLD AUTO: 49 % (ref 43–75)
NITRITE UR QL STRIP: POSITIVE
NON-SQ EPI CELLS URNS QL MICRO: ABNORMAL /HPF
NRBC BLD AUTO-RTO: 0 /100 WBCS
PH UR STRIP.AUTO: 5.5 [PH]
PLATELET # BLD AUTO: 146 THOUSANDS/UL (ref 149–390)
PMV BLD AUTO: 11.2 FL (ref 8.9–12.7)
POTASSIUM SERPL-SCNC: 4.6 MMOL/L (ref 3.5–5.3)
PROT SERPL-MCNC: 6.3 G/DL (ref 6.4–8.4)
PROT UR STRIP-MCNC: ABNORMAL MG/DL
RBC # BLD AUTO: 3.92 MILLION/UL (ref 3.81–5.12)
RBC #/AREA URNS AUTO: ABNORMAL /HPF
SODIUM SERPL-SCNC: 140 MMOL/L (ref 135–147)
SP GR UR STRIP.AUTO: 1.02 (ref 1–1.03)
T4 FREE SERPL-MCNC: 1.14 NG/DL (ref 0.61–1.12)
TIBC SERPL-MCNC: 283 UG/DL (ref 250–450)
TRANS CELLS #/AREA URNS HPF: PRESENT /[HPF]
TSH SERPL DL<=0.05 MIU/L-ACNC: 0.18 UIU/ML (ref 0.45–4.5)
UIBC SERPL-MCNC: 202 UG/DL (ref 155–355)
UROBILINOGEN UR STRIP-ACNC: <2 MG/DL
VIT B12 SERPL-MCNC: 1119 PG/ML (ref 180–914)
WBC # BLD AUTO: 3.02 THOUSAND/UL (ref 4.31–10.16)
WBC #/AREA URNS AUTO: ABNORMAL /HPF

## 2023-12-05 PROCEDURE — 85025 COMPLETE CBC W/AUTO DIFF WBC: CPT

## 2023-12-05 PROCEDURE — 84443 ASSAY THYROID STIM HORMONE: CPT

## 2023-12-05 PROCEDURE — 83550 IRON BINDING TEST: CPT

## 2023-12-05 PROCEDURE — 82728 ASSAY OF FERRITIN: CPT

## 2023-12-05 PROCEDURE — 84439 ASSAY OF FREE THYROXINE: CPT

## 2023-12-05 PROCEDURE — 80053 COMPREHEN METABOLIC PANEL: CPT

## 2023-12-05 PROCEDURE — 82306 VITAMIN D 25 HYDROXY: CPT

## 2023-12-05 PROCEDURE — 84425 ASSAY OF VITAMIN B-1: CPT

## 2023-12-05 PROCEDURE — 82607 VITAMIN B-12: CPT

## 2023-12-05 PROCEDURE — 83540 ASSAY OF IRON: CPT

## 2023-12-05 PROCEDURE — 84590 ASSAY OF VITAMIN A: CPT

## 2023-12-05 NOTE — TELEPHONE ENCOUNTER
----- Message from Grace Garcia MD sent at 12/5/2023  1:09 PM EST -----  Please ask her if she has any urinary symptoms.   ----- Message -----  From: Lab, Background User  Sent: 12/5/2023  11:02 AM EST  To: Grace Garcia MD

## 2023-12-06 ENCOUNTER — APPOINTMENT (OUTPATIENT)
Dept: LAB | Facility: CLINIC | Age: 83
End: 2023-12-06
Payer: MEDICARE

## 2023-12-06 LAB — RHEUMATOID FACT SER QL LA: NEGATIVE

## 2023-12-06 PROCEDURE — 87505 NFCT AGENT DETECTION GI: CPT

## 2023-12-06 PROCEDURE — 87205 SMEAR GRAM STAIN: CPT

## 2023-12-07 LAB
C DIFF TOX A+B STL QL IA: NEGATIVE
C DIFF TOX GENS STL QL NAA+PROBE: POSITIVE
CAMPYLOBACTER DNA SPEC NAA+PROBE: NORMAL
SALMONELLA DNA SPEC QL NAA+PROBE: NORMAL
SHIGA TOXIN STX GENE SPEC NAA+PROBE: NORMAL
SHIGELLA DNA SPEC QL NAA+PROBE: NORMAL
WBC STL QL MICRO: NORMAL

## 2023-12-08 ENCOUNTER — RA CDI HCC (OUTPATIENT)
Dept: OTHER | Facility: HOSPITAL | Age: 83
End: 2023-12-08

## 2023-12-09 LAB — VIT B1 BLD-SCNC: 92.1 NMOL/L (ref 66.5–200)

## 2023-12-11 ENCOUNTER — TELEPHONE (OUTPATIENT)
Dept: INTERNAL MEDICINE CLINIC | Facility: CLINIC | Age: 83
End: 2023-12-11

## 2023-12-11 LAB — VIT A SERPL-MCNC: 36.6 UG/DL (ref 22–69.5)

## 2023-12-11 NOTE — TELEPHONE ENCOUNTER
----- Message from Dana Acuna MD sent at 12/8/2023  5:48 PM EST -----  Please ask her if she has any symptoms of diarrhea or blood in the stool.  ----- Message -----  From: Lab, Background User  Sent: 12/5/2023  10:58 AM EST  To: Dana Acuna MD

## 2023-12-11 NOTE — TELEPHONE ENCOUNTER
She says no blood in the stool, she does have diarrhea but stays this has been for about 10 years for her, she says that has been normal for her. She does have appt this Friday with you.

## 2023-12-15 ENCOUNTER — APPOINTMENT (OUTPATIENT)
Dept: RADIOLOGY | Age: 83
End: 2023-12-15
Payer: MEDICARE

## 2023-12-15 ENCOUNTER — OFFICE VISIT (OUTPATIENT)
Dept: INTERNAL MEDICINE CLINIC | Facility: CLINIC | Age: 83
End: 2023-12-15
Payer: MEDICARE

## 2023-12-15 ENCOUNTER — TELEPHONE (OUTPATIENT)
Dept: INTERNAL MEDICINE CLINIC | Facility: CLINIC | Age: 83
End: 2023-12-15

## 2023-12-15 VITALS
SYSTOLIC BLOOD PRESSURE: 133 MMHG | WEIGHT: 107 LBS | BODY MASS INDEX: 19.69 KG/M2 | DIASTOLIC BLOOD PRESSURE: 61 MMHG | HEART RATE: 78 BPM | OXYGEN SATURATION: 99 % | HEIGHT: 62 IN | TEMPERATURE: 98.5 F

## 2023-12-15 DIAGNOSIS — M79.601 PAIN OF RIGHT UPPER EXTREMITY: ICD-10-CM

## 2023-12-15 DIAGNOSIS — K91.2 POSTSURGICAL MALABSORPTION: ICD-10-CM

## 2023-12-15 DIAGNOSIS — S89.91XA INJURY OF RIGHT KNEE, INITIAL ENCOUNTER: ICD-10-CM

## 2023-12-15 DIAGNOSIS — E03.9 ACQUIRED HYPOTHYROIDISM: Primary | ICD-10-CM

## 2023-12-15 DIAGNOSIS — Z00.00 MEDICARE ANNUAL WELLNESS VISIT, SUBSEQUENT: ICD-10-CM

## 2023-12-15 DIAGNOSIS — I10 ESSENTIAL HYPERTENSION: ICD-10-CM

## 2023-12-15 DIAGNOSIS — D69.6 PLATELETS DECREASED (HCC): ICD-10-CM

## 2023-12-15 LAB — CCP AB SER IA-ACNC: 1.5

## 2023-12-15 PROCEDURE — 73564 X-RAY EXAM KNEE 4 OR MORE: CPT

## 2023-12-15 PROCEDURE — G0439 PPPS, SUBSEQ VISIT: HCPCS | Performed by: INTERNAL MEDICINE

## 2023-12-15 PROCEDURE — 73060 X-RAY EXAM OF HUMERUS: CPT

## 2023-12-15 PROCEDURE — 99214 OFFICE O/P EST MOD 30 MIN: CPT | Performed by: INTERNAL MEDICINE

## 2023-12-15 NOTE — TELEPHONE ENCOUNTER
----- Message from Devin Juan MD sent at 12/15/2023  3:57 PM EST -----  Pls tell her xray ok  ----- Message -----  From: Interface, Radiology Results In  Sent: 12/15/2023   3:18 PM EST  To: Devin Juan MD
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
## 2024-01-12 ENCOUNTER — OFFICE VISIT (OUTPATIENT)
Dept: INTERNAL MEDICINE CLINIC | Facility: CLINIC | Age: 84
End: 2024-01-12
Payer: MEDICARE

## 2024-01-12 VITALS
BODY MASS INDEX: 19.69 KG/M2 | SYSTOLIC BLOOD PRESSURE: 132 MMHG | HEART RATE: 64 BPM | WEIGHT: 107 LBS | DIASTOLIC BLOOD PRESSURE: 66 MMHG | TEMPERATURE: 97.5 F | OXYGEN SATURATION: 98 % | HEIGHT: 62 IN

## 2024-01-12 DIAGNOSIS — T14.8XXA HEMATOMA: Primary | ICD-10-CM

## 2024-01-12 PROCEDURE — 99213 OFFICE O/P EST LOW 20 MIN: CPT | Performed by: INTERNAL MEDICINE
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
## 2024-02-08 ENCOUNTER — TELEPHONE (OUTPATIENT)
Age: 84
End: 2024-02-08

## 2024-02-08 NOTE — TELEPHONE ENCOUNTER
Patient is calling because they are having dental work done on 3/20/2024 and they would like to know if it is still ok to get the prolia shot on February 27th 2024 and the patient would like to know if Dr. Stein would like to reschedule the prolia injection for another time and just see the patient. Please advise the patient thank you .

## 2024-02-10 NOTE — TELEPHONE ENCOUNTER
I would like to at least just see her, can determine at that time whether she should get the Prolia injection or not, so she should keep her appt. What kind of dental work is she getting?

## 2024-02-11 DIAGNOSIS — M15.4 EROSIVE OSTEOARTHRITIS OF BOTH HANDS: ICD-10-CM

## 2024-02-12 RX ORDER — HYDROXYCHLOROQUINE SULFATE 200 MG/1
200 TABLET, FILM COATED ORAL EVERY MORNING
Qty: 90 TABLET | Refills: 1 | Status: SHIPPED | OUTPATIENT
Start: 2024-02-12 | End: 2024-08-10

## 2024-02-12 NOTE — TELEPHONE ENCOUNTER
Spoke to patient and relayed the message and she is keeping her appointment, she said she is getting an implant done in terms of dental work

## 2024-02-19 ENCOUNTER — OFFICE VISIT (OUTPATIENT)
Dept: INTERNAL MEDICINE CLINIC | Facility: CLINIC | Age: 84
End: 2024-02-19
Payer: MEDICARE

## 2024-02-19 VITALS
OXYGEN SATURATION: 97 % | BODY MASS INDEX: 21.86 KG/M2 | SYSTOLIC BLOOD PRESSURE: 142 MMHG | HEIGHT: 62 IN | WEIGHT: 118.8 LBS | HEART RATE: 75 BPM | DIASTOLIC BLOOD PRESSURE: 66 MMHG | TEMPERATURE: 97.6 F

## 2024-02-19 DIAGNOSIS — L91.0 KELOID: Primary | ICD-10-CM

## 2024-02-19 PROCEDURE — 99213 OFFICE O/P EST LOW 20 MIN: CPT | Performed by: INTERNAL MEDICINE
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
## 2024-02-21 ENCOUNTER — CONSULT (OUTPATIENT)
Dept: PLASTIC SURGERY | Facility: CLINIC | Age: 84
End: 2024-02-21

## 2024-02-21 VITALS
HEART RATE: 73 BPM | SYSTOLIC BLOOD PRESSURE: 149 MMHG | HEIGHT: 62 IN | TEMPERATURE: 98 F | BODY MASS INDEX: 20.47 KG/M2 | DIASTOLIC BLOOD PRESSURE: 73 MMHG | WEIGHT: 111.25 LBS

## 2024-02-21 DIAGNOSIS — L91.0 KELOID: ICD-10-CM
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
## 2024-02-22 ENCOUNTER — PREP FOR PROCEDURE (OUTPATIENT)
Dept: PLASTIC SURGERY | Facility: CLINIC | Age: 84
End: 2024-02-22

## 2024-02-22 ENCOUNTER — TELEPHONE (OUTPATIENT)
Dept: INTERNAL MEDICINE CLINIC | Facility: CLINIC | Age: 84
End: 2024-02-22

## 2024-02-22 DIAGNOSIS — L91.0 KELOID: Primary | ICD-10-CM

## 2024-02-22 NOTE — TELEPHONE ENCOUNTER
Left a message with kartik  to see if he wants us to sent up another visit for her for a surgical clearance or use last ovs not form 02/19/2024 paper in red folder

## 2024-02-26 ENCOUNTER — HOSPITAL ENCOUNTER (OUTPATIENT)
Dept: NON INVASIVE DIAGNOSTICS | Facility: CLINIC | Age: 84
Discharge: HOME | End: 2024-02-26
Payer: MEDICARE

## 2024-02-26 DIAGNOSIS — I65.21 SYMPTOMATIC CAROTID ARTERY STENOSIS WITHOUT INFARCTION, RIGHT: ICD-10-CM

## 2024-02-26 PROCEDURE — 93880 EXTRACRANIAL BILAT STUDY: CPT | Performed by: SURGERY

## 2024-02-26 PROCEDURE — 93880 EXTRACRANIAL BILAT STUDY: CPT

## 2024-02-27 ENCOUNTER — OFFICE VISIT (OUTPATIENT)
Dept: RHEUMATOLOGY | Facility: CLINIC | Age: 84
End: 2024-02-27
Payer: MEDICARE

## 2024-02-27 VITALS
HEIGHT: 62 IN | OXYGEN SATURATION: 100 % | WEIGHT: 112 LBS | SYSTOLIC BLOOD PRESSURE: 136 MMHG | BODY MASS INDEX: 20.61 KG/M2 | DIASTOLIC BLOOD PRESSURE: 74 MMHG | HEART RATE: 65 BPM

## 2024-02-27 DIAGNOSIS — E44.0 MODERATE PROTEIN-CALORIE MALNUTRITION (HCC): ICD-10-CM

## 2024-02-27 DIAGNOSIS — M15.4 EROSIVE OSTEOARTHRITIS OF BOTH HANDS: Primary | ICD-10-CM

## 2024-02-27 DIAGNOSIS — Z79.899 HIGH RISK MEDICATION USE: ICD-10-CM

## 2024-02-27 DIAGNOSIS — M81.0 OSTEOPOROSIS, UNSPECIFIED OSTEOPOROSIS TYPE, UNSPECIFIED PATHOLOGICAL FRACTURE PRESENCE: ICD-10-CM

## 2024-02-27 DIAGNOSIS — M19.90 INFLAMMATORY ARTHRITIS: ICD-10-CM

## 2024-02-27 PROBLEM — I77.1 STRICTURE OF ARTERY (HCC): Status: ACTIVE | Noted: 2024-02-27

## 2024-02-27 PROCEDURE — 99214 OFFICE O/P EST MOD 30 MIN: CPT | Performed by: INTERNAL MEDICINE

## 2024-02-27 RX ORDER — HYDROXYCHLOROQUINE SULFATE 200 MG/1
200 TABLET, FILM COATED ORAL EVERY MORNING
Qty: 90 TABLET | Refills: 1 | Status: SHIPPED | OUTPATIENT
Start: 2024-02-27 | End: 2024-08-25

## 2024-02-27 NOTE — PATIENT INSTRUCTIONS
Continue Vit. D supplementation   Increase to 3 Tums daily  Do weight-bearing exercises  Prolia injection on hold until get dental clearance  Continue hydroxychloroquine daily; continue regular eye exams    Return to clinic in 6 months

## 2024-02-27 NOTE — PROGRESS NOTES
Assessment and Plan:  Yajaira Mendez is a 84 y.o.  female who presents for follow-up of her erosive osteoarthritis and osteoporosis.  Will put patient's Prolia injection on hold until she gets an official dental clearance.  Is being planned for a dental implant in 2 days.    Erosive osteoarthritis.  Her osteoarthritis has progressed from before.  Continue taking 1 tablet of hydroxychloroquine daily.   Advised to inform her eye specialist that she is on hydroxychloroquine for further monitoring.    Osteoporosis.  Advised to discuss with her dentist the need for an official clearance prior to Prolia.  Advised to increase taking Tums to 3 a day.      Continue Vit. D supplementation   Increase to 3 Tums daily  Do weight-bearing exercises  Prolia injection on hold until get dental clearance  Continue hydroxychloroquine daily; continue regular eye exams    Return to clinic in 6 months    Plan:  1. Erosive osteoarthritis of both hands  -     hydroxychloroquine (PLAQUENIL) 200 mg tablet; Take 1 tablet (200 mg total) by mouth every morning    2. Inflammatory arthritis    3. Osteoporosis, unspecified osteoporosis type, unspecified pathological fracture presence    4. Moderate protein-calorie malnutrition (HCC)    5. High risk medication use    High risk medication use - Benefits and risks of hydroxychloroquine, including but not limited to retinal toxicity, corneal deposits, gastrointestinal side effects, and headaches were discussed with the patient. The need for a regular eye exam to monitor for ocular toxicity while on this medication was also explained to the patient.     RTC in 6 months      Chief Complaint  Follow-up of her osteoporosis and erosive osteoarthritis.    Rheumatic Disease Summary  Last visit 8/25/23: Patient presents for follow-up of osteoporosis and erosive OA.     Continue daily dairy intake  Continue Vit. D supplementation   Take 2 Tums daily  Do weight-bearing exercises  Prolia injection successfully  administered in clinic today  Schedule DEXA scan  Hand surgery referral made  Do labs  Do hand x-rays  Start hydroxychloroquine daily     1. Erosive osteoarthritis  We will repeat hand x-rays and order lab work.      2. Osteoporosis  We gave her Prolia injection today and we are going to continue that every 6 months. DEXA scan order placed; patient will schedule an appointment as soon as possible. Patient will continue taking vitamin D as per current management. It is recommended to take 2 chewable tablets of Tums once a day for calcium supplement. We recommended doing weightbearing exercises, significance explained. Patient verbalizes understanding.      3. Swelling right ring finger   We provided referral to Dr. Clark, hand surgery, for joint aspiration and steroid injection.     TAHMINA Mendez is an 84-year-old female who presents today for follow-up of her osteoporosis and erosive osteoarthritis. She consents to the use of RUBY.    The following portions of the patient's history were reviewed and updated as appropriate: allergies, current medications, past family history, past medical history, past social history, past surgical history, and problem list.    Interval History:    She is taking hydroxychloroquine 1 tablet a day, which provides benefit with the joint pain in her hands. She regularly visits for eye examinations for years and has an appointment this Friday, 03/01/2024. Her eye specialist has been monitoring her eye pressures and prescribed eyedrops. She does not have constant pain, but occasionally has pain in her finger. She also has knee pain but is unsure if it is from her fall incident on 12/08/2023. She damaged her left knee and right arm when she had the fall incident. She has not been doing weightbearing exercises. She does walk but has not been lifting weights though she has dumbbells. She tries to be careful when she ambulates as she almost fell with her sneakers today. She still has  "difficulty in keeping her balance. She denies any other falls in the past several months. She has been taking 2 Tums daily since 08/2023. She is taking vitamin D3 once a week and vitamin D2 once a month. She was previously referred to hand surgery and was advised with no significant changes and could be due to swelling and arthritis. She confirms that there was no \"draining\" performed as she was advised that there was no fluid that needed to be drained. She occasionally has pain on certain days. She has synovial cyst with mild pain and states that the size has not changed.     She had x-rays which went well except for the tooth that was fractured. She has a dental bridge that has come loose, which needs to be worked on after the dental implant on 02/29/2024. She has been having extensive dental work done. She states approximately 1.5 to 2 months prior to regrowth of bones to possibly complete her dental work.    She consumes cheese, but she does not drink milk since she is not interested and as she is lactose intolerant. She consumes almond milk yogurt daily.    She had a right shoulder replacement on 10/15/2021.    Review of Systems:   Pertinent ROS positive for headaches, dry eyes, diarrhea, and joint pain. Otherwise, the rest of 14-point ROS reviewed and were negative.    Home Medications:    Current Outpatient Medications:     acetaminophen (TYLENOL) 500 mg tablet, Take 1,000 mg by mouth every 6 (six) hours as needed , Disp: , Rfl:     amLODIPine (NORVASC) 5 mg tablet, Take 1 tablet (5 mg total) by mouth daily, Disp: 90 tablet, Rfl: 1    calcium-vitamin D 250-100 MG-UNIT per tablet, Take 1 tablet by mouth daily  , Disp: , Rfl:     cephalexin (KEFLEX) 500 mg capsule, Take 500 mg by mouth if needed (1 hour prior to dental procedures), Disp: , Rfl:     Cholecalciferol (Vitamin D-3) 125 MCG (5000 UT) TABS, Take 15,000 Units by mouth once a week Takes on a Friday every week, Disp: , Rfl:     Cyanocobalamin (VITAMIN B " "12 PO), Take 500 mcg by mouth daily , Disp: , Rfl:     denosumab (PROLIA) 60 mg/mL, Inject 60 mg under the skin every 6 (six) months, Disp: , Rfl:     ergocalciferol (VITAMIN D2) 50,000 units, Take 1 capsule (50,000 Units total) by mouth every 28 days, Disp: 3 capsule, Rfl: 3    famotidine (PEPCID) 20 mg tablet, Take 1 tablet (20 mg total) by mouth in the morning, Disp: 90 tablet, Rfl: 1    ferrous gluconate (FERGON) 240 (27 FE) MG tablet, Take 27 mg by mouth daily Takes in the am, Disp: , Rfl:     Garlic 1000 MG CAPS, Take 1,000 mg by mouth daily, Disp: , Rfl:     hydroxychloroquine (PLAQUENIL) 200 mg tablet, Take 1 tablet (200 mg total) by mouth every morning, Disp: 90 tablet, Rfl: 1    latanoprost (XALATAN) 0.005 % ophthalmic solution, , Disp: , Rfl:     levothyroxine 100 mcg tablet, Take 1 tablet (100 mcg total) by mouth daily, Disp: 90 tablet, Rfl: 1    Multiple Vitamins-Minerals (CENTRUM SILVER PO), Take 1 tablet by mouth daily, Disp: , Rfl:     Zinc 30 MG TABS, Take 50 mg by mouth daily , Disp: , Rfl:     Flowflex COVID-19 Ag Home Test KIT, , Disp: , Rfl:     meclizine (ANTIVERT) 12.5 MG tablet, Take 1 tablet (12.5 mg total) by mouth 3 (three) times a day as needed for dizziness (Patient not taking: Reported on 2/19/2024), Disp: 90 tablet, Rfl: 0    Objective:    Vitals:    02/27/24 1436   BP: 136/74   Pulse: 65   SpO2: 100%   Weight: 50.8 kg (112 lb)   Height: 5' 2\" (1.575 m)       Physical Exam:  Constitutional:    General: She is not in acute distress.  HENT:   Head: Normocephalic and atraumatic.   Eyes:   Conjunctiva/sclera: Conjunctivae normal.   Cardiovascular:   Rate and Rhythm: Normal rate and regular rhythm.   Heart sounds: S1 normal and S2 normal.   No friction rub.   Pulmonary:   Effort: Pulmonary effort is normal. No respiratory distress.   Breath sounds: Normal breath sounds. No wheezing, rhonchi, or rales.   Musculoskeletal:   General: Right 4th PIP synovial cyst with slight " tenderness.  Cervical back: Neck supple.   Skin:  Coloration: Skin is not pale.   Neurological:   Mental Status: She is alert. Mental status is at baseline.   Psychiatric:      Mood and Affect: Mood normal.      Behavior: Behavior normal.      I have personally reviewed results with the patient.    Inflammatory markers appear normal.  Rheumatoid arthritis work-up was normal.     Imaging:   VAS carotid complete study    Result Date: 2/26/2024  Narrative:  [facility] REPORT [facility]INICAL: Indications: Yearly surveillance of carotid artery disease.  Patient is asymptomatic at this time. Operative History: 2019-04-03 Right CEA 2000-09-28 GASTRIC BYPASS Risk Factors The patient has history of HTN and Hyperlipidemia. Clinical Right Pressure:  135/70 mm Hg, Left Pressure:  140/68 mm Hg.  FINDINGS:  Right        Impression  PSV  EDV (cm/s)  Ratio  Dist. ICA                 92          18   1.06  Mid. ICA                 103          26   1.19  Prox. ICA    1 - 49%     110          19   1.26  Dist CCA                 110          11         Mid CCA                   87          14   1.10  Prox CCA                  79           6   0.68  Ext Carotid              184           0   2.11  Prox Vert                 70          10         Subclavian               137           0         Innominate               116           0          Left         Impression  PSV  EDV (cm/s)  Ratio  Dist. ICA                174          35   1.72  Mid. ICA                 130          32   1.28  Prox. ICA    1 - 49%     155          35   1.53  Dist CCA                  95          21         Mid CCA                  102          21   1.26  Prox CCA                  81          18         Ext Carotid              101           0   0.99  Prox Vert                 56          13         Subclavian               175           8            CONCLUSION: Impression RIGHT: There is <50% stenosis noted in the internal carotid artery. Plaque is  heterogenous. Vertebral artery flow is antegrade. There is no significant subclavian artery disease. LEFT: There is <50% stenosis noted in the internal carotid artery. Plaque is heterogenous. Vertebral artery flow is antegrade. There is no significant subclavian artery disease.  Compared to previous study on 2/23/2023, there is no change  SIGNATURE: Electronically Signed by: RONNIE CASTILLO MD on 2024-02-26 04:49:16 PM      Labs:   Appointment on 12/05/2023   Component Date Value Ref Range Status    WBC 12/05/2023 3.02 (L)  4.31 - 10.16 Thousand/uL Final    RBC 12/05/2023 3.92  3.81 - 5.12 Million/uL Final    Hemoglobin 12/05/2023 11.8  11.5 - 15.4 g/dL Final    Hematocrit 12/05/2023 36.4  34.8 - 46.1 % Final    MCV 12/05/2023 93  82 - 98 fL Final    MCH 12/05/2023 30.1  26.8 - 34.3 pg Final    MCHC 12/05/2023 32.4  31.4 - 37.4 g/dL Final    RDW 12/05/2023 14.1  11.6 - 15.1 % Final    MPV 12/05/2023 11.2  8.9 - 12.7 fL Final    Platelets 12/05/2023 146 (L)  149 - 390 Thousands/uL Final    nRBC 12/05/2023 0  /100 WBCs Final    Neutrophils Relative 12/05/2023 49  43 - 75 % Final    Immat GRANS % 12/05/2023 0  0 - 2 % Final    Lymphocytes Relative 12/05/2023 32  14 - 44 % Final    Monocytes Relative 12/05/2023 13 (H)  4 - 12 % Final    Eosinophils Relative 12/05/2023 6  0 - 6 % Final    Basophils Relative 12/05/2023 0  0 - 1 % Final    Neutrophils Absolute 12/05/2023 1.46 (L)  1.85 - 7.62 Thousands/µL Final    Immature Grans Absolute 12/05/2023 0.01  0.00 - 0.20 Thousand/uL Final    Lymphocytes Absolute 12/05/2023 0.97  0.60 - 4.47 Thousands/µL Final    Monocytes Absolute 12/05/2023 0.40  0.17 - 1.22 Thousand/µL Final    Eosinophils Absolute 12/05/2023 0.17  0.00 - 0.61 Thousand/µL Final    Basophils Absolute 12/05/2023 0.01  0.00 - 0.10 Thousands/µL Final    Sodium 12/05/2023 140  135 - 147 mmol/L Final    Potassium 12/05/2023 4.6  3.5 - 5.3 mmol/L Final    Chloride 12/05/2023 107  96 - 108 mmol/L Final    CO2 12/05/2023  27  21 - 32 mmol/L Final    ANION GAP 12/05/2023 6  mmol/L Final    BUN 12/05/2023 13  5 - 25 mg/dL Final    Creatinine 12/05/2023 0.62  0.60 - 1.30 mg/dL Final    Standardized to IDMS reference method    Glucose, Fasting 12/05/2023 87  65 - 99 mg/dL Final    Calcium 12/05/2023 8.5  8.4 - 10.2 mg/dL Final    AST 12/05/2023 71 (H)  13 - 39 U/L Final    ALT 12/05/2023 48  7 - 52 U/L Final    Specimen collection should occur prior to Sulfasalazine administration due to the potential for falsely depressed results.     Alkaline Phosphatase 12/05/2023 59  34 - 104 U/L Final    Total Protein 12/05/2023 6.3 (L)  6.4 - 8.4 g/dL Final    Albumin 12/05/2023 3.8  3.5 - 5.0 g/dL Final    Total Bilirubin 12/05/2023 0.34  0.20 - 1.00 mg/dL Final    Use of this assay is not recommended for patients undergoing treatment with eltrombopag due to the potential for falsely elevated results.  N-acetyl-p-benzoquinone imine (metabolite of Acetaminophen) will generate erroneously low results in samples for patients that have taken an overdose of Acetaminophen.    eGFR 12/05/2023 84  ml/min/1.73sq m Final    Ferritin 12/05/2023 56  11 - 307 ng/mL Final    Iron Saturation 12/05/2023 29  15 - 50 % Final    TIBC 12/05/2023 283  250 - 450 ug/dL Final    Iron 12/05/2023 81  50 - 212 ug/dL Final    Patients treated with metal-binding drugs (ie. Deferoxamine) may have depressed iron values.    UIBC 12/05/2023 202  155 - 355 ug/dL Final    Vit D, 25-Hydroxy 12/05/2023 43.8  30.0 - 100.0 ng/mL Final    Vitamin D guidelines established by Clinical Guidelines Subcommittee  of the Endocrine Society Task Force, 2011    Deficiency <20ng/ml   Insufficiency 20-30ng/ml   Sufficient  ng/ml     Vitamin B-12 12/05/2023 1,119 (H)  180 - 914 pg/mL Final    Vitamin B1, Whole Blood 12/05/2023 92.1  66.5 - 200.0 nmol/L Final    Vitamin A 12/05/2023 36.6  22.0 - 69.5 ug/dL Final    Reference intervals for vitamin A determined from [facility] internal  studies.  Individuals with vitamin A less than 20 ug/dL are considered  vitamin A deficient and those with serum concentrations less than  10 ug/dL are considered severely deficient.  This test was developed and its performance characteristics  determined by [facility]. It has not been cleared or approved  by the Food and Drug Administration.    Free T4 12/05/2023 1.14 (H)  0.61 - 1.12 ng/dL Final    Specimens with biotin concentrations > 10 ng/mL can lead to significant (> 10%) positive bias in result.    TSH 3RD GENERATON 12/05/2023 0.181 (L)  0.450 - 4.500 uIU/mL Final    The recommended reference ranges for TSH during pregnancy are as follows:   First trimester 0.100 to 2.500 uIU/mL   Second trimester  0.200 to 3.000 uIU/mL   Third trimester 0.300 to 3.000 uIU/m    Note: Normal ranges may not apply to patients who are transgender, non-binary, or whose legal sex, sex at birth, and gender identity differ.  Adult TSH (3rd generation) reference range follows the recommended guidelines of the [employer], January, 2020.    C.difficile toxin by PCR 12/06/2023 Positive (A)  Negative Final    Result suggests infection or colonization with C. difficile. EIA testing has been performed to clarify. Maintain strict contact and hand hygiene precautions.    C difficile Toxins A+B, EIA 12/06/2023 Negative  Negative Final    Probable C. difficile colonization without active infection. Treatment recommended if severe diarrhea without alternate etiology. Maintain strict contact and hand hygiene precautions.    Fecal Leukocytes 12/06/2023 1+ WBC's (0-2/hpf)  No WBC's Final    Salmonella sp PCR 12/06/2023 None Detected  None Detected Final    Shigella sp/Enteroinvasive E. coli* 12/06/2023 None Detected  None Detected Final    Campylobacter sp (jejuni and coli)* 12/06/2023 None Detected  None Detected Final    Shiga toxin 1/Shiga toxin 2 genes * 12/06/2023 None Detected  None Detected Final       Transcribed for Lars Stein,  MD, by Alesia Verduzco on 03/05/24 at 5:48 PM. Powered by [employer].

## 2024-03-06 ENCOUNTER — APPOINTMENT (OUTPATIENT)
Dept: LAB | Facility: CLINIC | Age: 84
DRG: 517 | End: 2024-03-06
Payer: MEDICARE

## 2024-03-06 ENCOUNTER — APPOINTMENT (OUTPATIENT)
Dept: LAB | Facility: HOSPITAL | Age: 84
DRG: 517 | End: 2024-03-06
Payer: MEDICARE

## 2024-03-06 DIAGNOSIS — L91.0 KELOID: ICD-10-CM

## 2024-03-06 LAB
ANION GAP SERPL CALCULATED.3IONS-SCNC: 11 MMOL/L
ATRIAL RATE: 79 BPM
BASOPHILS # BLD AUTO: 0.01 THOUSANDS/ÂΜL (ref 0–0.1)
BASOPHILS NFR BLD AUTO: 0 % (ref 0–1)
BUN SERPL-MCNC: 16 MG/DL (ref 5–25)
CALCIUM SERPL-MCNC: 8.9 MG/DL (ref 8.4–10.2)
CHLORIDE SERPL-SCNC: 100 MMOL/L (ref 96–108)
CO2 SERPL-SCNC: 25 MMOL/L (ref 21–32)
CREAT SERPL-MCNC: 0.67 MG/DL (ref 0.6–1.3)
EOSINOPHIL # BLD AUTO: 0.17 THOUSAND/ÂΜL (ref 0–0.61)
EOSINOPHIL NFR BLD AUTO: 5 % (ref 0–6)
ERYTHROCYTE [DISTWIDTH] IN BLOOD BY AUTOMATED COUNT: 13.6 % (ref 11.6–15.1)
GFR SERPL CREATININE-BSD FRML MDRD: 82 ML/MIN/1.73SQ M
GLUCOSE P FAST SERPL-MCNC: 84 MG/DL (ref 65–99)
HCT VFR BLD AUTO: 38.4 % (ref 34.8–46.1)
HGB BLD-MCNC: 11.9 G/DL (ref 11.5–15.4)
IMM GRANULOCYTES # BLD AUTO: 0.01 THOUSAND/UL (ref 0–0.2)
IMM GRANULOCYTES NFR BLD AUTO: 0 % (ref 0–2)
LYMPHOCYTES # BLD AUTO: 0.91 THOUSANDS/ÂΜL (ref 0.6–4.47)
LYMPHOCYTES NFR BLD AUTO: 25 % (ref 14–44)
MCH RBC QN AUTO: 29.5 PG (ref 26.8–34.3)
MCHC RBC AUTO-ENTMCNC: 31 G/DL (ref 31.4–37.4)
MCV RBC AUTO: 95 FL (ref 82–98)
MONOCYTES # BLD AUTO: 0.58 THOUSAND/ÂΜL (ref 0.17–1.22)
MONOCYTES NFR BLD AUTO: 16 % (ref 4–12)
NEUTROPHILS # BLD AUTO: 1.95 THOUSANDS/ÂΜL (ref 1.85–7.62)
NEUTS SEG NFR BLD AUTO: 54 % (ref 43–75)
NRBC BLD AUTO-RTO: 0 /100 WBCS
P AXIS: 90 DEGREES
PLATELET # BLD AUTO: 148 THOUSANDS/UL (ref 149–390)
PMV BLD AUTO: 11.8 FL (ref 8.9–12.7)
POTASSIUM SERPL-SCNC: 4.1 MMOL/L (ref 3.5–5.3)
PR INTERVAL: 158 MS
QRS AXIS: 61 DEGREES
QRSD INTERVAL: 76 MS
QT INTERVAL: 386 MS
QTC INTERVAL: 442 MS
RBC # BLD AUTO: 4.04 MILLION/UL (ref 3.81–5.12)
SODIUM SERPL-SCNC: 136 MMOL/L (ref 135–147)
T WAVE AXIS: 74 DEGREES
VENTRICULAR RATE: 79 BPM
WBC # BLD AUTO: 3.63 THOUSAND/UL (ref 4.31–10.16)

## 2024-03-06 PROCEDURE — 36415 COLL VENOUS BLD VENIPUNCTURE: CPT

## 2024-03-06 PROCEDURE — 80048 BASIC METABOLIC PNL TOTAL CA: CPT

## 2024-03-06 PROCEDURE — 85025 COMPLETE CBC W/AUTO DIFF WBC: CPT

## 2024-03-07 ENCOUNTER — APPOINTMENT (EMERGENCY)
Dept: CT IMAGING | Facility: HOSPITAL | Age: 84
DRG: 517 | End: 2024-03-07
Payer: MEDICARE

## 2024-03-07 ENCOUNTER — APPOINTMENT (EMERGENCY)
Dept: RADIOLOGY | Facility: HOSPITAL | Age: 84
DRG: 517 | End: 2024-03-07
Payer: MEDICARE

## 2024-03-07 ENCOUNTER — HOSPITAL ENCOUNTER (INPATIENT)
Facility: HOSPITAL | Age: 84
LOS: 3 days | Discharge: HOME HEALTH | DRG: 517 | End: 2024-03-10
Attending: EMERGENCY MEDICINE | Admitting: INTERNAL MEDICINE
Payer: MEDICARE

## 2024-03-07 DIAGNOSIS — S60.419A: ICD-10-CM

## 2024-03-07 DIAGNOSIS — S01.511A LIP LACERATION, INITIAL ENCOUNTER: ICD-10-CM

## 2024-03-07 DIAGNOSIS — S82.042A CLOSED DISPLACED COMMINUTED FRACTURE OF LEFT PATELLA, INITIAL ENCOUNTER: Primary | ICD-10-CM

## 2024-03-07 DIAGNOSIS — S09.93XA DENTAL INJURY, INITIAL ENCOUNTER: ICD-10-CM

## 2024-03-07 DIAGNOSIS — T07.XXXA MULTIPLE ABRASIONS: ICD-10-CM

## 2024-03-07 DIAGNOSIS — S82.009A PATELLAR FRACTURE: ICD-10-CM

## 2024-03-07 DIAGNOSIS — S60.511A ABRASION OF PALM OF HAND, RIGHT, INITIAL ENCOUNTER: ICD-10-CM

## 2024-03-07 PROBLEM — S82.002A CLOSED FRACTURE OF LEFT PATELLA: Status: ACTIVE | Noted: 2024-03-07

## 2024-03-07 PROBLEM — M15.4 EROSIVE OSTEOARTHRITIS OF BOTH HANDS: Status: ACTIVE | Noted: 2024-03-07

## 2024-03-07 LAB
ALBUMIN SERPL BCP-MCNC: 4.3 G/DL (ref 3.5–5)
ALP SERPL-CCNC: 67 U/L (ref 34–104)
ALT SERPL W P-5'-P-CCNC: 37 U/L (ref 7–52)
ANION GAP SERPL CALCULATED.3IONS-SCNC: 7 MMOL/L
APTT PPP: 23 SECONDS (ref 23–37)
AST SERPL W P-5'-P-CCNC: 47 U/L (ref 13–39)
ATRIAL RATE: 80 BPM
BACTERIA UR QL AUTO: NORMAL /HPF
BASOPHILS # BLD AUTO: 0.02 THOUSANDS/ÂΜL (ref 0–0.1)
BASOPHILS NFR BLD AUTO: 1 % (ref 0–1)
BILIRUB SERPL-MCNC: 0.36 MG/DL (ref 0.2–1)
BILIRUB UR QL STRIP: NEGATIVE
BUN SERPL-MCNC: 18 MG/DL (ref 5–25)
CALCIUM SERPL-MCNC: 9.2 MG/DL (ref 8.4–10.2)
CHLORIDE SERPL-SCNC: 106 MMOL/L (ref 96–108)
CLARITY UR: CLEAR
CO2 SERPL-SCNC: 27 MMOL/L (ref 21–32)
COLOR UR: ABNORMAL
CREAT SERPL-MCNC: 0.73 MG/DL (ref 0.6–1.3)
EOSINOPHIL # BLD AUTO: 0.14 THOUSAND/ÂΜL (ref 0–0.61)
EOSINOPHIL NFR BLD AUTO: 4 % (ref 0–6)
ERYTHROCYTE [DISTWIDTH] IN BLOOD BY AUTOMATED COUNT: 13.6 % (ref 11.6–15.1)
GFR SERPL CREATININE-BSD FRML MDRD: 76 ML/MIN/1.73SQ M
GLUCOSE SERPL-MCNC: 90 MG/DL (ref 65–140)
GLUCOSE UR STRIP-MCNC: NEGATIVE MG/DL
HCT VFR BLD AUTO: 38.9 % (ref 34.8–46.1)
HGB BLD-MCNC: 12.3 G/DL (ref 11.5–15.4)
HGB UR QL STRIP.AUTO: NEGATIVE
IMM GRANULOCYTES # BLD AUTO: 0.01 THOUSAND/UL (ref 0–0.2)
IMM GRANULOCYTES NFR BLD AUTO: 0 % (ref 0–2)
INR PPP: 0.98 (ref 0.84–1.19)
KETONES UR STRIP-MCNC: NEGATIVE MG/DL
LEUKOCYTE ESTERASE UR QL STRIP: ABNORMAL
LYMPHOCYTES # BLD AUTO: 1.34 THOUSANDS/ÂΜL (ref 0.6–4.47)
LYMPHOCYTES NFR BLD AUTO: 35 % (ref 14–44)
MCH RBC QN AUTO: 29.7 PG (ref 26.8–34.3)
MCHC RBC AUTO-ENTMCNC: 31.6 G/DL (ref 31.4–37.4)
MCV RBC AUTO: 94 FL (ref 82–98)
MONOCYTES # BLD AUTO: 0.51 THOUSAND/ÂΜL (ref 0.17–1.22)
MONOCYTES NFR BLD AUTO: 13 % (ref 4–12)
NEUTROPHILS # BLD AUTO: 1.81 THOUSANDS/ÂΜL (ref 1.85–7.62)
NEUTS SEG NFR BLD AUTO: 47 % (ref 43–75)
NITRITE UR QL STRIP: NEGATIVE
NON-SQ EPI CELLS URNS QL MICRO: NORMAL /HPF
NRBC BLD AUTO-RTO: 0 /100 WBCS
P AXIS: 83 DEGREES
PH UR STRIP.AUTO: 5 [PH]
PLATELET # BLD AUTO: 146 THOUSANDS/UL (ref 149–390)
PMV BLD AUTO: 11.4 FL (ref 8.9–12.7)
POTASSIUM SERPL-SCNC: 4.3 MMOL/L (ref 3.5–5.3)
PR INTERVAL: 158 MS
PROT SERPL-MCNC: 6.8 G/DL (ref 6.4–8.4)
PROT UR STRIP-MCNC: NEGATIVE MG/DL
PROTHROMBIN TIME: 13.2 SECONDS (ref 11.6–14.5)
QRS AXIS: 74 DEGREES
QRSD INTERVAL: 72 MS
QT INTERVAL: 384 MS
QTC INTERVAL: 442 MS
RBC # BLD AUTO: 4.14 MILLION/UL (ref 3.81–5.12)
RBC #/AREA URNS AUTO: NORMAL /HPF
SODIUM SERPL-SCNC: 140 MMOL/L (ref 135–147)
SP GR UR STRIP.AUTO: 1.01 (ref 1–1.03)
T WAVE AXIS: 61 DEGREES
UROBILINOGEN UR STRIP-ACNC: <2 MG/DL
VENTRICULAR RATE: 80 BPM
WBC # BLD AUTO: 3.83 THOUSAND/UL (ref 4.31–10.16)
WBC #/AREA URNS AUTO: NORMAL /HPF

## 2024-03-07 PROCEDURE — 81001 URINALYSIS AUTO W/SCOPE: CPT | Performed by: PHYSICIAN ASSISTANT

## 2024-03-07 PROCEDURE — 73564 X-RAY EXAM KNEE 4 OR MORE: CPT

## 2024-03-07 PROCEDURE — 96361 HYDRATE IV INFUSION ADD-ON: CPT

## 2024-03-07 PROCEDURE — 72125 CT NECK SPINE W/O DYE: CPT

## 2024-03-07 PROCEDURE — 85025 COMPLETE CBC W/AUTO DIFF WBC: CPT | Performed by: PHYSICIAN ASSISTANT

## 2024-03-07 PROCEDURE — 99285 EMERGENCY DEPT VISIT HI MDM: CPT

## 2024-03-07 PROCEDURE — 80053 COMPREHEN METABOLIC PANEL: CPT | Performed by: PHYSICIAN ASSISTANT

## 2024-03-07 PROCEDURE — 70450 CT HEAD/BRAIN W/O DYE: CPT

## 2024-03-07 PROCEDURE — 85730 THROMBOPLASTIN TIME PARTIAL: CPT | Performed by: PHYSICIAN ASSISTANT

## 2024-03-07 PROCEDURE — 99285 EMERGENCY DEPT VISIT HI MDM: CPT | Performed by: PHYSICIAN ASSISTANT

## 2024-03-07 PROCEDURE — 36415 COLL VENOUS BLD VENIPUNCTURE: CPT | Performed by: PHYSICIAN ASSISTANT

## 2024-03-07 PROCEDURE — 93010 ELECTROCARDIOGRAM REPORT: CPT | Performed by: INTERNAL MEDICINE

## 2024-03-07 PROCEDURE — 99223 1ST HOSP IP/OBS HIGH 75: CPT | Performed by: INTERNAL MEDICINE

## 2024-03-07 PROCEDURE — 96374 THER/PROPH/DIAG INJ IV PUSH: CPT

## 2024-03-07 PROCEDURE — 85610 PROTHROMBIN TIME: CPT | Performed by: PHYSICIAN ASSISTANT

## 2024-03-07 PROCEDURE — G0168 WOUND CLOSURE BY ADHESIVE: HCPCS | Performed by: PHYSICIAN ASSISTANT

## 2024-03-07 PROCEDURE — 93005 ELECTROCARDIOGRAM TRACING: CPT

## 2024-03-07 RX ORDER — LATANOPROST 50 UG/ML
1 SOLUTION/ DROPS OPHTHALMIC
Status: DISCONTINUED | OUTPATIENT
Start: 2024-03-07 | End: 2024-03-10 | Stop reason: HOSPADM

## 2024-03-07 RX ORDER — KETOROLAC TROMETHAMINE 30 MG/ML
15 INJECTION, SOLUTION INTRAMUSCULAR; INTRAVENOUS ONCE
Status: COMPLETED | OUTPATIENT
Start: 2024-03-07 | End: 2024-03-07

## 2024-03-07 RX ORDER — LANOLIN ALCOHOL/MO/W.PET/CERES
1 CREAM (GRAM) TOPICAL
Status: DISCONTINUED | OUTPATIENT
Start: 2024-03-08 | End: 2024-03-10 | Stop reason: HOSPADM

## 2024-03-07 RX ORDER — AMLODIPINE BESYLATE 5 MG/1
5 TABLET ORAL DAILY
Status: DISCONTINUED | OUTPATIENT
Start: 2024-03-08 | End: 2024-03-10 | Stop reason: HOSPADM

## 2024-03-07 RX ORDER — SODIUM CHLORIDE 9 MG/ML
150 INJECTION, SOLUTION INTRAVENOUS CONTINUOUS
Status: DISCONTINUED | OUTPATIENT
Start: 2024-03-07 | End: 2024-03-07

## 2024-03-07 RX ORDER — CEPHALEXIN 250 MG/1
500 CAPSULE ORAL ONCE
Status: COMPLETED | OUTPATIENT
Start: 2024-03-07 | End: 2024-03-07

## 2024-03-07 RX ORDER — HYDROXYCHLOROQUINE SULFATE 200 MG/1
200 TABLET, FILM COATED ORAL EVERY MORNING
Status: DISCONTINUED | OUTPATIENT
Start: 2024-03-08 | End: 2024-03-09

## 2024-03-07 RX ORDER — FERROUS GLUCONATE 324(38)MG
324 TABLET ORAL
Status: DISCONTINUED | OUTPATIENT
Start: 2024-03-08 | End: 2024-03-10 | Stop reason: HOSPADM

## 2024-03-07 RX ORDER — ACETAMINOPHEN 325 MG/1
975 TABLET ORAL ONCE
Status: COMPLETED | OUTPATIENT
Start: 2024-03-07 | End: 2024-03-07

## 2024-03-07 RX ORDER — QUINIDINE GLUCONATE 324 MG
240 TABLET, EXTENDED RELEASE ORAL DAILY
Status: DISCONTINUED | OUTPATIENT
Start: 2024-03-08 | End: 2024-03-07 | Stop reason: RX

## 2024-03-07 RX ORDER — ONDANSETRON 2 MG/ML
4 INJECTION INTRAMUSCULAR; INTRAVENOUS EVERY 4 HOURS PRN
Status: DISCONTINUED | OUTPATIENT
Start: 2024-03-07 | End: 2024-03-10 | Stop reason: HOSPADM

## 2024-03-07 RX ORDER — ACETAMINOPHEN 325 MG/1
650 TABLET ORAL ONCE AS NEEDED
Status: CANCELLED | OUTPATIENT
Start: 2024-03-07

## 2024-03-07 RX ORDER — ENOXAPARIN SODIUM 100 MG/ML
40 INJECTION SUBCUTANEOUS DAILY
Status: DISCONTINUED | OUTPATIENT
Start: 2024-03-08 | End: 2024-03-08

## 2024-03-07 RX ORDER — ACETAMINOPHEN 325 MG/1
650 TABLET ORAL EVERY 4 HOURS PRN
Status: DISCONTINUED | OUTPATIENT
Start: 2024-03-07 | End: 2024-03-10 | Stop reason: HOSPADM

## 2024-03-07 RX ORDER — LEVOTHYROXINE SODIUM 0.1 MG/1
100 TABLET ORAL
Status: DISCONTINUED | OUTPATIENT
Start: 2024-03-08 | End: 2024-03-10 | Stop reason: HOSPADM

## 2024-03-07 RX ORDER — FAMOTIDINE 20 MG/1
20 TABLET, FILM COATED ORAL DAILY
Status: DISCONTINUED | OUTPATIENT
Start: 2024-03-07 | End: 2024-03-09

## 2024-03-07 RX ADMIN — LATANOPROST 1 DROP: 50 SOLUTION OPHTHALMIC at 21:43

## 2024-03-07 RX ADMIN — ACETAMINOPHEN 325MG 975 MG: 325 TABLET ORAL at 16:28

## 2024-03-07 RX ADMIN — FAMOTIDINE 20 MG: 20 TABLET, FILM COATED ORAL at 21:04

## 2024-03-07 RX ADMIN — KETOROLAC TROMETHAMINE 15 MG: 30 INJECTION, SOLUTION INTRAMUSCULAR; INTRAVENOUS at 16:27

## 2024-03-07 RX ADMIN — CEPHALEXIN 500 MG: 250 CAPSULE ORAL at 17:29

## 2024-03-07 RX ADMIN — SODIUM CHLORIDE 150 ML/HR: 0.9 INJECTION, SOLUTION INTRAVENOUS at 15:07
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
## 2024-03-08 ENCOUNTER — ANESTHESIA EVENT (INPATIENT)
Dept: PERIOP | Facility: HOSPITAL | Age: 84
DRG: 517 | End: 2024-03-08
Payer: MEDICARE

## 2024-03-08 PROBLEM — D64.9 ANEMIA: Status: ACTIVE | Noted: 2019-03-27

## 2024-03-08 LAB
ALBUMIN SERPL BCP-MCNC: 3.7 G/DL (ref 3.5–5)
ALP SERPL-CCNC: 60 U/L (ref 34–104)
ALT SERPL W P-5'-P-CCNC: 31 U/L (ref 7–52)
ANION GAP SERPL CALCULATED.3IONS-SCNC: 6 MMOL/L
AST SERPL W P-5'-P-CCNC: 42 U/L (ref 13–39)
BILIRUB SERPL-MCNC: 0.4 MG/DL (ref 0.2–1)
BUN SERPL-MCNC: 17 MG/DL (ref 5–25)
CALCIUM SERPL-MCNC: 8.9 MG/DL (ref 8.4–10.2)
CHLORIDE SERPL-SCNC: 110 MMOL/L (ref 96–108)
CO2 SERPL-SCNC: 25 MMOL/L (ref 21–32)
CREAT SERPL-MCNC: 0.69 MG/DL (ref 0.6–1.3)
ERYTHROCYTE [DISTWIDTH] IN BLOOD BY AUTOMATED COUNT: 13.6 % (ref 11.6–15.1)
GFR SERPL CREATININE-BSD FRML MDRD: 81 ML/MIN/1.73SQ M
GLUCOSE SERPL-MCNC: 96 MG/DL (ref 65–140)
HCT VFR BLD AUTO: 33.6 % (ref 34.8–46.1)
HGB BLD-MCNC: 10.9 G/DL (ref 11.5–15.4)
MCH RBC QN AUTO: 30.1 PG (ref 26.8–34.3)
MCHC RBC AUTO-ENTMCNC: 32.4 G/DL (ref 31.4–37.4)
MCV RBC AUTO: 93 FL (ref 82–98)
PLATELET # BLD AUTO: 140 THOUSANDS/UL (ref 149–390)
PMV BLD AUTO: 11.4 FL (ref 8.9–12.7)
POTASSIUM SERPL-SCNC: 4 MMOL/L (ref 3.5–5.3)
PROT SERPL-MCNC: 6 G/DL (ref 6.4–8.4)
RBC # BLD AUTO: 3.62 MILLION/UL (ref 3.81–5.12)
SODIUM SERPL-SCNC: 141 MMOL/L (ref 135–147)
WBC # BLD AUTO: 4.19 THOUSAND/UL (ref 4.31–10.16)

## 2024-03-08 PROCEDURE — 99232 SBSQ HOSP IP/OBS MODERATE 35: CPT | Performed by: PHYSICIAN ASSISTANT

## 2024-03-08 PROCEDURE — 80053 COMPREHEN METABOLIC PANEL: CPT | Performed by: INTERNAL MEDICINE

## 2024-03-08 PROCEDURE — 85027 COMPLETE CBC AUTOMATED: CPT | Performed by: INTERNAL MEDICINE

## 2024-03-08 PROCEDURE — 99223 1ST HOSP IP/OBS HIGH 75: CPT | Performed by: STUDENT IN AN ORGANIZED HEALTH CARE EDUCATION/TRAINING PROGRAM

## 2024-03-08 RX ORDER — ENOXAPARIN SODIUM 100 MG/ML
40 INJECTION SUBCUTANEOUS DAILY
Status: DISCONTINUED | OUTPATIENT
Start: 2024-03-10 | End: 2024-03-10 | Stop reason: HOSPADM

## 2024-03-08 RX ADMIN — Medication 1 TABLET: at 09:30

## 2024-03-08 RX ADMIN — ACETAMINOPHEN 325MG 650 MG: 325 TABLET ORAL at 12:43

## 2024-03-08 RX ADMIN — ACETAMINOPHEN 325MG 650 MG: 325 TABLET ORAL at 21:02

## 2024-03-08 RX ADMIN — ENOXAPARIN SODIUM 40 MG: 40 INJECTION SUBCUTANEOUS at 09:30

## 2024-03-08 RX ADMIN — CYANOCOBALAMIN TAB 500 MCG 500 MCG: 500 TAB at 09:31

## 2024-03-08 RX ADMIN — LEVOTHYROXINE SODIUM 100 MCG: 100 TABLET ORAL at 05:14

## 2024-03-08 RX ADMIN — LATANOPROST 1 DROP: 50 SOLUTION OPHTHALMIC at 21:03

## 2024-03-08 RX ADMIN — AMLODIPINE BESYLATE 5 MG: 5 TABLET ORAL at 09:31

## 2024-03-08 RX ADMIN — HYDROXYCHLOROQUINE SULFATE 200 MG: 200 TABLET ORAL at 09:30

## 2024-03-08 RX ADMIN — FAMOTIDINE 20 MG: 20 TABLET, FILM COATED ORAL at 09:30

## 2024-03-08 RX ADMIN — ACETAMINOPHEN 325MG 650 MG: 325 TABLET ORAL at 05:20

## 2024-03-08 RX ADMIN — FERROUS GLUCONATE 324 MG: 324 TABLET ORAL at 09:30
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
## 2024-03-08 NOTE — ASSESSMENT & PLAN NOTE
History of erosive osteoarthritis on hydroxychloroquine, hypertension, and hypothyroidism who presents for mechanical fall  Was walking out of staples when she tripped over a speed bump  Lacerated lip and sustained left comminuted patella fracture  S/p left ORIF left patella this morning   Lovenox dvt prophylaxis post op   Left knee immobilizer in place   Pain control   Follow up orthopedic recommendations   PT and OT

## 2024-03-08 NOTE — ASSESSMENT & PLAN NOTE
History of erosive osteoarthritis on hydroxychloroquine, hypertension, and hypothyroidism who presents for mechanical fall  Was walking out of staples when she tripped over a speed bump  Lacerated lip and sustained left comminuted patella fracture  Reached out to ortho to see if  she is a surgical candidate this AM   She ate breakfast already will make NPO after midnight in the event she needs surgery   Left knee immobilizer in place   Pain control   Follow up orthopedic recommendations

## 2024-03-08 NOTE — ASSESSMENT & PLAN NOTE
History of erosive osteoarthritis on hydroxychloroquine, hypertension, and hypothyroidism who presents for mechanical fall  Was walking out of staples when she tripped over a speed bump  Lacerated lip and broke her left patella  Significant ambulatory dysfunction in ED prompting admission  Consult orthopedics.  Continue immobilizer.
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
## 2024-03-08 NOTE — PLAN OF CARE
Problem: Potential for Falls  Goal: Patient will remain free of falls  Description: INTERVENTIONS:  - Educate patient/family on patient safety including physical limitations  - Instruct patient to call for assistance with activity   - Consult OT/PT to assist with strengthening/mobility   - Keep Call bell within reach  - Keep bed low and locked with side rails adjusted as appropriate  - Keep care items and personal belongings within reach  - Initiate and maintain comfort rounds  - Make Fall Risk Sign visible to staff  - Offer Toileting every 2 Hours, in advance of need  - Initiate/Maintain bed alarm  - Obtain necessary fall risk management equipment:   - Apply yellow socks and bracelet for high fall risk patients  - Consider moving patient to room near nurses station  Outcome: Progressing     Problem: MUSCULOSKELETAL - ADULT  Goal: Maintain or return mobility to safest level of function  Description: INTERVENTIONS:  - Assess patient's ability to carry out ADLs; assess patient's baseline for ADL function and identify physical deficits which impact ability to perform ADLs (bathing, care of mouth/teeth, toileting, grooming, dressing, etc.)  - Assess/evaluate cause of self-care deficits   - Assess range of motion  - Assess patient's mobility  - Assess patient's need for assistive devices and provide as appropriate  - Encourage maximum independence but intervene and supervise when necessary  - Involve family in performance of ADLs  - Assess for home care needs following discharge   - Consider OT consult to assist with ADL evaluation and planning for discharge  - Provide patient education as appropriate  Outcome: Progressing  Goal: Maintain proper alignment of affected body part  Description: INTERVENTIONS:  - Support, maintain and protect limb and body alignment  - Provide patient/ family with appropriate education  Outcome: Progressing     Problem: PAIN - ADULT  Goal: Verbalizes/displays adequate comfort level or  baseline comfort level  Description: Interventions:  - Encourage patient to monitor pain and request assistance  - Assess pain using appropriate pain scale  - Administer analgesics based on type and severity of pain and evaluate response  - Implement non-pharmacological measures as appropriate and evaluate response  - Consider cultural and social influences on pain and pain management  - Notify physician/advanced practitioner if interventions unsuccessful or patient reports new pain  Outcome: Progressing     Problem: SAFETY ADULT  Goal: Patient will remain free of falls  Description: INTERVENTIONS:  - Educate patient/family on patient safety including physical limitations  - Instruct patient to call for assistance with activity   - Consult OT/PT to assist with strengthening/mobility   - Keep Call bell within reach  - Keep bed low and locked with side rails adjusted as appropriate  - Keep care items and personal belongings within reach  - Initiate and maintain comfort rounds  - Make Fall Risk Sign visible to staff  - Offer Toileting every 2 Hours, in advance of need  - Initiate/Maintain bed alarm  - Obtain necessary fall risk management equipment:   - Apply yellow socks and bracelet for high fall risk patients  - Consider moving patient to room near nurses station  Outcome: Progressing  Goal: Maintain or return to baseline ADL function  Description: INTERVENTIONS:  - Educate patient/family on patient safety including physical limitations  - Instruct patient to call for assistance with activity   - Consult OT/PT to assist with strengthening/mobility   - Keep Call bell within reach  - Keep bed low and locked with side rails adjusted as appropriate  - Keep care items and personal belongings within reach  - Initiate and maintain comfort rounds  - Make Fall Risk Sign visible to staff  - Offer Toileting every 2 Hours, in advance of need  - Initiate/Maintain bed alarm  - Obtain necessary fall risk management equipment:   -  Apply yellow socks and bracelet for high fall risk patients  - Consider moving patient to room near nurses station  Outcome: Progressing  Goal: Maintains/Returns to pre admission functional level  Description: INTERVENTIONS:  -  Assess patient's ability to carry out ADLs; assess patient's baseline for ADL function and identify physical deficits which impact ability to perform ADLs (bathing, care of mouth/teeth, toileting, grooming, dressing, etc.)  - Assess/evaluate cause of self-care deficits   - Assess range of motion  - Assess patient's mobility; develop plan if impaired  - Assess patient's need for assistive devices and provide as appropriate  - Encourage maximum independence but intervene and supervise when necessary  - Involve family in performance of ADLs  - Assess for home care needs following discharge   - Consider OT consult to assist with ADL evaluation and planning for discharge  - Provide patient education as appropriate  Outcome: Progressing

## 2024-03-08 NOTE — ANESTHESIA PREPROCEDURE EVALUATION
Procedure:  OPEN REDUCTION W/ INTERNAL FIXATION (ORIF) PATELLA plus all other indicated procedures (Left: Knee)    Relevant Problems   CARDIO   (+) Essential hypertension   (+) Symptomatic carotid artery stenosis without infarction, right      ENDO   (+) Hypothyroidism      GI/HEPATIC   (+) Gastroesophageal reflux disease without esophagitis      HEMATOLOGY   (+) Anemia      MUSCULOSKELETAL   (+) Erosive osteoarthritis of both hands   (+) Localized, secondary osteoarthritis of the shoulder region        Physical Exam    Airway    Mallampati score: I  TM Distance: >3 FB  Neck ROM: full     Dental       Cardiovascular  Cardiovascular exam normal    Pulmonary  Pulmonary exam normal     Other Findings  post-pubertal.      Anesthesia Plan  ASA Score- 3     Anesthesia Type- general with ASA Monitors.         Additional Monitors:     Airway Plan: ETT.           Plan Factors-Exercise tolerance (METS): >4 METS.    Chart reviewed.   Existing labs reviewed. Patient summary reviewed.    Patient is not a current smoker.              Induction- intravenous.    Postoperative Plan-     Informed Consent- Anesthetic plan and risks discussed with patient.

## 2024-03-08 NOTE — ASSESSMENT & PLAN NOTE
Follows with rheumatology.  Continue hydroxychloroquine 200 mg daily   Vitamin D supplementation   Follows with Dr Stein

## 2024-03-08 NOTE — RESTORATIVE TECHNICIAN NOTE
Restorative Technician Note      Patient Name: Yajaira Mendez     Restorative Tech Visit Date: 03/08/24  Note Type: Mobility  Patient Position Upon Consult: Supine  Mobility / Activity Provided: assisted pt in trasnferring to C  Activity Performed: Transferred  Assistive Device: Roller walker  Patient Position at End of Consult: Bedside chair; All needs within reach; Bed/Chair alarm activated

## 2024-03-08 NOTE — PLAN OF CARE
Problem: Potential for Falls  Goal: Patient will remain free of falls  Description: INTERVENTIONS:  - Educate patient/family on patient safety including physical limitations  - Instruct patient to call for assistance with activity   - Consult OT/PT to assist with strengthening/mobility   - Keep Call bell within reach  - Keep bed low and locked with side rails adjusted as appropriate  - Keep care items and personal belongings within reach  - Initiate and maintain comfort rounds  - Make Fall Risk Sign visible to staff  - Offer Toileting every 2 Hours, in advance of need  - Initiate/Maintain bed alarm  - Obtain necessary fall risk management equipment: non slip socks, call bell   - Apply yellow socks and bracelet for high fall risk patients  - Consider moving patient to room near nurses station  Outcome: Progressing     Problem: MUSCULOSKELETAL - ADULT  Goal: Maintain or return mobility to safest level of function  Description: INTERVENTIONS:  - Assess patient's ability to carry out ADLs; assess patient's baseline for ADL function and identify physical deficits which impact ability to perform ADLs (bathing, care of mouth/teeth, toileting, grooming, dressing, etc.)  - Assess/evaluate cause of self-care deficits   - Assess range of motion  - Assess patient's mobility  - Assess patient's need for assistive devices and provide as appropriate  - Encourage maximum independence but intervene and supervise when necessary  - Involve family in performance of ADLs  - Assess for home care needs following discharge   - Consider OT consult to assist with ADL evaluation and planning for discharge  - Provide patient education as appropriate  Outcome: Progressing     Problem: PAIN - ADULT  Goal: Verbalizes/displays adequate comfort level or baseline comfort level  Description: Interventions:  - Encourage patient to monitor pain and request assistance  - Assess pain using appropriate pain scale  - Administer analgesics based on type  and severity of pain and evaluate response  - Implement non-pharmacological measures as appropriate and evaluate response  - Consider cultural and social influences on pain and pain management  - Notify physician/advanced practitioner if interventions unsuccessful or patient reports new pain  Outcome: Progressing     Problem: SAFETY ADULT  Goal: Patient will remain free of falls  Description: INTERVENTIONS:  - Educate patient/family on patient safety including physical limitations  - Instruct patient to call for assistance with activity   - Consult OT/PT to assist with strengthening/mobility   - Keep Call bell within reach  - Keep bed low and locked with side rails adjusted as appropriate  - Keep care items and personal belongings within reach  - Initiate and maintain comfort rounds  - Make Fall Risk Sign visible to staff  - Offer Toileting every 2 Hours, in advance of need  - Initiate/Maintain bed alarm  - Obtain necessary fall risk management equipment: non slip socks, call bell   - Apply yellow socks and bracelet for high fall risk patients  - Consider moving patient to room near nurses station  Outcome: Progressing     Problem: SAFETY ADULT  Goal: Maintain or return to baseline ADL function  Description: INTERVENTIONS:  -  Assess patient's ability to carry out ADLs; assess patient's baseline for ADL function and identify physical deficits which impact ability to perform ADLs (bathing, care of mouth/teeth, toileting, grooming, dressing, etc.)  - Assess/evaluate cause of self-care deficits   - Assess range of motion  - Assess patient's mobility; develop plan if impaired  - Assess patient's need for assistive devices and provide as appropriate  - Encourage maximum independence but intervene and supervise when necessary  - Involve family in performance of ADLs  - Assess for home care needs following discharge   - Consider OT consult to assist with ADL evaluation and planning for discharge  - Provide patient  education as appropriate  Outcome: Progressing     Problem: SAFETY ADULT  Goal: Maintains/Returns to pre admission functional level  Description: INTERVENTIONS:  - Perform AM-PAC 6 Click Basic Mobility/ Daily Activity assessment daily.  - Set and communicate daily mobility goal to care team and patient/family/caregiver.   - Collaborate with rehabilitation services on mobility goals if consulted  - Perform Range of Motion 3 times a day.  - Reposition patient every 2 hours.  - Dangle patient 3 times a day  - Stand patient 3 times a day  - Ambulate patient 3 times a day  - Out of bed to chair 3 times a day   - Out of bed for meals 3 times a day  - Out of bed for toileting  - Record patient progress and toleration of activity level   Outcome: Progressing

## 2024-03-08 NOTE — ASSESSMENT & PLAN NOTE
Lab Results   Component Value Date    HGB 10.9 (L) 03/08/2024    HGB 12.3 03/07/2024    HGB 11.9 03/06/2024    HGB 11.8 12/05/2023    HGB 12.2 07/26/2023     Monitor H&H   Platelets 140k since Dec, monitor

## 2024-03-08 NOTE — PROGRESS NOTES
[facility]  Progress Note  Name: Yajaira Mendez I  MRN: [id number]  Unit/Bed#: E2 -01 I Date of Admission: 3/7/2024   Date of Service: 3/8/2024  Hospital Day: 1    Assessment/Plan   * Closed fracture of left patella  Assessment & Plan  History of erosive osteoarthritis on hydroxychloroquine, hypertension, and hypothyroidism who presents for mechanical fall  Was walking out of staples when she tripped over a speed bump  Lacerated lip and sustained left comminuted patella fracture  Reached out to ortho to see if  she is a surgical candidate this AM   She ate breakfast already will make NPO after midnight in the event she needs surgery   Left knee immobilizer in place   Pain control   Follow up orthopedic recommendations     Erosive osteoarthritis of both hands  Assessment & Plan  Follows with rheumatology.  Continue hydroxychloroquine 200 mg daily   Vitamin D supplementation   Follows with Dr Stein     Lip laceration  Assessment & Plan  S/p fall   She also lost one of her central incisors   Supportive care     Gastroesophageal reflux disease without esophagitis  Assessment & Plan  Continue pepcid     Essential hypertension  Assessment & Plan  Continue amlodipine 5 mg daily     Anemia  Assessment & Plan  Lab Results   Component Value Date    HGB 10.9 (L) 03/08/2024    HGB 12.3 03/07/2024    HGB 11.9 03/06/2024    HGB 11.8 12/05/2023    HGB 12.2 07/26/2023     Monitor H&H   Platelets 140k since Dec, monitor     Hypothyroidism  Assessment & Plan  Continue levothyroxine                VTE Pharmacologic Prophylaxis: VTE Score: 3 Moderate Risk (Score 3-4) - Pharmacological DVT Prophylaxis Ordered: enoxaparin (Lovenox).    Mobility:   Basic Mobility Inpatient Raw Score: 15  -HLM Goal: 4: Move to chair/commode  JH-HLM Achieved: 2: Bed activities/Dependent transfer  HLM Goal achieved. Continue to encourage appropriate mobility.    Patient Centered Rounds: I performed bedside rounds with  nursing staff today.   Discussions with Specialists or Other Care Team Provider: discuss with ortho     Education and Discussions with Family / Patient: Patient declined call to .     Total Time Spent on Date of Encounter in care of patient:  mins. This time was spent on one or more of the following: performing physical exam; counseling and coordination of care; obtaining or reviewing history; documenting in the medical record; reviewing/ordering tests, medications or procedures; communicating with other healthcare professionals and discussing with patient's family/caregivers.    Current Length of Stay: 1 day(s)  Current Patient Status: Inpatient   Certification Statement: The patient will continue to require additional inpatient hospital stay due to patella fracture   Discharge Plan: Anticipate discharge in 24-48 hrs to discharge location to be determined pending rehab evaluations.    Code Status: Level 1 - Full Code    Subjective:   Doing okay. Only pain currently is in left knee. No calf pain or tenderness. No CP or SOB. No abdo pain N/V. She has no jaw pain. Hard to eat as she has lots central incisor and the other one feels loose. No headache. No vision loss or change. She lives at home with her hubsnad     Objective:     Vitals:   Temp (24hrs), Av.7 °F (36.5 °C), Min:97.5 °F (36.4 °C), Max:98 °F (36.7 °C)    Temp:  [97.5 °F (36.4 °C)-98 °F (36.7 °C)] 98 °F (36.7 °C)  HR:  [76-89] 76  Resp:  [14-20] 14  BP: (142-206)/(69-88) 142/69  SpO2:  [98 %-99 %] 98 %  Body mass index is 22.38 kg/m².     Input and Output Summary (last 24 hours):     Intake/Output Summary (Last 24 hours) at 3/8/2024 1040  Last data filed at 3/7/2024 1731  Gross per 24 hour   Intake 800 ml   Output --   Net 800 ml       Physical Exam:   Physical Exam  Vitals and nursing note reviewed.   HENT:      Mouth/Throat:      Comments: Loss of right central incisor   Cardiovascular:      Rate and Rhythm: Normal rate and regular  rhythm.   Pulmonary:      Effort: Pulmonary effort is normal. No respiratory distress.      Breath sounds: Normal breath sounds. No wheezing or rales.   Abdominal:      General: Bowel sounds are normal.      Palpations: Abdomen is soft.   Musculoskeletal:         General: Tenderness (left knee) and deformity present.      Right lower leg: No edema.      Left lower leg: No edema.      Comments: Left knee brace in place   Skin:     Findings: Bruising (around lips) present.   Neurological:      Mental Status: She is alert. Mental status is at baseline.   Psychiatric:         Mood and Affect: Mood normal.          Additional Data:     Labs:  Results from last 7 days   Lab Units 03/08/24  0432 03/07/24  1458   WBC Thousand/uL 4.19* 3.83*   HEMOGLOBIN g/dL 10.9* 12.3   HEMATOCRIT % 33.6* 38.9   PLATELETS Thousands/uL 140* 146*   NEUTROS PCT %  --  47   LYMPHS PCT %  --  35   MONOS PCT %  --  13*   EOS PCT %  --  4     Results from last 7 days   Lab Units 03/08/24  0432   SODIUM mmol/L 141   POTASSIUM mmol/L 4.0   CHLORIDE mmol/L 110*   CO2 mmol/L 25   BUN mg/dL 17   CREATININE mg/dL 0.69   ANION GAP mmol/L 6   CALCIUM mg/dL 8.9   ALBUMIN g/dL 3.7   TOTAL BILIRUBIN mg/dL 0.40   ALK PHOS U/L 60   ALT U/L 31   AST U/L 42*   GLUCOSE RANDOM mg/dL 96     Results from last 7 days   Lab Units 03/07/24  1458   INR  0.98                   Lines/Drains:  Invasive Devices       Peripheral Intravenous Line  Duration             Peripheral IV 03/07/24 Proximal;Right;Ventral (anterior) Forearm <1 day                          Imaging: Reviewed radiology reports from this admission including: CT head and xray(s)    Recent Cultures (last 7 days):         Last 24 Hours Medication List:   Current Facility-Administered Medications   Medication Dose Route Frequency Provider Last Rate    acetaminophen  650 mg Oral Q4H PRN Chester Bhagat, DO      amLODIPine  5 mg Oral Daily Chester Bhagat, DO      calcium carbonate-vitamin D  1 tablet Oral Daily  With Breakfast Chester Bhagat, DO      cyanocobalamin  500 mcg Oral Daily Chester Bhagat, DO      enoxaparin  40 mg Subcutaneous Daily Chester Bhagat, DO      famotidine  20 mg Oral Daily Chester Bhagat, DO      ferrous gluconate  324 mg Oral Daily With Breakfast Chester Bhagat, DO      hydroxychloroquine  200 mg Oral QAM Chester Bhagat, DO      latanoprost  1 drop Both Eyes HS Chester Bhagat, DO      levothyroxine  100 mcg Oral Early Morning Chester Bhagat, DO      ondansetron  4 mg Intravenous Q4H PRN Chester Bhagat, DO      oxyCODONE  2.5 mg Oral Q4H PRN Chester Bhagat, DO          Today, Patient Was Seen By: Catherine Montesinos PA-C    **Please Note: This note may have been constructed using a voice recognition system.**

## 2024-03-08 NOTE — PHYSICAL THERAPY NOTE
Physical Therapy Cancellation Note           03/08/24 0739   PT Last Visit   PT Visit Date 03/08/24   Note Type   Note type Cancelled Session   Cancel Reasons Medical status   Additional Comments pt admitted w patella fx s/p fall. pending ortho consult. will defer evaluation pending ortho recommendations to ensure safe mobility practices.       Alissa Coulter, PT

## 2024-03-09 ENCOUNTER — APPOINTMENT (INPATIENT)
Dept: RADIOLOGY | Facility: HOSPITAL | Age: 84
DRG: 517 | End: 2024-03-09
Payer: MEDICARE

## 2024-03-09 ENCOUNTER — ANESTHESIA (INPATIENT)
Dept: PERIOP | Facility: HOSPITAL | Age: 84
DRG: 517 | End: 2024-03-09
Payer: MEDICARE

## 2024-03-09 LAB
ANION GAP SERPL CALCULATED.3IONS-SCNC: 7 MMOL/L
BUN SERPL-MCNC: 16 MG/DL (ref 5–25)
CALCIUM SERPL-MCNC: 8.7 MG/DL (ref 8.4–10.2)
CHLORIDE SERPL-SCNC: 108 MMOL/L (ref 96–108)
CO2 SERPL-SCNC: 26 MMOL/L (ref 21–32)
CREAT SERPL-MCNC: 0.7 MG/DL (ref 0.6–1.3)
ERYTHROCYTE [DISTWIDTH] IN BLOOD BY AUTOMATED COUNT: 13.8 % (ref 11.6–15.1)
GFR SERPL CREATININE-BSD FRML MDRD: 80 ML/MIN/1.73SQ M
GLUCOSE SERPL-MCNC: 84 MG/DL (ref 65–140)
HCT VFR BLD AUTO: 34.1 % (ref 34.8–46.1)
HGB BLD-MCNC: 10.8 G/DL (ref 11.5–15.4)
MCH RBC QN AUTO: 29.3 PG (ref 26.8–34.3)
MCHC RBC AUTO-ENTMCNC: 31.7 G/DL (ref 31.4–37.4)
MCV RBC AUTO: 93 FL (ref 82–98)
PLATELET # BLD AUTO: 146 THOUSANDS/UL (ref 149–390)
PMV BLD AUTO: 11.9 FL (ref 8.9–12.7)
POTASSIUM SERPL-SCNC: 4 MMOL/L (ref 3.5–5.3)
RBC # BLD AUTO: 3.68 MILLION/UL (ref 3.81–5.12)
SODIUM SERPL-SCNC: 141 MMOL/L (ref 135–147)
WBC # BLD AUTO: 3.63 THOUSAND/UL (ref 4.31–10.16)

## 2024-03-09 PROCEDURE — 27524 TREAT KNEECAP FRACTURE: CPT | Performed by: STUDENT IN AN ORGANIZED HEALTH CARE EDUCATION/TRAINING PROGRAM

## 2024-03-09 PROCEDURE — 73560 X-RAY EXAM OF KNEE 1 OR 2: CPT

## 2024-03-09 PROCEDURE — C1769 GUIDE WIRE: HCPCS | Performed by: STUDENT IN AN ORGANIZED HEALTH CARE EDUCATION/TRAINING PROGRAM

## 2024-03-09 PROCEDURE — C1713 ANCHOR/SCREW BN/BN,TIS/BN: HCPCS | Performed by: STUDENT IN AN ORGANIZED HEALTH CARE EDUCATION/TRAINING PROGRAM

## 2024-03-09 PROCEDURE — 80048 BASIC METABOLIC PNL TOTAL CA: CPT | Performed by: PHYSICIAN ASSISTANT

## 2024-03-09 PROCEDURE — 27524 TREAT KNEECAP FRACTURE: CPT | Performed by: PHYSICIAN ASSISTANT

## 2024-03-09 PROCEDURE — 0QSF04Z REPOSITION LEFT PATELLA WITH INTERNAL FIXATION DEVICE, OPEN APPROACH: ICD-10-PCS | Performed by: STUDENT IN AN ORGANIZED HEALTH CARE EDUCATION/TRAINING PROGRAM

## 2024-03-09 PROCEDURE — 99232 SBSQ HOSP IP/OBS MODERATE 35: CPT | Performed by: PHYSICIAN ASSISTANT

## 2024-03-09 PROCEDURE — 85027 COMPLETE CBC AUTOMATED: CPT | Performed by: PHYSICIAN ASSISTANT

## 2024-03-09 DEVICE — 4.5MM CANNULATED SCREW PARTIALLY THREADED/34MM: Type: IMPLANTABLE DEVICE | Site: PATELLA | Status: FUNCTIONAL

## 2024-03-09 DEVICE — SUTURE WIRE 18 GA: Type: IMPLANTABLE DEVICE | Site: PATELLA | Status: FUNCTIONAL

## 2024-03-09 RX ORDER — HYDROXYCHLOROQUINE SULFATE 200 MG/1
200 TABLET, FILM COATED ORAL EVERY MORNING
Status: DISCONTINUED | OUTPATIENT
Start: 2024-03-09 | End: 2024-03-10 | Stop reason: HOSPADM

## 2024-03-09 RX ORDER — CEFAZOLIN SODIUM 2 G/50ML
SOLUTION INTRAVENOUS AS NEEDED
Status: DISCONTINUED | OUTPATIENT
Start: 2024-03-09 | End: 2024-03-09

## 2024-03-09 RX ORDER — MAGNESIUM HYDROXIDE 1200 MG/15ML
LIQUID ORAL AS NEEDED
Status: DISCONTINUED | OUTPATIENT
Start: 2024-03-09 | End: 2024-03-09 | Stop reason: HOSPADM

## 2024-03-09 RX ORDER — ASPIRIN 81 MG/1
81 TABLET, CHEWABLE ORAL 2 TIMES DAILY
Qty: 60 TABLET | Refills: 0 | Status: SHIPPED | OUTPATIENT
Start: 2024-03-09 | End: 2024-04-08

## 2024-03-09 RX ORDER — FENTANYL CITRATE/PF 50 MCG/ML
25 SYRINGE (ML) INJECTION
Status: DISCONTINUED | OUTPATIENT
Start: 2024-03-09 | End: 2024-03-09 | Stop reason: HOSPADM

## 2024-03-09 RX ORDER — KETOROLAC TROMETHAMINE 30 MG/ML
INJECTION, SOLUTION INTRAMUSCULAR; INTRAVENOUS AS NEEDED
Status: DISCONTINUED | OUTPATIENT
Start: 2024-03-09 | End: 2024-03-09 | Stop reason: HOSPADM

## 2024-03-09 RX ORDER — OXYCODONE HYDROCHLORIDE 5 MG/1
TABLET ORAL
Qty: 13 TABLET | Refills: 0 | Status: SHIPPED | OUTPATIENT
Start: 2024-03-09

## 2024-03-09 RX ORDER — OXYCODONE HYDROCHLORIDE 5 MG/1
5 TABLET ORAL EVERY 6 HOURS PRN
Status: DISCONTINUED | OUTPATIENT
Start: 2024-03-09 | End: 2024-03-10 | Stop reason: HOSPADM

## 2024-03-09 RX ORDER — SODIUM CHLORIDE 9 MG/ML
INJECTION, SOLUTION INTRAVENOUS AS NEEDED
Status: DISCONTINUED | OUTPATIENT
Start: 2024-03-09 | End: 2024-03-09 | Stop reason: HOSPADM

## 2024-03-09 RX ORDER — ROPIVACAINE HYDROCHLORIDE 5 MG/ML
INJECTION, SOLUTION EPIDURAL; INFILTRATION; PERINEURAL AS NEEDED
Status: DISCONTINUED | OUTPATIENT
Start: 2024-03-09 | End: 2024-03-09 | Stop reason: HOSPADM

## 2024-03-09 RX ORDER — FENTANYL CITRATE 50 UG/ML
INJECTION, SOLUTION INTRAMUSCULAR; INTRAVENOUS AS NEEDED
Status: DISCONTINUED | OUTPATIENT
Start: 2024-03-09 | End: 2024-03-09

## 2024-03-09 RX ORDER — ONDANSETRON 2 MG/ML
4 INJECTION INTRAMUSCULAR; INTRAVENOUS ONCE AS NEEDED
Status: DISCONTINUED | OUTPATIENT
Start: 2024-03-09 | End: 2024-03-09 | Stop reason: HOSPADM

## 2024-03-09 RX ORDER — SODIUM CHLORIDE 9 MG/ML
INJECTION, SOLUTION INTRAVENOUS CONTINUOUS PRN
Status: DISCONTINUED | OUTPATIENT
Start: 2024-03-09 | End: 2024-03-09

## 2024-03-09 RX ORDER — DEXAMETHASONE SODIUM PHOSPHATE 10 MG/ML
INJECTION, SOLUTION INTRAMUSCULAR; INTRAVENOUS AS NEEDED
Status: DISCONTINUED | OUTPATIENT
Start: 2024-03-09 | End: 2024-03-09

## 2024-03-09 RX ORDER — LIDOCAINE HYDROCHLORIDE 20 MG/ML
INJECTION, SOLUTION EPIDURAL; INFILTRATION; INTRACAUDAL; PERINEURAL AS NEEDED
Status: DISCONTINUED | OUTPATIENT
Start: 2024-03-09 | End: 2024-03-09

## 2024-03-09 RX ORDER — EPINEPHRINE 1 MG/ML
INJECTION, SOLUTION, CONCENTRATE INTRAVENOUS AS NEEDED
Status: DISCONTINUED | OUTPATIENT
Start: 2024-03-09 | End: 2024-03-09 | Stop reason: HOSPADM

## 2024-03-09 RX ORDER — EPHEDRINE SULFATE 50 MG/ML
INJECTION INTRAVENOUS AS NEEDED
Status: DISCONTINUED | OUTPATIENT
Start: 2024-03-09 | End: 2024-03-09

## 2024-03-09 RX ORDER — TRANEXAMIC ACID 100 MG/ML
INJECTION, SOLUTION INTRAVENOUS AS NEEDED
Status: DISCONTINUED | OUTPATIENT
Start: 2024-03-09 | End: 2024-03-09

## 2024-03-09 RX ORDER — HYDROMORPHONE HCL/PF 1 MG/ML
0.5 SYRINGE (ML) INJECTION
Status: DISCONTINUED | OUTPATIENT
Start: 2024-03-09 | End: 2024-03-09 | Stop reason: HOSPADM

## 2024-03-09 RX ORDER — FAMOTIDINE 20 MG/1
20 TABLET, FILM COATED ORAL DAILY
Status: DISCONTINUED | OUTPATIENT
Start: 2024-03-09 | End: 2024-03-10 | Stop reason: HOSPADM

## 2024-03-09 RX ORDER — PROPOFOL 10 MG/ML
INJECTION, EMULSION INTRAVENOUS AS NEEDED
Status: DISCONTINUED | OUTPATIENT
Start: 2024-03-09 | End: 2024-03-09

## 2024-03-09 RX ADMIN — LIDOCAINE HYDROCHLORIDE 40 MG: 20 INJECTION, SOLUTION EPIDURAL; INFILTRATION; INTRACAUDAL at 07:52

## 2024-03-09 RX ADMIN — FAMOTIDINE 20 MG: 20 TABLET ORAL at 11:26

## 2024-03-09 RX ADMIN — LEVOTHYROXINE SODIUM 100 MCG: 100 TABLET ORAL at 06:30

## 2024-03-09 RX ADMIN — HYDROXYCHLOROQUINE SULFATE 200 MG: 200 TABLET, FILM COATED ORAL at 11:27

## 2024-03-09 RX ADMIN — FENTANYL CITRATE 50 MCG: 50 INJECTION INTRAMUSCULAR; INTRAVENOUS at 07:42

## 2024-03-09 RX ADMIN — LATANOPROST 1 DROP: 50 SOLUTION OPHTHALMIC at 21:01

## 2024-03-09 RX ADMIN — DEXAMETHASONE SODIUM PHOSPHATE 10 MG: 10 INJECTION INTRAMUSCULAR; INTRAVENOUS at 08:00

## 2024-03-09 RX ADMIN — ACETAMINOPHEN 325MG 650 MG: 325 TABLET ORAL at 11:31

## 2024-03-09 RX ADMIN — FENTANYL CITRATE 25 MCG: 50 INJECTION INTRAMUSCULAR; INTRAVENOUS at 08:28

## 2024-03-09 RX ADMIN — PROPOFOL 120 MG: 10 INJECTION, EMULSION INTRAVENOUS at 07:52

## 2024-03-09 RX ADMIN — EPHEDRINE SULFATE 10 MG: 50 INJECTION, SOLUTION INTRAVENOUS at 08:01

## 2024-03-09 RX ADMIN — CYANOCOBALAMIN TAB 500 MCG 500 MCG: 500 TAB at 11:26

## 2024-03-09 RX ADMIN — FENTANYL CITRATE 25 MCG: 50 INJECTION INTRAMUSCULAR; INTRAVENOUS at 08:13

## 2024-03-09 RX ADMIN — TRANEXAMIC ACID 1 G: 100 INJECTION, SOLUTION INTRAVENOUS at 07:55

## 2024-03-09 RX ADMIN — CEFAZOLIN SODIUM 2000 MG: 2 SOLUTION INTRAVENOUS at 07:50

## 2024-03-09 RX ADMIN — EPHEDRINE SULFATE 10 MG: 50 INJECTION, SOLUTION INTRAVENOUS at 07:57

## 2024-03-09 RX ADMIN — FERROUS GLUCONATE 324 MG: 324 TABLET ORAL at 11:22

## 2024-03-09 RX ADMIN — Medication 1 TABLET: at 11:23

## 2024-03-09 RX ADMIN — SODIUM CHLORIDE: 0.9 INJECTION, SOLUTION INTRAVENOUS at 07:39

## 2024-03-09 RX ADMIN — ACETAMINOPHEN 325MG 650 MG: 325 TABLET ORAL at 15:36

## 2024-03-09 RX ADMIN — ONDANSETRON 4 MG: 2 INJECTION INTRAMUSCULAR; INTRAVENOUS at 08:10

## 2024-03-09 NOTE — OCCUPATIONAL THERAPY NOTE
Occupational Therapy         Patient Name: Yajaira Mendez  Today's Date: 3/9/2024       03/09/24 0815   OT Last Visit   OT Visit Date 03/09/24   Note Type   Note type Cancelled Session;Evaluation   Cancel Reasons Patient to operating room     Marcy Salazar

## 2024-03-09 NOTE — PLAN OF CARE
Problem: Potential for Falls  Goal: Patient will remain free of falls  Description: INTERVENTIONS:  - Educate patient/family on patient safety including physical limitations  - Instruct patient to call for assistance with activity   - Consult OT/PT to assist with strengthening/mobility   - Keep Call bell within reach  - Keep bed low and locked with side rails adjusted as appropriate  - Keep care items and personal belongings within reach  - Initiate and maintain comfort rounds  - Make Fall Risk Sign visible to staff  - Offer Toileting every 2 Hours, in advance of need  - Initiate/Maintain bed alarm  - Obtain necessary fall risk management equipment: non slip socks, call bell   - Apply yellow socks and bracelet for high fall risk patients  - Consider moving patient to room near nurses station  Outcome: Progressing     Problem: MUSCULOSKELETAL - ADULT  Goal: Maintain or return mobility to safest level of function  Description: INTERVENTIONS:  - Assess patient's ability to carry out ADLs; assess patient's baseline for ADL function and identify physical deficits which impact ability to perform ADLs (bathing, care of mouth/teeth, toileting, grooming, dressing, etc.)  - Assess/evaluate cause of self-care deficits   - Assess range of motion  - Assess patient's mobility  - Assess patient's need for assistive devices and provide as appropriate  - Encourage maximum independence but intervene and supervise when necessary  - Involve family in performance of ADLs  - Assess for home care needs following discharge   - Consider OT consult to assist with ADL evaluation and planning for discharge  - Provide patient education as appropriate  Outcome: Progressing     Problem: PAIN - ADULT  Goal: Verbalizes/displays adequate comfort level or baseline comfort level  Description: Interventions:  - Encourage patient to monitor pain and request assistance  - Assess pain using appropriate pain scale  - Administer analgesics based on type  and severity of pain and evaluate response  - Implement non-pharmacological measures as appropriate and evaluate response  - Consider cultural and social influences on pain and pain management  - Notify physician/advanced practitioner if interventions unsuccessful or patient reports new pain  Outcome: Progressing     Problem: SAFETY ADULT  Goal: Patient will remain free of falls  Description: INTERVENTIONS:  - Educate patient/family on patient safety including physical limitations  - Instruct patient to call for assistance with activity   - Consult OT/PT to assist with strengthening/mobility   - Keep Call bell within reach  - Keep bed low and locked with side rails adjusted as appropriate  - Keep care items and personal belongings within reach  - Initiate and maintain comfort rounds  - Make Fall Risk Sign visible to staff  - Offer Toileting every 2 Hours, in advance of need  - Initiate/Maintain bedalarm  - Obtain necessary fall risk management equipment: non slip socks, call bell  - Apply yellow socks and bracelet for high fall risk patients  - Consider moving patient to room near nurses station  Outcome: Progressing     Problem: SAFETY ADULT  Goal: Maintain or return to baseline ADL function  Description: INTERVENTIONS:  -  Assess patient's ability to carry out ADLs; assess patient's baseline for ADL function and identify physical deficits which impact ability to perform ADLs (bathing, care of mouth/teeth, toileting, grooming, dressing, etc.)  - Assess/evaluate cause of self-care deficits   - Assess range of motion  - Assess patient's mobility; develop plan if impaired  - Assess patient's need for assistive devices and provide as appropriate  - Encourage maximum independence but intervene and supervise when necessary  - Involve family in performance of ADLs  - Assess for home care needs following discharge   - Consider OT consult to assist with ADL evaluation and planning for discharge  - Provide patient education  as appropriate  Outcome: Progressing     Problem: SAFETY ADULT  Goal: Maintains/Returns to pre admission functional level  Description: INTERVENTIONS:  - Perform AM-PAC 6 Click Basic Mobility/ Daily Activity assessment daily.  - Set and communicate daily mobility goal to care team and patient/family/caregiver.   - Collaborate with rehabilitation services on mobility goals if consulted  - Perform Range of Motion 3 times a day.  - Reposition patient every 2 hours.  - Dangle patient 3 times a day  - Stand patient 3 times a day  - Ambulate patient 3 times a day  - Out of bed to chair 3 times a day   - Out of bed for meals 3 times a day  - Out of bed for toileting  - Record patient progress and toleration of activity level   Outcome: Progressing

## 2024-03-09 NOTE — ANESTHESIA POSTPROCEDURE EVALUATION
Post-Op Assessment Note    CV Status:  Stable  Pain Score: 2    Pain management: adequate       Mental Status:  Alert and awake   Hydration Status:  Euvolemic and stable   PONV Controlled:  Controlled   Airway Patency:  Patent     Post Op Vitals Reviewed: Yes    No anethesia notable event occurred.    Staff: Anesthesiologist               BP      Temp      Pulse     Resp      SpO2      /65 (BP Location: Left arm)   Pulse 86   Temp (!) 97.3 °F (36.3 °C) (Temporal)   Resp 14   Wt 55.5 kg (122 lb 5.7 oz)   LMP  (LMP Unknown)   SpO2 96%   BMI 22.38 kg/m²

## 2024-03-09 NOTE — PROGRESS NOTES
[facility]  Progress Note  Name: Yajaira Mendez I  MRN: [id number]  Unit/Bed#: E2 -01 I Date of Admission: 3/7/2024   Date of Service: 3/9/2024 I Hospital Day: 2    Assessment/Plan   * Closed fracture of left patella  Assessment & Plan  History of erosive osteoarthritis on hydroxychloroquine, hypertension, and hypothyroidism who presents for mechanical fall  Was walking out of staples when she tripped over a speed bump  Lacerated lip and sustained left comminuted patella fracture  S/p left ORIF left patella this morning   Lovenox dvt prophylaxis post op   Left knee immobilizer in place   Pain control   Follow up orthopedic recommendations   PT and OT     Erosive osteoarthritis of both hands  Assessment & Plan  Follows with rheumatology.  Continue hydroxychloroquine 200 mg daily   Vitamin D supplementation   Follows with Dr Stein     Lip laceration  Assessment & Plan  S/p fall   She also lost one of her central incisors   Supportive care   Outpt follow up with [facility] [place]    Gastroesophageal reflux disease without esophagitis  Assessment & Plan  Continue pepcid     Essential hypertension  Assessment & Plan  Continue amlodipine 5 mg daily   Monitor VS post op    Anemia  Assessment & Plan  Lab Results   Component Value Date    HGB 10.8 (L) 03/09/2024    HGB 10.9 (L) 03/08/2024    HGB 12.3 03/07/2024    HGB 11.9 03/06/2024    HGB 11.8 12/05/2023     Monitor H&H post op  Platelets 140k since Dec, monitor     Hypothyroidism  Assessment & Plan  Continue levothyroxine             VTE Pharmacologic Prophylaxis: VTE Score: 3 Moderate Risk (Score 3-4) - Pharmacological DVT Prophylaxis Ordered: enoxaparin (Lovenox).    Mobility:   Basic Mobility Inpatient Raw Score: 15  JH-HLM Goal: 4: Move to chair/commode  JH-HLM Achieved: 4: Move to chair/commode  HLM Goal achieved. Continue to encourage appropriate mobility.    Patient Centered Rounds: I performed bedside rounds with nursing staff today.    Discussions with Specialists or Other Care Team Provider:     Education and Discussions with Family / Patient:  tried calling  no answer, patient states ortho called him .     Total Time Spent on Date of Encounter in care of patient:  mins. This time was spent on one or more of the following: performing physical exam; counseling and coordination of care; obtaining or reviewing history; documenting in the medical record; reviewing/ordering tests, medications or procedures; communicating with other healthcare professionals and discussing with patient's family/caregivers.    Current Length of Stay: 2 day(s)  Current Patient Status: Inpatient   Certification Statement: The patient will continue to require additional inpatient hospital stay due to s/ pleft patella surgery   Discharge Plan: Anticipate discharge in 48-72 hrs to discharge location to be determined pending rehab evaluations.    Code Status: Level 1 - Full Code    Subjective:   Patient was seen post op she has no pain currently. No CP or SOB. No abdo pain, N/V. No fevers.     Objective:     Vitals:   Temp (24hrs), Av.1 °F (36.7 °C), Min:97.3 °F (36.3 °C), Max:98.7 °F (37.1 °C)    Temp:  [97.3 °F (36.3 °C)-98.7 °F (37.1 °C)] 97.3 °F (36.3 °C)  HR:  [72-92] 86  Resp:  [12-18] 14  BP: (118-148)/(54-77) 131/65  SpO2:  [96 %-100 %] 96 %  Body mass index is 22.38 kg/m².     Input and Output Summary (last 24 hours):     Intake/Output Summary (Last 24 hours) at 3/9/2024 1125  Last data filed at 3/9/2024 0901  Gross per 24 hour   Intake 1120 ml   Output 600 ml   Net 520 ml       Physical Exam:   Physical Exam  Vitals and nursing note reviewed.   Constitutional:       General: She is not in acute distress.  Cardiovascular:      Rate and Rhythm: Normal rate and regular rhythm.      Heart sounds: No murmur heard.  Abdominal:      General: Bowel sounds are normal.      Palpations: Abdomen is soft.   Musculoskeletal:         General: Deformity present.    Skin:     Comments: Bruising to both upper and lower lips, dressing over left knee with knee immobilizer   Neurological:      Mental Status: She is alert.   Psychiatric:         Mood and Affect: Mood normal.          Additional Data:     Labs:  Results from last 7 days   Lab Units 03/09/24 0425 03/08/24  0432 03/07/24  1458   WBC Thousand/uL 3.63*   < > 3.83*   HEMOGLOBIN g/dL 10.8*   < > 12.3   HEMATOCRIT % 34.1*   < > 38.9   PLATELETS Thousands/uL 146*   < > 146*   NEUTROS PCT %  --   --  47   LYMPHS PCT %  --   --  35   MONOS PCT %  --   --  13*   EOS PCT %  --   --  4    < > = values in this interval not displayed.     Results from last 7 days   Lab Units 03/09/24 0425 03/08/24  0432   SODIUM mmol/L 141 141   POTASSIUM mmol/L 4.0 4.0   CHLORIDE mmol/L 108 110*   CO2 mmol/L 26 25   BUN mg/dL 16 17   CREATININE mg/dL 0.70 0.69   ANION GAP mmol/L 7 6   CALCIUM mg/dL 8.7 8.9   ALBUMIN g/dL  --  3.7   TOTAL BILIRUBIN mg/dL  --  0.40   ALK PHOS U/L  --  60   ALT U/L  --  31   AST U/L  --  42*   GLUCOSE RANDOM mg/dL 84 96     Results from last 7 days   Lab Units 03/07/24  1458   INR  0.98                   Lines/Drains:  Invasive Devices       Peripheral Intravenous Line  Duration             Peripheral IV 03/07/24 Proximal;Right;Ventral (anterior) Forearm 1 day                          Imaging: Reviewed radiology reports from this admission including: xray(s)    Recent Cultures (last 7 days):         Last 24 Hours Medication List:   Current Facility-Administered Medications   Medication Dose Route Frequency Provider Last Rate    acetaminophen  650 mg Oral Q4H PRN Catherine Montesinos PA-C      amLODIPine  5 mg Oral Daily Catherine Montesinos PA-C      calcium carbonate-vitamin D  1 tablet Oral Daily With Breakfast Catherine Montesinos PA-C      cyanocobalamin  500 mcg Oral Daily Catherine Montesinos PA-C      [START ON 3/10/2024] enoxaparin  40 mg Subcutaneous Daily Catherine Montesinos PA-C      famotidine  20 mg Oral Daily Catherine  KRISS Montesinos      ferrous gluconate  324 mg Oral Daily With Breakfast Catherine Montesinos PA-C      hydroxychloroquine  200 mg Oral QAM Catherine Montesinos PA-C      latanoprost  1 drop Both Eyes HS Catherine Montesinos PA-C      levothyroxine  100 mcg Oral Early Morning Catherine Montesinos PA-C      ondansetron  4 mg Intravenous Q4H PRN Catherine Montesinos PA-C      oxyCODONE  5 mg Oral Q6H PRN Catherine Montesinos PA-C      oxyCODONE  2.5 mg Oral Q4H PRN Catherine Montesinos PA-C      povidone-iodine (BETADINE) 10 % 20 Application in sodium chloride 0.9 % 500 mL irrigation bottle   Irrigation Once Catherine Montesinos PA-C          Today, Patient Was Seen By: Catherine Montesinos PA-C    **Please Note: This note may have been constructed using a voice recognition system.**

## 2024-03-09 NOTE — DISCHARGE INSTR - AVS FIRST PAGE
Discharge Instructions - Orthopedics  Yajaira Mendez 84 y.o. female MRN: [id number]  Unit/Bed#: AL OR MAIN    Weight Bearing Status:                                           Weight bearing to tolerance in knee immobilizer  Knee immobilizer at all times until follow up with Dr Griffin in 2 weeks    DVT prophylaxis:  ASA 81mg BID x 30 days    Pain:  Continue analgesics as directed  Tylenol for mild to moderate pain  Ice - 20 minutes on and 20 minutes off  Oxycodone 2.5mg (1/2 tablet) every 6 hours for severe pain only    Dressing Instructions:   Please keep clean, dry and intact until follow up     Appt Instructions:   If you do not have your appointment, please call the clinic at [phone]  Otherwise follow up as scheduled.    Contact the office sooner if you experience any increased numbness/tingling in the extremities, uncontrolled pain, excessive drainage/bleeding

## 2024-03-09 NOTE — ASSESSMENT & PLAN NOTE
S/p fall   She also lost one of her central incisors   Supportive care   Outpt follow up with [facility]

## 2024-03-09 NOTE — PLAN OF CARE
Problem: Potential for Falls  Goal: Patient will remain free of falls  Description: INTERVENTIONS:  - Educate patient/family on patient safety including physical limitations  - Instruct patient to call for assistance with activity   - Consult OT/PT to assist with strengthening/mobility   - Keep Call bell within reach  - Keep bed low and locked with side rails adjusted as appropriate  - Keep care items and personal belongings within reach  - Initiate and maintain comfort rounds  - Make Fall Risk Sign visible to staff  - Offer Toileting every 2 Hours, in advance of need  - Initiate/Maintain bed alarm  - Obtain necessary fall risk management equipment:   - Apply yellow socks and bracelet for high fall risk patients  - Consider moving patient to room near nurses station  Outcome: Progressing     Problem: MUSCULOSKELETAL - ADULT  Goal: Maintain or return mobility to safest level of function  Description: INTERVENTIONS:  - Assess patient's ability to carry out ADLs; assess patient's baseline for ADL function and identify physical deficits which impact ability to perform ADLs (bathing, care of mouth/teeth, toileting, grooming, dressing, etc.)  - Assess/evaluate cause of self-care deficits   - Assess range of motion  - Assess patient's mobility  - Assess patient's need for assistive devices and provide as appropriate  - Encourage maximum independence but intervene and supervise when necessary  - Involve family in performance of ADLs  - Assess for home care needs following discharge   - Consider OT consult to assist with ADL evaluation and planning for discharge  - Provide patient education as appropriate  Outcome: Progressing  Goal: Maintain proper alignment of affected body part  Description: INTERVENTIONS:  - Support, maintain and protect limb and body alignment  - Provide patient/ family with appropriate education  Outcome: Progressing     Problem: PAIN - ADULT  Goal: Verbalizes/displays adequate comfort level or  baseline comfort level  Description: Interventions:  - Encourage patient to monitor pain and request assistance  - Assess pain using appropriate pain scale  - Administer analgesics based on type and severity of pain and evaluate response  - Implement non-pharmacological measures as appropriate and evaluate response  - Consider cultural and social influences on pain and pain management  - Notify physician/advanced practitioner if interventions unsuccessful or patient reports new pain  Outcome: Progressing     Problem: SAFETY ADULT  Goal: Patient will remain free of falls  Description: INTERVENTIONS:  - Educate patient/family on patient safety including physical limitations  - Instruct patient to call for assistance with activity   - Consult OT/PT to assist with strengthening/mobility   - Keep Call bell within reach  - Keep bed low and locked with side rails adjusted as appropriate  - Keep care items and personal belongings within reach  - Initiate and maintain comfort rounds  - Make Fall Risk Sign visible to staff  - Offer Toileting every 2 Hours, in advance of need  - Initiate/Maintain bed alarm  - Obtain necessary fall risk management equipment:   - Apply yellow socks and bracelet for high fall risk patients  - Consider moving patient to room near nurses station  Outcome: Progressing  Goal: Maintain or return to baseline ADL function  Description: INTERVENTIONS:  -  Assess patient's ability to carry out ADLs; assess patient's baseline for ADL function and identify physical deficits which impact ability to perform ADLs (bathing, care of mouth/teeth, toileting, grooming, dressing, etc.)  - Assess/evaluate cause of self-care deficits   - Assess range of motion  - Assess patient's mobility; develop plan if impaired  - Assess patient's need for assistive devices and provide as appropriate  - Encourage maximum independence but intervene and supervise when necessary  - Involve family in performance of ADLs  - Assess for  home care needs following discharge   - Consider OT consult to assist with ADL evaluation and planning for discharge  - Provide patient education as appropriate  Outcome: Progressing  Goal: Maintains/Returns to pre admission functional level  Description: INTERVENTIONS:  - Perform AM-PAC 6 Click Basic Mobility/ Daily Activity assessment daily.  - Set and communicate daily mobility goal to care team and patient/family/caregiver.   - Collaborate with rehabilitation services on mobility goals if consulted  - Perform Range of Motion 3 times a day.  - Reposition patient every 2 hours.  - Dangle patient 3 times a day  - Stand patient 3 times a day  - Ambulate patient 3 times a day  - Out of bed to chair 3 times a day   - Out of bed for meals 3 times a day  - Out of bed for toileting  - Record patient progress and toleration of activity level   Outcome: Progressing

## 2024-03-10 VITALS
RESPIRATION RATE: 17 BRPM | BODY MASS INDEX: 22.38 KG/M2 | OXYGEN SATURATION: 98 % | HEART RATE: 83 BPM | TEMPERATURE: 97.1 F | WEIGHT: 122.36 LBS | SYSTOLIC BLOOD PRESSURE: 156 MMHG | DIASTOLIC BLOOD PRESSURE: 77 MMHG

## 2024-03-10 LAB
ANION GAP SERPL CALCULATED.3IONS-SCNC: 9 MMOL/L
BASOPHILS # BLD AUTO: 0 THOUSANDS/ÂΜL (ref 0–0.1)
BASOPHILS NFR BLD AUTO: 0 % (ref 0–1)
BUN SERPL-MCNC: 14 MG/DL (ref 5–25)
CALCIUM SERPL-MCNC: 9.1 MG/DL (ref 8.4–10.2)
CHLORIDE SERPL-SCNC: 108 MMOL/L (ref 96–108)
CO2 SERPL-SCNC: 24 MMOL/L (ref 21–32)
CREAT SERPL-MCNC: 0.6 MG/DL (ref 0.6–1.3)
EOSINOPHIL # BLD AUTO: 0 THOUSAND/ÂΜL (ref 0–0.61)
EOSINOPHIL NFR BLD AUTO: 0 % (ref 0–6)
ERYTHROCYTE [DISTWIDTH] IN BLOOD BY AUTOMATED COUNT: 13.8 % (ref 11.6–15.1)
GFR SERPL CREATININE-BSD FRML MDRD: 85 ML/MIN/1.73SQ M
GLUCOSE SERPL-MCNC: 87 MG/DL (ref 65–140)
HCT VFR BLD AUTO: 32.9 % (ref 34.8–46.1)
HGB BLD-MCNC: 10.5 G/DL (ref 11.5–15.4)
IMM GRANULOCYTES # BLD AUTO: 0.03 THOUSAND/UL (ref 0–0.2)
IMM GRANULOCYTES NFR BLD AUTO: 1 % (ref 0–2)
LYMPHOCYTES # BLD AUTO: 0.52 THOUSANDS/ÂΜL (ref 0.6–4.47)
LYMPHOCYTES NFR BLD AUTO: 10 % (ref 14–44)
MCH RBC QN AUTO: 29.9 PG (ref 26.8–34.3)
MCHC RBC AUTO-ENTMCNC: 31.9 G/DL (ref 31.4–37.4)
MCV RBC AUTO: 94 FL (ref 82–98)
MONOCYTES # BLD AUTO: 0.39 THOUSAND/ÂΜL (ref 0.17–1.22)
MONOCYTES NFR BLD AUTO: 8 % (ref 4–12)
NEUTROPHILS # BLD AUTO: 4.05 THOUSANDS/ÂΜL (ref 1.85–7.62)
NEUTS SEG NFR BLD AUTO: 81 % (ref 43–75)
NRBC BLD AUTO-RTO: 0 /100 WBCS
PLATELET # BLD AUTO: 154 THOUSANDS/UL (ref 149–390)
PMV BLD AUTO: 11.4 FL (ref 8.9–12.7)
POTASSIUM SERPL-SCNC: 4.2 MMOL/L (ref 3.5–5.3)
RBC # BLD AUTO: 3.51 MILLION/UL (ref 3.81–5.12)
SODIUM SERPL-SCNC: 141 MMOL/L (ref 135–147)
WBC # BLD AUTO: 4.99 THOUSAND/UL (ref 4.31–10.16)

## 2024-03-10 PROCEDURE — 97166 OT EVAL MOD COMPLEX 45 MIN: CPT

## 2024-03-10 PROCEDURE — 99239 HOSP IP/OBS DSCHRG MGMT >30: CPT | Performed by: PHYSICIAN ASSISTANT

## 2024-03-10 PROCEDURE — 97163 PT EVAL HIGH COMPLEX 45 MIN: CPT

## 2024-03-10 PROCEDURE — 80048 BASIC METABOLIC PNL TOTAL CA: CPT | Performed by: PHYSICIAN ASSISTANT

## 2024-03-10 PROCEDURE — 85025 COMPLETE CBC W/AUTO DIFF WBC: CPT | Performed by: PHYSICIAN ASSISTANT

## 2024-03-10 PROCEDURE — 97530 THERAPEUTIC ACTIVITIES: CPT

## 2024-03-10 PROCEDURE — 97535 SELF CARE MNGMENT TRAINING: CPT

## 2024-03-10 RX ADMIN — Medication 1 TABLET: at 08:46

## 2024-03-10 RX ADMIN — FAMOTIDINE 20 MG: 20 TABLET ORAL at 08:46

## 2024-03-10 RX ADMIN — AMLODIPINE BESYLATE 5 MG: 5 TABLET ORAL at 08:46

## 2024-03-10 RX ADMIN — LEVOTHYROXINE SODIUM 100 MCG: 100 TABLET ORAL at 06:45

## 2024-03-10 RX ADMIN — FERROUS GLUCONATE 324 MG: 324 TABLET ORAL at 08:46

## 2024-03-10 RX ADMIN — HYDROXYCHLOROQUINE SULFATE 200 MG: 200 TABLET, FILM COATED ORAL at 08:47

## 2024-03-10 RX ADMIN — CYANOCOBALAMIN TAB 500 MCG 500 MCG: 500 TAB at 08:46

## 2024-03-10 RX ADMIN — ENOXAPARIN SODIUM 40 MG: 40 INJECTION SUBCUTANEOUS at 08:46

## 2024-03-10 NOTE — ASSESSMENT & PLAN NOTE
History of erosive osteoarthritis on hydroxychloroquine, hypertension, and hypothyroidism who presents for mechanical fall  Was walking out of staples when she tripped over a speed bump  Lacerated lip and sustained left comminuted patella fracture  S/p left ORIF left patella this morning   Asa 81 mg bid for 30 days and script for oxy fore severe pain sent to pharmacy per ortho   Left knee immobilizer in place , WBAT with brace in place   She did very well with PT and OT today and will go home with VNA   Outtp follow up with ortho
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
## 2024-03-10 NOTE — DISCHARGE SUMMARY
Formerly [facility], [facility]  Discharge- Yajaira Mendez 1940, 84 y.o. female MRN: [id number]  Unit/Bed#: E2 -01 Encounter: [id number]  Primary Care Provider: Terrell Schulz MD   Date and time admitted to hospital: 3/7/2024  2:27 PM    * Closed fracture of left patella  Assessment & Plan  History of erosive osteoarthritis on hydroxychloroquine, hypertension, and hypothyroidism who presents for mechanical fall  Was walking out of staples when she tripped over a speed bump  Lacerated lip and sustained left comminuted patella fracture  S/p left ORIF left patella this morning   Asa 81 mg bid for 30 days and script for oxy fore severe pain sent to pharmacy per ortho   Left knee immobilizer in place , WBAT with brace in place   She did very well with PT and OT today and will go home with VNA   Spoke with ortho okay for discharge   Outtp follow up with ortho     Erosive osteoarthritis of both hands  Assessment & Plan  Follows with rheumatology.  Continue hydroxychloroquine 200 mg daily   Vitamin D supplementation   Follows with Dr Stein     Lip laceration  Assessment & Plan  S/p fall   She also lost one of her central incisors   Supportive care   Outpt follow up with OMFS, she is calling her dentist tomorrow     Gastroesophageal reflux disease without esophagitis  Assessment & Plan  Continue pepcid     Essential hypertension  Assessment & Plan  Continue amlodipine 5 mg daily   VS stable     Anemia  Assessment & Plan  Lab Results   Component Value Date    HGB 10.5 (L) 03/10/2024    HGB 10.8 (L) 03/09/2024    HGB 10.9 (L) 03/08/2024    HGB 12.3 03/07/2024    HGB 11.9 03/06/2024     Stable post op   Platelets 140k since Dec  Cbc in 1 week     Hypothyroidism  Assessment & Plan  Continue levothyroxine      Medical Problems       Resolved Problems  Date Reviewed: 3/9/2024   None       Discharging Physician / Practitioner: Catherine Montesinos PA-C  PCP: Terrell Schulz MD  Admission Date:   Admission Orders  (From admission, onward)       Ordered        03/07/24 1708  INPATIENT ADMISSION  Once                          Discharge Date: 03/10/24    Consultations During Hospital Stay:  Orthopedics     Procedures Performed:   S/p left ORIF patella 3/9/24 with Dr. Griffin     Significant Findings / Test Results:   Xray left knee:   FINDINGS:     Comminuted fracture of the patella without deviation. Insall Salvati ratio of 1.55. Fracture gap of approximately 0.1 cm. Small knee joint effusion. No significant degenerative changes.     IMPRESSION:     Acute comminuted patellar fracture without deviation, and with possible disruption of the patellar tendon.  CT head   FINDINGS:     PARENCHYMA: Decreased attenuation is noted in periventricular and subcortical white matter demonstrating an appearance that is statistically most likely to represent moderate microangiopathic change.     No CT signs of acute infarction.  No intracranial mass, mass effect or midline shift.  No acute parenchymal hemorrhage.     VENTRICLES AND EXTRA-AXIAL SPACES:  Normal for the patient's age.     VISUALIZED ORBITS: No acute findings.     PARANASAL SINUSES: Normal visualized paranasal sinuses.     CALVARIUM AND EXTRACRANIAL SOFT TISSUES:  Normal.     IMPRESSION:     No acute intracranial abnormality.  CT cervical spine   IMAGE QUALITY:  Diagnostic.     FINDINGS:     ALIGNMENT:  Normal alignment of the cervical spine. No subluxation.     VERTEBRAE:  No fracture.     DEGENERATIVE CHANGES: Moderate-to-severe multilevel cervical degenerative changes are noted. No critical central canal stenosis.     PREVERTEBRAL AND PARASPINAL SOFT TISSUES: Unremarkable     THORACIC INLET:  Normal.     IMPRESSION:     No cervical spine fracture or traumatic malalignment.    Incidental Findings:   none    Test Results Pending at Discharge (will require follow up):   CBC in 1 week      Outpatient Tests Requested:  Orthoepdics   PCP in 1 week   Dentist     Complications:       Reason for Admission: left patella fracture     Hospital Course:   Yajaira Mendez is a 84 y.o. female patient who originally presented to the hospital on 3/7/2024 due to mechanical fall tripping over a speed bump when leaving staples sustain left patella fracture and lip laceration. She was seen by orthopedics.   She is status post left ORIF patella 3/9/24 with Dr. Griffin. She can continue WBAT with brace LLE. She will need close ortho follow up. She did very well with PT and OT today and felt comfortbale going home with VNA with her hubsnad. She is getting a walker and a commode. She was sen with aspirin 812 mg bid for 30 days and oxycodone per ortho for severe pain. She should have CBC in 1 week. She is calling her dentist Monday for her lost central incisor.       Please see above list of diagnoses and related plan for additional information.     Condition at Discharge: stable    Discharge Day Visit / Exam:   Subjective:  doing well with PT and OT and feels ready to go home. No chest pain, SOB, fevers, abdo pain, N/V/D. No calf pain. No lightheadedness. Pain controlled LLE  Vitals: Blood Pressure: 156/77 (03/10/24 0659)  Pulse: 83 (03/10/24 0659)  Temperature: (!) 97.1 °F (36.2 °C) (03/10/24 0659)  Temp Source: Temporal (03/10/24 0659)  Respirations: 17 (03/10/24 0659)  Weight - Scale: 55.5 kg (122 lb 5.7 oz) (03/07/24 1431)  SpO2: 98 % (03/10/24 0659)  Exam:   Physical Exam  Vitals and nursing note reviewed.   Constitutional:       General: She is not in acute distress.     Appearance: She is not ill-appearing or toxic-appearing.   HENT:      Mouth/Throat:      Comments: Bruining around lips on face, lost one central incisior  Cardiovascular:      Rate and Rhythm: Normal rate and regular rhythm.   Pulmonary:      Effort: Pulmonary effort is normal.      Breath sounds: Normal breath sounds.   Abdominal:      General: Bowel sounds are normal.      Palpations: Abdomen is soft.   Musculoskeletal:          General: Deformity present.      Right lower leg: No edema.   Skin:     Comments: Ace bandage and LLE brace in place, walking around halls with PT with walker   Neurological:      Mental Status: She is alert. Mental status is at baseline.   Psychiatric:         Mood and Affect: Mood normal.          Discussion with Family: Patient declined call to .     Discharge instructions/Information to patient and family:   See after visit summary for information provided to patient and family.      Provisions for Follow-Up Care:  See after visit summary for information related to follow-up care and any pertinent home health orders.      Mobility at time of Discharge:   Basic Mobility Inpatient Raw Score: 15  JH-HLM Goal: 4: Move to chair/commode  JH-HLM Achieved: 2: Bed activities/Dependent transfer  HLM Goal achieved. Continue to encourage appropriate mobility.     Disposition:   Home with VNA Services (Reminder: Complete face to face encounter)    Planned Readmission: none     Discharge Statement:  I spent 45 minutes discharging the patient. This time was spent on the day of discharge. I had direct contact with the patient on the day of discharge. Greater than 50% of the total time was spent examining patient, answering all patient questions, arranging and discussing plan of care with patient as well as directly providing post-discharge instructions.  Additional time then spent on discharge activities.    Discharge Medications:  See after visit summary for reconciled discharge medications provided to patient and/or family.      **Please Note: This note may have been constructed using a voice recognition system**
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
## 2024-03-10 NOTE — PLAN OF CARE
Problem: Potential for Falls  Goal: Patient will remain free of falls  Description: INTERVENTIONS:  - Educate patient/family on patient safety including physical limitations  - Instruct patient to call for assistance with activity   - Consult OT/PT to assist with strengthening/mobility   - Keep Call bell within reach  - Keep bed low and locked with side rails adjusted as appropriate  - Keep care items and personal belongings within reach  - Initiate and maintain comfort rounds  - Make Fall Risk Sign visible to staff  - Offer Toileting every 2 Hours, in advance of need  - Initiate/Maintain bed alarm  - Obtain necessary fall risk management equipment: gripper socks and bed alarm  - Apply yellow socks and bracelet for high fall risk patients  - Consider moving patient to room near nurses station  Outcome: Progressing     Problem: MUSCULOSKELETAL - ADULT  Goal: Maintain or return mobility to safest level of function  Description: INTERVENTIONS:  - Assess patient's ability to carry out ADLs; assess patient's baseline for ADL function and identify physical deficits which impact ability to perform ADLs (bathing, care of mouth/teeth, toileting, grooming, dressing, etc.)  - Assess/evaluate cause of self-care deficits   - Assess range of motion  - Assess patient's mobility  - Assess patient's need for assistive devices and provide as appropriate  - Encourage maximum independence but intervene and supervise when necessary  - Involve family in performance of ADLs  - Assess for home care needs following discharge   - Consider OT consult to assist with ADL evaluation and planning for discharge  - Provide patient education as appropriate  Outcome: Progressing  Goal: Maintain proper alignment of affected body part  Description: INTERVENTIONS:  - Support, maintain and protect limb and body alignment  - Provide patient/ family with appropriate education  Outcome: Progressing     Problem: PAIN - ADULT  Goal: Verbalizes/displays  adequate comfort level or baseline comfort level  Description: Interventions:  - Encourage patient to monitor pain and request assistance  - Assess pain using appropriate pain scale  - Administer analgesics based on type and severity of pain and evaluate response  - Implement non-pharmacological measures as appropriate and evaluate response  - Consider cultural and social influences on pain and pain management  - Notify physician/advanced practitioner if interventions unsuccessful or patient reports new pain  Outcome: Progressing     Problem: SAFETY ADULT  Goal: Patient will remain free of falls  Description: INTERVENTIONS:  - Educate patient/family on patient safety including physical limitations  - Instruct patient to call for assistance with activity   - Consult OT/PT to assist with strengthening/mobility   - Keep Call bell within reach  - Keep bed low and locked with side rails adjusted as appropriate  - Keep care items and personal belongings within reach  - Initiate and maintain comfort rounds  - Make Fall Risk Sign visible to staff  - Offer Toileting every 2 Hours, in advance of need  - Initiate/Maintain bed alarm  - Obtain necessary fall risk management equipment: gripper socks and bed alarm  - Apply yellow socks and bracelet for high fall risk patients  - Consider moving patient to room near nurses station  Outcome: Progressing  Goal: Maintain or return to baseline ADL function  Description: INTERVENTIONS:  - Educate patient/family on patient safety including physical limitations  - Instruct patient to call for assistance with activity   - Consult OT/PT to assist with strengthening/mobility   - Keep Call bell within reach  - Keep bed low and locked with side rails adjusted as appropriate  - Keep care items and personal belongings within reach  - Initiate and maintain comfort rounds  - Make Fall Risk Sign visible to staff  - Offer Toileting every 2 Hours, in advance of need  - Initiate/Maintain bed alarm  -  Obtain necessary fall risk management equipment: gripper socks and bed alarm  - Apply yellow socks and bracelet for high fall risk patients  - Consider moving patient to room near nurses station  Outcome: Progressing  Goal: Maintains/Returns to pre admission functional level  Description: INTERVENTIONS:  - Perform AM-PAC 6 Click Basic Mobility/ Daily Activity assessment daily.  - Set and communicate daily mobility goal to care team and patient/family/caregiver.   - Collaborate with rehabilitation services on mobility goals if consulted  - Perform Range of Motion 3 times a day.  - Reposition patient every 3 hours.  - Dangle patient 3 times a day  - Stand patient 3 times a day  - Ambulate patient 3 times a day  - Out of bed to chair 3 times a day   - Out of bed for meals 3 times a day  - Out of bed for toileting  - Record patient progress and toleration of activity level   Outcome: Progressing     Problem: DISCHARGE PLANNING  Goal: Discharge to home or other facility with appropriate resources  Description: INTERVENTIONS:  - Identify barriers to discharge w/patient and caregiver  - Arrange for needed discharge resources and transportation as appropriate  - Identify discharge learning needs (meds, wound care, etc.)  - Refer to Case Management Department for coordinating discharge planning if the patient needs post-hospital services based on physician/advanced practitioner order or complex needs related to functional status, cognitive ability, or social support system  Outcome: Progressing

## 2024-03-10 NOTE — PLAN OF CARE
Problem: OCCUPATIONAL THERAPY ADULT  Goal: Performs self-care activities at highest level of function for planned discharge setting.  See evaluation for individualized goals.  Description: Treatment Interventions: ADL retraining, Functional transfer training, UE strengthening/ROM, Endurance training, Patient/family training, Equipment evaluation/education, Continued evaluation, Energy conservation, Activityengagement, Compensatory technique education  Equipment Recommended: Bedside commode, Other (comment) (RW)       See flowsheet documentation for full assessment, interventions and recommendations.   Note: Limitation: Decreased ADL status, Decreased Safe judgement during ADL, Decreased endurance, Decreased self-care trans, Decreased high-level ADLs  Prognosis: Good  Assessment: Patient is a 84 y.o. year old female seen for OT eval s/p admit to [facility] on 3/7/2024 with closed fx of L patella s/p ORIF 3/9- WBAT w knee immobilizer, also w lip laceration.  OT consulted to assess ADLs/IADLs/functional mobility and assist w/ D/C planning.Patient demonstrates the following deficits impacting occupational performance: weakness, decreased balance, decreased activity tolerance, limited functional reach, decreased safety awareness, increased pain, orthopedic restrictions, impaired coordination, and decreased skin integrity . These impairments, as well at pt’s BRYAN home environment, difficulty performing ADLs, difficulty performing IADLs, difficulty performing transfers/mobility, WBS, fall risk , functional decline , new use of AD for functional transfers/mobility, and advanced age, limit pt’s ability to safely engage in all baseline areas of occupation. Pt's CLOF as follows: eating/grooming: Independent and supervision , UB ADLs: supervision , LB ADLs: Galo, toileting: supervision , bed mobility: DNT, functional transfers: supervision  and Galo, functional mobility: supervision , sitting/standing tolerance: ~8 min standing. Pt  would benefit from continued skilled OT while in acute setting to address deficits as defined above and to maximize (I) w/ ADLs/functional mobility. Occupational performance areas to address include: grooming, bathing/shower, toilet hygiene, dressing, health maintenance, functional mobility, cleaning, meal prep, and household maintenance. Based on the aforementioned evaluation, functional performance deficits, and assessments, pt has been identified as a moderate complexity evaluation. At this time, recommendation for pt to receive post-acute rehabilitation services at a Level III (minimum resource intensity) due to above deficits and CLOF. Pt's spouse able to provide 24/7 support PRN - recommend. OT will continue to follow pt 2-3x/wk to address the goals listed below to  w/in 10-14 days.     Rehab Resource Intensity Level, OT: III (Minimum Resource Intensity) (home w 24/7 assistance from spouse)

## 2024-03-10 NOTE — CASE MANAGEMENT
Case Management Discharge Planning Note    Patient name Yajaira Mendez  Location East 2 /E2 -* MRN [id number]  : 1940 Date 3/10/2024       Current Admission Date: 3/7/2024  Current Admission Diagnosis:Closed fracture of left patella   Patient Active Problem List    Diagnosis Date Noted    Closed fracture of left patella 2024    Lip laceration 2024    Erosive osteoarthritis of both hands 2024    Stricture of artery (HCC) 2024    Lipoma of left thigh 2022    History of right shoulder replacement 10/26/2021    Hx of gastric bypass     History of carotid endarterectomy     Rotator cuff arthropathy of right shoulder 10/06/2021    Localized, secondary osteoarthritis of the shoulder region 2021    Moderate protein-calorie malnutrition (HCC) 2021    Gastritis without bleeding 2021    Incontinence of feces 2021    Platelets decreased (HCC) 2020    Postoperative malabsorption 09/15/2020    Menopause, premature 09/15/2020    Gastroesophageal reflux disease without esophagitis 09/15/2020    Irritable bowel syndrome with diarrhea 09/15/2020    Hypothyroidism 2019    Anemia 2019    Symptomatic carotid artery stenosis without infarction, right 2019    Osteoporosis 2013    Essential hypertension 2010      LOS (days): 3  Geometric Mean LOS (GMLOS) (days): 2.8  Days to GMLOS:0     OBJECTIVE:  Risk of Unplanned Readmission Score: 11.96         Current admission status: Inpatient   Preferred Pharmacy:   [facility] - [address] [address]  Phone: [phone] Fax: [phone]    [id number] [place] [facility] [address]  [address] [address]  Phone: [phone] Fax: [phone]    [id number] - [place] - [address]  [address] [address] [facility] [address]  Phone:  [phone] Fax: [phone]    [facility] - [place], [place] - [address]  [address]  [address]  [facility] [address]  Phone: [phone] Fax: [phone]    Primary Care Provider: Terrell Schulz MD    Primary Insurance: MEDICARE  Secondary Insurance: AARP    DISCHARGE DETAILS:    Discharge planning discussed with:: Patient        Treatment Team Recommendation: Home with Home Health Care  Discharge Destination Plan:: Home with Home Health Care  Transport at Discharge : Family                             IMM Given (Date):: 03/10/24  IMM Given to:: Patient     Additional Comments:  spoke with patient.Cm delivered walker and commode to patient bedside and put signed DME ticket in bin for [employer] Liaison. CM reserved [facility] for patient from her preference.

## 2024-03-10 NOTE — PLAN OF CARE
Problem: PHYSICAL THERAPY ADULT  Goal: Performs mobility at highest level of function for planned discharge setting.  See evaluation for individualized goals.  Description: Treatment/Interventions: Functional transfer training, LE strengthening/ROM, Elevations, Therapeutic exercise, Patient/family training, Equipment eval/education, Gait training, Spoke to case management  Equipment Recommended: Walker       See flowsheet documentation for full assessment, interventions and recommendations.  Note: Prognosis: Good  Problem List: Decreased strength, Decreased endurance, Impaired balance, Decreased mobility, Orthopedic restrictions, Pain  Assessment: Pt is a 84 y.o. female seen for PT evaluation s/p admit to [facility] on 3/7/2024 w/ Closed fracture of left patella.  Order placed for PT.  Prior to admission, pt was independent w/ all functional mobility w/ no device, ambulated unrestricted distances and all terrain, lived in one floor environment, had 5 BRYAN bilateral railing, lived with , and was retired. Upon evaluation: Pt requires  min of 1 assistance for bed mobility, transfers, and ambulation with rolling walker 50' .  Pt's clinical presentation is currently unstable/unpredictable given the functional mobility deficits above, especially weakness, decreased ROM, decreased endurance, gait deviations, pain, and orthopedic restrictions, coupled with fall risks including hx of falls and impaired balance, and combined with medical complications of hypertension , abnormal H&H, and abnormal WBCs.  Pt to benefit from continued skilled PT tx while in hospital and upon DC to address deficits as defined above and maximize level of functional mobility. . The patient's AM-PAC Basic Mobility Inpatient Short Form Raw Score is 18. A Raw score of greater than 16 suggests the patient may benefit from discharge to home. Please also refer to the recommendation of the Physical Therapist for safe discharge planning.  From PT/mobility standpoint, recommendation at time of d/c would be home with family support, with rolling walker, and Level 2 post acute services with RW and bedside commode recommended  pending progress in order to maximize pt's functional independence and consistency w/ mobility in order to facilitate return to PLOF.  Recommend trial with walker next 1-2 sessions and ther ex next 1-2 sessions.  Barriers to Discharge: None     Rehab Resource Intensity Level, PT: II (Moderate Resource Intensity)    See flowsheet documentation for full assessment.

## 2024-03-10 NOTE — ASSESSMENT & PLAN NOTE
Lab Results   Component Value Date    HGB 10.5 (L) 03/10/2024    HGB 10.8 (L) 03/09/2024    HGB 10.9 (L) 03/08/2024    HGB 12.3 03/07/2024    HGB 11.9 03/06/2024     Stable post op   Platelets 140k since Dec  Cbc in 1 week

## 2024-03-10 NOTE — ASSESSMENT & PLAN NOTE
S/p fall   She also lost one of her central incisors   Supportive care   Outpt follow up with [facility], she is calling her dentist tomorrow

## 2024-03-10 NOTE — PLAN OF CARE
Problem: Potential for Falls  Goal: Patient will remain free of falls  Description: INTERVENTIONS:  - Educate patient/family on patient safety including physical limitations  - Instruct patient to call for assistance with activity   - Consult OT/PT to assist with strengthening/mobility   - Keep Call bell within reach  - Keep bed low and locked with side rails adjusted as appropriate  - Keep care items and personal belongings within reach  - Initiate and maintain comfort rounds  - Make Fall Risk Sign visible to staff  - Offer Toileting every 2 Hours, in advance of need  - Initiate/Maintain bed alarm  - Obtain necessary fall risk management equipment: gripper socks and bed alarm  - Apply yellow socks and bracelet for high fall risk patients  - Consider moving patient to room near nurses station  Outcome: Progressing     Problem: MUSCULOSKELETAL - ADULT  Goal: Maintain or return mobility to safest level of function  Description: INTERVENTIONS:  - Assess patient's ability to carry out ADLs; assess patient's baseline for ADL function and identify physical deficits which impact ability to perform ADLs (bathing, care of mouth/teeth, toileting, grooming, dressing, etc.)  - Assess/evaluate cause of self-care deficits   - Assess range of motion  - Assess patient's mobility  - Assess patient's need for assistive devices and provide as appropriate  - Encourage maximum independence but intervene and supervise when necessary  - Involve family in performance of ADLs  - Assess for home care needs following discharge   - Consider OT consult to assist with ADL evaluation and planning for discharge  - Provide patient education as appropriate  Outcome: Progressing     Problem: PAIN - ADULT  Goal: Verbalizes/displays adequate comfort level or baseline comfort level  Description: Interventions:  - Encourage patient to monitor pain and request assistance  - Assess pain using appropriate pain scale  - Administer analgesics based on type  and severity of pain and evaluate response  - Implement non-pharmacological measures as appropriate and evaluate response  - Consider cultural and social influences on pain and pain management  - Notify physician/advanced practitioner if interventions unsuccessful or patient reports new pain  Outcome: Progressing     Problem: SAFETY ADULT  Goal: Patient will remain free of falls  Description: INTERVENTIONS:  - Educate patient/family on patient safety including physical limitations  - Instruct patient to call for assistance with activity   - Consult OT/PT to assist with strengthening/mobility   - Keep Call bell within reach  - Keep bed low and locked with side rails adjusted as appropriate  - Keep care items and personal belongings within reach  - Initiate and maintain comfort rounds  - Make Fall Risk Sign visible to staff  - Offer Toileting every 2 Hours, in advance of need  - Initiate/Maintain bed alarm  - Obtain necessary fall risk management equipment: gripper socks and bed alarm  - Apply yellow socks and bracelet for high fall risk patients  - Consider moving patient to room near nurses station  Outcome: Progressing     Problem: DISCHARGE PLANNING  Goal: Discharge to home or other facility with appropriate resources  Description: INTERVENTIONS:  - Identify barriers to discharge w/patient and caregiver  - Arrange for needed discharge resources and transportation as appropriate  - Identify discharge learning needs (meds, wound care, etc.)  - Refer to Case Management Department for coordinating discharge planning if the patient needs post-hospital services based on physician/advanced practitioner order or complex needs related to functional status, cognitive ability, or social support system  Outcome: Progressing

## 2024-03-10 NOTE — CASE MANAGEMENT
Case Management Discharge Planning Note    Patient name Yajaira Mendez  Location East 2 /E2 -* MRN [id number]  : 1940 Date 3/10/2024       Current Admission Date: 3/7/2024  Current Admission Diagnosis:Closed fracture of left patella   Patient Active Problem List    Diagnosis Date Noted    Closed fracture of left patella 2024    Lip laceration 2024    Erosive osteoarthritis of both hands 2024    Stricture of artery (HCC) 2024    Lipoma of left thigh 2022    History of right shoulder replacement 10/26/2021    Hx of gastric bypass     History of carotid endarterectomy     Rotator cuff arthropathy of right shoulder 10/06/2021    Localized, secondary osteoarthritis of the shoulder region 2021    Moderate protein-calorie malnutrition (HCC) 2021    Gastritis without bleeding 2021    Incontinence of feces 2021    Platelets decreased (HCC) 2020    Postoperative malabsorption 09/15/2020    Menopause, premature 09/15/2020    Gastroesophageal reflux disease without esophagitis 09/15/2020    Irritable bowel syndrome with diarrhea 09/15/2020    Hypothyroidism 2019    Anemia 2019    Symptomatic carotid artery stenosis without infarction, right 2019    Osteoporosis 2013    Essential hypertension 2010      LOS (days): 3  Geometric Mean LOS (GMLOS) (days): 2.8  Days to GMLOS:0.1     OBJECTIVE:  Risk of Unplanned Readmission Score: 11.93         Current admission status: Inpatient   Preferred Pharmacy:   [facility] - [address] [address]  Phone: [phone] Fax: [phone]    [id number] [place] - [address]  [address] [facility] [address]  Phone: [phone] Fax: [phone]    [id number] - [place] - [address]  [address] [facility] [address]  Phone:  [phone] Fax: [phone]    [facility] - [place], [place] - [address]  [address]  [address]  [facility] [address]  Phone: [phone] Fax: [phone]    Primary Care Provider: Terrell Schulz MD    Primary Insurance: MEDICARE  Secondary Insurance: AARP    DISCHARGE DETAILS:    Discharge planning discussed with:: Patient  Freedom of Choice: Yes  Comments - Freedom of Choice: home with home health     Were Treatment Team discharge recommendations reviewed with patient/caregiver?: Yes  Did patient/caregiver verbalize understanding of patient care needs?: Yes  Were patient/caregiver advised of the risks associated with not following Treatment Team discharge recommendations?: Yes    Contacts  Patient Contacts: Patient's -Vik  Relationship to Patient:: Family  Contact Method: Phone  Reason/Outcome: Continuity of Care, Emergency Contact    Requested Home Health Care         Is the patient interested in HHC at discharge?: Yes  Home Health Discipline requested:: Occupational Therapy, Physical Therapy  Home Health Agency Name:: Other  HHA External Referral Reason (only applicable if external HHA name selected): Patient has established relationship with provider  Home Health Follow-Up Provider:: PCP  Home Health Services Needed:: Strengthening/Theraputic Exercises to Improve Function, Evaluate Functional Status and Safety, Gait/ADL Training  Homebound Criteria Met:: Requires the Assistance of Another Person for Safe Ambulation or to Leave the Home, Uses an Assist Device (i.e. cane, walker, etc)  Supporting Clincal Findings:: Limited Endurance, Fatigues Easliy in Short Distances    DME Referral Provided  Referral made for DME?: Yes  DME referral completed for the following items:: Bedside Commode, Walker  DME Supplier Name:: [facility]              Treatment Team Recommendation: Home with Home Health Care           Additional Comments:  spoke with patient. She is  agreeable to home health. CM sent referrals via aidin. CM also ordered bedside commode and walker.

## 2024-03-10 NOTE — PROGRESS NOTES
Patient:    MRN:  [id number]    William Request ID:  [id number]    Level of care reserved:  Home Health Agency    Partner Reserved:  [facility] [place] [id number] [phone]    Clinical needs requested:    Geography searched:  [id number]    Start of Service:    Request sent:  11:00am EDT on 3/10/2024 by María Brambila    Partner reserved:  12:29pm EDT on 3/10/2024 by María Brambila    Choice list shared:  12:28pm EDT on 3/10/2024 by María Brambila

## 2024-03-11 ENCOUNTER — TELEPHONE (OUTPATIENT)
Age: 84
End: 2024-03-11

## 2024-03-11 ENCOUNTER — TRANSITIONAL CARE MANAGEMENT (OUTPATIENT)
Dept: INTERNAL MEDICINE CLINIC | Facility: OTHER | Age: 84
End: 2024-03-11

## 2024-03-11 LAB
DME PARACHUTE DELIVERY DATE ACTUAL: NORMAL
DME PARACHUTE DELIVERY DATE REQUESTED: NORMAL
DME PARACHUTE DELIVERY NOTE: NORMAL
DME PARACHUTE ITEM DESCRIPTION: NORMAL
DME PARACHUTE ITEM DESCRIPTION: NORMAL
DME PARACHUTE ORDER STATUS: NORMAL
DME PARACHUTE SUPPLIER NAME: NORMAL
DME PARACHUTE SUPPLIER PHONE: NORMAL

## 2024-03-11 NOTE — TELEPHONE ENCOUNTER
Caller: Patient     Doctor: Elias     Reason for call: States she in a lot of pain and asked if this normal, call was warm transferred

## 2024-03-11 NOTE — TELEPHONE ENCOUNTER
Spoke with patient who had a ORIF Left knee patella 3/9/24.     Pt states the pain started yesterday and the pain got extreme over night and her pain is a 10/10. Pt has feeling her leg, feet and toes. Pt denies any numbness and tingling. Pt states she does noticed that getting up makes the pain better. Pt is elevating and icing. Pt is taking tylenol and oxycodone per prescription. Pt states she gets a little pain relief. Pt states her block wore off yesterday early evening.     Pt states she can deal with her pain but wanted to make sure it was normal     I did advise to continue to ice and elevate, it could be the block that wore off at this point. Patient does not complain about leg or foot being cool, pale or loss of sensation which I stated are signs of decreased circulation. Advised we will wait for further advisement from Dr Griffin

## 2024-03-12 ENCOUNTER — TELEPHONE (OUTPATIENT)
Dept: OBGYN CLINIC | Facility: HOSPITAL | Age: 84
End: 2024-03-12

## 2024-03-12 NOTE — TELEPHONE ENCOUNTER
Caller: Patient    Doctor: Elias    Reason for call: Patient had ORIF Left Patella on 3/9/24.  She is asking how you would like her to bathe?  Please advise.    Call back#: [phone]

## 2024-03-13 ENCOUNTER — OFFICE VISIT (OUTPATIENT)
Dept: INTERNAL MEDICINE CLINIC | Facility: CLINIC | Age: 84
End: 2024-03-13
Payer: MEDICARE

## 2024-03-13 VITALS
HEIGHT: 62 IN | BODY MASS INDEX: 22.38 KG/M2 | HEART RATE: 99 BPM | SYSTOLIC BLOOD PRESSURE: 128 MMHG | DIASTOLIC BLOOD PRESSURE: 76 MMHG | OXYGEN SATURATION: 99 % | TEMPERATURE: 97.7 F
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
## 2024-03-13 DIAGNOSIS — S82.042D CLOSED DISPLACED COMMINUTED FRACTURE OF LEFT PATELLA WITH ROUTINE HEALING, SUBSEQUENT ENCOUNTER: ICD-10-CM

## 2024-03-13 DIAGNOSIS — S09.93XD FACIAL INJURY, SUBSEQUENT ENCOUNTER: ICD-10-CM

## 2024-03-13 DIAGNOSIS — T14.8XXA HEMATOMA: ICD-10-CM

## 2024-03-13 PROCEDURE — 99496 TRANSJ CARE MGMT HIGH F2F 7D: CPT | Performed by: INTERNAL MEDICINE

## 2024-03-14 ENCOUNTER — TELEPHONE (OUTPATIENT)
Age: 84
End: 2024-03-14

## 2024-03-14 NOTE — TELEPHONE ENCOUNTER
Caller: cha OCAMPO from [facility]    Doctor: steven    Reason for call: would like to know if she is able to remove the ace bandage, needs clarification   Please advise    Call back#: [phone]

## 2024-03-14 NOTE — PROGRESS NOTES
Assessment & Plan     1. Fall, subsequent encounter    2. Facial injury, subsequent encounter    3. Hematoma lip    4. Closed displaced comminuted fracture of left patella with routine healing, subsequent encounter  Comments:  Had surgery and has lower extremity splint.         Subjective     Transitional Care Management Review:   Yajaira Mendez is a 84 y.o. female here for TCM follow up.     During the TCM phone call patient stated:  TCM Call       Date and time call was made  3/11/2024  9:12 AM    Hospital care reviewed  Records reviewed    Patient was hospitialized at  [facility]    Date of Admission  03/07/24    Date of discharge  03/10/24    Diagnosis  closed fracture of patella    Disposition  Home    Current Symptoms  Leg pain - left side          TCM Call       Post hospital issues  Reduced activity; Poor ADL (Activities of Daily Living) performance    Scheduled for follow up?  Yes    Did you obtain your prescribed medications  Yes    Do you need help managing your prescriptions or medications  No    Is transportation to your appointment needed  No    I have advised the patient to call PCP with any new or worsening symptoms  [facility]          HPI  Review of Systems   Constitutional:  Negative for appetite change, chills, fatigue and fever.   HENT:  Positive for dental problem and facial swelling. Negative for sore throat and trouble swallowing.    Eyes:  Negative for redness.   Respiratory:  Negative for shortness of breath.    Cardiovascular:  Negative for chest pain and palpitations.   Gastrointestinal:  Negative for abdominal pain, constipation and diarrhea.   Genitourinary:  Negative for dysuria and hematuria.   Musculoskeletal:  Positive for gait problem. Negative for back pain and neck pain.   Skin:  Negative for rash.   Neurological:  Negative for seizures, weakness and headaches.   Hematological:  Negative for adenopathy.   Psychiatric/Behavioral:  Negative for confusion. The  "patient is not nervous/anxious.        Objective     /76 (BP Location: Left arm, Patient Position: Sitting, Cuff Size: Standard)   Pulse 99   Temp 97.7 °F (36.5 °C) (Temporal)   Ht 5' 2\" (1.575 m)   LMP  (LMP Unknown)   SpO2 99%   BMI 22.38 kg/m²      Physical Exam  Constitutional:       Appearance: Normal appearance.   HENT:      Head: Normocephalic and atraumatic.      Ears:      Comments: Trauma to the right lower maxillary area     Mouth/Throat:      Mouth: Mucous membranes are moist.   Eyes:      Extraocular Movements: Extraocular movements intact.      Conjunctiva/sclera: Conjunctivae normal.      Pupils: Pupils are equal, round, and reactive to light.   Cardiovascular:      Rate and Rhythm: Normal rate.   Pulmonary:      Effort: Pulmonary effort is normal.   Musculoskeletal:         General: Swelling present.      Cervical back: Normal range of motion and neck supple.      Comments: Left lower extremity splint is present   Skin:     Findings: Bruising present.   Neurological:      General: No focal deficit present.      Mental Status: She is alert and oriented to person, place, and time. Mental status is at baseline.   Psychiatric:         Mood and Affect: Mood normal.         Behavior: Behavior normal.       Medications have been reviewed by provider in current encounter    Terrell Schulz MD         "

## 2024-03-22 ENCOUNTER — OFFICE VISIT (OUTPATIENT)
Dept: OBGYN CLINIC | Facility: CLINIC | Age: 84
End: 2024-03-22

## 2024-03-22 VITALS
HEIGHT: 62 IN | BODY MASS INDEX: 22.45 KG/M2 | SYSTOLIC BLOOD PRESSURE: 137 MMHG | DIASTOLIC BLOOD PRESSURE: 76 MMHG | WEIGHT: 122 LBS | HEART RATE: 87 BPM

## 2024-03-22 DIAGNOSIS — Z98.890 S/P ORIF (OPEN REDUCTION INTERNAL FIXATION) FRACTURE: Primary | ICD-10-CM

## 2024-03-22 DIAGNOSIS — Z87.81 S/P ORIF (OPEN REDUCTION INTERNAL FIXATION) FRACTURE: Primary | ICD-10-CM

## 2024-03-22 PROCEDURE — 99024 POSTOP FOLLOW-UP VISIT: CPT | Performed by: STUDENT IN AN ORGANIZED HEALTH CARE EDUCATION/TRAINING PROGRAM

## 2024-03-22 NOTE — ASSESSMENT & PLAN NOTE
S/p ORIF of left patella 3/9/24    No signs of infection, well healing incision with steri strips  Patient will be placed in TROM 0-30 of motion for 2 weeks, then 0-60 for next 2 weeks, when walking lock brace  Continue with OT/PT, new referral for physical therapy placed.   Tylenol as needed for pain  Continue with 81 mg ASA next 4 weeks BID  Follow up in 4 weeks with imaging

## 2024-03-22 NOTE — PROGRESS NOTES
Date: 24  Yajaira Mendez   MRN# [id number]  : 1940      Chief Complaint: left knee    Assessment and Plan:      S/P ORIF (open reduction internal fixation) fracture  S/p ORIF of left patella 3/9/24    No signs of infection, well healing incision with steri strips  Patient will be placed in TROM 0-30 of motion for 2 weeks, then 0-60 for next 2 weeks, when walking lock brace  Continue with OT/PT, new referral for physical therapy placed.   Tylenol as needed for pain  Continue with 81 mg ASA next 4 weeks BID  Follow up in 4 weeks with imaging       Subjective:     HPI:  Date of Surgery:     Patient is 2 weeks status post left ORIF of patella.   Doing well post-operatively and is able to ambulate with an assistive device.  Patient has been getting OT at home.   Pain is well controlled with Tylenol  No new injuries or complaints  81mg ASA BID    ROS:   Review of Systems   Constitutional:  Negative for chills and fever.   HENT:  Negative for ear pain and sore throat.    Eyes:  Negative for pain and visual disturbance.   Respiratory:  Negative for cough and shortness of breath.    Cardiovascular:  Negative for chest pain and palpitations.   Gastrointestinal:  Negative for abdominal pain and vomiting.   Genitourinary:  Negative for dysuria and hematuria.   Musculoskeletal:  Positive for arthralgias (left knee). Negative for back pain.   Skin:  Negative for color change and rash.   Neurological:  Negative for seizures and syncope.   All other systems reviewed and are negative.       Objective:   BP Readings from Last 1 Encounters:   24 137/76      Wt Readings from Last 1 Encounters:   24 55.3 kg (122 lb)      Pulse Readings from Last 1 Encounters:   24 87        Physical Exam  Constitutional:       Appearance: She is well-developed.   Eyes:      Pupils: Pupils are equal, round, and reactive to light.   Pulmonary:      Effort: Pulmonary effort is normal.      Breath sounds: Normal breath  sounds.   Musculoskeletal:         General: Tenderness (left knee arthralgia) present.   Skin:     General: Skin is warm and dry.   Neurological:      Mental Status: She is alert and oriented to person, place, and time.      Deep Tendon Reflexes: Reflexes are normal and symmetric.   Psychiatric:         Behavior: Behavior normal.         Thought Content: Thought content normal.         Judgment: Judgment normal.          Gait and Station:   antalgic    left knee:      Inspection: dressing removed, no signs of infection or drainage, steri strips applied.     ROM 0 extension, flexion not assessed due to patella fracture  Able to perform SLR    Palpation: mild surgical soft tissue pain    Motor: 5/5 EHL/FHL/TA/GS/Qd/Hs    Vascular: Toes WWP with BCR    Sensory: SILT DP/SP/Carlos A/Saph/Tib  Calf is supple       Images:    No new imaging to review       Juwan Griffin MD  Adult Reconstruction Specialist   [facility]

## 2024-03-25 ENCOUNTER — TELEMEDICINE (OUTPATIENT)
Dept: VASCULAR SURGERY | Facility: CLINIC | Age: 84
End: 2024-03-25
Payer: MEDICARE

## 2024-03-25 VITALS
SYSTOLIC BLOOD PRESSURE: 154 MMHG | WEIGHT: 122 LBS | DIASTOLIC BLOOD PRESSURE: 81 MMHG | BODY MASS INDEX: 22.45 KG/M2 | HEART RATE: 102 BPM | HEIGHT: 62 IN

## 2024-03-25 DIAGNOSIS — I65.21 SYMPTOMATIC CAROTID ARTERY STENOSIS WITHOUT INFARCTION, RIGHT: ICD-10-CM

## 2024-03-25 DIAGNOSIS — Z98.890 HISTORY OF CAROTID ENDARTERECTOMY: Primary | Chronic | ICD-10-CM

## 2024-03-25 PROCEDURE — 99214 OFFICE O/P EST MOD 30 MIN: CPT | Performed by: NURSE PRACTITIONER

## 2024-03-25 NOTE — PROGRESS NOTES
Virtual AWV Consent    Verification of patient location:    Patient is located at Home in the following state in which I hold an active license [place]    The patient was identified by name and date of birth. Yajaira Mendez was informed that this is a telemedicine visit and that the visit is being conducted through the Epic Embedded platform. She agrees to proceed..  My office door was closed. The patient was notified the following individuals were present in the room N/A.  She acknowledged consent and understanding of privacy and security of the video platform. The patient has agreed to participate and understands they can discontinue the visit at any time.    Patient is aware this is a billable service.     Reason for visit is Review of non-invasive vascular testing.    Encounter provider ANNAMARIE Brumfield    Provider located at [address] [place] [address] [facility] [address] [address]      Recent Visits  No visits were found meeting these conditions.  Showing recent visits within past 7 days and meeting all other requirements  Today's Visits  Date Type Provider Dept   03/25/24 Telemedicine ANNAMARIE Brumfield  [facility]   Showing today's visits and meeting all other requirements  Future Appointments  No visits were found meeting these conditions.  Showing future appointments within next 150 days and meeting all other requirements           Visit Time  Total Visit Duration: 10    Assessment/Plan:    Symptomatic carotid artery stenosis without infarction, right  84-year-old female w/ hx of HTN, GERD, IBS, Gastritis, hypothyroidism, osteoporosis, anemia, hx of gastric bypass, high-grade right carotid artery stenosis with preoperative TIA (3/27/2019) s/p R CEA by Dr. Anderson (4/3/2019) presents via video visit for review of her non-invasive vascular ultrasound and RFM, she is asymptomatic.  -Presents via video visit due to s/p ORIF of left patella 3/9/24   -Reviewed  carotid ultrasound from 2/26/2024 which demonstrates right ICA less than 50% stenosis with velocities 110/19 and a ratio of 1.26.  Left ICA less than 50% stenosis with velocities 155/35 and a ratio 1.53  -Denies symptoms of numbness/ tingling/ weakness on one side of the body, facial droop, slurred speech or blindness in one eye.   -Reviewed most recent carotid ultrasound results in detail with and discussed pathophysiology of arterial disease and indication for vascular intervention. No vascular intervention planned at this time. Discussed that we will continue with medical management and non-invasive imaging at this time.     Plan  -Complete carotid ultrasound in one year and return to the office for review.  -Continue Aspirin 81mg daily.  -She is statin intolerant and is on garlic for this reason. We discussed obtaining updated lipid panel from PCP.   -Call 911/ Go to the ER with any S/S of TIA/ CVA.      Essential hypertension  -BP elevated today, she reports this is due to her recent leg surgery and pain related to ORIF/ patella fracture.  -continue current medical regimen  -management per PCP       Diagnoses and all orders for this visit:    History of carotid endarterectomy  -     VAS carotid complete study; Future    Symptomatic carotid artery stenosis without infarction, right  -     VAS carotid complete study; Future          Subjective:      Patient ID: Yajaira Mendez is a 84 y.o. female.    84-year-old female w/ hx of HTN, GERD, IBS, Gastritis, hypothyroidism, osteoporosis, anemia, hx of gastric bypass, high-grade right carotid artery stenosis with preoperative TIA (3/27/2019) s/p R CEA by Dr. Anderson (4/3/2019) presents via video visit for review of her non-invasive vascular ultrasound and RFM, she is asymptomatic.  -Presents via video visit due to s/p ORIF of left patella 3/9/24. She starts physical therapy for her leg starting tomorrow.   She has been compliant with ASA 81mg BID for one month per  "ortho, and states she is unable to tolerate statin therapy. We discussed that she has no recent lipid testing and she should return to her family doctor for follow up in this regard.           The following portions of the patient's history were reviewed and updated as appropriate: allergies, current medications, past family history, past medical history, past social history, past surgical history, and problem list.    Review of Systems   Constitutional: Negative.    HENT: Negative.     Eyes: Negative.    Respiratory:  Negative for shortness of breath.    Cardiovascular:  Positive for leg swelling. Negative for chest pain.   Gastrointestinal: Negative.    Genitourinary: Negative.    Musculoskeletal:  Positive for gait problem (ORIF).   Skin: Negative.    Allergic/Immunologic: Negative.    Hematological: Negative.    Psychiatric/Behavioral: Negative.           Objective:      /81 (BP Location: Right arm, Patient Position: Sitting, Cuff Size: Standard)   Pulse 102   Ht 5' 2\" (1.575 m)   Wt 55.3 kg (122 lb)   LMP  (LMP Unknown)   BMI 22.31 kg/m²          Physical Exam  Vitals reviewed.         I have reviewed and made appropriate changes to the review of systems input by the medical assistant.    Vitals:    03/25/24 1507   BP: 154/81   BP Location: Right arm   Patient Position: Sitting   Cuff Size: Standard   Pulse: 102   Weight: 55.3 kg (122 lb)   Height: 5' 2\" (1.575 m)       Patient Active Problem List   Diagnosis    Symptomatic carotid artery stenosis without infarction, right    Hypothyroidism    Anemia    Essential hypertension    Postoperative malabsorption    Menopause, premature    Gastroesophageal reflux disease without esophagitis    Irritable bowel syndrome with diarrhea    Platelets decreased (HCC)    Osteoporosis    Gastritis without bleeding    Incontinence of feces    Moderate protein-calorie malnutrition (HCC)    Rotator cuff arthropathy of right shoulder    Localized, secondary " osteoarthritis of the shoulder region    Hx of gastric bypass    History of carotid endarterectomy    History of right shoulder replacement    Lipoma of left thigh    Stricture of artery (HCC)    Closed fracture of left patella    Lip laceration    Erosive osteoarthritis of both hands    S/P ORIF (open reduction internal fixation) fracture       Past Surgical History:   Procedure Laterality Date    ABDOMINOPLASTY  07/29/2002    TUMMY TUCK    APPENDECTOMY  1970    AUGMENTATION BREAST  01/25/2002    AUGMENTATION MAMMAPLASTY  2002    implants    BARIATRIC SURGERY  9/28/2000    BASAL CELL CARCINOMA EXCISION Left 05/04/2011    cheek    CATARACT EXTRACTION W/  INTRAOCULAR LENS IMPLANT Right 04/15/2014    CATARACT EXTRACTION W/  INTRAOCULAR LENS IMPLANT Left 04/22/2014    COLONOSCOPY W/ POLYPECTOMY  12/17/2008    COLONOSCOPY W/ POLYPECTOMY  04/28/2011    COLONOSCOPY W/ POLYPECTOMY  07/09/2014    COLONOSCOPY W/ POLYPECTOMY  07/26/2017    COSMETIC SURGERY  2002    will supply list @ [facility]    CYST REMOVAL  1975    vaginal     CYST REMOVAL  10/26/2006    R vulva- Sebaceous    EYE SURGERY Left 08/09/2014    Yag Laser    EYE SURGERY Right 08/26/2014    Yag laser    FINGER SURGERY Right     4th- cyst excision w/ bone debridement    GASTRIC BYPASS  09/28/2000    INCONTINENCE SURGERY  04/19/2014    Solesta bulking agent for fecal incontinence    JOINT REPLACEMENT      MAMMO (HISTORICAL)  04/13/2016 7/30/2015, 4/13/2016 - As per eClinicalWorks    OOPHORECTOMY Right     age 30    NY ARTHROPLASTY GLENOHUMERAL JOINT TOTAL SHOULDER Right 10/15/2021    Procedure: TOTAL REVERSE SHOULDER REPLACEMENT;  Surgeon: Matthieu Shannon MD;  Location: [facility];  Service: Orthopedics    NY OPTX PATLLR FX W/INT FIXJ/PATLLC&SOFT TISS RPR Left 3/9/2024    Procedure: OPEN REDUCTION W/ INTERNAL FIXATION (ORIF) PATELLA;  Surgeon: Juwan Griffin MD;  Location: [facility];  Service: Orthopedics    NY TEAEC W/PATCH GRF CAROTID VERTB SUBCLAV NECK INC  "Right 04/03/2019    Procedure: ENDARTERECTOMY ARTERY CAROTID;  Surgeon: Darlene Aguilar DO;  Location: [facility];  Service: Vascular    RHYTIDECTOMY NECK / CHEEK / CHIN  12/04/2001    Chin & neck    ROTATOR CUFF REPAIR Right 05/01/2003    SQUAMOUS CELL CARCINOMA EXCISION Left 03/05/2013    ABOVE LIP ON LEFT SIDE    SUPERIOR OBLIQUE TUCK  12/30/2002    BUTTOCK TUCK    THIGH LIFT  10/29/2002    THIGH TUCK    TONSILLECTOMY      TOTAL ABDOMINAL HYSTERECTOMY      USO FOR FIBROIDS, OVARIAN CYST - PART OF ONE OVARY LEFT IN     TOTAL SHOULDER REPLACEMENT      right     VULVA / PERINEUM BIOPSY  05/27/2015    labia-- \"negative\"       Family History   Problem Relation Age of Onset    Hodgkin's lymphoma Sister 25    Cancer Sister     Stroke Mother 40    Other Father         heart problems     No Known Problems Daughter     Stroke Maternal Grandmother     No Known Problems Maternal Grandfather     Heart attack Paternal Grandmother     No Known Problems Paternal Grandfather     No Known Problems Sister     No Known Problems Maternal Aunt     No Known Problems Maternal Aunt     No Known Problems Paternal Aunt     No Known Problems Paternal Aunt     Heart attack Brother     No Known Problems Son     No Known Problems Son     No Known Problems Son        Social History     Socioeconomic History    Marital status: /Civil Union     Spouse name: Not on file    Number of children: Not on file    Years of education: Not on file    Highest education level: Not on file   Occupational History    Not on file   Tobacco Use    Smoking status: Never    Smokeless tobacco: Never    Tobacco comments:     NO TOBACCO USE   Vaping Use    Vaping status: Never Used   Substance and Sexual Activity    Alcohol use: Yes     Alcohol/week: 1.0 standard drink of alcohol     Types: 1 Glasses of wine per week     Comment: Only about twice monthly    Drug use: No     Comment: Denies    Sexual activity: Not Currently     Partners: Male   Other Topics " Concern    Not on file   Social History Narrative    No alcohol use - As per e[employer]      Social Determinants of Health     Financial Resource Strain: Low Risk  (12/15/2023)    Overall Financial Resource Strain (CARDIA)     Difficulty of Paying Living Expenses: Not hard at all   Food Insecurity: Not on file   Transportation Needs: No Transportation Needs (12/15/2023)    PRAPARE - Transportation     Lack of Transportation (Medical): No     Lack of Transportation (Non-Medical): No   Physical Activity: Not on file   Stress: Not on file   Social Connections: Not on file   Intimate Partner Violence: Not on file   Housing Stability: Not on file       Allergies   Allergen Reactions    Atorvastatin Confusion     Fogginess + disorientation    Codeine Vomiting     Reaction Date: 02Sep2003;     Promethazine Other (See Comments)     Very dry mouth and throat which causes swallowing problems    Statins Other (See Comments)     Bad mental fogginess & disorientation    Nitrofurantoin GI Intolerance         Current Outpatient Medications:     amLODIPine (NORVASC) 5 mg tablet, Take 1 tablet (5 mg total) by mouth daily, Disp: 90 tablet, Rfl: 1    aspirin 81 mg chewable tablet, Chew 1 tablet (81 mg total) 2 (two) times a day, Disp: 60 tablet, Rfl: 0    calcium-vitamin D 250-100 MG-UNIT per tablet, Take 1 tablet by mouth daily  , Disp: , Rfl:     cephalexin (KEFLEX) 500 mg capsule, Take 500 mg by mouth if needed (1 hour prior to dental procedures), Disp: , Rfl:     Cholecalciferol (Vitamin D-3) 125 MCG (5000 UT) TABS, Take 15,000 Units by mouth once a week Takes on a Friday every week, Disp: , Rfl:     Cyanocobalamin (VITAMIN B 12 PO), Take 500 mcg by mouth daily , Disp: , Rfl:     denosumab (PROLIA) 60 mg/mL, Inject 60 mg under the skin every 6 (six) months, Disp: , Rfl:     ergocalciferol (VITAMIN D2) 50,000 units, Take 1 capsule (50,000 Units total) by mouth every 28 days, Disp: 3 capsule, Rfl: 3    famotidine (PEPCID) 20 mg  tablet, Take 1 tablet (20 mg total) by mouth in the morning, Disp: 90 tablet, Rfl: 1    ferrous gluconate (FERGON) 240 (27 FE) MG tablet, Take 27 mg by mouth daily Takes in the am, Disp: , Rfl:     Garlic 1000 MG CAPS, Take 1,000 mg by mouth daily, Disp: , Rfl:     hydroxychloroquine (PLAQUENIL) 200 mg tablet, Take 1 tablet (200 mg total) by mouth every morning, Disp: 90 tablet, Rfl: 1    latanoprost (XALATAN) 0.005 % ophthalmic solution, , Disp: , Rfl:     levothyroxine 100 mcg tablet, Take 1 tablet (100 mcg total) by mouth daily, Disp: 90 tablet, Rfl: 1    Multiple Vitamins-Minerals (CENTRUM SILVER PO), Take 1 tablet by mouth daily, Disp: , Rfl:     oxyCODONE (ROXICODONE) 5 immediate release tablet, 1/2 tablets every 6 hours as needed for severe knee pain, Disp: 13 tablet, Rfl: 0    Zinc 30 MG TABS, Take 50 mg by mouth daily , Disp: , Rfl:   I have spent a total time of 25 minutes on 03/25/24 in caring for this patient including Diagnostic results, Risks and benefits of tx options, Instructions for management, Patient and family education, Importance of tx compliance, Risk factor reductions, Documenting in the medical record, Reviewing / ordering tests, medicine, procedures  , and Obtaining or reviewing history  .

## 2024-03-25 NOTE — ASSESSMENT & PLAN NOTE
-BP elevated today, she reports this is due to her recent leg surgery and pain related to ORIF/ patella fracture.  -continue current medical regimen  -management per PCP

## 2024-03-25 NOTE — ASSESSMENT & PLAN NOTE
84-year-old female w/ hx of HTN, GERD, IBS, Gastritis, hypothyroidism, osteoporosis, anemia, hx of gastric bypass, high-grade right carotid artery stenosis with preoperative TIA (3/27/2019) s/p R CEA by Dr. Anderson (4/3/2019) presents via video visit for review of her non-invasive vascular ultrasound and RFM, she is asymptomatic.  -Presents via video visit due to s/p ORIF of left patella 3/9/24   -Reviewed carotid ultrasound from 2/26/2024 which demonstrates right ICA less than 50% stenosis with velocities 110/19 and a ratio of 1.26.  Left ICA less than 50% stenosis with velocities 155/35 and a ratio 1.53  -Denies symptoms of numbness/ tingling/ weakness on one side of the body, facial droop, slurred speech or blindness in one eye.   -Reviewed most recent carotid ultrasound results in detail with and discussed pathophysiology of arterial disease and indication for vascular intervention. No vascular intervention planned at this time. Discussed that we will continue with medical management and non-invasive imaging at this time.     Plan  -Complete carotid ultrasound in one year and return to the office for review.  -Continue Aspirin 81mg daily.  -She is statin intolerant and is on garlic for this reason. We discussed obtaining updated lipid panel from PCP.   -Call 911/ Go to the ER with any S/S of TIA/ CVA.

## 2024-03-25 NOTE — PATIENT INSTRUCTIONS
-Complete carotid ultrasound in one year and return to the office for review if instructed.    -Go to the emergency department/ Call 911 with any signs or symptoms of a stroke: numbness/ tingling/ weakness on one side of the body, facial droop, slurred speech or blindness in one eye.     -Continue to take Aspirin 81 mg daily as per your family doctor.     -Follow up with your family doctor about repeat blood work for your lipids or cholesterol.     -Call our office with any question or concerns.

## 2024-03-26 ENCOUNTER — EVALUATION (OUTPATIENT)
Dept: PHYSICAL THERAPY | Facility: CLINIC | Age: 84
End: 2024-03-26
Payer: MEDICARE

## 2024-03-26 DIAGNOSIS — Z98.890 S/P ORIF (OPEN REDUCTION INTERNAL FIXATION) FRACTURE: ICD-10-CM

## 2024-03-26 DIAGNOSIS — Z87.81 S/P ORIF (OPEN REDUCTION INTERNAL FIXATION) FRACTURE: ICD-10-CM

## 2024-03-26 DIAGNOSIS — M25.662 STIFFNESS OF LEFT KNEE: ICD-10-CM

## 2024-03-26 DIAGNOSIS — G89.18 ACUTE POST-OPERATIVE PAIN: ICD-10-CM

## 2024-03-26 DIAGNOSIS — M25.562 ACUTE PAIN OF LEFT KNEE: Primary | ICD-10-CM

## 2024-03-26 PROCEDURE — 97161 PT EVAL LOW COMPLEX 20 MIN: CPT

## 2024-03-26 PROCEDURE — 97110 THERAPEUTIC EXERCISES: CPT

## 2024-03-26 NOTE — PROGRESS NOTES
PT Evaluation     Today's date: 3/26/2024  Patient name: Yajaira Mendez  : 1940  MRN: [id number]  Referring provider: Juwan Griffin MD  Dx:   Encounter Diagnosis     ICD-10-CM    1. Acute pain of left knee  M25.562       2. S/P ORIF (open reduction internal fixation) fracture  Z98.890 Ambulatory Referral to Physical Therapy    Z87.81       3. Stiffness of left knee  M25.662       4. Acute post-operative pain  G89.18           Start Time: 1530  Stop Time: 1615  Total time in clinic (min): 45 minutes    Assessment  Assessment details: Problem List:  1) hypomobility of left knee - addressing with PROM, AAROM, AROM  2) decreased neuromuscular control L knee - addressing with quad set, SLR, minisquat, weight shift     Yajaira Mendez is a pleasant 84 y.o. female who presents with left knee pain following fall, fracture, and ORIF of left patella. She has decreased knee flexion mobility, decreased quad and hip flexor strength, and pain resulting in inability to perform functional activities like light lifting/cleaning around the house and walking.  No further referral appears necessary at this time based upon examination results.  I expect she will benefit from physical therapy to restore functional tolerance.        Comparable signs:  1) left knee flex ROM  2) left quad strength  3) STS  4) TUG    Impairments: activity intolerance, impaired physical strength, lacks appropriate home exercise program, pain with function and weight-bearing intolerance    Symptom irritability: lowUnderstanding of Dx/Px/POC: good   Prognosis: good    Goals  Short Term Goals: to be achieved by 4 weeks  1) Patient to be independent with basic HEP.  2) Decrease pain to 4/10 at its worst.  3) Increase R knee flex ROM to 115 deg to increase function post-op.   4) Increase LE strength by 1/2 MMT grade in all deficient planes post-op.    Long Term Goals: to be achieved by discharge  1) Patient to be independent with comprehensive  HEP.  2) R knee ROM WNL all planes to improve a/iadls post-op.  3) Increase LE strength to 5/5 MMT grade in all planes to improve a/iadls post-op.  4) Patient to report no sleep interruption secondary to pain post-op.  5) Increase ambulatory tolerance to 60 min post-op without AD.     Plan  Plan details: Address physical impairments found during IE via therapeutic exercises, neuromuscular re-education, and manual therapy to improve functional tolerance. Develop HEP for self-management of symptoms.    Patient would benefit from: skilled physical therapy  Referral necessary: No  Planned therapy interventions: home exercise program, balance, joint mobilization, manual therapy, massage, neuromuscular re-education, patient education, strengthening, stretching, therapeutic activities, therapeutic exercise, therapeutic training, flexibility and gait training  Frequency: 2x week  Duration in visits: 16  Duration in weeks: 8  Plan of Care beginning date: 3/26/2024  Plan of Care expiration date: 2024  Treatment plan discussed with: patient        Subjective Evaluation    History of Present Illness  Date of onset: 3/9/2024  Mechanism of injury: surgery  Mechanism of injury: Patient reports tripping over speed bump in a shopping center and fell on knee and hit face on the ground busting her teeth, went by ambulance to ER and had imaging. Ended up having a fracture of left patella and had an ORIF with 3 screws and wire to hold together on 3/9/24. Was having the most pain when standing up but now the pain is better. Currently wearing knee immobilizer and having trouble sleeping. Also ambulating with rolling walker.           Not a recurrent problem   Quality of life: good    Patient Goals  Patient goals for therapy: increased strength, decreased pain, decreased edema, increased motion and return to sport/leisure activities  Patient goal: light house work  Pain  Current pain ratin  At best pain ratin  At worst pain  ratin  Location: left knee cap  Quality: dull ache  Relieving factors: medications, change in position and rest (extra strength Tylenol)  Aggravating factors: walking and stair climbing  Progression: improved    Social Support  Steps to enter house: yes      Diagnostic Tests  X-ray: normal  Treatments  Current treatment: immobilization        Objective     Observations   Left Knee   Positive for edema, effusion, incision and trophic changes. Negative for drainage.     Passive Range of Motion   Left Knee   Flexion: 60 degrees with pain  Extension: 0 degrees     Right Knee   Flexion: 150 degrees   Extension: 0 degrees     Strength/Myotome Testing     Left Knee   Quadriceps contraction: good    Additional Strength Details  Unable to perform SLR without assistance              Precautions: HTN, hx fracture (left patellar, ORIF performed 3/9/24), osteoporosis     Patient will be placed in TROM 0-30 of motion for 2 weeks, then 0-60 for next 2 weeks, when walking lock brace       Manuals 3/26            PROM L knee             Patellar mobs              Retrograde massage                          Neuro Re-Ed             Quad set HEP            SLR             Weight shift             Mini squats             LAQ                                       Ther Ex             Heel slide HEP            Ankle pumps HEP            Pt education CS            NS - activity tolerance                                                                  Ther Activity             5xSTS             TUG             Gait Training                                       Modalities

## 2024-03-27 ENCOUNTER — TELEPHONE (OUTPATIENT)
Dept: OBGYN CLINIC | Facility: MEDICAL CENTER | Age: 84
End: 2024-03-27

## 2024-03-27 NOTE — TELEPHONE ENCOUNTER
Caller: Yajaira     Doctor: Dr Griffin    Reason for call: OPEN REDUCTION W/ INTERNAL FIXATION (ORIF) PATELLA (Left: Knee)     Patient had surgery on 3/9/ She is calling to ask about the T-ROM Brace given at her 3/22/ appointment. Would she be able to wear the brace that she got when she was in the hospital to sleep at night. The new brace is too heavy. Please advise. Thank you     Call back#: [phone]

## 2024-04-01 ENCOUNTER — HOSPITAL ENCOUNTER (OUTPATIENT)
Dept: RADIOLOGY | Age: 84
Discharge: HOME | End: 2024-04-01
Payer: MEDICARE

## 2024-04-01 DIAGNOSIS — M81.0 OSTEOPOROSIS, UNSPECIFIED OSTEOPOROSIS TYPE, UNSPECIFIED PATHOLOGICAL FRACTURE PRESENCE: ICD-10-CM

## 2024-04-01 PROCEDURE — 77080 DXA BONE DENSITY AXIAL: CPT

## 2024-04-02 ENCOUNTER — OFFICE VISIT (OUTPATIENT)
Dept: PHYSICAL THERAPY | Facility: CLINIC | Age: 84
End: 2024-04-02
Payer: MEDICARE

## 2024-04-02 DIAGNOSIS — Z98.890 S/P ORIF (OPEN REDUCTION INTERNAL FIXATION) FRACTURE: Primary | ICD-10-CM

## 2024-04-02 DIAGNOSIS — M25.562 ACUTE PAIN OF LEFT KNEE: ICD-10-CM

## 2024-04-02 DIAGNOSIS — Z87.81 S/P ORIF (OPEN REDUCTION INTERNAL FIXATION) FRACTURE: Primary | ICD-10-CM

## 2024-04-02 DIAGNOSIS — M25.662 STIFFNESS OF LEFT KNEE: ICD-10-CM

## 2024-04-02 DIAGNOSIS — G89.18 ACUTE POST-OPERATIVE PAIN: ICD-10-CM

## 2024-04-02 PROCEDURE — 97110 THERAPEUTIC EXERCISES: CPT

## 2024-04-02 PROCEDURE — 97140 MANUAL THERAPY 1/> REGIONS: CPT

## 2024-04-02 PROCEDURE — 97112 NEUROMUSCULAR REEDUCATION: CPT

## 2024-04-02 NOTE — PROGRESS NOTES
Daily Note     Today's date: 2024  Patient name: Yajaira Mendez  : 1940  MRN: [id number]  Referring provider: Juwan Griffin MD  Dx:   Encounter Diagnosis     ICD-10-CM    1. S/P ORIF (open reduction internal fixation) fracture  Z98.890     Z87.81       2. Acute pain of left knee  M25.562       3. Stiffness of left knee  M25.662       4. Acute post-operative pain  G89.18                      Subjective: Patient reports still having difficulty sleeping because of pain, sleeping in recliner or chair because she's unable to get into bed because of the height.       Objective: See treatment diary below   Flex L knee PROM = 100    Assessment:   Assessment details: Problem List:  1) hypomobility of left knee - addressing with PROM, AAROM, AROM  2) decreased neuromuscular control L knee - addressing with quad set, SLR, minisquat, weight shift     Yajaira Mendez is a pleasant 84 y.o. female who presents with left knee pain following fall, fracture, and ORIF of left patella. She has decreased knee flexion mobility, decreased quad and hip flexor strength, and pain resulting in inability to perform functional activities like light lifting/cleaning around the house and walking.  No further referral appears necessary at this time based upon examination results.  I expect she will benefit from physical therapy to restore functional tolerance.        Comparable signs:  1) left knee flex ROM  2) left quad strength  3) STS  4) TUG    Goals  Short Term Goals: to be achieved by 4 weeks  1) Patient to be independent with basic HEP.  2) Decrease pain to 4/10 at its worst.  3) Increase R knee flex ROM to 115 deg to increase function post-op.   4) Increase LE strength by 1/2 MMT grade in all deficient planes post-op.    Long Term Goals: to be achieved by discharge  1) Patient to be independent with comprehensive HEP.  2) R knee ROM WNL all planes to improve a/iadls post-op.  3) Increase LE strength to 5/5 MMT grade in  "all planes to improve a/iadls post-op.  4) Patient to report no sleep interruption secondary to pain post-op.  5) Increase ambulatory tolerance to 60 min post-op without AD.     Tolerated treatment well. Patient demonstrates improvement in left knee ROM with soft end feel and pain. Patient had favorable response to patellar mobs inferomedially with decreased pain following. Patient demonstrated good form and reviewed exercises to continue at home.  Patient would benefit from continued PT to address remaining functional deficits.       Plan: Progress treatment as tolerated.       Precautions: HTN, hx fracture (left patellar, ORIF performed 3/9/24), osteoporosis     Patient will be placed in TROM 0-30 of motion for 2 weeks (4/5), then 0-60 for next 2 weeks (4/19), when walking lock brace       Manuals 3/26 4/2           PROM L knee  CS            Patellar mobs   In 90 deg flex, inf/med with tib stabilization           Retrograde massage                          Neuro Re-Ed             Quad set HEP 20x5\"           SLR             Weight shift             Mini squats             LAQ                                       Ther Ex             Heel slide HEP 10x5\"           Ankle pumps HEP            Pt education CS            NS - activity tolerance   5'                                                                Ther Activity             5xSTS             TUG             Gait Training                                       Modalities                                            "

## 2024-04-04 ENCOUNTER — OFFICE VISIT (OUTPATIENT)
Dept: PHYSICAL THERAPY | Facility: CLINIC | Age: 84
End: 2024-04-04
Payer: MEDICARE

## 2024-04-04 DIAGNOSIS — Z98.890 S/P ORIF (OPEN REDUCTION INTERNAL FIXATION) FRACTURE: Primary | ICD-10-CM

## 2024-04-04 DIAGNOSIS — M25.662 STIFFNESS OF LEFT KNEE: ICD-10-CM

## 2024-04-04 DIAGNOSIS — G89.18 ACUTE POST-OPERATIVE PAIN: ICD-10-CM

## 2024-04-04 DIAGNOSIS — Z87.81 S/P ORIF (OPEN REDUCTION INTERNAL FIXATION) FRACTURE: Primary | ICD-10-CM

## 2024-04-04 DIAGNOSIS — M25.562 ACUTE PAIN OF LEFT KNEE: ICD-10-CM

## 2024-04-04 PROCEDURE — 97140 MANUAL THERAPY 1/> REGIONS: CPT

## 2024-04-04 PROCEDURE — 97112 NEUROMUSCULAR REEDUCATION: CPT

## 2024-04-04 PROCEDURE — 97110 THERAPEUTIC EXERCISES: CPT

## 2024-04-04 NOTE — PROGRESS NOTES
Daily Note     Today's date: 2024  Patient name: Yajaira Mendez  : 1940  MRN: [id number]  Referring provider: Juwan Griffin MD  Dx:   Encounter Diagnosis     ICD-10-CM    1. S/P ORIF (open reduction internal fixation) fracture  Z98.890     Z87.81       2. Acute pain of left knee  M25.562       3. Stiffness of left knee  M25.662       4. Acute post-operative pain  G89.18             Start Time: 1215  Stop Time: 1253  Total time in clinic (min): 38 minutes    Subjective: Patient reports her pain is improving and she is doing her HEP.      Objective: See treatment diary below   Flex L knee PROM = 100    Assessment:   Assessment details: Problem List:  1) hypomobility of left knee - addressing with PROM, AAROM, AROM  2) decreased neuromuscular control L knee - addressing with quad set, SLR, minisquat, weight shift     Yajaira Mednez is a pleasant 84 y.o. female who presents with left knee pain following fall, fracture, and ORIF of left patella. She has decreased knee flexion mobility, decreased quad and hip flexor strength, and pain resulting in inability to perform functional activities like light lifting/cleaning around the house and walking.  No further referral appears necessary at this time based upon examination results.  I expect she will benefit from physical therapy to restore functional tolerance.        Comparable signs:  1) left knee flex ROM  2) left quad strength  3) STS  4) TUG    Goals  Short Term Goals: to be achieved by 4 weeks  1) Patient to be independent with basic HEP.  2) Decrease pain to 4/10 at its worst.  3) Increase R knee flex ROM to 115 deg to increase function post-op.   4) Increase LE strength by 1/2 MMT grade in all deficient planes post-op.    Long Term Goals: to be achieved by discharge  1) Patient to be independent with comprehensive HEP.  2) R knee ROM WNL all planes to improve a/iadls post-op.  3) Increase LE strength to 5/5 MMT grade in all planes to improve  "a/iadls post-op.  4) Patient to report no sleep interruption secondary to pain post-op.  5) Increase ambulatory tolerance to 60 min post-op without AD.     Tolerated treatment well. Patient would benefit from continued PT to address remaining functional deficits. Able to progress quad strengthening with SLR with PT assist with good tolerance and no pain noted. Improving L knee mobility noted during PROM and heel slides but remains limited due to pain.      Plan: Progress treatment as tolerated.       Precautions: HTN, hx fracture (left patellar, ORIF performed 3/9/24), osteoporosis     Patient will be placed in TROM 0-30 of motion for 2 weeks (4/5), then 0-60 for next 2 weeks (4/19), when walking lock brace       Manuals 3/26 4/2 4/4          PROM L knee  CS  MK          Patellar mobs   In 90 deg flex, inf/med with tib stabilization In 90 deg flex, inf/med with tib stabilization          Retrograde massage                          Neuro Re-Ed             Quad set HEP 20x5\" 20x5\"          SLR   1x5 with PT assist          Weight shift             Mini squats             LAQ                                       Ther Ex             Heel slide HEP 10x5\" 15x5\"          Ankle pumps HEP            Pt education CS            NS - activity tolerance   5'  5'                                                              Ther Activity             5xSTS             TUG             Gait Training                                       Modalities                                            "

## 2024-04-08 ENCOUNTER — OFFICE VISIT (OUTPATIENT)
Dept: PHYSICAL THERAPY | Facility: CLINIC | Age: 84
End: 2024-04-08
Payer: MEDICARE

## 2024-04-08 DIAGNOSIS — G89.18 ACUTE POST-OPERATIVE PAIN: ICD-10-CM

## 2024-04-08 DIAGNOSIS — M25.562 ACUTE PAIN OF LEFT KNEE: ICD-10-CM

## 2024-04-08 DIAGNOSIS — M25.662 STIFFNESS OF LEFT KNEE: ICD-10-CM

## 2024-04-08 DIAGNOSIS — Z98.890 S/P ORIF (OPEN REDUCTION INTERNAL FIXATION) FRACTURE: Primary | ICD-10-CM

## 2024-04-08 DIAGNOSIS — Z87.81 S/P ORIF (OPEN REDUCTION INTERNAL FIXATION) FRACTURE: Primary | ICD-10-CM

## 2024-04-08 PROCEDURE — 97112 NEUROMUSCULAR REEDUCATION: CPT

## 2024-04-08 PROCEDURE — 97140 MANUAL THERAPY 1/> REGIONS: CPT

## 2024-04-08 PROCEDURE — 97110 THERAPEUTIC EXERCISES: CPT

## 2024-04-08 NOTE — PROGRESS NOTES
Daily Note     Today's date: 2024  Patient name: Yajaira Mendez  : 1940  MRN: [id number]  Referring provider: Juwan Griffin MD  Dx:   Encounter Diagnosis     ICD-10-CM    1. S/P ORIF (open reduction internal fixation) fracture  Z98.890     Z87.81       2. Acute pain of left knee  M25.562       3. Stiffness of left knee  M25.662       4. Acute post-operative pain  G89.18                        Subjective: Patient reports sleeping better since switching to soft brace and walking intermittently at home without walker, but keeps it nearby in case she needs it.       Objective: See treatment diary below   Flex L knee PROM = 105    Assessment:   Assessment details: Problem List:  1) hypomobility of left knee - addressing with PROM, AAROM, AROM  2) decreased neuromuscular control L knee - addressing with quad set, SLR, minisquat, weight shift     Yajaira Mendez is a pleasant 84 y.o. female who presents with left knee pain following fall, fracture, and ORIF of left patella. She has decreased knee flexion mobility, decreased quad and hip flexor strength, and pain resulting in inability to perform functional activities like light lifting/cleaning around the house and walking.  No further referral appears necessary at this time based upon examination results.  I expect she will benefit from physical therapy to restore functional tolerance.        Comparable signs:  1) left knee flex ROM - improving, 105  2) left quad strength - able to complete SLR with min quad lag, corrects with verbal cueing   3) STS  4) TUG    Goals  Short Term Goals: to be achieved by 4 weeks  1) Patient to be independent with basic HEP.  2) Decrease pain to 4/10 at its worst.  3) Increase R knee flex ROM to 115 deg to increase function post-op.   4) Increase LE strength by 1/2 MMT grade in all deficient planes post-op.    Long Term Goals: to be achieved by discharge  1) Patient to be independent with comprehensive HEP.  2) R knee ROM  "WNL all planes to improve a/iadls post-op.  3) Increase LE strength to 5/5 MMT grade in all planes to improve a/iadls post-op.  4) Patient to report no sleep interruption secondary to pain post-op.  5) Increase ambulatory tolerance to 60 min post-op without AD.     Tolerated treatment well. Patient demonstrates improved passive mobility of left knee with decreased symptoms following patellar mobs. Improved quad strength with ability to perform SLR with min quad lag without PT assistance. Performed standing banded TKE for functional quad strength. Included pre-gait hurdles to normalize gait and attempted ambulation with SPC, demonstrating good form and stability. Encouraged patient to progress exercises at home and use SPC if she felt safe. Patient would benefit from continued PT to address remaining functional deficits.      Plan: Progress treatment as tolerated.       Precautions: HTN, hx fracture (left patellar, ORIF performed 3/9/24), osteoporosis     Patient will be placed in TROM 0-30 of motion for 2 weeks (4/5), then 0-60 for next 2 weeks (4/19), when walking lock brace       Manuals 3/26 4/2 4/4 4/8         PROM L knee  CS  MK CS         Patellar mobs   In 90 deg flex, inf/med with tib stabilization In 90 deg flex, inf/med with tib stabilization In 90 deg flex, inf/med with tib stabilization         Retrograde massage                          Neuro Re-Ed             Quad set HEP 20x5\" 20x5\" 20x5\"         SLR   1x5 with PT assist X10          Weight shift             Mini squats             LAQ    15x5\" and flex w/ pt OP         Standing HS curl    20x L         TKE    20x3\" L btb         Pre-gait hurdles     2x10         Ther Ex             Heel slide HEP 10x5\" 15x5\"          Ankle pumps HEP            Pt education CS   CS         NS - activity tolerance   5'  5' 5'                                                             Ther Activity             FSU    4\" 2x10          5xSTS             TUG           "   Gait Training             SPC    2x75'                      Modalities

## 2024-04-11 ENCOUNTER — OFFICE VISIT (OUTPATIENT)
Dept: PHYSICAL THERAPY | Facility: CLINIC | Age: 84
End: 2024-04-11
Payer: MEDICARE

## 2024-04-11 DIAGNOSIS — M25.562 ACUTE PAIN OF LEFT KNEE: ICD-10-CM

## 2024-04-11 DIAGNOSIS — M25.662 STIFFNESS OF LEFT KNEE: ICD-10-CM

## 2024-04-11 DIAGNOSIS — G89.18 ACUTE POST-OPERATIVE PAIN: ICD-10-CM

## 2024-04-11 DIAGNOSIS — Z87.81 S/P ORIF (OPEN REDUCTION INTERNAL FIXATION) FRACTURE: Primary | ICD-10-CM

## 2024-04-11 DIAGNOSIS — Z98.890 S/P ORIF (OPEN REDUCTION INTERNAL FIXATION) FRACTURE: Primary | ICD-10-CM

## 2024-04-11 PROCEDURE — 97110 THERAPEUTIC EXERCISES: CPT

## 2024-04-11 PROCEDURE — 97140 MANUAL THERAPY 1/> REGIONS: CPT

## 2024-04-11 PROCEDURE — 97112 NEUROMUSCULAR REEDUCATION: CPT

## 2024-04-11 NOTE — PROGRESS NOTES
Daily Note     Today's date: 2024  Patient name: Yajaira Mendez  : 1940  MRN: [id number]  Referring provider: Juwan Griffin MD  Dx:   Encounter Diagnosis     ICD-10-CM    1. S/P ORIF (open reduction internal fixation) fracture  Z98.890     Z87.81       2. Acute pain of left knee  M25.562       3. Stiffness of left knee  M25.662       4. Acute post-operative pain  G89.18                          Subjective: Patient reports sleeping better since switching to soft brace and walking intermittently at home without walker, but keeps it nearby in case she needs it.       Objective: See treatment diary below   Flex L knee PROM = 105    Assessment:   Assessment details: Problem List:  1) hypomobility of left knee - addressing with PROM, AAROM, AROM  2) decreased neuromuscular control L knee - addressing with quad set, SLR, minisquat, weight shift     Yajaira Mendez is a pleasant 84 y.o. female who presents with left knee pain following fall, fracture, and ORIF of left patella. She has decreased knee flexion mobility, decreased quad and hip flexor strength, and pain resulting in inability to perform functional activities like light lifting/cleaning around the house and walking.  No further referral appears necessary at this time based upon examination results.  I expect she will benefit from physical therapy to restore functional tolerance.        Comparable signs:  1) left knee flex ROM - improving, 105  2) left quad strength - able to complete SLR with min quad lag, corrects with verbal cueing   3) STS  4) TUG    Goals  Short Term Goals: to be achieved by 4 weeks  1) Patient to be independent with basic HEP.  2) Decrease pain to 4/10 at its worst.  3) Increase R knee flex ROM to 115 deg to increase function post-op.   4) Increase LE strength by 1/2 MMT grade in all deficient planes post-op.    Long Term Goals: to be achieved by discharge  1) Patient to be independent with comprehensive HEP.  2) R knee  "ROM WNL all planes to improve a/iadls post-op.  3) Increase LE strength to 5/5 MMT grade in all planes to improve a/iadls post-op.  4) Patient to report no sleep interruption secondary to pain post-op.  5) Increase ambulatory tolerance to 60 min post-op without AD.     Tolerated treatment well. Patient improving with knee PROM but continues to have some discomfort at end ranges. Patient demonstrates good form with exercises and able to progress with resistance. Patient would benefit from continued PT to address remaining functional deficits.      Plan: Progress treatment as tolerated.       Precautions: HTN, hx fracture (left patellar, ORIF performed 3/9/24), osteoporosis     Patient will be placed in TROM 0-30 of motion for 2 weeks (4/5), then 0-60 for next 2 weeks (4/19), when walking lock brace       Manuals 3/26 4/2 4/4 4/8 4/11        PROM L knee  CS  MK CS CS        Patellar mobs   In 90 deg flex, inf/med with tib stabilization In 90 deg flex, inf/med with tib stabilization In 90 deg flex, inf/med with tib stabilization In 90 deg flex, inf/med with tib stabilization        Retrograde massage                          Neuro Re-Ed             Quad set HEP 20x5\" 20x5\" 20x5\"         SLR   1x5 with PT assist X10  2x10        Weight shift             Mini squats             LAQ    15x5\" and flex w/ pt OP 10x5\" flex and ext w pt OP 2#        Standing HS curl    20x L         TKE    20x3\" L btb 20x5\" L maroon tb        Pre-gait hurdles     2x10 X10 b/l no brace        Ther Ex             Heel slide HEP 10x5\" 15x5\"          Ankle pumps HEP            Pt education CS   CS CS        NS - activity tolerance   5'  5' 5' 5' TM 1.1 mph                                                            Ther Activity             FSU    4\" 2x10  nv        5xSTS             TUG             Gait Training             SPC    2x75'                      Modalities                                            "

## 2024-04-12 DIAGNOSIS — K29.70 GASTRITIS WITHOUT BLEEDING, UNSPECIFIED CHRONICITY, UNSPECIFIED GASTRITIS TYPE: ICD-10-CM

## 2024-04-12 DIAGNOSIS — I10 ESSENTIAL HYPERTENSION: ICD-10-CM

## 2024-04-12 DIAGNOSIS — M15.4 EROSIVE OSTEOARTHRITIS OF BOTH HANDS: ICD-10-CM

## 2024-04-12 RX ORDER — FAMOTIDINE 20 MG/1
20 TABLET, FILM COATED ORAL DAILY
Qty: 90 TABLET | Refills: 1 | Status: SHIPPED | OUTPATIENT
Start: 2024-04-12

## 2024-04-12 RX ORDER — HYDROXYCHLOROQUINE SULFATE 200 MG/1
200 TABLET, FILM COATED ORAL EVERY MORNING
Qty: 90 TABLET | Refills: 1 | Status: SHIPPED | OUTPATIENT
Start: 2024-04-12 | End: 2024-10-09

## 2024-04-12 RX ORDER — AMLODIPINE BESYLATE 5 MG/1
5 TABLET ORAL DAILY
Qty: 90 TABLET | Refills: 1 | Status: SHIPPED | OUTPATIENT
Start: 2024-04-12

## 2024-04-12 NOTE — TELEPHONE ENCOUNTER
Patient called and needs a refill on her medication     Amlodipine 5 mg     Famotidine 20 mg     Last ov's 03/13/2024  Next ov's 05/15/2024

## 2024-04-15 ENCOUNTER — OFFICE VISIT (OUTPATIENT)
Dept: PHYSICAL THERAPY | Facility: CLINIC | Age: 84
End: 2024-04-15
Payer: MEDICARE

## 2024-04-15 DIAGNOSIS — Z87.81 S/P ORIF (OPEN REDUCTION INTERNAL FIXATION) FRACTURE: Primary | ICD-10-CM

## 2024-04-15 DIAGNOSIS — G89.18 ACUTE POST-OPERATIVE PAIN: ICD-10-CM

## 2024-04-15 DIAGNOSIS — M25.562 ACUTE PAIN OF LEFT KNEE: ICD-10-CM

## 2024-04-15 DIAGNOSIS — M25.662 STIFFNESS OF LEFT KNEE: ICD-10-CM

## 2024-04-15 DIAGNOSIS — Z98.890 S/P ORIF (OPEN REDUCTION INTERNAL FIXATION) FRACTURE: Primary | ICD-10-CM

## 2024-04-15 PROCEDURE — 97530 THERAPEUTIC ACTIVITIES: CPT

## 2024-04-15 PROCEDURE — 97140 MANUAL THERAPY 1/> REGIONS: CPT

## 2024-04-15 PROCEDURE — 97112 NEUROMUSCULAR REEDUCATION: CPT

## 2024-04-15 NOTE — PROGRESS NOTES
Daily Note     Today's date: 4/15/2024  Patient name: Yajaira Mendez  : 1940  MRN: [id number]  Referring provider: Juwan Griffin MD  Dx:   Encounter Diagnosis     ICD-10-CM    1. S/P ORIF (open reduction internal fixation) fracture  Z98.890     Z87.81       2. Acute pain of left knee  M25.562       3. Stiffness of left knee  M25.662       4. Acute post-operative pain  G89.18                          Subjective: Patient reports her knee is achy today and isn't sure why. Walks around at home with cane and occasionally no AD, leaving brace unlocked if going short distances between rooms.       Objective: See treatment diary below   Flex L knee PROM = 105    Assessment:   Assessment details: Problem List:  1) hypomobility of left knee - addressing with PROM, AAROM, AROM  2) decreased neuromuscular control L knee - addressing with quad set, SLR, minisquat, weight shift     Yajaira Mendez is a pleasant 84 y.o. female who presents with left knee pain following fall, fracture, and ORIF of left patella. She has decreased knee flexion mobility, decreased quad and hip flexor strength, and pain resulting in inability to perform functional activities like light lifting/cleaning around the house and walking.  No further referral appears necessary at this time based upon examination results.  I expect she will benefit from physical therapy to restore functional tolerance.        Comparable signs:  1) left knee flex ROM - improving, 108  2) left quad strength - able to complete SLR with min quad lag, corrects with verbal cueing   3) STS  4) TUG    Goals  Short Term Goals: to be achieved by 4 weeks  1) Patient to be independent with basic HEP.  2) Decrease pain to 4/10 at its worst.  3) Increase R knee flex ROM to 115 deg to increase function post-op.   4) Increase LE strength by 1/2 MMT grade in all deficient planes post-op.    Long Term Goals: to be achieved by discharge  1) Patient to be independent with  "comprehensive HEP.  2) R knee ROM WNL all planes to improve a/iadls post-op.  3) Increase LE strength to 5/5 MMT grade in all planes to improve a/iadls post-op.  4) Patient to report no sleep interruption secondary to pain post-op.  5) Increase ambulatory tolerance to 60 min post-op without AD.     Tolerated treatment well. Patient improving with knee PROM but continues to have some discomfort at end ranges, passive motion improved to 108 degrees following manual stretching. Patient demonstrates good form with exercises and able to progress with resistance. Patient significantly challenged with SLS balance on LLE, close guarding and contact with handrail for assistance. Addition of mini squats for progression of LE strength. Patient expressed \"wooziness\" following treatment and will follow up with doctor on Friday at follow up visit.  Patient would benefit from continued PT to address remaining functional deficits.    4/15: 5xSTS = 15.93s, TUG = 20.7s SPC, brace unlocked at 60 deg    Plan: Progress treatment as tolerated.       Precautions: HTN, hx fracture (left patellar, ORIF performed 3/9/24), osteoporosis     Patient will be placed in TROM 0-30 of motion for 2 weeks (4/5), then 0-60 for next 2 weeks (4/19), when walking lock brace       Manuals 3/26 4/2 4/4 4/8 4/11 4/15       PROM L knee  CS  MK CS CS CS       Patellar mobs   In 90 deg flex, inf/med with tib stabilization In 90 deg flex, inf/med with tib stabilization In 90 deg flex, inf/med with tib stabilization In 90 deg flex, inf/med with tib stabilization        Retrograde massage                          Neuro Re-Ed             Quad set HEP 20x5\" 20x5\" 20x5\"         SLR   1x5 with PT assist X10  2x10 2x10       Weight shift             Mini squats      2x10       LAQ    15x5\" and flex w/ pt OP 10x5\" flex and ext w pt OP 2#        Standing HS curl    20x L         TKE    20x3\" L btb 20x5\" L maroon tb 2x20x5\" maroon tb       Pre-gait hurdles     2x10 X10 " "b/l no brace        SLS      3x20\" L       Ther Ex             Heel slide HEP 10x5\" 15x5\"          Ankle pumps HEP            Pt education CS   CS CS        NS - activity tolerance   5'  5' 5' 5' TM 1.1 mph 5' TM 1.1 mph                                                           Ther Activity             FSU    4\" 2x10  nv 4\" x10, 6\" x10 up left, down right       5xSTS      15.93s       TUG      20.7s        Gait Training             SPC    2x75'                      Modalities                                            "

## 2024-04-18 ENCOUNTER — OFFICE VISIT (OUTPATIENT)
Dept: PHYSICAL THERAPY | Facility: CLINIC | Age: 84
End: 2024-04-18
Payer: MEDICARE

## 2024-04-18 DIAGNOSIS — G89.18 ACUTE POST-OPERATIVE PAIN: ICD-10-CM

## 2024-04-18 DIAGNOSIS — Z98.890 S/P ORIF (OPEN REDUCTION INTERNAL FIXATION) FRACTURE: Primary | ICD-10-CM

## 2024-04-18 DIAGNOSIS — M25.562 ACUTE PAIN OF LEFT KNEE: ICD-10-CM

## 2024-04-18 DIAGNOSIS — Z87.81 S/P ORIF (OPEN REDUCTION INTERNAL FIXATION) FRACTURE: Primary | ICD-10-CM

## 2024-04-18 DIAGNOSIS — M25.662 STIFFNESS OF LEFT KNEE: ICD-10-CM

## 2024-04-18 PROCEDURE — 97140 MANUAL THERAPY 1/> REGIONS: CPT

## 2024-04-18 PROCEDURE — 97110 THERAPEUTIC EXERCISES: CPT

## 2024-04-18 NOTE — PROGRESS NOTES
"Daily Note     Today's date: 2024  Patient name: Yajaira Mendez  : 1940  MRN: [id number]  Referring provider: Juwan Griffin MD  Dx:   Encounter Diagnosis     ICD-10-CM    1. S/P ORIF (open reduction internal fixation) fracture  Z98.890     Z87.81       2. Acute pain of left knee  M25.562       3. Stiffness of left knee  M25.662       4. Acute post-operative pain  G89.18                          Subjective: Patient scheduled to recheck with ortho physician tomorrow.      Objective: See treatment diary below   Goals  Short Term Goals: to be achieved by 4 weeks  1) Patient to be independent with basic HEP.  2) Decrease pain to 4/10 at its worst.  3) Increase R knee flex ROM to 115 deg to increase function post-op.   4) Increase LE strength by 1/2 MMT grade in all deficient planes post-op.    Long Term Goals: to be achieved by discharge  1) Patient to be independent with comprehensive HEP.  2) R knee ROM WNL all planes to improve a/iadls post-op.  3) Increase LE strength to 5/5 MMT grade in all planes to improve a/iadls post-op.  4) Patient to report no sleep interruption secondary to pain post-op.  5) Increase ambulatory tolerance to 60 min post-op without AD.     Patient tolerated treatment well today.  Progressed with step ups for 2 sets.  Patient challenged with 6\" step up.  Progressing well with ROM within restrictions.      4/15: 5xSTS = 15.93s, TUG = 20.7s SPC, brace unlocked at 60 deg    Plan: Progress treatment as tolerated.       Precautions: HTN, hx fracture (left patellar, ORIF performed 3/9/24), osteoporosis     Patient will be placed in TROM 0-30 of motion for 2 weeks (), then 0-60 for next 2 weeks (), when walking lock brace       Manuals 3/26 4/2 4/4 4/8 4/11 4/15 4/18      PROM L knee  CS  MK CS CS CS ksg      Patellar mobs   In 90 deg flex, inf/med with tib stabilization In 90 deg flex, inf/med with tib stabilization In 90 deg flex, inf/med with tib stabilization In 90 deg " "flex, inf/med with tib stabilization        Retrograde massage                          Neuro Re-Ed             Quad set HEP 20x5\" 20x5\" 20x5\"         SLR   1x5 with PT assist X10  2x10 2x10 2x10      Weight shift             Mini squats      2x10 2x10      LAQ    15x5\" and flex w/ pt OP 10x5\" flex and ext w pt OP 2#        Standing HS curl    20x L         TKE    20x3\" L btb 20x5\" L maroon tb 2x20x5\" maroon tb 2x20x5\" maroon tb      Pre-gait hurdles     2x10 X10 b/l no brace        SLS      3x20\" L 3x20\" L      Ther Ex             Heel slide HEP 10x5\" 15x5\"          Ankle pumps HEP            Pt education CS   CS CS        NS - activity tolerance   5'  5' 5' 5' TM 1.1 mph 5' TM 1.1 mph 5\" TM 1.1 mph                                                          Ther Activity             FSU    4\" 2x10  nv 4\" x10, 6\" x10 up left, down right 4\" 2x10, 6\" 2x10 up left, down right      5xSTS      15.93s       TUG      20.7s        Gait Training             SPC    2x75'                      Modalities                                            "

## 2024-04-19 ENCOUNTER — APPOINTMENT (OUTPATIENT)
Dept: RADIOLOGY | Age: 84
End: 2024-04-19
Payer: MEDICARE

## 2024-04-19 ENCOUNTER — OFFICE VISIT (OUTPATIENT)
Dept: OBGYN CLINIC | Facility: CLINIC | Age: 84
End: 2024-04-19

## 2024-04-19 VITALS
BODY MASS INDEX: 20.43 KG/M2 | HEART RATE: 71 BPM | HEIGHT: 62 IN | DIASTOLIC BLOOD PRESSURE: 66 MMHG | SYSTOLIC BLOOD PRESSURE: 125 MMHG | WEIGHT: 111 LBS

## 2024-04-19 DIAGNOSIS — Z98.890 S/P ORIF (OPEN REDUCTION INTERNAL FIXATION) FRACTURE: Primary | ICD-10-CM

## 2024-04-19 DIAGNOSIS — Z87.81 S/P ORIF (OPEN REDUCTION INTERNAL FIXATION) FRACTURE: ICD-10-CM

## 2024-04-19 DIAGNOSIS — Z87.81 S/P ORIF (OPEN REDUCTION INTERNAL FIXATION) FRACTURE: Primary | ICD-10-CM

## 2024-04-19 DIAGNOSIS — S82.032D CLOSED DISPLACED TRANSVERSE FRACTURE OF LEFT PATELLA WITH ROUTINE HEALING, SUBSEQUENT ENCOUNTER: ICD-10-CM

## 2024-04-19 DIAGNOSIS — Z98.890 S/P ORIF (OPEN REDUCTION INTERNAL FIXATION) FRACTURE: ICD-10-CM

## 2024-04-19 PROCEDURE — 73562 X-RAY EXAM OF KNEE 3: CPT

## 2024-04-19 PROCEDURE — 99024 POSTOP FOLLOW-UP VISIT: CPT | Performed by: STUDENT IN AN ORGANIZED HEALTH CARE EDUCATION/TRAINING PROGRAM
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
## 2024-04-20 NOTE — ASSESSMENT & PLAN NOTE
Patient 6 weeks s/p left patella ORIF, doing well    -WBAT LLE  -May discontinue knee brace  -ROM to tolerance  -Continue PT primarily for gait training and ROM. Limit strengthening exercises for another 6 weeks  -Tylenol and NSAIDS as needed for any residual pain  -Follow-up in 6 weeks for continued post-operative evaluation and treatment

## 2024-04-22 ENCOUNTER — OFFICE VISIT (OUTPATIENT)
Dept: PHYSICAL THERAPY | Facility: CLINIC | Age: 84
End: 2024-04-22
Payer: MEDICARE

## 2024-04-22 DIAGNOSIS — Z87.81 S/P ORIF (OPEN REDUCTION INTERNAL FIXATION) FRACTURE: Primary | ICD-10-CM

## 2024-04-22 DIAGNOSIS — M25.662 STIFFNESS OF LEFT KNEE: ICD-10-CM

## 2024-04-22 DIAGNOSIS — G89.18 ACUTE POST-OPERATIVE PAIN: ICD-10-CM

## 2024-04-22 DIAGNOSIS — M25.562 ACUTE PAIN OF LEFT KNEE: ICD-10-CM

## 2024-04-22 DIAGNOSIS — Z98.890 S/P ORIF (OPEN REDUCTION INTERNAL FIXATION) FRACTURE: Primary | ICD-10-CM

## 2024-04-22 PROCEDURE — 97112 NEUROMUSCULAR REEDUCATION: CPT

## 2024-04-22 PROCEDURE — 97140 MANUAL THERAPY 1/> REGIONS: CPT

## 2024-04-22 PROCEDURE — 97110 THERAPEUTIC EXERCISES: CPT

## 2024-04-22 NOTE — PROGRESS NOTES
"Daily Note     Today's date: 2024  Patient name: Yajaira Mendez  : 1940  MRN: [id number]  Referring provider: Juwan Griffin MD  Dx:   Encounter Diagnosis     ICD-10-CM    1. S/P ORIF (open reduction internal fixation) fracture  Z98.890     Z87.81       2. Acute pain of left knee  M25.562       3. Stiffness of left knee  M25.662       4. Acute post-operative pain  G89.18                      Subjective: Patient reports seeing her MD on Friday who said she could d/c the brace and to use her SPC when outside the house.   She c/o her knee feeling stiff this afternoon and is anxious to practice driving to see if she can get in and out of the car without difficulty.    Objective: See treatment diary below      Assessment: Tolerated treatment well. FSU 4\" and 6\" box ex was challenging noting decreased strength as well as the SLR.  Patient also had difficulty maintaining balance intermittently during SLB needing frequent light support.  Overall, Patient exhibited good technique with therapeutic exercises and would benefit from continued PT      Plan: Continue per plan of care.      Precautions: HTN, hx fracture (left patellar, ORIF performed 3/9/24), osteoporosis     Patient will be placed in TROM 0-30 of motion for 2 weeks (), then 0-60 for next 2 weeks (), when walking lock brace       Manuals 3/26 4/2 4/4 4/8 4/11 4/15 4/18 4/22     PROM L knee  CS  MK CS CS CS ksg [place]     Patellar mobs   In 90 deg flex, inf/med with tib stabilization In 90 deg flex, inf/med with tib stabilization In 90 deg flex, inf/med with tib stabilization In 90 deg flex, inf/med with tib stabilization   [place]     Retrograde massage                          Neuro Re-Ed             Quad set HEP 20x5\" 20x5\" 20x5\"         SLR   1x5 with PT assist X10  2x10 2x10 2x10 2x10     Weight shift             Mini squats      2x10 2x10 2x10     LAQ    15x5\" and flex w/ pt OP 10x5\" flex and ext w pt OP 2#   2x10     Standing HS curl    20x L " "   20xL     TKE    20x3\" L btb 20x5\" L maroon tb 2x20x5\" maroon tb 2x20x5\" maroon tb 2x20x5\"marothon TB     Pre-gait hurdles     2x10 X10 b/l no brace        SLS      3x20\" L 3x20\" L 4x20\" L     Ther Ex             Heel slide HEP 10x5\" 15x5\"          Ankle pumps HEP            Pt education CS   CS CS        NS - activity tolerance   5'  5' 5' 5' TM 1.1 mph 5' TM 1.1 mph 5\" TM 1.1 mph 5'  TM  1.1  mph                                                         Ther Activity             FSU    4\" 2x10  nv 4\" x10, 6\" x10 up left, down right 4\" 2x10, 6\" 2x10 up left, down right 4\" 2x10 and 6\" 2x10  Up L, down R     5xSTS      15.93s  4x untimed     TUG      20.7s        Gait Training             SPC    2x75'                      Modalities                                              "

## 2024-04-25 ENCOUNTER — OFFICE VISIT (OUTPATIENT)
Dept: PHYSICAL THERAPY | Facility: CLINIC | Age: 84
End: 2024-04-25
Payer: MEDICARE

## 2024-04-25 DIAGNOSIS — M25.562 ACUTE PAIN OF LEFT KNEE: ICD-10-CM

## 2024-04-25 DIAGNOSIS — Z87.81 S/P ORIF (OPEN REDUCTION INTERNAL FIXATION) FRACTURE: Primary | ICD-10-CM

## 2024-04-25 DIAGNOSIS — M25.662 STIFFNESS OF LEFT KNEE: ICD-10-CM

## 2024-04-25 DIAGNOSIS — Z98.890 S/P ORIF (OPEN REDUCTION INTERNAL FIXATION) FRACTURE: Primary | ICD-10-CM

## 2024-04-25 DIAGNOSIS — G89.18 ACUTE POST-OPERATIVE PAIN: ICD-10-CM

## 2024-04-25 PROCEDURE — 97110 THERAPEUTIC EXERCISES: CPT

## 2024-04-25 PROCEDURE — 97112 NEUROMUSCULAR REEDUCATION: CPT

## 2024-04-25 PROCEDURE — 97140 MANUAL THERAPY 1/> REGIONS: CPT

## 2024-04-25 NOTE — PROGRESS NOTES
"PT Re-Evaluation     Today's date: 2024  Patient name: Yajaira Mendez  : 1940  MRN: [id number]  Referring provider: Juwan Griffin MD  Dx:   Encounter Diagnosis     ICD-10-CM    1. S/P ORIF (open reduction internal fixation) fracture  Z98.890     Z87.81       2. Acute pain of left knee  M25.562       3. Stiffness of left knee  M25.662       4. Acute post-operative pain  G89.18                      Subjective: Patient reports stiffness after sitting for a while and getting up. Walking without brace and AD inside of home and doing well. Happy with progress so far and notes ability to \"take better care of myself\". Patient reports feeling 50% improvement since beginning therapy. Still having tightness in knee though notes having stability. Not normally having pain but has some discomfort after walking for longer periods of time. Most stiffness when going from sitting to standing and pain while trying to stand without using hands. Worst pain = 8/10. Best pain = 0/10.     Objective: See treatment diary below    Passive Range of Motion   Left Knee   Flexion: 105 degrees  Extension: 0 degrees     Right Knee   Flexion: 150 degrees   Extension: 0 degrees     Active Range of Motion   Left Knee  Flexion: 110 degrees with pain    Strength/Myotome Testing     Left Knee   Quadriceps contraction: good    Additional Strength Details  Performs SLR without quad lag    Extension: 4-  Flexion: 4-    Right Knee  Extension: 5  Flexion: 5    Funtional Tests:  5xSTS: 13.6s with use of hands  TU.65s no AD or brace      Assessment: Patient demonstrates improvements in left knee ROM and strength with formal therapy. Patient also shows improvements in functional testing including 5xSTS and TUG. Patient continues to demonstrate difference in left and right knee strength with mild discomfort. Patient would benefit from continued therapy to address remaining functional deficits and maintain independence at home to reduce " "risk for falls.     Goals  Short Term Goals: to be achieved by 4 weeks  1) Patient to be independent with basic HEP. Met  2) Decrease pain to 4/10 at its worst. Progress  3) Increase R knee flex ROM to 115 deg to increase function post-op. Progress, 110  4) Increase LE strength by 1/2 MMT grade in all deficient planes post-op. Met    Long Term Goals: to be achieved by discharge  1) Patient to be independent with comprehensive HEP. Met  2) R knee ROM WNL all planes to improve a/iadls post-op.  3) Increase LE strength to 5/5 MMT grade in all planes to improve a/iadls post-op. Progress  4) Patient to report no sleep interruption secondary to pain post-op. Met  5) Increase ambulatory tolerance to 60 min post-op without AD. Progress, 10 min no AD      Plan: Continue per plan of care.      Precautions: HTN, hx fracture (left patellar, ORIF performed 3/9/24), osteoporosis     Patient will be placed in TROM 0-30 of motion for 2 weeks (4/5), then 0-60 for next 2 weeks (4/19), when walking lock brace       Manuals 3/26 4/2 4/4 4/8 4/11 4/15 4/18 4/22 4/25    PROM L knee  CS  MK CS CS CS ksg [place] CS    Patellar mobs   In 90 deg flex, inf/med with tib stabilization In 90 deg flex, inf/med with tib stabilization In 90 deg flex, inf/med with tib stabilization In 90 deg flex, inf/med with tib stabilization   [place] CS    Retrograde massage                          Neuro Re-Ed             Quad set HEP 20x5\" 20x5\" 20x5\"         SLR   1x5 with PT assist X10  2x10 2x10 2x10 2x10 2x10     Weight shift             Mini squats      2x10 2x10 2x10 2x10 biodex WB lateral    LAQ    15x5\" and flex w/ pt OP 10x5\" flex and ext w pt OP 2#   2x10 2x10 2.5#     Standing HS curl    20x L    20xL 10x L 2.5#    TKE    20x3\" L btb 20x5\" L maroon tb 2x20x5\" maroon tb 2x20x5\" maroon tb 2x20x5\"marothon TB     Pre-gait hurdles     2x10 X10 b/l no brace        SLS      3x20\" L 3x20\" L 4x20\" L 3x20\" L    Ther Ex             Heel slide HEP 10x5\" 15x5\"        " "  Ankle pumps HEP            Pt education CS   CS CS        NS - activity tolerance   5'  5' 5' 5' TM 1.1 mph 5' TM 1.1 mph 5\" TM 1.1 mph 5'  TM  1.1  mph 5' TM 1.0 mph                                                         Ther Activity             FSU    4\" 2x10  nv 4\" x10, 6\" x10 up left, down right 4\" 2x10, 6\" 2x10 up left, down right 4\" 2x10 and 6\" 2x10  Up L, down R 6\" 2x10 up left down right, ecc step down 4\" x5, 2\" 2x10    5xSTS      15.93s  4x untimed 13.6s    TUG      20.7s    9.65s    Gait Training             SPC    2x75'                      Modalities                                              "

## 2024-04-29 ENCOUNTER — OFFICE VISIT (OUTPATIENT)
Dept: PHYSICAL THERAPY | Facility: CLINIC | Age: 84
End: 2024-04-29
Payer: MEDICARE

## 2024-04-29 DIAGNOSIS — M25.662 STIFFNESS OF LEFT KNEE: ICD-10-CM

## 2024-04-29 DIAGNOSIS — Z98.890 S/P ORIF (OPEN REDUCTION INTERNAL FIXATION) FRACTURE: Primary | ICD-10-CM

## 2024-04-29 DIAGNOSIS — G89.18 ACUTE POST-OPERATIVE PAIN: ICD-10-CM

## 2024-04-29 DIAGNOSIS — Z87.81 S/P ORIF (OPEN REDUCTION INTERNAL FIXATION) FRACTURE: Primary | ICD-10-CM

## 2024-04-29 DIAGNOSIS — M25.562 ACUTE PAIN OF LEFT KNEE: ICD-10-CM

## 2024-04-29 PROCEDURE — 97112 NEUROMUSCULAR REEDUCATION: CPT

## 2024-04-29 PROCEDURE — 97530 THERAPEUTIC ACTIVITIES: CPT

## 2024-04-29 PROCEDURE — 97140 MANUAL THERAPY 1/> REGIONS: CPT
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
## 2024-05-01 PROBLEM — S01.511A LIP LACERATION: Status: RESOLVED | Noted: 2024-03-07 | Resolved: 2024-05-01

## 2024-05-02 ENCOUNTER — OFFICE VISIT (OUTPATIENT)
Dept: PHYSICAL THERAPY | Facility: CLINIC | Age: 84
End: 2024-05-02
Payer: MEDICARE

## 2024-05-02 DIAGNOSIS — Z98.890 S/P ORIF (OPEN REDUCTION INTERNAL FIXATION) FRACTURE: Primary | ICD-10-CM

## 2024-05-02 DIAGNOSIS — M25.662 STIFFNESS OF LEFT KNEE: ICD-10-CM

## 2024-05-02 DIAGNOSIS — M25.562 ACUTE PAIN OF LEFT KNEE: ICD-10-CM

## 2024-05-02 DIAGNOSIS — G89.18 ACUTE POST-OPERATIVE PAIN: ICD-10-CM

## 2024-05-02 DIAGNOSIS — Z87.81 S/P ORIF (OPEN REDUCTION INTERNAL FIXATION) FRACTURE: Primary | ICD-10-CM

## 2024-05-02 PROCEDURE — 97112 NEUROMUSCULAR REEDUCATION: CPT

## 2024-05-02 PROCEDURE — 97530 THERAPEUTIC ACTIVITIES: CPT

## 2024-05-02 PROCEDURE — 97140 MANUAL THERAPY 1/> REGIONS: CPT
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
## 2024-05-06 ENCOUNTER — APPOINTMENT (OUTPATIENT)
Dept: PHYSICAL THERAPY | Facility: CLINIC | Age: 84
End: 2024-05-06
Payer: MEDICARE

## 2024-05-07 ENCOUNTER — OFFICE VISIT (OUTPATIENT)
Dept: PHYSICAL THERAPY | Facility: CLINIC | Age: 84
End: 2024-05-07
Payer: MEDICARE

## 2024-05-07 DIAGNOSIS — Z87.81 S/P ORIF (OPEN REDUCTION INTERNAL FIXATION) FRACTURE: Primary | ICD-10-CM

## 2024-05-07 DIAGNOSIS — M25.662 STIFFNESS OF LEFT KNEE: ICD-10-CM

## 2024-05-07 DIAGNOSIS — G89.18 ACUTE POST-OPERATIVE PAIN: ICD-10-CM

## 2024-05-07 DIAGNOSIS — Z98.890 S/P ORIF (OPEN REDUCTION INTERNAL FIXATION) FRACTURE: Primary | ICD-10-CM

## 2024-05-07 DIAGNOSIS — M25.562 ACUTE PAIN OF LEFT KNEE: ICD-10-CM

## 2024-05-07 PROCEDURE — 97140 MANUAL THERAPY 1/> REGIONS: CPT | Performed by: PHYSICAL THERAPIST

## 2024-05-07 PROCEDURE — 97112 NEUROMUSCULAR REEDUCATION: CPT | Performed by: PHYSICAL THERAPIST

## 2024-05-07 PROCEDURE — 97530 THERAPEUTIC ACTIVITIES: CPT | Performed by: PHYSICAL THERAPIST

## 2024-05-07 NOTE — PROGRESS NOTES
Daily Note    Today's date: 2024  Patient name: Yajaira Mendez  : 1940  MRN: [id number]  Referring provider: Juwan Griffin MD  Dx:   Encounter Diagnosis     ICD-10-CM    1. S/P ORIF (open reduction internal fixation) fracture  Z98.890     Z87.81       2. Acute pain of left knee  M25.562       3. Stiffness of left knee  M25.662       4. Acute post-operative pain  G89.18                        Subjective: Patient reports sore today and yesterday but realized did a lot of sitting yesterday.    Objective: See treatment diary below    Assessment: Patient tolerated treatment well. Patient would benefit from continued treatment to address mobility and balance deficits to decrease falls and maintain independence at home.     Goals  Short Term Goals: to be achieved by 4 weeks  1) Patient to be independent with basic HEP. Met  2) Decrease pain to 4/10 at its worst. Progress  3) Increase R knee flex ROM to 115 deg to increase function post-op. Progress, 110  4) Increase LE strength by 1/2 MMT grade in all deficient planes post-op. Met    Long Term Goals: to be achieved by discharge  1) Patient to be independent with comprehensive HEP. Met  2) R knee ROM WNL all planes to improve a/iadls post-op.  3) Increase LE strength to 5/5 MMT grade in all planes to improve a/iadls post-op. Progress  4) Patient to report no sleep interruption secondary to pain post-op. Met  5) Increase ambulatory tolerance to 60 min post-op without AD. Progress, 10 min no AD      Plan: Continue per plan of care.      Precautions: HTN, hx fracture (left patellar, ORIF performed 3/9/24), osteoporosis     Patient will be placed in TROM 0-30 of motion for 2 weeks (), then 0-60 for next 2 weeks (), when walking lock brace       Manuals    PROM L knee ksg [place] CS CS CS MK   Patellar mobs   [place] CS CS CS and rotational MK   Retrograde massage         Patellar tendon STM     CS MK   MRE L knee flex/ext     CS 50% MK  "  Neuro Re-Ed         Quad set         SLR 2x10 2x10 2x10  2x10 1.5#  3x10 1.5# 2x12    Weight shift         Mini squats 2x10 2x10 2x10 biodex WB lateral      LAQ  2x10 2x10 2.5#  2x10 3#    Isometric 30\"x5 1' rest     Standing HS curl  20xL 10x L 2.5# 2x10 L 3#     TKE 2x20x5\" maroon tb 2x20x5\"marothon TB       Pre-gait hurdles     3 laps reciprocal      SLS 3x20\" L 4x20\" L 3x20\" L 3x20\" L     Ther Ex         Heel slide         Standing quad stretch on chair      3x20\" holds   Pt education         NS - activity tolerance  5\" TM 1.1 mph 5'  TM  1.1  mph 5' TM 1.0 mph  5' TM 1.0 mph 5' TM 1.0 mph  5 min                                        Ther Activity         FSU 4\" 2x10, 6\" 2x10 up left, down right 4\" 2x10 and 6\" 2x10  Up L, down R 6\" 2x10 up left down right, ecc step down 4\" x5, 2\" 2x10 6\" x10 up left down right ! 6\" 2x10 up left down right 10x up only w/ L 6\" due to pain going down, 10x 4\" down    5xSTS  4x untimed 13.6s 2x10 seated on foam,  X6 ecc control no foam  X10 seated on foam, x10 ecc control no foam ytb abd X10 seated on foam, x10 ecc control no foam ytb abd   TUG   9.65s      Gait Training         SPC                  Modalities                                  "

## 2024-05-09 ENCOUNTER — OFFICE VISIT (OUTPATIENT)
Dept: PHYSICAL THERAPY | Facility: CLINIC | Age: 84
End: 2024-05-09
Payer: MEDICARE

## 2024-05-09 DIAGNOSIS — Z98.890 S/P ORIF (OPEN REDUCTION INTERNAL FIXATION) FRACTURE: Primary | ICD-10-CM

## 2024-05-09 DIAGNOSIS — M25.562 ACUTE PAIN OF LEFT KNEE: ICD-10-CM

## 2024-05-09 DIAGNOSIS — G89.18 ACUTE POST-OPERATIVE PAIN: ICD-10-CM

## 2024-05-09 DIAGNOSIS — Z87.81 S/P ORIF (OPEN REDUCTION INTERNAL FIXATION) FRACTURE: Primary | ICD-10-CM

## 2024-05-09 DIAGNOSIS — M25.662 STIFFNESS OF LEFT KNEE: ICD-10-CM

## 2024-05-09 PROCEDURE — 97140 MANUAL THERAPY 1/> REGIONS: CPT

## 2024-05-09 PROCEDURE — 97112 NEUROMUSCULAR REEDUCATION: CPT

## 2024-05-09 PROCEDURE — 97110 THERAPEUTIC EXERCISES: CPT

## 2024-05-09 NOTE — PROGRESS NOTES
Daily Note    Today's date: 2024  Patient name: Yajaira Mendez  : 1940  MRN: [id number]  Referring provider: Juwan Griffin MD  Dx:   Encounter Diagnosis     ICD-10-CM    1. S/P ORIF (open reduction internal fixation) fracture  Z98.890     Z87.81       2. Acute pain of left knee  M25.562       3. Stiffness of left knee  M25.662       4. Acute post-operative pain  G89.18                          Subjective: Patient reports her knee is more sore today like it was Monday after sitting in the car for 4 hours traveling to [place]. She denies injury to her knee during that time and can't think of anything else she did different. She is currently having pain walking on even surfaces where she wasn't having any pain with this prior to .     Objective: See treatment diary below    Assessment: Patient tolerated treatment well. Patient demonstrates increased swelling of left knee and warmth though doesn't show any redness or signs of infection. Tenderness predominantly alone medial hamstring bulk, tendons, and pes anserine with mild tenderness along patellar tendon. Patients posterior hamstring tenderness improved during gait following STM. Addition of prone hamstring strength and MET techniques to improve quad length. Patient would benefit from continued treatment to address mobility and balance deficits to decrease falls and maintain independence at home.     Goals  Short Term Goals: to be achieved by 4 weeks  1) Patient to be independent with basic HEP. Met  2) Decrease pain to 4/10 at its worst. Progress  3) Increase R knee flex ROM to 115 deg to increase function post-op. Progress, 110  4) Increase LE strength by 1/2 MMT grade in all deficient planes post-op. Met    Long Term Goals: to be achieved by discharge  1) Patient to be independent with comprehensive HEP. Met  2) R knee ROM WNL all planes to improve a/iadls post-op.  3) Increase LE strength to 5/5 MMT grade in all planes to improve  "a/iadls post-op. Progress  4) Patient to report no sleep interruption secondary to pain post-op. Met  5) Increase ambulatory tolerance to 60 min post-op without AD. Progress, 10 min no AD      Plan: Continue per plan of care.      Precautions: HTN, hx fracture (left patellar, ORIF performed 3/9/24), osteoporosis     Patient will be placed in TROM 0-30 of motion for 2 weeks (4/5), then 0-60 for next 2 weeks (4/19), when walking lock brace       Manuals 4/18 4/22 4/25 4/29 5/2 5/7 5/9   PROM L knee ksg [place] CS CS CS MK CS   Patellar mobs   [place] CS CS CS and rotational MK CS   Retrograde massage          Patellar tendon STM     CS MK CS   MRE L knee flex/ext     CS 50% MK CS 75% ext and flex 45\" hold, x3, 1' rest   STM hamstring tendons       CS   Neuro Re-Ed          Quad set          SLR 2x10 2x10 2x10  2x10 1.5#  3x10 1.5# 2x12  2x15   Weight shift          Mini squats 2x10 2x10 2x10 biodex WB lateral       LAQ  2x10 2x10 2.5#  2x10 3#    Isometric 30\"x5 1' rest      Standing HS curl  20xL 10x L 2.5# 2x10 L 3#      TKE 2x20x5\" maroon tb 2x20x5\"marothon TB        Pre-gait hurdles     3 laps reciprocal       SLS 3x20\" L 4x20\" L 3x20\" L 3x20\" L   X10  X10 with PT OP   Prone HS curl       X10  2x5 with MET   Prone quad set       10x3\"   Ther Ex          Heel slide          Standing quad stretch on chair      3x20\" holds    Pt education       CS   NS - activity tolerance  5\" TM 1.1 mph 5'  TM  1.1  mph 5' TM 1.0 mph  5' TM 1.0 mph 5' TM 1.0 mph  5 min  5' RB                                           Ther Activity          FSU 4\" 2x10, 6\" 2x10 up left, down right 4\" 2x10 and 6\" 2x10  Up L, down R 6\" 2x10 up left down right, ecc step down 4\" x5, 2\" 2x10 6\" x10 up left down right ! 6\" 2x10 up left down right 10x up only w/ L 6\" due to pain going down, 10x 4\" down     5xSTS  4x untimed 13.6s 2x10 seated on foam,  X6 ecc control no foam  X10 seated on foam, x10 ecc control no foam ytb abd X10 seated on foam, x10 ecc control no " foam ytb abd    TUG   9.65s       Gait Training          SPC                    Modalities

## 2024-05-13 ENCOUNTER — OFFICE VISIT (OUTPATIENT)
Dept: PHYSICAL THERAPY | Facility: CLINIC | Age: 84
End: 2024-05-13
Payer: MEDICARE

## 2024-05-13 DIAGNOSIS — Z87.81 S/P ORIF (OPEN REDUCTION INTERNAL FIXATION) FRACTURE: Primary | ICD-10-CM

## 2024-05-13 DIAGNOSIS — G89.18 ACUTE POST-OPERATIVE PAIN: ICD-10-CM

## 2024-05-13 DIAGNOSIS — M25.662 STIFFNESS OF LEFT KNEE: ICD-10-CM

## 2024-05-13 DIAGNOSIS — M25.562 ACUTE PAIN OF LEFT KNEE: ICD-10-CM

## 2024-05-13 DIAGNOSIS — Z98.890 S/P ORIF (OPEN REDUCTION INTERNAL FIXATION) FRACTURE: Primary | ICD-10-CM

## 2024-05-13 PROCEDURE — 97530 THERAPEUTIC ACTIVITIES: CPT

## 2024-05-13 PROCEDURE — 97140 MANUAL THERAPY 1/> REGIONS: CPT

## 2024-05-13 PROCEDURE — 97110 THERAPEUTIC EXERCISES: CPT

## 2024-05-13 NOTE — PROGRESS NOTES
Daily Note    Today's date: 2024  Patient name: Yajaira Mendez  : 1940  MRN: [id number]  Referring provider: Juwan Griffin MD  Dx:   Encounter Diagnosis     ICD-10-CM    1. S/P ORIF (open reduction internal fixation) fracture  Z98.890     Z87.81       2. Acute pain of left knee  M25.562       3. Stiffness of left knee  M25.662       4. Acute post-operative pain  G89.18                            Subjective: Patient reports feeling better today compared to previous session, felt better when she left though still having some swelling just not the same discomfort she had before. Not using SPC for ambulation today.     Objective: See treatment diary below    Assessment: Patient tolerated treatment well. Patient demonstrates slight discomfort with standing from chair. Improved symptoms following patellar mobs in sitting and STM of patellar tendon. Patient demonstrates ability to perform steps reciprocally though has some discomfort. Improved ability to perform STS without use of hands, decreased symptoms with maintaining hip abd. Patient would benefit from continued treatment to address mobility and balance deficits to decrease falls and maintain independence at home.     Goals  Short Term Goals: to be achieved by 4 weeks  1) Patient to be independent with basic HEP. Met  2) Decrease pain to 4/10 at its worst. Progress  3) Increase R knee flex ROM to 115 deg to increase function post-op. Progress, 110  4) Increase LE strength by 1/2 MMT grade in all deficient planes post-op. Met    Long Term Goals: to be achieved by discharge  1) Patient to be independent with comprehensive HEP. Met  2) R knee ROM WNL all planes to improve a/iadls post-op.  3) Increase LE strength to 5/5 MMT grade in all planes to improve a/iadls post-op. Progress  4) Patient to report no sleep interruption secondary to pain post-op. Met  5) Increase ambulatory tolerance to 60 min post-op without AD. Progress, 10 min no AD      Plan:  "Continue per plan of care.      Precautions: HTN, hx fracture (left patellar, ORIF performed 3/9/24), osteoporosis     Patient will be placed in TROM 0-30 of motion for 2 weeks (4/5), then 0-60 for next 2 weeks (4/19), when walking lock brace       Manuals 4/18 4/22 4/25 4/29 5/2 5/7 5/9 5/13   PROM L knee ksg [place] CS CS CS MK CS    Patellar mobs   [place] CS CS CS and rotational MK CS CS seated   Retrograde massage           Patellar tendon STM     CS MK CS CS   MRE L knee flex/ext     CS 50% MK CS 75% ext and flex 45\" hold, x3, 1' rest CS 75% ext 45\" hold    2x10 alt flex/ext   STM hamstring tendons       CS    Neuro Re-Ed           Quad set           SLR 2x10 2x10 2x10  2x10 1.5#  3x10 1.5# 2x12  2x15    Weight shift           Mini squats 2x10 2x10 2x10 biodex WB lateral        LAQ  2x10 2x10 2.5#  2x10 3#    Isometric 30\"x5 1' rest       Standing HS curl  20xL 10x L 2.5# 2x10 L 3#       TKE 2x20x5\" maroon tb 2x20x5\"marothon TB         Pre-gait hurdles     3 laps reciprocal        SLS 3x20\" L 4x20\" L 3x20\" L 3x20\" L   X10  X10 with PT OP    Prone HS curl       X10  2x5 with MET 2x10    Prone quad set       10x3\"    Ther Ex           Heel slide           Standing quad stretch on chair      3x20\" holds     Pt education       CS CS   NS - activity tolerance  5\" TM 1.1 mph 5'  TM  1.1  mph 5' TM 1.0 mph  5' TM 1.0 mph 5' TM 1.0 mph  5 min  5' RB 5' RB                                               Ther Activity           FSU 4\" 2x10, 6\" 2x10 up left, down right 4\" 2x10 and 6\" 2x10  Up L, down R 6\" 2x10 up left down right, ecc step down 4\" x5, 2\" 2x10 6\" x10 up left down right ! 6\" 2x10 up left down right 10x up only w/ L 6\" due to pain going down, 10x 4\" down   Stairs 2x reciprocal     Ecc step down 4\" x10 L   5xSTS  4x untimed 13.6s 2x10 seated on foam,  X6 ecc control no foam  X10 seated on foam, x10 ecc control no foam ytb abd X10 seated on foam, x10 ecc control no foam ytb abd  X10 ecc control no foam ytb abd    NO HANDS "   TUG   9.65s        Gait Training           SPC                      Modalities

## 2024-05-15 ENCOUNTER — OFFICE VISIT (OUTPATIENT)
Dept: INTERNAL MEDICINE CLINIC | Facility: CLINIC | Age: 84
End: 2024-05-15
Payer: MEDICARE

## 2024-05-15 VITALS
SYSTOLIC BLOOD PRESSURE: 134 MMHG | DIASTOLIC BLOOD PRESSURE: 64 MMHG | WEIGHT: 112 LBS | OXYGEN SATURATION: 96 % | TEMPERATURE: 96.7 F | HEIGHT: 62 IN | HEART RATE: 58 BPM | BODY MASS INDEX: 20.61 KG/M2

## 2024-05-15 DIAGNOSIS — M81.0 AGE-RELATED OSTEOPOROSIS WITHOUT CURRENT PATHOLOGICAL FRACTURE: ICD-10-CM

## 2024-05-15 DIAGNOSIS — I10 ESSENTIAL HYPERTENSION: Primary | ICD-10-CM

## 2024-05-15 DIAGNOSIS — K91.2 POSTSURGICAL MALABSORPTION: ICD-10-CM

## 2024-05-15 DIAGNOSIS — E03.9 ACQUIRED HYPOTHYROIDISM: ICD-10-CM

## 2024-05-15 PROCEDURE — 99214 OFFICE O/P EST MOD 30 MIN: CPT | Performed by: INTERNAL MEDICINE

## 2024-05-15 PROCEDURE — G2211 COMPLEX E/M VISIT ADD ON: HCPCS | Performed by: INTERNAL MEDICINE

## 2024-05-15 RX ORDER — TRIAMCINOLONE ACETONIDE 1 MG/G
CREAM TOPICAL
COMMUNITY
Start: 2024-04-15
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
## 2024-05-16 ENCOUNTER — OFFICE VISIT (OUTPATIENT)
Dept: PHYSICAL THERAPY | Facility: CLINIC | Age: 84
End: 2024-05-16
Payer: MEDICARE

## 2024-05-16 DIAGNOSIS — Z98.890 S/P ORIF (OPEN REDUCTION INTERNAL FIXATION) FRACTURE: Primary | ICD-10-CM

## 2024-05-16 DIAGNOSIS — M25.662 STIFFNESS OF LEFT KNEE: ICD-10-CM

## 2024-05-16 DIAGNOSIS — G89.18 ACUTE POST-OPERATIVE PAIN: ICD-10-CM

## 2024-05-16 DIAGNOSIS — Z87.81 S/P ORIF (OPEN REDUCTION INTERNAL FIXATION) FRACTURE: Primary | ICD-10-CM

## 2024-05-16 DIAGNOSIS — M25.562 ACUTE PAIN OF LEFT KNEE: ICD-10-CM

## 2024-05-16 PROCEDURE — 97112 NEUROMUSCULAR REEDUCATION: CPT

## 2024-05-16 PROCEDURE — 97140 MANUAL THERAPY 1/> REGIONS: CPT

## 2024-05-16 PROCEDURE — 97530 THERAPEUTIC ACTIVITIES: CPT

## 2024-05-16 NOTE — PROGRESS NOTES
"Daily Note    Today's date: 2024  Patient name: Yajaira Mendez  : 1940  MRN: [id number]  Referring provider: Juwan Griffin MD  Dx:   Encounter Diagnosis     ICD-10-CM    1. S/P ORIF (open reduction internal fixation) fracture  Z98.890     Z87.81       2. Acute pain of left knee  M25.562       3. Stiffness of left knee  M25.662       4. Acute post-operative pain  G89.18                              Subjective: Patient reports walking without cane and \"got yelled at\" by her PCP yesterday. Says she should be using even if she's feeling okay. Walking better but feels like she pulled something in her hamstring before she left the house today, felt a sharp pain, and thinks maybe she moved the wrong way.     Objective: See treatment diary below    Assessment: Patient tolerated treatment well. Patient demonstrates improvement in symptoms following manual therapy, decreased pain with resisted knee flexion and extension following. Improvement in ability to stand from chair without use of hands while keeping knees abducted. Increased reps with this activity. Addition of tandem balance with ball toss to decrease overall fall-risk and maintain independence at home. Educated patient about use of SPC while outside of home to maintain safety. Patient would benefit from continued treatment to address mobility and balance deficits to decrease falls and maintain independence at home.     Goals  Short Term Goals: to be achieved by 4 weeks  1) Patient to be independent with basic HEP. Met  2) Decrease pain to 4/10 at its worst. Progress  3) Increase R knee flex ROM to 115 deg to increase function post-op. Progress, 110  4) Increase LE strength by 1/2 MMT grade in all deficient planes post-op. Met    Long Term Goals: to be achieved by discharge  1) Patient to be independent with comprehensive HEP. Met  2) R knee ROM WNL all planes to improve a/iadls post-op.  3) Increase LE strength to 5/5 MMT grade in all planes to " "improve a/iadls post-op. Progress  4) Patient to report no sleep interruption secondary to pain post-op. Met  5) Increase ambulatory tolerance to 60 min post-op without AD. Progress, 10 min no AD      Plan: Continue per plan of care.      Precautions: HTN, hx fracture (left patellar, ORIF performed 3/9/24), osteoporosis     Patient will be placed in TROM 0-30 of motion for 2 weeks (4/5), then 0-60 for next 2 weeks (4/19), when walking lock brace       Manuals 4/18 4/22 4/25 4/29 5/2 5/7 5/9 5/13 5/16   PROM L knee ksg KY CS CS CS MK CS     Patellar mobs   KY CS CS CS and rotational MK CS CS seated CS seated   Retrograde massage            Patellar tendon STM     CS MK CS CS CS   MRE L knee flex/ext     CS 50% MK CS 75% ext and flex 45\" hold, x3, 1' rest CS 75% ext 45\" hold    2x10 alt flex/ext 2x10 alt flex/ext   STM hamstring tendons       CS  CS   Neuro Re-Ed            Quad set            SLR 2x10 2x10 2x10  2x10 1.5#  3x10 1.5# 2x12  2x15     Weight shift            Mini squats 2x10 2x10 2x10 biodex WB lateral         LAQ  2x10 2x10 2.5#  2x10 3#    Isometric 30\"x5 1' rest        Standing HS curl  20xL 10x L 2.5# 2x10 L 3#        TKE 2x20x5\" maroon tb 2x20x5\"marothon TB          Pre-gait hurdles     3 laps reciprocal         SLS 3x20\" L 4x20\" L 3x20\" L 3x20\" L   X10  X10 with PT OP     Prone HS curl       X10  2x5 with MET 2x10     Prone quad set       10x3\"     Tandem ball toss         2x10 B red pball    Ther Ex            Heel slide            Standing quad stretch on chair      3x20\" holds      Pt education       CS CS CS   NS - activity tolerance  5\" TM 1.1 mph 5'  TM  1.1  mph 5' TM 1.0 mph  5' TM 1.0 mph 5' TM 1.0 mph  5 min  5' RB 5' RB 5' TM                                                    Ther Activity            FSU 4\" 2x10, 6\" 2x10 up left, down right 4\" 2x10 and 6\" 2x10  Up L, down R 6\" 2x10 up left down right, ecc step down 4\" x5, 2\" 2x10 6\" x10 up left down right ! 6\" 2x10 up left down right 10x " "up only w/ L 6\" due to pain going down, 10x 4\" down   Stairs 2x reciprocal     Ecc step down 4\" x10 L Stairs 2x reciprocal    5xSTS  4x untimed 13.6s 2x10 seated on foam,  X6 ecc control no foam  X10 seated on foam, x10 ecc control no foam ytb abd X10 seated on foam, x10 ecc control no foam ytb abd  X10 ecc control no foam ytb abd    NO HANDS X10 ecc control no foam gtb abd     NO HANDS   TUG   9.65s         Gait Training            SPC                        Modalities                                           "

## 2024-05-20 ENCOUNTER — OFFICE VISIT (OUTPATIENT)
Dept: PHYSICAL THERAPY | Facility: CLINIC | Age: 84
End: 2024-05-20
Payer: MEDICARE

## 2024-05-20 DIAGNOSIS — G89.18 ACUTE POST-OPERATIVE PAIN: ICD-10-CM

## 2024-05-20 DIAGNOSIS — M25.562 ACUTE PAIN OF LEFT KNEE: ICD-10-CM

## 2024-05-20 DIAGNOSIS — Z98.890 S/P ORIF (OPEN REDUCTION INTERNAL FIXATION) FRACTURE: Primary | ICD-10-CM

## 2024-05-20 DIAGNOSIS — M25.662 STIFFNESS OF LEFT KNEE: ICD-10-CM

## 2024-05-20 DIAGNOSIS — Z87.81 S/P ORIF (OPEN REDUCTION INTERNAL FIXATION) FRACTURE: Primary | ICD-10-CM

## 2024-05-20 PROCEDURE — 97112 NEUROMUSCULAR REEDUCATION: CPT

## 2024-05-20 PROCEDURE — 97140 MANUAL THERAPY 1/> REGIONS: CPT

## 2024-05-20 PROCEDURE — 97530 THERAPEUTIC ACTIVITIES: CPT

## 2024-05-20 NOTE — PROGRESS NOTES
Daily Note    Today's date: 2024  Patient name: Yajaira Mendez  : 1940  MRN: [id number]  Referring provider: Juwan Griffin MD  Dx:   Encounter Diagnosis     ICD-10-CM    1. S/P ORIF (open reduction internal fixation) fracture  Z98.890     Z87.81       2. Acute pain of left knee  M25.562       3. Stiffness of left knee  M25.662       4. Acute post-operative pain  G89.18                              Subjective: Patient reports no longer having sharp pain in the back of the knee and thinks most of the swelling is gone. Still feels the knee is stiff into flexion when walking. Unable to perform steps into house reciprocally because they're higher than the ones here.     Objective: See treatment diary below    Assessment: Patient tolerated treatment well. Patient demonstrates decreased pain overall in left knee, specifically in posterior hamstring. However, patient continues to have tightness with ambulation. Addressed with prone quad stretching. Progression of forward step ups with increased step height. Patient fatigued with progressions, however, able to complete all reps. Patient would benefit from continued treatment to address mobility and balance deficits to decrease falls and maintain independence at home.     Goals  Short Term Goals: to be achieved by 4 weeks  1) Patient to be independent with basic HEP. Met  2) Decrease pain to 4/10 at its worst. Progress  3) Increase R knee flex ROM to 115 deg to increase function post-op. Progress, 110  4) Increase LE strength by 1/2 MMT grade in all deficient planes post-op. Met    Long Term Goals: to be achieved by discharge  1) Patient to be independent with comprehensive HEP. Met  2) R knee ROM WNL all planes to improve a/iadls post-op.  3) Increase LE strength to 5/5 MMT grade in all planes to improve a/iadls post-op. Progress  4) Patient to report no sleep interruption secondary to pain post-op. Met  5) Increase ambulatory tolerance to 60 min post-op  "without AD. Progress, 10 min no AD      Plan: Continue per plan of care.      Precautions: HTN, hx fracture (left patellar, ORIF performed 3/9/24), osteoporosis     Patient will be placed in TROM 0-30 of motion for 2 weeks (4/5), then 0-60 for next 2 weeks (4/19), when walking lock brace       Manuals 4/18 4/22 4/25 4/29 5/2 5/7 5/9 5/13 5/16 5/20   PROM L knee ksg KY CS CS CS MK CS      Patellar mobs   KY CS CS CS and rotational MK CS CS seated CS seated CS seated   Retrograde massage             Patellar tendon STM     CS MK CS CS CS CS and passive left knee flexion in prone    MRE L knee flex/ext     CS 50% MK CS 75% ext and flex 45\" hold, x3, 1' rest CS 75% ext 45\" hold    2x10 alt flex/ext 2x10 alt flex/ext    STM hamstring tendons       CS  CS    Neuro Re-Ed             Quad set             SLR 2x10 2x10 2x10  2x10 1.5#  3x10 1.5# 2x12  2x15      Weight shift             Mini squats 2x10 2x10 2x10 biodex WB lateral          LAQ  2x10 2x10 2.5#  2x10 3#    Isometric 30\"x5 1' rest         Standing HS curl  20xL 10x L 2.5# 2x10 L 3#         TKE 2x20x5\" maroon tb 2x20x5\"marothon TB           Pre-gait hurdles     3 laps reciprocal          SLS 3x20\" L 4x20\" L 3x20\" L 3x20\" L   X10  X10 with PT OP      Prone HS curl       X10  2x5 with MET 2x10   3x10    Prone quad set       10x3\"   3# 20x3\"   Tandem ball toss         2x10 B red pball  2x10 B red pball    Ther Ex             Heel slide             Standing quad stretch on chair      3x20\" holds       Pt education       CS CS CS    NS - activity tolerance  5\" TM 1.1 mph 5'  TM  1.1  mph 5' TM 1.0 mph  5' TM 1.0 mph 5' TM 1.0 mph  5 min  5' RB 5' RB 5' TM  5' TM                                                        Ther Activity             FSU 4\" 2x10, 6\" 2x10 up left, down right 4\" 2x10 and 6\" 2x10  Up L, down R 6\" 2x10 up left down right, ecc step down 4\" x5, 2\" 2x10 6\" x10 up left down right ! 6\" 2x10 up left down right 10x up only w/ L 6\" due to pain going down, " "10x 4\" down   Stairs 2x reciprocal     Ecc step down 4\" x10 L Stairs 2x reciprocal  X10 6\" up L down R    3x5 8\" up L down R   5xSTS  4x untimed 13.6s 2x10 seated on foam,  X6 ecc control no foam  X10 seated on foam, x10 ecc control no foam ytb abd X10 seated on foam, x10 ecc control no foam ytb abd  X10 ecc control no foam ytb abd    NO HANDS X10 ecc control no foam gtb abd     NO HANDS 2x10 ecc control no foam gtb abd     NO HANDS   TUG   9.65s          Gait Training             SPC                          Modalities                                              "

## 2024-05-21 ENCOUNTER — APPOINTMENT (OUTPATIENT)
Dept: LAB | Facility: CLINIC | Age: 84
End: 2024-05-21
Payer: MEDICARE

## 2024-05-21 DIAGNOSIS — E03.9 ACQUIRED HYPOTHYROIDISM: ICD-10-CM

## 2024-05-21 LAB
T4 FREE SERPL-MCNC: 1.14 NG/DL (ref 0.61–1.12)
TSH SERPL DL<=0.05 MIU/L-ACNC: 0.1 UIU/ML (ref 0.45–4.5)

## 2024-05-21 PROCEDURE — 84439 ASSAY OF FREE THYROXINE: CPT

## 2024-05-21 PROCEDURE — 36415 COLL VENOUS BLD VENIPUNCTURE: CPT

## 2024-05-21 PROCEDURE — 84443 ASSAY THYROID STIM HORMONE: CPT

## 2024-05-22 ENCOUNTER — TELEPHONE (OUTPATIENT)
Dept: INTERNAL MEDICINE CLINIC | Facility: CLINIC | Age: 84
End: 2024-05-22

## 2024-05-22 DIAGNOSIS — E03.9 ACQUIRED HYPOTHYROIDISM: Primary | ICD-10-CM

## 2024-05-22 RX ORDER — LEVOTHYROXINE SODIUM 88 UG/1
88 TABLET ORAL
Qty: 100 TABLET | Refills: 3 | Status: SHIPPED | OUTPATIENT
Start: 2024-05-22

## 2024-05-22 NOTE — TELEPHONE ENCOUNTER
Spoke to patient per Dr. Schulz recommendation of the Thyroid medication change to 88mcg. Repeat blood work in 2 months after taking the new dosage.

## 2024-05-23 ENCOUNTER — OFFICE VISIT (OUTPATIENT)
Dept: PHYSICAL THERAPY | Facility: CLINIC | Age: 84
End: 2024-05-23
Payer: MEDICARE

## 2024-05-23 DIAGNOSIS — Z87.81 S/P ORIF (OPEN REDUCTION INTERNAL FIXATION) FRACTURE: Primary | ICD-10-CM

## 2024-05-23 DIAGNOSIS — G89.18 ACUTE POST-OPERATIVE PAIN: ICD-10-CM

## 2024-05-23 DIAGNOSIS — Z98.890 S/P ORIF (OPEN REDUCTION INTERNAL FIXATION) FRACTURE: Primary | ICD-10-CM

## 2024-05-23 DIAGNOSIS — M25.662 STIFFNESS OF LEFT KNEE: ICD-10-CM

## 2024-05-23 DIAGNOSIS — M25.562 ACUTE PAIN OF LEFT KNEE: ICD-10-CM

## 2024-05-23 PROCEDURE — 97112 NEUROMUSCULAR REEDUCATION: CPT

## 2024-05-23 PROCEDURE — 97530 THERAPEUTIC ACTIVITIES: CPT

## 2024-05-23 PROCEDURE — 97140 MANUAL THERAPY 1/> REGIONS: CPT

## 2024-05-23 NOTE — PROGRESS NOTES
Daily Note    Today's date: 2024  Patient name: Yajaira Mendez  : 1940  MRN: [id number]  Referring provider: Juwan Griffin MD  Dx:   Encounter Diagnosis     ICD-10-CM    1. S/P ORIF (open reduction internal fixation) fracture  Z98.890     Z87.81       2. Acute pain of left knee  M25.562       3. Stiffness of left knee  M25.662       4. Acute post-operative pain  G89.18                                Subjective: Patient reports still having some difficulty with standing after sitting for a while, feeling stiff, and also reciprocal stairs at home due to height. Patient reports 75% improvement since beginning therapy. Further improvement noted in ability to talk safely without the cane, feeling more confident in ability to walk. However, continues to note most difficulty with stiffness trying to stand after sitting for about 20 min, has been trying to do leg exercises before standing which has been helpful. Also still having difficulty with reciprocal steps into house because of the height. Best pain = 0/10. Current pain = 0/10. Worst pain = 6, in last 2 weeks when standing up, achy and stiff.     Objective: See treatment diary below    Passive Range of Motion   Left Knee   Flexion: 130 degrees  Extension: 0 degrees     Right Knee   Flexion: 150 degrees   Extension: 0 degrees     Active Range of Motion   Left Knee  Flexion: 120 degrees with pain    Strength/Myotome Testing     Left Knee   Quadriceps contraction: good    Additional Strength Details  Performs SLR without quad lag    Extension: 4+  Flexion: 4+    Right Knee  Extension: 5  Flexion: 5    Funtional Tests:  5xSTS: 13.6s with use of hands // 11.78s no use of hands  TU.65s no AD or brace     Assessment: Patient tolerated treatment well. Patient demonstrates improvements overall with therapy. Demonstrates improved STS times and able to perform without use of hands. Patient also demonstrates improved mobility of left knee and improved  "balance. Patient continues to have most limitations with standing up after sitting for longer periods of time, performing steps into house, and waling for longer distances. Patient would benefit from continued PT to address remaining functional deficits.     Goals  Short Term Goals: to be achieved by 4 weeks  1) Patient to be independent with basic HEP. Met  2) Decrease pain to 4/10 at its worst. Progress  3) Increase R knee flex ROM to 115 deg to increase function post-op. Progress, 110  4) Increase LE strength by 1/2 MMT grade in all deficient planes post-op. Met    Long Term Goals: to be achieved by discharge  1) Patient to be independent with comprehensive HEP. Met  2) R knee ROM WNL all planes to improve a/iadls post-op.  3) Increase LE strength to 5/5 MMT grade in all planes to improve a/iadls post-op. Progress  4) Patient to report no sleep interruption secondary to pain post-op. Met  5) Increase ambulatory tolerance to 60 min post-op without AD. Progress, 20 min no AD      Plan: Continue per plan of care.     Frequency: 2x week  Duration in visits: 8  Duration in weeks: 4  Plan of Care beginning date: 5/23/2024  Plan of Care expiration date: 6/20/2024  Treatment plan discussed with: patient     Precautions: HTN, hx fracture (left patellar, ORIF performed 3/9/24), osteoporosis     Patient will be placed in TROM 0-30 of motion for 2 weeks (4/5), then 0-60 for next 2 weeks (4/19), when walking lock brace       Manuals 4/29 5/2 5/7 5/9 5/13 5/16 5/20 5/23   PROM L knee CS CS MK CS       Patellar mobs  CS CS and rotational MK CS CS seated CS seated CS seated CS seated   Retrograde massage           Patellar tendon STM  CS MK CS CS CS CS and passive left knee flexion in prone  CS   MRE L knee flex/ext  CS 50% MK CS 75% ext and flex 45\" hold, x3, 1' rest CS 75% ext 45\" hold    2x10 alt flex/ext 2x10 alt flex/ext     STM hamstring tendons    CS  CS     Neuro Re-Ed           Quad set           SLR 2x10 1.5#  3x10 " "1.5# 2x12  2x15       Weight shift           Mini squats           LAQ 2x10 3#    Isometric 30\"x5 1' rest          Standing HS curl 2x10 L 3#       Seated gtb 2x10    TKE           Pre-gait hurdles  3 laps reciprocal           SLS 3x20\" L   X10  X10 with PT OP       Prone HS curl    X10  2x5 with MET 2x10   3x10     Prone quad set    10x3\"   3# 20x3\"    Tandem ball toss      2x10 B red pball  2x10 B red pball  Rombergon airex 3x10   Pball marches        2x20 alt red    Leg press        55# 2x5   Ther Ex           Heel slide           Standing quad stretch on chair   3x20\" holds        Pt education    CS CS CS     NS - activity tolerance  5' TM 1.0 mph 5' TM 1.0 mph  5 min  5' RB 5' RB 5' TM  5' TM  5' RB                                               Ther Activity           FSU 6\" x10 up left down right ! 6\" 2x10 up left down right 10x up only w/ L 6\" due to pain going down, 10x 4\" down   Stairs 2x reciprocal     Ecc step down 4\" x10 L Stairs 2x reciprocal  X10 6\" up L down R    3x5 8\" up L down R X10 8\" up L down R   5xSTS 2x10 seated on foam,  X6 ecc control no foam  X10 seated on foam, x10 ecc control no foam ytb abd X10 seated on foam, x10 ecc control no foam ytb abd  X10 ecc control no foam ytb abd    NO HANDS X10 ecc control no foam gtb abd     NO HANDS 2x10 ecc control no foam gtb abd     NO HANDS 3x5 2# ecc control no foam gtb abd       NO HANDS   TUG           Gait Training           SPC                      Modalities                                        "

## 2024-05-28 ENCOUNTER — OFFICE VISIT (OUTPATIENT)
Dept: PHYSICAL THERAPY | Facility: CLINIC | Age: 84
End: 2024-05-28
Payer: MEDICARE

## 2024-05-28 DIAGNOSIS — M25.562 ACUTE PAIN OF LEFT KNEE: ICD-10-CM

## 2024-05-28 DIAGNOSIS — M25.662 STIFFNESS OF LEFT KNEE: ICD-10-CM

## 2024-05-28 DIAGNOSIS — G89.18 ACUTE POST-OPERATIVE PAIN: ICD-10-CM

## 2024-05-28 DIAGNOSIS — Z87.81 S/P ORIF (OPEN REDUCTION INTERNAL FIXATION) FRACTURE: Primary | ICD-10-CM

## 2024-05-28 DIAGNOSIS — Z98.890 S/P ORIF (OPEN REDUCTION INTERNAL FIXATION) FRACTURE: Primary | ICD-10-CM

## 2024-05-28 PROCEDURE — 97140 MANUAL THERAPY 1/> REGIONS: CPT

## 2024-05-28 PROCEDURE — 97110 THERAPEUTIC EXERCISES: CPT

## 2024-05-28 PROCEDURE — 97530 THERAPEUTIC ACTIVITIES: CPT

## 2024-05-28 NOTE — PROGRESS NOTES
"Daily Note     Today's date: 2024  Patient name: Yajaira Mendez  : 1940  MRN: [id number]  Referring provider: Juwan Griffin MD  Dx:   Encounter Diagnosis     ICD-10-CM    1. S/P ORIF (open reduction internal fixation) fracture  Z98.890     Z87.81       2. Acute pain of left knee  M25.562       3. Stiffness of left knee  M25.662       4. Acute post-operative pain  G89.18                      Subjective: Patient reports she still has L knee pain when going from sit to stand, but the pain quickly subsides when she begins to walk.       Objective: See treatment diary below      Assessment: Tolerated treatment well. Added GS to increase ankle flexibility, as well as seated/standing HR/TR to increase ankle strength.  No complaints reported during today's session.  Patient exhibited good technique with therapeutic exercises and would benefit from continued PT      Plan: Continue per plan of care.      Precautions: HTN, hx fracture (left patellar, ORIF performed 3/9/24), osteoporosis     Patient will be placed in TROM 0-30 of motion for 2 weeks (), then 0-60 for next 2 weeks (), when walking lock brace       Manuals  5/   PROM L knee CS CS MK CS        Patellar mobs  CS CS and rotational MK CS CS seated CS seated CS seated CS seated KY   Retrograde massage            Patellar tendon STM  CS MK CS CS CS CS and passive left knee flexion in prone  CS KY and     GS 30\"x3 ea   MRE L knee flex/ext  CS 50% MK CS 75% ext and flex 45\" hold, x3, 1' rest CS 75% ext 45\" hold    2x10 alt flex/ext 2x10 alt flex/ext      STM hamstring tendons    CS  CS      Neuro Re-Ed            Quad set            SLR 2x10 1.5#  3x10 1.5# 2x12  2x15        Weight shift            Mini squats            LAQ 2x10 3#    Isometric 30\"x5 1' rest           Standing HS curl 2x10 L 3#       Seated gtb 2x10  Seated GTB 2x10   TKE            Pre-gait hurdles  3 laps reciprocal            SLS 3x20\" L " "  X10  X10 with PT OP        Prone HS curl    X10  2x5 with MET 2x10   3x10      Prone quad set    10x3\"   3# 20x3\"     Tandem ball toss      2x10 B red pball  2x10 B red pball  Rombergon airex 3x10 Romberg on airex 3x10   Pball marches        2x20 alt red     Leg press        55# 2x5 55# 3x5   Ther Ex            Heel slide            Standing quad stretch on chair   3x20\" holds         Pt education    CS CS CS      NS - activity tolerance  5' TM 1.0 mph 5' TM 1.0 mph  5 min  5' RB 5' RB 5' TM  5' TM  5' RB 5'TM   Seated/Stand HR/TR         X 20 ea                                       Ther Activity            FSU 6\" x10 up left down right ! 6\" 2x10 up left down right 10x up only w/ L 6\" due to pain going down, 10x 4\" down   Stairs 2x reciprocal     Ecc step down 4\" x10 L Stairs 2x reciprocal  X10 6\" up L down R    3x5 8\" up L down R X10 8\" up L down R X10 ea 8\"  Up L down R &  UP R down L   5xSTS 2x10 seated on foam,  X6 ecc control no foam  X10 seated on foam, x10 ecc control no foam ytb abd X10 seated on foam, x10 ecc control no foam ytb abd  X10 ecc control no foam ytb abd    NO HANDS X10 ecc control no foam gtb abd     NO HANDS 2x10 ecc control no foam gtb abd     NO HANDS 3x5 2# ecc control no foam gtb abd       NO HANDS 3x5  No foam.  GTB abd    NO HANDS   TUG            Gait Training            SPC                        Modalities                                             "

## 2024-05-30 ENCOUNTER — OFFICE VISIT (OUTPATIENT)
Dept: PHYSICAL THERAPY | Facility: CLINIC | Age: 84
End: 2024-05-30
Payer: MEDICARE

## 2024-05-30 DIAGNOSIS — M25.562 ACUTE PAIN OF LEFT KNEE: ICD-10-CM

## 2024-05-30 DIAGNOSIS — Z98.890 S/P ORIF (OPEN REDUCTION INTERNAL FIXATION) FRACTURE: Primary | ICD-10-CM

## 2024-05-30 DIAGNOSIS — G89.18 ACUTE POST-OPERATIVE PAIN: ICD-10-CM

## 2024-05-30 DIAGNOSIS — Z87.81 S/P ORIF (OPEN REDUCTION INTERNAL FIXATION) FRACTURE: Primary | ICD-10-CM

## 2024-05-30 DIAGNOSIS — M25.662 STIFFNESS OF LEFT KNEE: ICD-10-CM

## 2024-05-30 PROCEDURE — 97110 THERAPEUTIC EXERCISES: CPT

## 2024-05-30 PROCEDURE — 97140 MANUAL THERAPY 1/> REGIONS: CPT

## 2024-05-30 NOTE — PROGRESS NOTES
"Daily Note     Today's date: 2024  Patient name: Yajaira Mendez  : 1940  MRN: [id number]  Referring provider: Juwan Griffin MD  Dx:   Encounter Diagnosis     ICD-10-CM    1. S/P ORIF (open reduction internal fixation) fracture  Z98.890     Z87.81       2. Acute pain of left knee  M25.562       3. Acute post-operative pain  G89.18       4. Stiffness of left knee  M25.662                      Subjective: Patient states she is feeling good. She reports she has a follow up with her doctor next Tuesday.      Objective: See treatment diary below      Assessment: Tolerated treatment well. Continued POC as prescribed. Patient demonstrated fatigue post treatment, exhibited good technique with therapeutic exercises, and would benefit from continued PT      Plan: Continue per plan of care.      Precautions: HTN, hx fracture (left patellar, ORIF performed 3/9/24), osteoporosis     Patient will be placed in TROM 0-30 of motion for 2 weeks (), then 0-60 for next 2 weeks (), when walking lock brace       Manuals   5 5   PROM L knee   MK CS        Patellar mobs  RA  MK CS CS seated CS seated CS seated CS seated [place]   Retrograde massage            Patellar tendon STM RAa  MK CS CS CS CS and passive left knee flexion in prone  CS KY and     GS 30\"x3 ea   MRE L knee flex/ext   MK CS 75% ext and flex 45\" hold, x3, 1' rest CS 75% ext 45\" hold    2x10 alt flex/ext 2x10 alt flex/ext      STM hamstring tendons    CS  CS      Neuro Re-Ed            Quad set            SLR   2x12  2x15        Weight shift            Mini squats            LAQ            Standing HS curl        Seated gtb 2x10  Seated GTB 2x10   TKE            Pre-gait hurdles             SLS    X10  X10 with PT OP        Prone HS curl    X10  2x5 with MET 2x10   3x10      Prone quad set    10x3\"   3# 20x3\"     Tandem ball toss Romberg on airex 3x10     2x10 B red pball  2x10 B red pball  Rombergon airex 3x10 Romberg " "on airex 3x10   Pball marches        2x20 alt red     Leg press 35# 2x10       55# 2x5 55# 3x5   Ther Ex            Heel slide            Standing quad stretch on chair   3x20\" holds         Pt education    CS CS CS      NS - activity tolerance  5' RB  5 min  5' RB 5' RB 5' TM  5' TM  5' RB 5'TM   Seated/Stand HR/TR 20x ea        X 20 ea                                       Ther Activity            FSU X10 ea 8\"  Up L down R &  UP R down L  10x up only w/ L 6\" due to pain going down, 10x 4\" down   Stairs 2x reciprocal     Ecc step down 4\" x10 L Stairs 2x reciprocal  X10 6\" up L down R    3x5 8\" up L down R X10 8\" up L down R X10 ea 8\"  Up L down R &  UP R down L   5xSTS 3x5  No foam.  GTB abd    NO HANDS  X10 seated on foam, x10 ecc control no foam ytb abd  X10 ecc control no foam ytb abd    NO HANDS X10 ecc control no foam gtb abd     NO HANDS 2x10 ecc control no foam gtb abd     NO HANDS 3x5 2# ecc control no foam gtb abd       NO HANDS 3x5  No foam.  GTB abd    NO HANDS   TUG            Gait Training            SPC                        Modalities                                               "

## 2024-06-03 ENCOUNTER — OFFICE VISIT (OUTPATIENT)
Dept: PHYSICAL THERAPY | Facility: CLINIC | Age: 84
End: 2024-06-03
Payer: MEDICARE

## 2024-06-03 DIAGNOSIS — Z87.81 S/P ORIF (OPEN REDUCTION INTERNAL FIXATION) FRACTURE: Primary | ICD-10-CM

## 2024-06-03 DIAGNOSIS — Z98.890 S/P ORIF (OPEN REDUCTION INTERNAL FIXATION) FRACTURE: Primary | ICD-10-CM

## 2024-06-03 DIAGNOSIS — M25.662 STIFFNESS OF LEFT KNEE: ICD-10-CM

## 2024-06-03 DIAGNOSIS — G89.18 ACUTE POST-OPERATIVE PAIN: ICD-10-CM

## 2024-06-03 DIAGNOSIS — M25.562 ACUTE PAIN OF LEFT KNEE: ICD-10-CM

## 2024-06-03 PROCEDURE — 97110 THERAPEUTIC EXERCISES: CPT | Performed by: PHYSICAL THERAPIST

## 2024-06-03 PROCEDURE — 97530 THERAPEUTIC ACTIVITIES: CPT | Performed by: PHYSICAL THERAPIST

## 2024-06-03 PROCEDURE — 97140 MANUAL THERAPY 1/> REGIONS: CPT | Performed by: PHYSICAL THERAPIST

## 2024-06-03 NOTE — PROGRESS NOTES
"Daily Note     Today's date: 6/3/2024  Patient name: Yajaira Mendez  : 1940  MRN: [id number]  Referring provider: Juwan Griffin MD  Dx:   Encounter Diagnosis     ICD-10-CM    1. S/P ORIF (open reduction internal fixation) fracture  Z98.890     Z87.81       2. Acute pain of left knee  M25.562       3. Acute post-operative pain  G89.18       4. Stiffness of left knee  M25.662                      Subjective: Patient states still difficulty with steps and standing up.      Objective: See treatment diary below      Assessment: Tolerated treatment well. Continued POC as prescribed. Patient demonstrated fatigue post treatment, exhibited good technique with therapeutic exercises, and would benefit from continued PT      Plan: Continue per plan of care.      Precautions: HTN, hx fracture (left patellar, ORIF performed 3/9/24), osteoporosis     Patient will be placed in TROM 0-30 of motion for 2 weeks (), then 0-60 for next 2 weeks (), when walking lock brace       Manuals  6/3   PROM L knee CS         Patellar mobs  CS CS seated CS seated CS seated CS seated KY MK w/ distraction and CP and knee distraction FMT flexion    Retrograde massage          Patellar tendon STM CS CS CS CS and passive left knee flexion in prone  CS [place] and     GS 30\"x3 ea MK distal quad    MRE L knee flex/ext CS 75% ext and flex 45\" hold, x3, 1' rest CS 75% ext 45\" hold    2x10 alt flex/ext 2x10 alt flex/ext       STM hamstring tendons CS  CS       Neuro Re-Ed          Quad set          SLR 2x15      3x10    Weight shift          Mini squats          LAQ       2x12 5 lbs   Standing HS curl     Seated gtb 2x10  Seated GTB 2x10    TKE          Pre-gait hurdles           SLS X10  X10 with PT OP         Prone HS curl X10  2x5 with MET 2x10   3x10       Prone quad set 10x3\"   3# 20x3\"      Tandem ball toss   2x10 B red pball  2x10 B red pball  Rombergon airex 3x10 Romberg on airex 3x10    Pball      " "2x20 alt red      Leg press     55# 2x5 55# 3x5 2x8 55 lbs   Ther Ex          Heel slide          Standing quad stretch on chair          Pt education CS CS CS       NS - activity tolerance  5' RB 5' RB 5' TM  5' TM  5' RB 5'TM 5 min   Seated/Stand HR/TR      X 20 ea -   Supine hip flexor stretch       3x20\" holds                        Ther Activity          FSU  Stairs 2x reciprocal     Ecc step down 4\" x10 L Stairs 2x reciprocal  X10 6\" up L down R    3x5 8\" up L down R X10 8\" up L down R X10 ea 8\"  Up L down R &  UP R down L 10x 8\"    5xSTS  X10 ecc control no foam ytb abd    NO HANDS X10 ecc control no foam gtb abd     NO HANDS 2x10 ecc control no foam gtb abd     NO HANDS 3x5 2# ecc control no foam gtb abd       NO HANDS 3x5  No foam.  GTB abd    NO HANDS 2x8   TUG          Gait Training          SPC                    Modalities                                         "

## 2024-06-04 ENCOUNTER — APPOINTMENT (OUTPATIENT)
Dept: PHYSICAL THERAPY | Facility: CLINIC | Age: 84
End: 2024-06-04
Payer: MEDICARE

## 2024-06-04 ENCOUNTER — OFFICE VISIT (OUTPATIENT)
Dept: OBGYN CLINIC | Facility: CLINIC | Age: 84
End: 2024-06-04

## 2024-06-04 VITALS
SYSTOLIC BLOOD PRESSURE: 125 MMHG | WEIGHT: 108 LBS | HEART RATE: 72 BPM | BODY MASS INDEX: 19.88 KG/M2 | HEIGHT: 62 IN | DIASTOLIC BLOOD PRESSURE: 84 MMHG

## 2024-06-04 DIAGNOSIS — Z98.890 S/P ORIF (OPEN REDUCTION INTERNAL FIXATION) FRACTURE: Primary | ICD-10-CM

## 2024-06-04 DIAGNOSIS — Z87.81 S/P ORIF (OPEN REDUCTION INTERNAL FIXATION) FRACTURE: Primary | ICD-10-CM

## 2024-06-04 PROCEDURE — 99024 POSTOP FOLLOW-UP VISIT: CPT | Performed by: STUDENT IN AN ORGANIZED HEALTH CARE EDUCATION/TRAINING PROGRAM

## 2024-06-04 NOTE — ASSESSMENT & PLAN NOTE
Patient is 3 months s/p left ORIF patella  - She describes no functional deficit and walks pain free without a limp   - Continue PT/HEP as directed   - WBAT, no restrictions  - Let pain be a guide for activity   - F/u PRN

## 2024-06-06 ENCOUNTER — OFFICE VISIT (OUTPATIENT)
Dept: PHYSICAL THERAPY | Facility: CLINIC | Age: 84
End: 2024-06-06
Payer: MEDICARE

## 2024-06-06 DIAGNOSIS — Z87.81 S/P ORIF (OPEN REDUCTION INTERNAL FIXATION) FRACTURE: Primary | ICD-10-CM

## 2024-06-06 DIAGNOSIS — M25.662 STIFFNESS OF LEFT KNEE: ICD-10-CM

## 2024-06-06 DIAGNOSIS — M25.562 ACUTE PAIN OF LEFT KNEE: ICD-10-CM

## 2024-06-06 DIAGNOSIS — G89.18 ACUTE POST-OPERATIVE PAIN: ICD-10-CM

## 2024-06-06 DIAGNOSIS — Z98.890 S/P ORIF (OPEN REDUCTION INTERNAL FIXATION) FRACTURE: Primary | ICD-10-CM

## 2024-06-06 PROCEDURE — 97112 NEUROMUSCULAR REEDUCATION: CPT

## 2024-06-06 PROCEDURE — 97110 THERAPEUTIC EXERCISES: CPT

## 2024-06-06 PROCEDURE — 97140 MANUAL THERAPY 1/> REGIONS: CPT

## 2024-06-06 NOTE — PROGRESS NOTES
"Daily Note     Today's date: 2024  Patient name: Yajaira Mendez  : 1940  MRN: [id number]  Referring provider: Juwan Griffin MD  Dx:   Encounter Diagnosis     ICD-10-CM    1. S/P ORIF (open reduction internal fixation) fracture  Z98.890     Z87.81       2. Acute pain of left knee  M25.562       3. Acute post-operative pain  G89.18       4. Stiffness of left knee  M25.662                        Subjective: Continues to note difficulty with first standing up from seated position and up/down stairs, though doing some seated exercises before standing has helped her feel less stiff.       Objective: See treatment diary below      Assessment: Tolerated treatment well. Continued POC as prescribed. Patient demonstrated fatigue post treatment, exhibited good technique with therapeutic exercises, and would benefit from continued PT      Plan: Continue per plan of care.      Precautions: HTN, hx fracture (left patellar, ORIF performed 3/9/24), osteoporosis     Patient will be placed in TROM 0-30 of motion for 2 weeks (), then 0-60 for next 2 weeks (), when walking lock brace       Manuals 5/9 5/13 5/16 5/20 5/23 5/28 6/3 6/6   PROM L knee CS          Patellar mobs  CS CS seated CS seated CS seated CS seated KY MK w/ distraction and CP and knee distraction FMT flexion  CS seated    Retrograde massage           Patellar tendon STM CS CS CS CS and passive left knee flexion in prone  CS [place] and     GS 30\"x3 ea MK distal quad     MRE L knee flex/ext CS 75% ext and flex 45\" hold, x3, 1' rest CS 75% ext 45\" hold    2x10 alt flex/ext 2x10 alt flex/ext        STM hamstring tendons CS  CS        Neuro Re-Ed           Quad set           SLR 2x15      3x10  3x10   Weight shift           Mini squats           LAQ       2x12 5 lbs 3x10 5#    Standing HS curl     Seated gtb 2x10  Seated GTB 2x10     TKE           Pre-gait hurdles            SLS X10  X10 with PT OP          Prone HS curl X10  2x5 with MET 2x10   3x10   " "     Prone quad set 10x3\"   3# 20x3\"       Tandem ball toss   2x10 B red pball  2x10 B red pball  Rombergon airex 3x10 Romberg on airex 3x10     Pball marches     2x20 alt red    2x20 alt green tandem on airex   Leg press     55# 2x5 55# 3x5 2x8 55 lbs 2x12 55#   Hurdles        3 laps   Tandem walking        3 laps   Ther Ex           Heel slide           Standing quad stretch on chair           Pt education CS CS CS        NS - activity tolerance  5' RB 5' RB 5' TM  5' TM  5' RB 5'TM 5 min 5' TM   Seated/Stand HR/TR      X 20 ea -    Supine hip flexor stretch       3x20\" holds  3x30\"                          Ther Activity           FSU  Stairs 2x reciprocal     Ecc step down 4\" x10 L Stairs 2x reciprocal  X10 6\" up L down R    3x5 8\" up L down R X10 8\" up L down R X10 ea 8\"  Up L down R &  UP R down L 10x 8\"  2x10 8\" up L, down R   5xSTS  X10 ecc control no foam ytb abd    NO HANDS X10 ecc control no foam gtb abd     NO HANDS 2x10 ecc control no foam gtb abd     NO HANDS 3x5 2# ecc control no foam gtb abd       NO HANDS 3x5  No foam.  GTB abd    NO HANDS 2x8 2x10   TUG           Gait Training           SPC                      Modalities                                            "

## 2024-06-07 ENCOUNTER — HOSPITAL ENCOUNTER (OUTPATIENT)
Dept: RADIOLOGY | Age: 84
Discharge: HOME | End: 2024-06-07
Attending: INTERNAL MEDICINE
Payer: MEDICARE

## 2024-06-07 VITALS — HEIGHT: 62 IN | BODY MASS INDEX: 19.88 KG/M2 | WEIGHT: 108 LBS

## 2024-06-07 DIAGNOSIS — Z12.31 ENCOUNTER FOR SCREENING MAMMOGRAM FOR BREAST CANCER: ICD-10-CM

## 2024-06-07 PROCEDURE — 77063 BREAST TOMOSYNTHESIS BI: CPT

## 2024-06-07 PROCEDURE — 77067 SCR MAMMO BI INCL CAD: CPT

## 2024-06-11 ENCOUNTER — OFFICE VISIT (OUTPATIENT)
Dept: PHYSICAL THERAPY | Facility: CLINIC | Age: 84
End: 2024-06-11
Payer: MEDICARE

## 2024-06-11 DIAGNOSIS — Z87.81 S/P ORIF (OPEN REDUCTION INTERNAL FIXATION) FRACTURE: Primary | ICD-10-CM

## 2024-06-11 DIAGNOSIS — G89.18 ACUTE POST-OPERATIVE PAIN: ICD-10-CM

## 2024-06-11 DIAGNOSIS — M25.662 STIFFNESS OF LEFT KNEE: ICD-10-CM

## 2024-06-11 DIAGNOSIS — M25.562 ACUTE PAIN OF LEFT KNEE: ICD-10-CM

## 2024-06-11 DIAGNOSIS — Z98.890 S/P ORIF (OPEN REDUCTION INTERNAL FIXATION) FRACTURE: Primary | ICD-10-CM

## 2024-06-11 PROCEDURE — 97110 THERAPEUTIC EXERCISES: CPT

## 2024-06-11 PROCEDURE — 97530 THERAPEUTIC ACTIVITIES: CPT

## 2024-06-11 PROCEDURE — 97112 NEUROMUSCULAR REEDUCATION: CPT

## 2024-06-11 NOTE — PROGRESS NOTES
"Daily Note     Today's date: 2024  Patient name: Yajaira Mendez  : 1940  MRN: [id number]  Referring provider: Juwan Griffin MD  Dx:   Encounter Diagnosis     ICD-10-CM    1. S/P ORIF (open reduction internal fixation) fracture  Z98.890     Z87.81       2. Acute pain of left knee  M25.562       3. Acute post-operative pain  G89.18       4. Stiffness of left knee  M25.662                          Subjective: Patient continues to note most difficulty with standing up after sitting for prolonged periods and going up/down stairs.       Objective: See treatment diary below      Assessment: Tolerated treatment well. Patient demonstrates improved balance with tandem walking and tandem stance. However, patient continues to have difficulty with ankle strategies and relies heavily on stepping strategy. Patient demonstrated fatigue post treatment, exhibited good technique with therapeutic exercises, and would benefit from continued PT to decrease overall fall-risk at home.       Plan: Continue per plan of care.      Precautions: HTN, hx fracture (left patellar, ORIF performed 3/9/24), osteoporosis     Patient will be placed in TROM 0-30 of motion for 2 weeks (), then 0-60 for next 2 weeks (), when walking lock brace       Manuals 5/9 5/13 5/16 5/20 5/23 5/28 6/3 6/6 6/11   PROM L knee CS           Patellar mobs  CS CS seated CS seated CS seated CS seated [place] MK w/ distraction and CP and knee distraction FMT flexion  CS seated  CS seated    Retrograde massage            Patellar tendon STM CS CS CS CS and passive left knee flexion in prone  CS [place] and     GS 30\"x3 ea MK distal quad      MRE L knee flex/ext CS 75% ext and flex 45\" hold, x3, 1' rest CS 75% ext 45\" hold    2x10 alt flex/ext 2x10 alt flex/ext         STM hamstring tendons CS  CS         Neuro Re-Ed            Quad set            SLR 2x15      3x10  3x10 3x10    Weight shift            Mini squats            LAQ       2x12 5 lbs 3x10 5#  4x5 " "7.5#    Standing HS curl     Seated gtb 2x10  Seated GTB 2x10      TKE            Pre-gait hurdles             SLS X10  X10 with PT OP           Prone HS curl X10  2x5 with MET 2x10   3x10         Prone quad set 10x3\"   3# 20x3\"        Tandem ball toss   2x10 B red pball  2x10 B red pball  Rombergon airex 3x10 Romberg on airex 3x10   2x10 alt green tandem on airex   Pball marches     2x20 alt red    2x20 alt green tandem on airex    Leg press     55# 2x5 55# 3x5 2x8 55 lbs 2x12 55# 2x10 55#    Hurdles        3 laps 4 laps, 4  hurdles, between tables    Tandem walking        3 laps 4 laps between tables   Ther Ex            Heel slide            Standing quad stretch on chair            Pt education CS CS CS         NS - activity tolerance  5' RB 5' RB 5' TM  5' TM  5' RB 5'TM 5 min 5' TM 5' TM   Seated/Stand HR/TR      X 20 ea -     Supine hip flexor stretch       3x20\" holds  3x30\"  3x30\"                            Ther Activity            FSU  Stairs 2x reciprocal     Ecc step down 4\" x10 L Stairs 2x reciprocal  X10 6\" up L down R    3x5 8\" up L down R X10 8\" up L down R X10 ea 8\"  Up L down R &  UP R down L 10x 8\"  2x10 8\" up L, down R 2x10 8\" up L, down R    5xSTS  X10 ecc control no foam ytb abd    NO HANDS X10 ecc control no foam gtb abd     NO HANDS 2x10 ecc control no foam gtb abd     NO HANDS 3x5 2# ecc control no foam gtb abd       NO HANDS 3x5  No foam.  GTB abd    NO HANDS 2x8 2x10    Cable column weighted walkout         11# x10 reverse    TUG            Gait Training            SPC                        Modalities                                               " no

## 2024-06-13 ENCOUNTER — OFFICE VISIT (OUTPATIENT)
Dept: PHYSICAL THERAPY | Facility: CLINIC | Age: 84
End: 2024-06-13
Payer: MEDICARE

## 2024-06-13 DIAGNOSIS — G89.18 ACUTE POST-OPERATIVE PAIN: ICD-10-CM

## 2024-06-13 DIAGNOSIS — M25.662 STIFFNESS OF LEFT KNEE: ICD-10-CM

## 2024-06-13 DIAGNOSIS — Z98.890 S/P ORIF (OPEN REDUCTION INTERNAL FIXATION) FRACTURE: Primary | ICD-10-CM

## 2024-06-13 DIAGNOSIS — Z87.81 S/P ORIF (OPEN REDUCTION INTERNAL FIXATION) FRACTURE: Primary | ICD-10-CM

## 2024-06-13 DIAGNOSIS — M25.562 ACUTE PAIN OF LEFT KNEE: ICD-10-CM

## 2024-06-13 PROCEDURE — 97530 THERAPEUTIC ACTIVITIES: CPT

## 2024-06-13 PROCEDURE — 97112 NEUROMUSCULAR REEDUCATION: CPT

## 2024-06-13 PROCEDURE — 97110 THERAPEUTIC EXERCISES: CPT

## 2024-06-13 NOTE — PROGRESS NOTES
"Daily Note     Today's date: 2024  Patient name: Yajaira Mendez  : 1940  MRN: [id number]  Referring provider: Juwan Griffin MD  Dx:   Encounter Diagnosis     ICD-10-CM    1. S/P ORIF (open reduction internal fixation) fracture  Z98.890     Z87.81       2. Acute pain of left knee  M25.562       3. Acute post-operative pain  G89.18       4. Stiffness of left knee  M25.662                            Subjective: Patient reports \"finally getting some relief standing up and doing stairs\", notes it's still painful but not as much as it had been.        Objective: See treatment diary below      Assessment: Tolerated treatment well. Patient demonstrated difficulty with balance activities today. Fatigue with LE strength, however, improved backwards stepping compared to last session. Patient demonstrated fatigue post treatment, exhibited good technique with therapeutic exercises, and would benefit from continued PT to decrease overall fall-risk at home.       Plan: Continue per plan of care.      Precautions: HTN, hx fracture (left patellar, ORIF performed 3/9/24), osteoporosis     Patient will be placed in TROM 0-30 of motion for 2 weeks (), then 0-60 for next 2 weeks (), when walking lock brace       Manuals 5/9 5/13 5/16 5/20 5/23 5/28 6/3 6/6 6/11 6/13   PROM L knee CS            Patellar mobs  CS CS seated CS seated CS seated CS seated KY MK w/ distraction and CP and knee distraction FMT flexion  CS seated  CS seated  CS seated    Retrograde massage             Patellar tendon STM CS CS CS CS and passive left knee flexion in prone  CS KY and     GS 30\"x3 ea MK distal quad       MRE L knee flex/ext CS 75% ext and flex 45\" hold, x3, 1' rest CS 75% ext 45\" hold    2x10 alt flex/ext 2x10 alt flex/ext          STM hamstring tendons CS  CS          Neuro Re-Ed             Quad set             SLR 2x15      3x10  3x10 3x10  3x10   Weight shift             Mini squats             LAQ       2x12 5 lbs " "3x10 5#  4x5 7.5#  4x8 7.5#   Standing HS curl     Seated gtb 2x10  Seated GTB 2x10       TKE             Pre-gait hurdles              SLS X10  X10 with PT OP            Prone HS curl X10  2x5 with MET 2x10   3x10          Prone quad set 10x3\"   3# 20x3\"         Tandem ball toss   2x10 B red pball  2x10 B red pball  Rombergon airex 3x10 Romberg on airex 3x10   2x10 alt green tandem on airex 2x10 alt green tandem on airex    Pball marches     2x20 alt red    2x20 alt green tandem on airex     Leg press     55# 2x5 55# 3x5 2x8 55 lbs 2x12 55# 2x10 55#  3x10 55#   Hurdles        3 laps 4 laps, 4  hurdles, between tables  4 laps, 4 hurdles between tables    Tandem walking        3 laps 4 laps between tables 5 laps between tables    Ther Ex             Heel slide             Standing quad stretch on chair             Pt education CS CS CS          NS - activity tolerance  5' RB 5' RB 5' TM  5' TM  5' RB 5'TM 5 min 5' TM 5' TM 5' TM   Seated/Stand HR/TR      X 20 ea -      Supine hip flexor stretch       3x20\" holds  3x30\"  3x30\"                               Ther Activity             FSU  Stairs 2x reciprocal     Ecc step down 4\" x10 L Stairs 2x reciprocal  X10 6\" up L down R    3x5 8\" up L down R X10 8\" up L down R X10 ea 8\"  Up L down R &  UP R down L 10x 8\"  2x10 8\" up L, down R 2x10 8\" up L, down R  2x10 8\" up L, down R   5xSTS  X10 ecc control no foam ytb abd    NO HANDS X10 ecc control no foam gtb abd     NO HANDS 2x10 ecc control no foam gtb abd     NO HANDS 3x5 2# ecc control no foam gtb abd       NO HANDS 3x5  No foam.  GTB abd    NO HANDS 2x8 2x10     Cable column weighted walkout         11# x10 reverse  11# x10 reverse   TUG             Gait Training             SPC                          Modalities                                                  "

## 2024-06-18 ENCOUNTER — APPOINTMENT (OUTPATIENT)
Dept: PHYSICAL THERAPY | Facility: CLINIC | Age: 84
End: 2024-06-18
Payer: MEDICARE

## 2024-06-20 ENCOUNTER — OFFICE VISIT (OUTPATIENT)
Dept: PHYSICAL THERAPY | Facility: CLINIC | Age: 84
End: 2024-06-20
Payer: MEDICARE

## 2024-06-20 DIAGNOSIS — Z98.890 S/P ORIF (OPEN REDUCTION INTERNAL FIXATION) FRACTURE: Primary | ICD-10-CM

## 2024-06-20 DIAGNOSIS — M25.562 ACUTE PAIN OF LEFT KNEE: ICD-10-CM

## 2024-06-20 DIAGNOSIS — M25.662 STIFFNESS OF LEFT KNEE: ICD-10-CM

## 2024-06-20 DIAGNOSIS — Z87.81 S/P ORIF (OPEN REDUCTION INTERNAL FIXATION) FRACTURE: Primary | ICD-10-CM

## 2024-06-20 DIAGNOSIS — G89.18 ACUTE POST-OPERATIVE PAIN: ICD-10-CM

## 2024-06-20 PROCEDURE — 97112 NEUROMUSCULAR REEDUCATION: CPT

## 2024-06-20 PROCEDURE — 97110 THERAPEUTIC EXERCISES: CPT

## 2024-06-20 PROCEDURE — 97530 THERAPEUTIC ACTIVITIES: CPT

## 2024-06-20 NOTE — PROGRESS NOTES
"Daily Note     Today's date: 2024  Patient name: Yajaira Mendez  : 1940  MRN: [id number]  Referring provider: Juwan Griffin MD  Dx:   Encounter Diagnosis     ICD-10-CM    1. S/P ORIF (open reduction internal fixation) fracture  Z98.890     Z87.81       2. Acute pain of left knee  M25.562       3. Acute post-operative pain  G89.18       4. Stiffness of left knee  M25.662                              Subjective: Patient reports same stiffness when standing up after sitting for a while or doing steps.        Objective: See treatment diary below      Assessment: Tolerated treatment well. Patient demonstrated good form with exercises with some mild pain during step ups that subsided with repeated reps. Able to increase resistance with maria luisa backwards walking and added lateral walking, though challenged by resistance. Patient demonstrated fatigue post treatment, exhibited good technique with therapeutic exercises, and would benefit from continued PT to decrease overall fall-risk at home.       Plan: Continue per plan of care.      Precautions: HTN, hx fracture (left patellar, ORIF performed 3/9/24), osteoporosis     Patient will be placed in TROM 0-30 of motion for 2 weeks (), then 0-60 for next 2 weeks (), when walking lock brace       Manuals 5/9 5/13 5/16 5/20 5/23 5/28 6/3 6/6 6/11 6/13 6/20    PROM L knee CS              Patellar mobs  CS CS seated CS seated CS seated CS seated KY MK w/ distraction and CP and knee distraction FMT flexion  CS seated  CS seated  CS seated      Retrograde massage               Patellar tendon STM CS CS CS CS and passive left knee flexion in prone  CS KY and     GS 30\"x3 ea MK distal quad         MRE L knee flex/ext CS 75% ext and flex 45\" hold, x3, 1' rest CS 75% ext 45\" hold    2x10 alt flex/ext 2x10 alt flex/ext            STM hamstring tendons CS  CS            Neuro Re-Ed               Quad set               SLR 2x15      3x10  3x10 3x10  3x10 3x10   " "  Weight shift               Mini squats               LAQ       2x12 5 lbs 3x10 5#  4x5 7.5#  4x8 7.5# 3x10 7.5#    Standing HS curl     Seated gtb 2x10  Seated GTB 2x10         TKE               Pre-gait hurdles                SLS X10  X10 with PT OP              Prone HS curl X10  2x5 with MET 2x10   3x10            Prone quad set 10x3\"   3# 20x3\"           Tandem ball toss   2x10 B red pball  2x10 B red pball  Rombergon airex 3x10 Romberg on airex 3x10   2x10 alt green tandem on airex 2x10 alt green tandem on airex      Pball marches     2x20 alt red    2x20 alt green tandem on airex       Leg press     55# 2x5 55# 3x5 2x8 55 lbs 2x12 55# 2x10 55#  3x10 55# 3x8 65#    Hurdles        3 laps 4 laps, 4  hurdles, between tables  4 laps, 4 hurdles between tables  5 laps at mirror    Tandem walking        3 laps 4 laps between tables 5 laps between tables  3 laps at mirror     Lateral walking on airex strip           2 laps    Ther Ex               Heel slide               Standing quad stretch on chair               Pt education CS CS CS            NS - activity tolerance  5' RB 5' RB 5' TM  5' TM  5' RB 5'TM 5 min 5' TM 5' TM 5' TM 5' TM     Seated/Stand HR/TR      X 20 ea -        Supine hip flexor stretch       3x20\" holds  3x30\"  3x30\"                                     Ther Activity               FSU  Stairs 2x reciprocal     Ecc step down 4\" x10 L Stairs 2x reciprocal  X10 6\" up L down R    3x5 8\" up L down R X10 8\" up L down R X10 ea 8\"  Up L down R &  UP R down L 10x 8\"  2x10 8\" up L, down R 2x10 8\" up L, down R  2x10 8\" up L, down R 2x10 8\" up L, down R     5xSTS  X10 ecc control no foam ytb abd    NO HANDS X10 ecc control no foam gtb abd     NO HANDS 2x10 ecc control no foam gtb abd     NO HANDS 3x5 2# ecc control no foam gtb abd       NO HANDS 3x5  No foam.  GTB abd    NO HANDS 2x8 2x10       Cable column weighted walkout         11# x10 reverse  11# x10 reverse 14# x10 reverse,  8.5# x5 lateral      TUG  "              Gait Training               SPC                              Modalities

## 2024-06-25 ENCOUNTER — OFFICE VISIT (OUTPATIENT)
Dept: PHYSICAL THERAPY | Facility: CLINIC | Age: 84
End: 2024-06-25
Payer: MEDICARE

## 2024-06-25 DIAGNOSIS — M25.662 STIFFNESS OF LEFT KNEE: ICD-10-CM

## 2024-06-25 DIAGNOSIS — Z98.890 S/P ORIF (OPEN REDUCTION INTERNAL FIXATION) FRACTURE: Primary | ICD-10-CM

## 2024-06-25 DIAGNOSIS — G89.18 ACUTE POST-OPERATIVE PAIN: ICD-10-CM

## 2024-06-25 DIAGNOSIS — Z87.81 S/P ORIF (OPEN REDUCTION INTERNAL FIXATION) FRACTURE: Primary | ICD-10-CM

## 2024-06-25 DIAGNOSIS — M25.562 ACUTE PAIN OF LEFT KNEE: ICD-10-CM

## 2024-06-25 PROCEDURE — 97112 NEUROMUSCULAR REEDUCATION: CPT

## 2024-06-25 PROCEDURE — 97530 THERAPEUTIC ACTIVITIES: CPT

## 2024-06-25 PROCEDURE — 97110 THERAPEUTIC EXERCISES: CPT

## 2024-06-25 NOTE — PROGRESS NOTES
"Daily Note     Today's date: 2024  Patient name: Yajaira Mendez  : 1940  MRN: [id number]  Referring provider: Juwan Griffin MD  Dx:   Encounter Diagnosis     ICD-10-CM    1. S/P ORIF (open reduction internal fixation) fracture  Z98.890     Z87.81       2. Acute pain of left knee  M25.562       3. Acute post-operative pain  G89.18       4. Stiffness of left knee  M25.662                                Subjective: Patient reports \"it seems to be getting better\". Notes sometimes feeling like the knee is going to give out if walking for longer periods of time, asking about wearing a knee brace when walking for longer distances.       Objective: See treatment diary below      Assessment: Tolerated treatment well. Patient able to progress with LE strengthening and balance with increased resistance during walk backs and lateral walking. Able to progress step ups to unsteady surface on bosu. No adverse reaction and decreased overall pain in knee. Continues to be challenged with balance while walking on unsteady surface. Patient demonstrated fatigue post treatment, exhibited good technique with therapeutic exercises, and would benefit from continued PT to decrease overall fall-risk at home.       Plan: Continue per plan of care.      Precautions: HTN, hx fracture (left patellar, ORIF performed 3/9/24), osteoporosis     Patient will be placed in TROM 0-30 of motion for 2 weeks (), then 0-60 for next 2 weeks (), when walking lock brace       Manuals  6/3 6/6 6/11 6/13 6/20 6/25   PROM L knee           Patellar mobs  CS seated KY MK w/ distraction and CP and knee distraction FMT flexion  CS seated  CS seated  CS seated      Retrograde massage           Patellar tendon STM CS KY and     GS 30\"x3 ea MK distal quad         MRE L knee flex/ext           STM hamstring tendons           Neuro Re-Ed           Quad set           SLR   3x10  3x10 3x10  3x10 3x10  3x10 B flex, 2x8 abd B    Weight shift " "          Mini squats           LAQ   2x12 5 lbs 3x10 5#  4x5 7.5#  4x8 7.5# 3x10 7.5# 3x10 7.5# B   Standing HS curl Seated gtb 2x10  Seated GTB 2x10         TKE           Pre-gait hurdles            SLS           Prone HS curl           Prone quad set           Tandem ball toss Rombergon airex 3x10 Romberg on airex 3x10   2x10 alt green tandem on airex 2x10 alt green tandem on airex      Pball marches 2x20 alt red    2x20 alt green tandem on airex       Leg press 55# 2x5 55# 3x5 2x8 55 lbs 2x12 55# 2x10 55#  3x10 55# 3x8 65# 3x10 65#   Hurdles    3 laps 4 laps, 4  hurdles, between tables  4 laps, 4 hurdles between tables  5 laps at mirror 4 laps on airex    Tandem walking    3 laps 4 laps between tables 5 laps between tables  3 laps at mirror  2 laps on airex   Lateral walking on airex strip       2 laps 3 laps on airex   Romberg on bosu        1'   Ther Ex           Heel slide           Standing quad stretch on chair           Pt education           NS - activity tolerance  5' RB 5'TM 5 min 5' TM 5' TM 5' TM 5' TM  5' TM    Seated/Stand HR/TR  X 20 ea -        Supine hip flexor stretch   3x20\" holds  3x30\"  3x30\"                             Ther Activity           FSU X10 8\" up L down R X10 ea 8\"  Up L down R &  UP R down L 10x 8\"  2x10 8\" up L, down R 2x10 8\" up L, down R  2x10 8\" up L, down R 2x10 8\" up L, down R  2x10 bosu B    5xSTS 3x5 2# ecc control no foam gtb abd       NO HANDS 3x5  No foam.  GTB abd    NO HANDS 2x8 2x10       Cable column weighted walkout     11# x10 reverse  11# x10 reverse 14# x10 reverse,  8.5# x5 lateral   15# x10 reverse, 10# x5 lateral  B    TUG           Gait Training           SPC                      Modalities                                            "

## 2024-06-26 ENCOUNTER — OFFICE VISIT (OUTPATIENT)
Dept: PLASTIC SURGERY | Facility: CLINIC | Age: 84
End: 2024-06-26
Payer: MEDICARE

## 2024-06-26 DIAGNOSIS — L91.0 KELOID: Primary | ICD-10-CM

## 2024-06-26 PROCEDURE — 99203 OFFICE O/P NEW LOW 30 MIN: CPT | Performed by: PHYSICIAN ASSISTANT
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
## 2024-06-27 ENCOUNTER — TELEPHONE (OUTPATIENT)
Dept: PLASTIC SURGERY | Facility: CLINIC | Age: 84
End: 2024-06-27

## 2024-06-27 ENCOUNTER — OFFICE VISIT (OUTPATIENT)
Dept: PHYSICAL THERAPY | Facility: CLINIC | Age: 84
End: 2024-06-27
Payer: MEDICARE

## 2024-06-27 DIAGNOSIS — M25.662 STIFFNESS OF LEFT KNEE: ICD-10-CM

## 2024-06-27 DIAGNOSIS — M25.562 ACUTE PAIN OF LEFT KNEE: ICD-10-CM

## 2024-06-27 DIAGNOSIS — Z98.890 S/P ORIF (OPEN REDUCTION INTERNAL FIXATION) FRACTURE: Primary | ICD-10-CM

## 2024-06-27 DIAGNOSIS — L91.0 KELOID: Primary | ICD-10-CM

## 2024-06-27 DIAGNOSIS — Z87.81 S/P ORIF (OPEN REDUCTION INTERNAL FIXATION) FRACTURE: Primary | ICD-10-CM

## 2024-06-27 DIAGNOSIS — G89.18 ACUTE POST-OPERATIVE PAIN: ICD-10-CM

## 2024-06-27 PROCEDURE — 97112 NEUROMUSCULAR REEDUCATION: CPT

## 2024-06-27 PROCEDURE — 97110 THERAPEUTIC EXERCISES: CPT

## 2024-06-27 PROCEDURE — 97530 THERAPEUTIC ACTIVITIES: CPT

## 2024-06-27 NOTE — TELEPHONE ENCOUNTER
Received call from patient stating she had a missed call from our office but no message was left.    I warm transferred the call to Alex to further assist her with ramiro sx.

## 2024-06-27 NOTE — PROGRESS NOTES
"Daily Note     Today's date: 2024  Patient name: Yajaira Mendez  : 1940  MRN: [id number]  Referring provider: Juwan Griffin MD  Dx:   Encounter Diagnosis     ICD-10-CM    1. S/P ORIF (open reduction internal fixation) fracture  Z98.890     Z87.81       2. Acute pain of left knee  M25.562       3. Acute post-operative pain  G89.18       4. Stiffness of left knee  M25.662                                  Subjective: Patient reports some soreness following last session but improved today.       Objective: See treatment diary below      Assessment: Tolerated treatment well. Patient continues to be challenged by balance activities, though demonstrates appropriate stepping strategies to help recover balance. Use of HHA while performing walking on airex. Patient demonstrated fatigue post treatment, exhibited good technique with therapeutic exercises, and would benefit from continued PT to decrease overall fall-risk at home.       Plan: Continue per plan of care.      Precautions: HTN, hx fracture (left patellar, ORIF performed 3/9/24), osteoporosis     Patient will be placed in TROM 0-30 of motion for 2 weeks (), then 0-60 for next 2 weeks (), when walking lock brace       Manuals 5/23 5/28 6/3 6/6 6/11 6/13 6/20 6/25 6/27   PROM L knee            Patellar mobs  CS seated KY MK w/ distraction and CP and knee distraction FMT flexion  CS seated  CS seated  CS seated       Retrograde massage            Patellar tendon STM CS KY and     GS 30\"x3 ea MK distal quad          MRE L knee flex/ext            STM hamstring tendons            Neuro Re-Ed            Quad set            SLR   3x10  3x10 3x10  3x10 3x10  3x10 B flex, 2x8 abd B  3x10 B flex, 2x10 abd B    Weight shift            Mini squats            LAQ   2x12 5 lbs 3x10 5#  4x5 7.5#  4x8 7.5# 3x10 7.5# 3x10 7.5# B 3x10 7.5# B   Standing HS curl Seated gtb 2x10  Seated GTB 2x10       2x10 2# B    TKE            Pre-gait hurdles             SLS " "           Prone HS curl            Prone quad set            Tandem ball toss Rombergon airex 3x10 Romberg on airex 3x10   2x10 alt green tandem on airex 2x10 alt green tandem on airex       Pball marches 2x20 alt red    2x20 alt green tandem on airex        Leg press 55# 2x5 55# 3x5 2x8 55 lbs 2x12 55# 2x10 55#  3x10 55# 3x8 65# 3x10 65# 3x8 75#    Hurdles    3 laps 4 laps, 4  hurdles, between tables  4 laps, 4 hurdles between tables  5 laps at mirror 4 laps on airex  2 laps forward and lateral on airex   Tandem walking    3 laps 4 laps between tables 5 laps between tables  3 laps at mirror  2 laps on airex 2 laps on airex    Lateral walking on airex strip       2 laps 3 laps on airex 2 laps on airex    Romberg on bosu        1'    Ther Ex            Heel slide            Standing quad stretch on chair            Pt education            NS - activity tolerance  5' RB 5'TM 5 min 5' TM 5' TM 5' TM 5' TM  5' TM  5' TM    Seated/Stand HR/TR  X 20 ea -         Supine hip flexor stretch   3x20\" holds  3x30\"  3x30\"                                Ther Activity            FSU X10 8\" up L down R X10 ea 8\"  Up L down R &  UP R down L 10x 8\"  2x10 8\" up L, down R 2x10 8\" up L, down R  2x10 8\" up L, down R 2x10 8\" up L, down R  2x10 bosu B  2x10 bosu B    5xSTS 3x5 2# ecc control no foam gtb abd       NO HANDS 3x5  No foam.  GTB abd    NO HANDS 2x8 2x10        Cable column weighted walkout     11# x10 reverse  11# x10 reverse 14# x10 reverse,  8.5# x5 lateral   15# x10 reverse, 10# x5 lateral  B  15# x10 reverse    TUG            Gait Training            SPC                        Modalities                                               "

## 2024-07-01 ENCOUNTER — APPOINTMENT (OUTPATIENT)
Dept: PHYSICAL THERAPY | Facility: CLINIC | Age: 84
End: 2024-07-01
Payer: MEDICARE

## 2024-07-01 NOTE — PROGRESS NOTES
"Daily Note     Today's date: 2024  Patient name: Yajaira Mendez  : 1940  MRN: [id number]  Referring provider: Juwan Griffin MD  Dx:   No diagnosis found.                             Subjective: Patient reports some soreness following last session but improved today.       Objective: See treatment diary below      Assessment: Tolerated treatment well. Patient continues to be challenged by balance activities, though demonstrates appropriate stepping strategies to help recover balance. Use of HHA while performing walking on airex. Patient demonstrated fatigue post treatment, exhibited good technique with therapeutic exercises, and would benefit from continued PT to decrease overall fall-risk at home.       Plan: Continue per plan of care.      Precautions: HTN, hx fracture (left patellar, ORIF performed 3/9/24), osteoporosis     Patient will be placed in TROM 0-30 of motion for 2 weeks (), then 0-60 for next 2 weeks (), when walking lock brace       Manuals 5/23 5/28 6/3 6/6 6/11 6/13 6/20 6/25 6/27 7/1   PROM L knee             Patellar mobs  CS seated KY MK w/ distraction and CP and knee distraction FMT flexion  CS seated  CS seated  CS seated        Retrograde massage             Patellar tendon STM CS KY and     GS 30\"x3 ea MK distal quad           MRE L knee flex/ext             STM hamstring tendons             Neuro Re-Ed             Quad set             SLR   3x10  3x10 3x10  3x10 3x10  3x10 B flex, 2x8 abd B  3x10 B flex, 2x10 abd B     Weight shift             Mini squats             LAQ   2x12 5 lbs 3x10 5#  4x5 7.5#  4x8 7.5# 3x10 7.5# 3x10 7.5# B 3x10 7.5# B    Standing HS curl Seated gtb 2x10  Seated GTB 2x10       2x10 2# B     TKE             Pre-gait hurdles              SLS             Prone HS curl             Prone quad set             Tandem ball toss Rombergon airex 3x10 Romberg on airex 3x10   2x10 alt green tandem on airex 2x10 alt green tandem on airex        Pball " "marches 2x20 alt red    2x20 alt green tandem on airex         Leg press 55# 2x5 55# 3x5 2x8 55 lbs 2x12 55# 2x10 55#  3x10 55# 3x8 65# 3x10 65# 3x8 75#     Hurdles    3 laps 4 laps, 4  hurdles, between tables  4 laps, 4 hurdles between tables  5 laps at mirror 4 laps on airex  2 laps forward and lateral on airex    Tandem walking    3 laps 4 laps between tables 5 laps between tables  3 laps at mirror  2 laps on airex 2 laps on airex     Lateral walking on airex strip       2 laps 3 laps on airex 2 laps on airex     Romberg on bosu        1'     Ther Ex             Heel slide             Standing quad stretch on chair             Pt education             NS - activity tolerance  5' RB 5'TM 5 min 5' TM 5' TM 5' TM 5' TM  5' TM  5' TM     Seated/Stand HR/TR  X 20 ea -          Supine hip flexor stretch   3x20\" holds  3x30\"  3x30\"                                   Ther Activity             FSU X10 8\" up L down R X10 ea 8\"  Up L down R &  UP R down L 10x 8\"  2x10 8\" up L, down R 2x10 8\" up L, down R  2x10 8\" up L, down R 2x10 8\" up L, down R  2x10 bosu B  2x10 bosu B     5xSTS 3x5 2# ecc control no foam gtb abd       NO HANDS 3x5  No foam.  GTB abd    NO HANDS 2x8 2x10         Cable column weighted walkout     11# x10 reverse  11# x10 reverse 14# x10 reverse,  8.5# x5 lateral   15# x10 reverse, 10# x5 lateral  B  15# x10 reverse     TUG             Gait Training             SPC                          Modalities                                                  "

## 2024-07-02 ENCOUNTER — OFFICE VISIT (OUTPATIENT)
Dept: PHYSICAL THERAPY | Facility: CLINIC | Age: 84
End: 2024-07-02
Payer: MEDICARE

## 2024-07-02 ENCOUNTER — TELEPHONE (OUTPATIENT)
Age: 84
End: 2024-07-02

## 2024-07-02 DIAGNOSIS — M25.662 STIFFNESS OF LEFT KNEE: ICD-10-CM

## 2024-07-02 DIAGNOSIS — M25.562 ACUTE PAIN OF LEFT KNEE: ICD-10-CM

## 2024-07-02 DIAGNOSIS — Z87.81 S/P ORIF (OPEN REDUCTION INTERNAL FIXATION) FRACTURE: Primary | ICD-10-CM

## 2024-07-02 DIAGNOSIS — Z98.890 S/P ORIF (OPEN REDUCTION INTERNAL FIXATION) FRACTURE: Primary | ICD-10-CM

## 2024-07-02 DIAGNOSIS — G89.18 ACUTE POST-OPERATIVE PAIN: ICD-10-CM

## 2024-07-02 PROCEDURE — 97110 THERAPEUTIC EXERCISES: CPT

## 2024-07-02 PROCEDURE — 97112 NEUROMUSCULAR REEDUCATION: CPT

## 2024-07-02 PROCEDURE — 97530 THERAPEUTIC ACTIVITIES: CPT

## 2024-07-02 NOTE — TELEPHONE ENCOUNTER
Pt states that her last dental procedure was on May 15th, 2024     She is asking if Dr. Stein wants her to come in for an earlier appt than August.    Pt states that her Prolia injections were on hold as well because of her dental procedure.    Please advise patient     Thank you

## 2024-07-02 NOTE — PROGRESS NOTES
PT Re-Evaluation    Today's date: 2024  Patient name: Yajaira Mendez  : 1940  MRN: [id number]  Referring provider: Juwan Griffin MD  Dx:   Encounter Diagnosis     ICD-10-CM    1. S/P ORIF (open reduction internal fixation) fracture  Z98.890     Z87.81       2. Acute pain of left knee  M25.562       3. Acute post-operative pain  G89.18       4. Stiffness of left knee  M25.662                                    Subjective: Patient reports still having some difficulty with standing after sitting for a while, feeling stiff, and also reciprocal stairs at home due to height. Patient reports 80% improvement since beginning therapy. Able to walk without cane while in the house and out of home. Feeling less stiffness when standing up and decreased pain while performing steps. Still having mild pain walking into the house due to step height.  Best pain = 0/10. Current pain = 0/10. Worst pain = 5/10, in last 2 weeks when standing up, achy and stiff.     Objective: See treatment diary below    Passive Range of Motion   Left Knee   Flexion: 130 degrees  Extension: 0 degrees     Right Knee   Flexion: 150 degrees   Extension: 0 degrees     Active Range of Motion   Left Knee  Flexion: 132 degrees    Strength/Myotome Testing     Left Knee   Quadriceps contraction: good    Additional Strength Details  Performs SLR without quad lag    Extension: 4+  Flexion: 4+    Right Knee  Extension: 5  Flexion: 5    Funtional Tests:  5xSTS: 13.6s with use of hands // 11.78s no use of hands // 10.29 s no hands  TU.65s no AD or brace    Assessment: Patient tolerated treatment well. Patient demonstrates overall improvements in functional ability evidenced by improvements in 5xSTS scores. Patient has increased strength of Le also and shows increased steadiness on her feet. However, she continues to be mildly unsteady during gait and would benefit from continuing with PT for progression of balance activities and to decrease  overall fall-risk prior to transition to HEP. Patient is agreeable to this decision and will continue with therapy 1x/week for 4 weeks.     Goals  Short Term Goals: to be achieved by 4 weeks  1) Patient to be independent with basic HEP. Met  2) Decrease pain to 4/10 at its worst. Progress  3) Increase R knee flex ROM to 115 deg to increase function post-op. Met, 132  4) Increase LE strength by 1/2 MMT grade in all deficient planes post-op. Met    Long Term Goals: to be achieved by discharge  1) Patient to be independent with comprehensive HEP. Met  2) R knee ROM WNL all planes to improve a/iadls post-op.  3) Increase LE strength to 5/5 MMT grade in all planes to improve a/iadls post-op. Progress  4) Patient to report no sleep interruption secondary to pain post-op. Met  5) Increase ambulatory tolerance to 60 min post-op without AD. Progress, an hour if leisurely and walking slow      Plan: Continue per plan of care.     Frequency: 1x week  Duration in weeks: 4  Plan of Care beginning date: 7/2/2024  Plan of Care expiration date: 7/30/2024  Treatment plan discussed with: patient     Precautions: HTN, hx fracture (left patellar, ORIF performed 3/9/24), osteoporosis     Patient will be placed in TROM 0-30 of motion for 2 weeks (4/5), then 0-60 for next 2 weeks (4/19), when walking lock brace       Manuals 6/3 6/6 6/11 6/13 6/20 6/25 6/27 7/2   PROM L knee           Patellar mobs  MK w/ distraction and CP and knee distraction FMT flexion  CS seated  CS seated  CS seated        Retrograde massage           Patellar tendon STM MK distal quad           MRE L knee flex/ext           STM hamstring tendons           Neuro Re-Ed           Quad set           SLR 3x10  3x10 3x10  3x10 3x10  3x10 B flex, 2x8 abd B  3x10 B flex, 2x10 abd B  3x10 B flex, 2x10 abd B   Weight shift           Mini squats           LAQ 2x12 5 lbs 3x10 5#  4x5 7.5#  4x8 7.5# 3x10 7.5# 3x10 7.5# B 3x10 7.5# B 3x10 10# B   Standing HS curl       2x10 2# B  " 3x10 3# B   TKE           Pre-gait hurdles            SLS           Prone HS curl           Prone quad set           Tandem ball toss   2x10 alt green tandem on airex 2x10 alt green tandem on airex        Pball marches  2x20 alt green tandem on airex         Leg press 2x8 55 lbs 2x12 55# 2x10 55#  3x10 55# 3x8 65# 3x10 65# 3x8 75#  3x10 75#   Hurdles  3 laps 4 laps, 4  hurdles, between tables  4 laps, 4 hurdles between tables  5 laps at mirror 4 laps on airex  2 laps forward and lateral on airex    Tandem walking  3 laps 4 laps between tables 5 laps between tables  3 laps at mirror  2 laps on airex 2 laps on airex     Lateral walking on airex strip     2 laps 3 laps on airex 2 laps on airex     Romberg on bosu      1'     Ther Ex           Heel slide           Standing quad stretch on chair           Pt education           NS - activity tolerance  5 min 5' TM 5' TM 5' TM 5' TM  5' TM  5' TM  5' TM   Seated/Stand HR/TR -          Supine hip flexor stretch 3x20\" holds  3x30\"  3x30\"                               Ther Activity           FSU 10x 8\"  2x10 8\" up L, down R 2x10 8\" up L, down R  2x10 8\" up L, down R 2x10 8\" up L, down R  2x10 bosu B  2x10 bosu B  2x10 bosu B    LSU x10 B    5xSTS 2x8 2x10         Cable column weighted walkout   11# x10 reverse  11# x10 reverse 14# x10 reverse,  8.5# x5 lateral   15# x10 reverse, 10# x5 lateral  B  15# x10 reverse  16# x10 reverse, x5 lateral 12#    TUG           Gait Training           SPC                      Modalities                                            "

## 2024-07-07 NOTE — TELEPHONE ENCOUNTER
Please ask her if everything is healed from the dental procedure, and if it is, please schedule her for Prolia only injection visit as soon as possible, no prior auth required.

## 2024-07-08 ENCOUNTER — CONSULT (OUTPATIENT)
Dept: INTERNAL MEDICINE CLINIC | Facility: CLINIC | Age: 84
End: 2024-07-08
Payer: MEDICARE

## 2024-07-08 VITALS
DIASTOLIC BLOOD PRESSURE: 72 MMHG | HEIGHT: 62 IN | HEART RATE: 78 BPM | TEMPERATURE: 98.6 F | SYSTOLIC BLOOD PRESSURE: 131 MMHG | BODY MASS INDEX: 20.24 KG/M2 | OXYGEN SATURATION: 96 % | WEIGHT: 110 LBS

## 2024-07-08 DIAGNOSIS — L91.0 KELOID: ICD-10-CM

## 2024-07-08 DIAGNOSIS — K91.2 POSTSURGICAL MALABSORPTION: ICD-10-CM

## 2024-07-08 DIAGNOSIS — M81.0 AGE-RELATED OSTEOPOROSIS WITHOUT CURRENT PATHOLOGICAL FRACTURE: ICD-10-CM

## 2024-07-08 DIAGNOSIS — Z01.818 PRE-OP EXAMINATION: Primary | ICD-10-CM

## 2024-07-08 DIAGNOSIS — E03.9 ACQUIRED HYPOTHYROIDISM: ICD-10-CM

## 2024-07-08 PROCEDURE — 99214 OFFICE O/P EST MOD 30 MIN: CPT | Performed by: INTERNAL MEDICINE

## 2024-07-08 PROCEDURE — G2211 COMPLEX E/M VISIT ADD ON: HCPCS | Performed by: INTERNAL MEDICINE

## 2024-07-08 NOTE — LETTER
July 8, 2024     Derick Chi MD  [address]  [place] [address]    Patient: Yajaira Mendez   YOB: 1940   Date of Visit: 7/8/2024       Dear Dr. Chi:    Thank you for referring Yajaira Mendez to me for evaluation. Below are my notes for this consultation.    If you have questions, please do not hesitate to call me. I look forward to following your patient along with you.         Sincerely,        Terrell Schulz MD        CC: No Recipients

## 2024-07-08 NOTE — PROGRESS NOTES
"Ambulatory Visit  Name: Yajaira Mendez      : 1940      MRN: [id number]  Encounter Provider: Terrell Schulz MD  Encounter Date: 2024   Encounter department: [facility]    Assessment & Plan   1. Pre-op examination  Comments:  CBC & CMP needed to be completed prior to surgery date.  2. Keloid  Comments:  Plastic surgery scheduled for 24.  3. Acquired hypothyroidism  Comments:  Continue levothyroxine. Repeat thyroid labs for August appointment.  4. Postsurgical malabsorption  5. Age-related osteoporosis without current pathological fracture  Comments:  Continue Prolia injection now that dental work is complete.       History of Present Illness     Patient is an 84 year old female who presents for a Pre-Op exam for plastic surgery for a keloid on her upper lip.         Review of Systems   Constitutional:  Negative for chills and fever.   HENT:  Negative for ear pain and sore throat.    Eyes:  Negative for pain and visual disturbance.   Respiratory:  Negative for cough and shortness of breath.    Cardiovascular:  Negative for chest pain and palpitations.   Gastrointestinal:  Negative for abdominal pain and vomiting.   Genitourinary:  Negative for dysuria and hematuria.   Musculoskeletal:  Negative for arthralgias and back pain.   Skin:  Negative for color change and rash.   Neurological:  Negative for seizures and syncope.   All other systems reviewed and are negative.      Objective     /72 (BP Location: Left arm, Patient Position: Sitting, Cuff Size: Standard)   Pulse 78   Temp 98.6 °F (37 °C) (Temporal)   Ht 5' 2\" (1.575 m)   Wt 49.9 kg (110 lb)   LMP  (LMP Unknown)   SpO2 96%   BMI 20.12 kg/m²     Physical Exam  Vitals and nursing note reviewed.   Constitutional:       General: She is not in acute distress.     Appearance: She is well-developed.   HENT:      Head: Normocephalic and atraumatic.      Mouth/Throat:      Mouth: Mucous membranes are moist.      " Pharynx: Oropharynx is clear.   Eyes:      Conjunctiva/sclera: Conjunctivae normal.   Cardiovascular:      Rate and Rhythm: Normal rate and regular rhythm.      Pulses: Normal pulses.      Heart sounds: Normal heart sounds. No murmur heard.  Pulmonary:      Effort: Pulmonary effort is normal. No respiratory distress.      Breath sounds: Normal breath sounds.   Abdominal:      General: Abdomen is flat.      Palpations: Abdomen is soft.      Tenderness: There is no abdominal tenderness.   Musculoskeletal:         General: No swelling.      Cervical back: Normal range of motion and neck supple.   Skin:     General: Skin is warm and dry.      Capillary Refill: Capillary refill takes less than 2 seconds.   Neurological:      General: No focal deficit present.      Mental Status: She is alert and oriented to person, place, and time.   Psychiatric:         Mood and Affect: Mood normal.       Administrative Statements

## 2024-07-08 NOTE — TELEPHONE ENCOUNTER
Left patient a voicemail inquiring how they are feeling after their dental procedure that they had in May of this year and if they feel like they have healed from the procedure. If they do feel as though they have fully healed then they can call the office to schedule a nurse only prolia injection

## 2024-07-10 ENCOUNTER — CLINICAL SUPPORT (OUTPATIENT)
Dept: RHEUMATOLOGY | Facility: CLINIC | Age: 84
End: 2024-07-10
Payer: MEDICARE

## 2024-07-10 DIAGNOSIS — M81.0 OSTEOPOROSIS, UNSPECIFIED OSTEOPOROSIS TYPE, UNSPECIFIED PATHOLOGICAL FRACTURE PRESENCE: Primary | ICD-10-CM

## 2024-07-10 PROCEDURE — 96372 THER/PROPH/DIAG INJ SC/IM: CPT

## 2024-07-10 NOTE — PROGRESS NOTES
Assessment/Plan:    Yajaira Mendez came into the [facility] today 07/10/24 to receive Prolia injection.      Verbal consent obtained.  Consent given by: patient    patient states patient has been medically healthy with no underlining concerns/complications.      Yajaira Mendez presents with no symptoms today.       All insturctions were reviewed with the patient.    If the patient should have any questions/concerns, advised patient to contacted [facility].       Subjective:     History provided by: patient    Patient ID: Yajaira Mendez is a 84 y.o. female      Objective:    There were no vitals filed for this visit.    Patient tolerated the injection well without any complications.  Injection site/s left arm.  Medication was provided by provider stock.    Patient signed consent form no   Patient signed ABN form no (If no patient is not a medicare patient).   Patient waited 15 minutes after injection no (This only applies to patient's receiving first time injection).       Last Visit: 2/27/2024  Next visit:8/27/2024

## 2024-07-12 ENCOUNTER — APPOINTMENT (OUTPATIENT)
Dept: LAB | Facility: CLINIC | Age: 84
End: 2024-07-12
Payer: MEDICARE

## 2024-07-12 DIAGNOSIS — L91.0 KELOID: ICD-10-CM

## 2024-07-12 LAB
ANION GAP SERPL CALCULATED.3IONS-SCNC: 7 MMOL/L (ref 4–13)
BASOPHILS # BLD MANUAL: 0 THOUSAND/UL (ref 0–0.1)
BASOPHILS NFR MAR MANUAL: 0 % (ref 0–1)
BUN SERPL-MCNC: 23 MG/DL (ref 5–25)
CALCIUM SERPL-MCNC: 8.4 MG/DL (ref 8.4–10.2)
CHLORIDE SERPL-SCNC: 105 MMOL/L (ref 96–108)
CO2 SERPL-SCNC: 27 MMOL/L (ref 21–32)
CREAT SERPL-MCNC: 0.65 MG/DL (ref 0.6–1.3)
EOSINOPHIL # BLD MANUAL: 0.06 THOUSAND/UL (ref 0–0.4)
EOSINOPHIL NFR BLD MANUAL: 2 % (ref 0–6)
ERYTHROCYTE [DISTWIDTH] IN BLOOD BY AUTOMATED COUNT: 13.6 % (ref 11.6–15.1)
GFR SERPL CREATININE-BSD FRML MDRD: 82 ML/MIN/1.73SQ M
GLUCOSE P FAST SERPL-MCNC: 90 MG/DL (ref 65–99)
HCT VFR BLD AUTO: 38.3 % (ref 34.8–46.1)
HGB BLD-MCNC: 11.9 G/DL (ref 11.5–15.4)
LYMPHOCYTES # BLD AUTO: 0.75 THOUSAND/UL (ref 0.6–4.47)
LYMPHOCYTES # BLD AUTO: 18 % (ref 14–44)
MCH RBC QN AUTO: 29.1 PG (ref 26.8–34.3)
MCHC RBC AUTO-ENTMCNC: 31.1 G/DL (ref 31.4–37.4)
MCV RBC AUTO: 94 FL (ref 82–98)
MONOCYTES # BLD AUTO: 0.2 THOUSAND/UL (ref 0–1.22)
MONOCYTES NFR BLD: 7 % (ref 4–12)
NEUTROPHILS # BLD MANUAL: 1.87 THOUSAND/UL (ref 1.85–7.62)
NEUTS BAND NFR BLD MANUAL: 12 % (ref 0–8)
NEUTS SEG NFR BLD AUTO: 53 % (ref 43–75)
PLATELET # BLD AUTO: 131 THOUSANDS/UL (ref 149–390)
PLATELET BLD QL SMEAR: ABNORMAL
PMV BLD AUTO: 11.9 FL (ref 8.9–12.7)
POTASSIUM SERPL-SCNC: 4.3 MMOL/L (ref 3.5–5.3)
RBC # BLD AUTO: 4.09 MILLION/UL (ref 3.81–5.12)
RBC MORPH BLD: NORMAL
SODIUM SERPL-SCNC: 139 MMOL/L (ref 135–147)
VARIANT LYMPHS # BLD AUTO: 8 %
WBC # BLD AUTO: 2.88 THOUSAND/UL (ref 4.31–10.16)

## 2024-07-12 PROCEDURE — 80048 BASIC METABOLIC PNL TOTAL CA: CPT

## 2024-07-12 PROCEDURE — 85027 COMPLETE CBC AUTOMATED: CPT

## 2024-07-12 PROCEDURE — 36415 COLL VENOUS BLD VENIPUNCTURE: CPT

## 2024-07-12 PROCEDURE — 85007 BL SMEAR W/DIFF WBC COUNT: CPT

## 2024-07-15 ENCOUNTER — OFFICE VISIT (OUTPATIENT)
Dept: PHYSICAL THERAPY | Facility: CLINIC | Age: 84
End: 2024-07-15
Payer: MEDICARE

## 2024-07-15 DIAGNOSIS — Z98.890 S/P ORIF (OPEN REDUCTION INTERNAL FIXATION) FRACTURE: Primary | ICD-10-CM

## 2024-07-15 DIAGNOSIS — G89.18 ACUTE POST-OPERATIVE PAIN: ICD-10-CM

## 2024-07-15 DIAGNOSIS — M25.562 ACUTE PAIN OF LEFT KNEE: ICD-10-CM

## 2024-07-15 DIAGNOSIS — M25.662 STIFFNESS OF LEFT KNEE: ICD-10-CM

## 2024-07-15 DIAGNOSIS — Z87.81 S/P ORIF (OPEN REDUCTION INTERNAL FIXATION) FRACTURE: Primary | ICD-10-CM

## 2024-07-15 PROCEDURE — 97112 NEUROMUSCULAR REEDUCATION: CPT

## 2024-07-15 PROCEDURE — 97530 THERAPEUTIC ACTIVITIES: CPT

## 2024-07-15 NOTE — PROGRESS NOTES
Daily Note    Today's date: 7/15/2024  Patient name: Yajaira Mendez  : 1940  MRN: [id number]  Referring provider: Juwan Griffin MD  Dx:   Encounter Diagnosis     ICD-10-CM    1. S/P ORIF (open reduction internal fixation) fracture  Z98.890     Z87.81       2. Acute pain of left knee  M25.562       3. Acute post-operative pain  G89.18       4. Stiffness of left knee  M25.662                                      Subjective: Patient reports knee has been really good. Still working on doing stairs reciprocally, mild pain but able to perform. Continues with standing from seated position and moving around to help with stiffness. Last appt schedule for next Monday and patient plans for that to be her last appt and then transition to HEP.     Objective: See treatment diary below    Assessment: Patient tolerated treatment well. Patient demonstrates fatigue during leg press most notable in quads, though denies pain in knee during this activity. Patient yue challenged by balance exercises performed on airex foam with contact guarding on handrail for safety. Patient demonstrated fatigue post-treatment and remains sufficiently challenged by exercise progressions. Patient would benefit form continued therapy to address remaining functional deficits and reduce overall fall-risk.    Goals  Short Term Goals: to be achieved by 4 weeks  1) Patient to be independent with basic HEP. Met  2) Decrease pain to 4/10 at its worst. Progress  3) Increase R knee flex ROM to 115 deg to increase function post-op. Met, 132  4) Increase LE strength by 1/2 MMT grade in all deficient planes post-op. Met    Long Term Goals: to be achieved by discharge  1) Patient to be independent with comprehensive HEP. Met  2) R knee ROM WNL all planes to improve a/iadls post-op.  3) Increase LE strength to 5/5 MMT grade in all planes to improve a/iadls post-op. Progress  4) Patient to report no sleep interruption secondary to pain post-op. Met  5)  "Increase ambulatory tolerance to 60 min post-op without AD. Progress, an hour if leisurely and walking slow      Plan: Continue per plan of care.     Frequency: 1x week  Duration in weeks: 4  Plan of Care beginning date: 7/2/2024  Plan of Care expiration date: 7/30/2024  Treatment plan discussed with: patient     Precautions: HTN, hx fracture (left patellar, ORIF performed 3/9/24), osteoporosis     Patient will be placed in TROM 0-30 of motion for 2 weeks (4/5), then 0-60 for next 2 weeks (4/19), when walking lock brace       Manuals 6/3 6/6 6/11 6/13 6/20 6/25 6/27 7/2 7/15   PROM L knee            Patellar mobs  MK w/ distraction and CP and knee distraction FMT flexion  CS seated  CS seated  CS seated         Retrograde massage            Patellar tendon STM MK distal quad            MRE L knee flex/ext            STM hamstring tendons            Neuro Re-Ed            Quad set            SLR 3x10  3x10 3x10  3x10 3x10  3x10 B flex, 2x8 abd B  3x10 B flex, 2x10 abd B  3x10 B flex, 2x10 abd B    Weight shift            Mini squats            LAQ 2x12 5 lbs 3x10 5#  4x5 7.5#  4x8 7.5# 3x10 7.5# 3x10 7.5# B 3x10 7.5# B 3x10 10# B    Standing HS curl       2x10 2# B  3x10 3# B    TKE            Pre-gait hurdles             SLS         On airex 3x10\" B    Prone HS curl            Prone quad set            Tandem ball toss   2x10 alt green tandem on airex 2x10 alt green tandem on airex         Pball marches  2x20 alt green tandem on airex          Leg press 2x8 55 lbs 2x12 55# 2x10 55#  3x10 55# 3x8 65# 3x10 65# 3x8 75#  3x10 75# 75# to fatigue    Hurdles  3 laps 4 laps, 4  hurdles, between tables  4 laps, 4 hurdles between tables  5 laps at mirror 4 laps on airex  2 laps forward and lateral on airex  3 laps forward and lateral on airex   Tandem walking  3 laps 4 laps between tables 5 laps between tables  3 laps at mirror  2 laps on airex 2 laps on airex   3 laps on airex    Lateral walking on airex strip     2 laps 3 " "laps on airex 2 laps on airex   2 laps on airex    Romberg on bosu      1'   3x30'    Ther Ex            Heel slide            Standing quad stretch on chair            Pt education            NS - activity tolerance  5 min 5' TM 5' TM 5' TM 5' TM  5' TM  5' TM  5' TM 5' TM    Seated/Stand HR/TR -           Supine hip flexor stretch 3x20\" holds  3x30\"  3x30\"                                  Ther Activity            FSU 10x 8\"  2x10 8\" up L, down R 2x10 8\" up L, down R  2x10 8\" up L, down R 2x10 8\" up L, down R  2x10 bosu B  2x10 bosu B  2x10 bosu B    LSU x10 B  2x10 bosu B     LSU x10 B bosu   5xSTS 2x8 2x10          Cable column weighted walkout   11# x10 reverse  11# x10 reverse 14# x10 reverse,  8.5# x5 lateral   15# x10 reverse, 10# x5 lateral  B  15# x10 reverse  16# x10 reverse, x5 lateral 12#  16# x10 reverse, x10 lateral 12#    TUG            Gait Training            SPC                        Modalities                                               "

## 2024-07-17 ENCOUNTER — TELEPHONE (OUTPATIENT)
Age: 84
End: 2024-07-17

## 2024-07-17 ENCOUNTER — ANESTHESIA EVENT (OUTPATIENT)
Dept: PERIOP | Facility: HOSPITAL | Age: 84
End: 2024-07-17
Payer: MEDICARE

## 2024-07-17 NOTE — TELEPHONE ENCOUNTER
Esha from [facility]'s pre admission testing called stating that patient has completed CMP and CBC labs.  Can note from 7/8 be addended to state that patient is cleared for surgery since labs are completed?

## 2024-07-22 ENCOUNTER — OFFICE VISIT (OUTPATIENT)
Dept: PHYSICAL THERAPY | Facility: CLINIC | Age: 84
End: 2024-07-22
Payer: MEDICARE

## 2024-07-22 DIAGNOSIS — G89.18 ACUTE POST-OPERATIVE PAIN: ICD-10-CM

## 2024-07-22 DIAGNOSIS — M25.662 STIFFNESS OF LEFT KNEE: ICD-10-CM

## 2024-07-22 DIAGNOSIS — Z87.81 S/P ORIF (OPEN REDUCTION INTERNAL FIXATION) FRACTURE: Primary | ICD-10-CM

## 2024-07-22 DIAGNOSIS — Z98.890 S/P ORIF (OPEN REDUCTION INTERNAL FIXATION) FRACTURE: Primary | ICD-10-CM

## 2024-07-22 DIAGNOSIS — M25.562 ACUTE PAIN OF LEFT KNEE: ICD-10-CM

## 2024-07-22 PROCEDURE — 97530 THERAPEUTIC ACTIVITIES: CPT

## 2024-07-22 PROCEDURE — 97110 THERAPEUTIC EXERCISES: CPT

## 2024-07-22 PROCEDURE — 97112 NEUROMUSCULAR REEDUCATION: CPT

## 2024-07-22 NOTE — PROGRESS NOTES
Discharge Note    Today's date: 2024  Patient name: Yajaira Mendez  : 1940  MRN: [id number]  Referring provider: Juwan Griffin MD  Dx:   Encounter Diagnosis     ICD-10-CM    1. S/P ORIF (open reduction internal fixation) fracture  Z98.890     Z87.81       2. Acute pain of left knee  M25.562       3. Acute post-operative pain  G89.18       4. Stiffness of left knee  M25.662                                        Subjective: Patient reports continued stiffness in left knee when going to stand from seated position after a while. Still ready to transition to HEP to manage symptoms.    Objective: See treatment diary below    Functional Tests:  5xSTS: 13.6s with use of hands // 11.78s no use of hands // 10.29 s no hands  TU.65s no AD or brace    Assessment: Patient tolerated treatment well. Patient demonstrates overall improvements in functional ability evidenced by improvements in 5xSTS scores. Patient has increased strength of LE also and shows increased steadiness on her feet. At this time, patient is appropriate and agreeable to discharge. Patient provided with updated HEP and advised to call with any questions/concerns, verbalizes understanding.      Goals  Short Term Goals: to be achieved by 4 weeks  1) Patient to be independent with basic HEP. Met  2) Decrease pain to 4/10 at its worst. Progress  3) Increase R knee flex ROM to 115 deg to increase function post-op. Met, 132  4) Increase LE strength by 1/2 MMT grade in all deficient planes post-op. Met    Long Term Goals: to be achieved by discharge  1) Patient to be independent with comprehensive HEP. Met  2) R knee ROM WNL all planes to improve a/iadls post-op.  3) Increase LE strength to 5/5 MMT grade in all planes to improve a/iadls post-op. Progress  4) Patient to report no sleep interruption secondary to pain post-op. Met  5) Increase ambulatory tolerance to 60 min post-op without AD. Progress, an hour if leisurely and walking  "slow      Plan: Discharge POC.     Frequency: 1x week  Duration in weeks: 4  Plan of Care beginning date: 7/2/2024  Plan of Care expiration date: 7/30/2024  Treatment plan discussed with: patient     Precautions: HTN, hx fracture (left patellar, ORIF performed 3/9/24), osteoporosis     Patient will be placed in TROM 0-30 of motion for 2 weeks (4/5), then 0-60 for next 2 weeks (4/19), when walking lock brace       Manuals 6/3 6/6 6/11 6/13 6/20 6/25 6/27 7/2 7/15 7/22   PROM L knee             Patellar mobs  MK w/ distraction and CP and knee distraction FMT flexion  CS seated  CS seated  CS seated          Retrograde massage             Patellar tendon STM MK distal quad             MRE L knee flex/ext             STM hamstring tendons             Neuro Re-Ed             Quad set             SLR 3x10  3x10 3x10  3x10 3x10  3x10 B flex, 2x8 abd B  3x10 B flex, 2x10 abd B  3x10 B flex, 2x10 abd B     Weight shift             Mini squats          STS 2x10 no hands     X10 minisquat    LAQ 2x12 5 lbs 3x10 5#  4x5 7.5#  4x8 7.5# 3x10 7.5# 3x10 7.5# B 3x10 7.5# B 3x10 10# B  r   Standing HS curl       2x10 2# B  3x10 3# B  r   TKE             Pre-gait hurdles              SLS         On airex 3x10\" B     Prone HS curl             Prone quad set             Tandem ball toss   2x10 alt green tandem on airex 2x10 alt green tandem on airex          Pball marches  2x20 alt green tandem on airex           Leg press 2x8 55 lbs 2x12 55# 2x10 55#  3x10 55# 3x8 65# 3x10 65# 3x8 75#  3x10 75# 75# to fatigue  75# to fatigue    Hurdles  3 laps 4 laps, 4  hurdles, between tables  4 laps, 4 hurdles between tables  5 laps at mirror 4 laps on airex  2 laps forward and lateral on airex  3 laps forward and lateral on airex 3 laps forward and lateral    Tandem walking  3 laps 4 laps between tables 5 laps between tables  3 laps at mirror  2 laps on airex 2 laps on airex   3 laps on airex  3 laps on airex   Lateral walking on airex strip     2 " "laps 3 laps on airex 2 laps on airex   2 laps on airex  3 laps on airex    Romberg on bosu      1'   3x30'  X30'    Ther Ex             Heel slide             Standing quad stretch on chair             Pt education             NS - activity tolerance  5 min 5' TM 5' TM 5' TM 5' TM  5' TM  5' TM  5' TM 5' TM  5' TM    Seated/Stand HR/TR -            Supine hip flexor stretch 3x20\" holds  3x30\"  3x30\"                                     Ther Activity             FSU 10x 8\"  2x10 8\" up L, down R 2x10 8\" up L, down R  2x10 8\" up L, down R 2x10 8\" up L, down R  2x10 bosu B  2x10 bosu B  2x10 bosu B    LSU x10 B  2x10 bosu B     LSU x10 B bosu 2x10 bosu B    LSU x10 B bosu     5xSTS 2x8 2x10           Cable column weighted walkout   11# x10 reverse  11# x10 reverse 14# x10 reverse,  8.5# x5 lateral   15# x10 reverse, 10# x5 lateral  B  15# x10 reverse  16# x10 reverse, x5 lateral 12#  16# x10 reverse, x10 lateral 12#     TUG             Gait Training             SPC                          Modalities                                                  "

## 2024-07-23 NOTE — PRE-PROCEDURE INSTRUCTIONS
Pre-Surgery Instructions:   Medication Instructions    amLODIPine (NORVASC) 5 mg tablet Take day of surgery.    calcium-vitamin D 250-100 MG-UNIT per tablet Stop taking 2 days prior to surgery.    Cholecalciferol (Vitamin D-3) 125 MCG (5000 UT) TABS Hold day of surgery.    Cyanocobalamin (VITAMIN B 12 PO) Stop taking 2 days prior to surgery.    ergocalciferol (VITAMIN D2) 50,000 units Hold day of surgery.    famotidine (PEPCID) 20 mg tablet Take day of surgery.    ferrous gluconate (FERGON) 240 (27 FE) MG tablet Stop taking 2 days prior to surgery.    Garlic 1000 MG CAPS Stop taking 2 days prior to surgery.    hydroxychloroquine (PLAQUENIL) 200 mg tablet Take day of surgery.    latanoprost (XALATAN) 0.005 % ophthalmic solution Uses PRN- OK to take day of surgery    levothyroxine 88 mcg tablet Take day of surgery.    Multiple Vitamins-Minerals (CENTRUM SILVER PO) Stop taking 2 days prior to surgery.    Zinc 30 MG TABS Stop taking 2 days prior to surgery.   Medication instructions for day surgery reviewed. Please use only a sip of water to take your instructed medications. Avoid all over the counter vitamins, supplements and NSAIDS for one week prior to surgery per anesthesia guidelines. Tylenol is ok to take as needed.     You will receive a call one business day prior to surgery with an arrival time and hospital directions. If your surgery is scheduled on a Monday, the hospital will be calling you on the Friday prior to your surgery. If you have not heard from anyone by 8pm, please call the hospital supervisor through the hospital  at [phone]. ([place] [phone] or [place] [phone]).    Do not eat or drink anything after midnight the night before your surgery, including candy, mints, lifesavers, or chewing gum. Do not drink alcohol 24hrs before your surgery. Try not to smoke at least 24hrs before your surgery.       Follow the pre surgery showering instructions as listed in the “My Surgical  Experience Booklet” or otherwise provided by your surgeon's office. Do not use a blade to shave the surgical area 1 week before surgery. It is okay to use a clean electric clippers up to 24 hours before surgery. Do not apply any lotions, creams, including makeup, cologne, deodorant, or perfumes after showering on the day of your surgery. Do not use dry shampoo, hair spray, hair gel, or any type of hair products.     No contact lenses, eye make-up, or artificial eyelashes. Remove nail polish, including gel polish, and any artificial, gel, or acrylic nails if possible. Remove all jewelry including rings and body piercing jewelry.     Wear causal clothing that is easy to take on and off. Consider your type of surgery.    Keep any valuables, jewelry, piercings at home. Please bring any specially ordered equipment (sling, braces) if indicated.    Arrange for a responsible person to drive you to and from the hospital on the day of your surgery. Please confirm the visitor policy for the day of your procedure when you receive your phone call with an arrival time.     Call the surgeon's office with any new illnesses, exposures, or additional questions prior to surgery.    Please reference your “My Surgical Experience Booklet” for additional information to prepare for your upcoming surgery.

## 2024-07-24 DIAGNOSIS — E55.9 VITAMIN D DEFICIENCY DISEASE: ICD-10-CM

## 2024-07-24 PROCEDURE — NC001 PR NO CHARGE: Performed by: PHYSICIAN ASSISTANT

## 2024-07-24 RX ORDER — ERGOCALCIFEROL 1.25 MG/1
CAPSULE ORAL
Qty: 4 CAPSULE | Refills: 3 | Status: SHIPPED | OUTPATIENT
Start: 2024-07-24
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
## 2024-07-26 ENCOUNTER — ANESTHESIA (OUTPATIENT)
Dept: PERIOP | Facility: HOSPITAL | Age: 84
End: 2024-07-26
Payer: MEDICARE

## 2024-07-26 ENCOUNTER — HOSPITAL ENCOUNTER (OUTPATIENT)
Facility: HOSPITAL | Age: 84
Setting detail: OUTPATIENT SURGERY
Discharge: HOME | End: 2024-07-26
Attending: STUDENT IN AN ORGANIZED HEALTH CARE EDUCATION/TRAINING PROGRAM | Admitting: STUDENT IN AN ORGANIZED HEALTH CARE EDUCATION/TRAINING PROGRAM
Payer: MEDICARE

## 2024-07-26 VITALS
HEIGHT: 62 IN | TEMPERATURE: 98 F | DIASTOLIC BLOOD PRESSURE: 68 MMHG | BODY MASS INDEX: 20.24 KG/M2 | HEART RATE: 69 BPM | OXYGEN SATURATION: 98 % | WEIGHT: 110 LBS | RESPIRATION RATE: 12 BRPM | SYSTOLIC BLOOD PRESSURE: 155 MMHG

## 2024-07-26 DIAGNOSIS — L91.0 KELOID: ICD-10-CM

## 2024-07-26 PROCEDURE — 13151 CMPLX RPR E/N/E/L 1.1-2.5 CM: CPT | Performed by: STUDENT IN AN ORGANIZED HEALTH CARE EDUCATION/TRAINING PROGRAM

## 2024-07-26 PROCEDURE — 88302 TISSUE EXAM BY PATHOLOGIST: CPT | Performed by: PATHOLOGY

## 2024-07-26 PROCEDURE — 13151 CMPLX RPR E/N/E/L 1.1-2.5 CM: CPT | Performed by: PHYSICIAN ASSISTANT

## 2024-07-26 PROCEDURE — 11442 EXC FACE-MM B9+MARG 1.1-2 CM: CPT | Performed by: STUDENT IN AN ORGANIZED HEALTH CARE EDUCATION/TRAINING PROGRAM

## 2024-07-26 PROCEDURE — 11442 EXC FACE-MM B9+MARG 1.1-2 CM: CPT | Performed by: PHYSICIAN ASSISTANT

## 2024-07-26 RX ORDER — ONDANSETRON 2 MG/ML
4 INJECTION INTRAMUSCULAR; INTRAVENOUS EVERY 6 HOURS PRN
Status: CANCELLED | OUTPATIENT
Start: 2024-07-26

## 2024-07-26 RX ORDER — OXYCODONE HYDROCHLORIDE 5 MG/1
5 TABLET ORAL EVERY 4 HOURS PRN
Status: CANCELLED | OUTPATIENT
Start: 2024-07-26

## 2024-07-26 RX ORDER — LIDOCAINE HYDROCHLORIDE 20 MG/ML
INJECTION, SOLUTION EPIDURAL; INFILTRATION; INTRACAUDAL; PERINEURAL AS NEEDED
Status: DISCONTINUED | OUTPATIENT
Start: 2024-07-26 | End: 2024-07-26

## 2024-07-26 RX ORDER — EPHEDRINE SULFATE 50 MG/ML
INJECTION INTRAVENOUS AS NEEDED
Status: DISCONTINUED | OUTPATIENT
Start: 2024-07-26 | End: 2024-07-26

## 2024-07-26 RX ORDER — PROPOFOL 10 MG/ML
INJECTION, EMULSION INTRAVENOUS AS NEEDED
Status: DISCONTINUED | OUTPATIENT
Start: 2024-07-26 | End: 2024-07-26

## 2024-07-26 RX ORDER — LIDOCAINE HYDROCHLORIDE AND EPINEPHRINE 10; 10 MG/ML; UG/ML
INJECTION, SOLUTION INFILTRATION; PERINEURAL AS NEEDED
Status: DISCONTINUED | OUTPATIENT
Start: 2024-07-26 | End: 2024-07-26 | Stop reason: HOSPADM

## 2024-07-26 RX ORDER — HYDROMORPHONE HCL/PF 1 MG/ML
0.25 SYRINGE (ML) INJECTION
Status: DISCONTINUED | OUTPATIENT
Start: 2024-07-26 | End: 2024-07-26 | Stop reason: HOSPADM

## 2024-07-26 RX ORDER — ONDANSETRON 2 MG/ML
4 INJECTION INTRAMUSCULAR; INTRAVENOUS ONCE AS NEEDED
Status: DISCONTINUED | OUTPATIENT
Start: 2024-07-26 | End: 2024-07-26 | Stop reason: HOSPADM

## 2024-07-26 RX ORDER — ROCURONIUM BROMIDE 10 MG/ML
INJECTION, SOLUTION INTRAVENOUS AS NEEDED
Status: DISCONTINUED | OUTPATIENT
Start: 2024-07-26 | End: 2024-07-26

## 2024-07-26 RX ORDER — TRAMADOL HYDROCHLORIDE 50 MG/1
50 TABLET ORAL EVERY 6 HOURS PRN
Qty: 3 TABLET | Refills: 0 | Status: SHIPPED | OUTPATIENT
Start: 2024-07-26 | End: 2024-07-28

## 2024-07-26 RX ORDER — ACETAMINOPHEN 325 MG/1
650 TABLET ORAL EVERY 4 HOURS PRN
Status: CANCELLED | OUTPATIENT
Start: 2024-07-26

## 2024-07-26 RX ORDER — SODIUM CHLORIDE, SODIUM LACTATE, POTASSIUM CHLORIDE, CALCIUM CHLORIDE 600; 310; 30; 20 MG/100ML; MG/100ML; MG/100ML; MG/100ML
INJECTION, SOLUTION INTRAVENOUS CONTINUOUS PRN
Status: DISCONTINUED | OUTPATIENT
Start: 2024-07-26 | End: 2024-07-26

## 2024-07-26 RX ORDER — CEFAZOLIN SODIUM 1 G/50ML
1000 SOLUTION INTRAVENOUS ONCE
Status: COMPLETED | OUTPATIENT
Start: 2024-07-26 | End: 2024-07-26

## 2024-07-26 RX ORDER — FENTANYL CITRATE 50 UG/ML
INJECTION, SOLUTION INTRAMUSCULAR; INTRAVENOUS AS NEEDED
Status: DISCONTINUED | OUTPATIENT
Start: 2024-07-26 | End: 2024-07-26

## 2024-07-26 RX ORDER — SODIUM CHLORIDE, SODIUM LACTATE, POTASSIUM CHLORIDE, CALCIUM CHLORIDE 600; 310; 30; 20 MG/100ML; MG/100ML; MG/100ML; MG/100ML
125 INJECTION, SOLUTION INTRAVENOUS CONTINUOUS
Status: DISCONTINUED | OUTPATIENT
Start: 2024-07-26 | End: 2024-07-26 | Stop reason: HOSPADM

## 2024-07-26 RX ORDER — DEXAMETHASONE SODIUM PHOSPHATE 10 MG/ML
INJECTION, SOLUTION INTRAMUSCULAR; INTRAVENOUS AS NEEDED
Status: DISCONTINUED | OUTPATIENT
Start: 2024-07-26 | End: 2024-07-26

## 2024-07-26 RX ORDER — ACETAMINOPHEN 325 MG/1
975 TABLET ORAL ONCE
Status: COMPLETED | OUTPATIENT
Start: 2024-07-26 | End: 2024-07-26

## 2024-07-26 RX ORDER — GABAPENTIN 300 MG/1
300 CAPSULE ORAL ONCE
Status: COMPLETED | OUTPATIENT
Start: 2024-07-26 | End: 2024-07-26

## 2024-07-26 RX ORDER — ONDANSETRON 2 MG/ML
INJECTION INTRAMUSCULAR; INTRAVENOUS AS NEEDED
Status: DISCONTINUED | OUTPATIENT
Start: 2024-07-26 | End: 2024-07-26

## 2024-07-26 RX ORDER — FENTANYL CITRATE/PF 50 MCG/ML
25 SYRINGE (ML) INJECTION
Status: DISCONTINUED | OUTPATIENT
Start: 2024-07-26 | End: 2024-07-26 | Stop reason: HOSPADM

## 2024-07-26 RX ADMIN — SODIUM CHLORIDE, SODIUM LACTATE, POTASSIUM CHLORIDE, AND CALCIUM CHLORIDE: .6; .31; .03; .02 INJECTION, SOLUTION INTRAVENOUS at 07:11

## 2024-07-26 RX ADMIN — DEXAMETHASONE SODIUM PHOSPHATE 10 MG: 10 INJECTION, SOLUTION INTRAMUSCULAR; INTRAVENOUS at 07:34

## 2024-07-26 RX ADMIN — ACETAMINOPHEN 975 MG: 325 TABLET, FILM COATED ORAL at 06:24

## 2024-07-26 RX ADMIN — FENTANYL CITRATE 50 MCG: 50 INJECTION, SOLUTION INTRAMUSCULAR; INTRAVENOUS at 07:45

## 2024-07-26 RX ADMIN — PROPOFOL 100 MG: 10 INJECTION, EMULSION INTRAVENOUS at 07:34

## 2024-07-26 RX ADMIN — ROCURONIUM BROMIDE 40 MG: 10 INJECTION, SOLUTION INTRAVENOUS at 07:34

## 2024-07-26 RX ADMIN — CEFAZOLIN SODIUM 1000 MG: 1 SOLUTION INTRAVENOUS at 07:30

## 2024-07-26 RX ADMIN — SUGAMMADEX 200 MG: 100 INJECTION, SOLUTION INTRAVENOUS at 08:07

## 2024-07-26 RX ADMIN — EPHEDRINE SULFATE 10 MG: 50 INJECTION INTRAVENOUS at 07:50

## 2024-07-26 RX ADMIN — GABAPENTIN 300 MG: 300 CAPSULE ORAL at 06:24

## 2024-07-26 RX ADMIN — ONDANSETRON 4 MG: 2 INJECTION INTRAMUSCULAR; INTRAVENOUS at 07:34

## 2024-07-26 RX ADMIN — LIDOCAINE HYDROCHLORIDE 50 MG: 20 INJECTION, SOLUTION EPIDURAL; INFILTRATION; INTRACAUDAL; PERINEURAL at 07:34

## 2024-07-26 NOTE — OP NOTE
OPERATIVE REPORT  PATIENT NAME: Yajaira Mendez    :  1940  MRN: [id number]  Pt Location: [facility] OR ROOM 02    SURGERY DATE: 2024    Surgeons and Role:     * Derick Chi MD - Primary     * Steven Ta PA-C - Assisting    Preop Diagnosis:  Keloid [L91.0]    Post-Op Diagnosis Codes:     * Keloid [L91.0]    Procedure(s):  Excision of upper lip keloid/hypertrophic scar (size 1.1x0.5 cm) with complex closure (total length 1.3 cm)    Specimen(s):  ID Type Source Tests Collected by Time Destination   1 : keloid scar of upper lip Tissue Skin, Plastic Repair TISSUE EXAM Derick Chi MD 2024  7:58 AM        Estimated Blood Loss:   Minimal    Drains:  * No LDAs found *    Anesthesia Type:   General    Operative Indications:  Keloid [L91.0]    Operative Findings:  Total 1 cc of 1% lidocaine with epi used      Complications:   None    Procedure and Technique:  Patient was brought to the operating room, transferred to the operating table in supine fashion. After undergoing general anesthesia, a timeout was performed at which point all patient identifiers were deemed to be correct. The face was prepped and draped in the normal sterile fashion. I first began marking around the lesion. Next, 1% lidocaine with epi was used to infiltrate the area. After allowing for local to take effect, I excised the lesion including underlying fibrosis down to but not extending to underlying orbicularis muscle. The lesion was sent for routine pathology. I then proceeded with complex closure to allow for tension free closure of the wound. The surrounding skin flaps were raised at least one width of the defect in all directions to allow for tension free closure of the wound. I then proceeded with complex closure. Due to the scant subcutaneous tissue, the dermis and skin was reapproximated with 4-0 interrupted nylon sutures. Bacitracin was applied to the incision.    This concluded the procedure. Patient tolerated the procedure  well without complications. At the end of the case, all sponge, needle, and instrument counts were correct. Patient was awakened from anesthesia and taken to the PACU in stable condition.    I was present for the entire procedure, A qualified resident physician was not available and A physician assistant was required during the procedure for retraction, tissue handling, dissection and suturing        Patient Disposition:  PACU         SIGNATURE: Derick Chi MD  DATE: July 26, 2024  TIME: 8:09 AM

## 2024-07-26 NOTE — ANESTHESIA POSTPROCEDURE EVALUATION
Post-Op Assessment Note    CV Status:  Stable    Pain management: adequate       Mental Status:  Lethargic and sleepy   Hydration Status:  Stable   PONV Controlled:  None   Airway Patency:  Patent     Post Op Vitals Reviewed: Yes    No anethesia notable event occurred.    Staff: Anesthesiologist, CRNA   Comments: vss              BP   178/76   Temp   97   Pulse  66   Resp   12   SpO2  100

## 2024-07-26 NOTE — DISCHARGE INSTR - AVS FIRST PAGE
Discharge Instructions - Excision      Please take tylenol for pain.  You can take also ibuprofen if your pain is not resolved with Tylenol.  Add Tramadol AS NEEDED.    Keep Bacitracin ointment on upper lip   Keep wound clean and dry.    May shower tomorrow.     Use a gentle shampoo such as baby shampoo.    Keep head elevated to avoid bruising, swelling.    Avoid applying any other lotions or creams to the incision.  Avoid spicy or hot foods that may irritate the incision    Follow up in our office in 7 days.    Please call [phone] for appointment.    If  you experience fever greater than 100.5°, increasing redness,  increasing drainage or swelling, call our office at [phone].

## 2024-07-26 NOTE — ANESTHESIA PREPROCEDURE EVALUATION
Procedure:  EXCISION OF UPPER LIP KELOID SCAR WITH COMPLEX CLOSURE (Mouth)  FLAP LOCAL UPPER LIP (Mouth)    Relevant Problems   CARDIO   (+) Essential hypertension      ENDO   (+) Hypothyroidism      GI/HEPATIC   (+) Gastroesophageal reflux disease without esophagitis      HEMATOLOGY   (+) Anemia      MUSCULOSKELETAL   (+) Erosive osteoarthritis of both hands   (+) Localized, secondary osteoarthritis of the shoulder region      NEURO/PSYCH   (+) TIA (transient ischemic attack) (Resolved)      Surgery/Wound/Pain   (+) History of carotid endarterectomy   (+) Hx of gastric bypass        Physical Exam    Airway    Mallampati score: II  TM Distance: >3 FB  Neck ROM: limited     Dental   No notable dental hx     Cardiovascular      Pulmonary      Other Findings  post-pubertal.      Anesthesia Plan  ASA Score- 3     Anesthesia Type- general with ASA Monitors.         Additional Monitors:     Airway Plan: ETT.           Plan Factors-        Patient summary reviewed.    Patient is not a current smoker.              Induction- intravenous.    Postoperative Plan- Plan for postoperative opioid use.         Informed Consent- Anesthetic plan and risks discussed with patient and spouse.  I personally reviewed this patient with the CRNA. Discussed and agreed on the Anesthesia Plan with the CRNA..

## 2024-08-01 ENCOUNTER — TELEPHONE (OUTPATIENT)
Age: 84
End: 2024-08-01

## 2024-08-01 NOTE — TELEPHONE ENCOUNTER
Patient called asking if is okay to keep applying bacitracin until tomorrow ; I did inform that it should be okay since her suture removal is scheduled tomorrow she can ask the provider for how long she needs to use it .   Patient verbally admitted to understand.

## 2024-08-02 ENCOUNTER — OFFICE VISIT (OUTPATIENT)
Dept: PLASTIC SURGERY | Facility: CLINIC | Age: 84
End: 2024-08-02

## 2024-08-02 DIAGNOSIS — L91.0 KELOID SCAR OF SKIN: Primary | ICD-10-CM

## 2024-08-02 PROCEDURE — 99024 POSTOP FOLLOW-UP VISIT: CPT | Performed by: PHYSICIAN ASSISTANT

## 2024-08-02 NOTE — PROGRESS NOTES
Assessment/Plan:     Pt is an 84 YOF who is an upper lip keloid/hypertrophic scar with complex closure by Dr. Chi on 7/26/2024.  Please see HPI.    Patient returns to the office today for first postoperative visit and suture removal.  Patient has had no issues postoperatively.    The patient will return to our office in approximately 3 weeks for a scar check.  The patient will apply Aquaphor to the incision site for the next 4-5 days and then may begin silicone scar treatment.  She was advised to avoid sun exposure and to wear an SPF of at least 30 at all times.      Diagnoses and all orders for this visit:    Keloid scar of skin          Subjective:     Patient ID: Yajaira Mendez is a 84 y.o. female.    HPI    Patient reports no issues or concerns.    Review of Systems    See HPI    Objective:     Physical Exam      Incision and sutures are clean, dry and intact.

## 2024-08-06 ENCOUNTER — APPOINTMENT (OUTPATIENT)
Dept: LAB | Facility: CLINIC | Age: 84
End: 2024-08-06
Payer: MEDICARE

## 2024-08-06 DIAGNOSIS — E03.9 ACQUIRED HYPOTHYROIDISM: ICD-10-CM

## 2024-08-06 LAB
T4 FREE SERPL-MCNC: 1 NG/DL (ref 0.61–1.12)
TSH SERPL DL<=0.05 MIU/L-ACNC: 0.47 UIU/ML (ref 0.45–4.5)

## 2024-08-06 PROCEDURE — 84439 ASSAY OF FREE THYROXINE: CPT

## 2024-08-06 PROCEDURE — 84443 ASSAY THYROID STIM HORMONE: CPT

## 2024-08-06 PROCEDURE — 36415 COLL VENOUS BLD VENIPUNCTURE: CPT

## 2024-08-21 ENCOUNTER — OFFICE VISIT (OUTPATIENT)
Dept: PLASTIC SURGERY | Facility: CLINIC | Age: 84
End: 2024-08-21

## 2024-08-21 DIAGNOSIS — L91.0 KELOID SCAR OF SKIN: Primary | ICD-10-CM

## 2024-08-21 PROCEDURE — 99024 POSTOP FOLLOW-UP VISIT: CPT | Performed by: PHYSICIAN ASSISTANT

## 2024-08-21 NOTE — PROGRESS NOTES
Assessment/Plan:     Pt is an 84 YOF who is an upper lip keloid/hypertrophic scar with complex closure by Dr. Chi on 7/26/2024.  Please see HPI.     Patient returns to the office today for a scar check.  Patient has had no issues postoperatively.     The patient will return to our office in approximately 6 weeks for a scar check.  She should continue with silicone scar treatment and massage.      Diagnoses and all orders for this visit:    Keloid scar of skin          Subjective:     Patient ID: Yajaira Mendez is a 84 y.o. female.    HPI    Patient reports no issues or concerns.    Review of Systems    See HPI    Objective:     Physical Exam      Healed incision above her lip without signs of keloid scarring.

## 2024-08-23 ENCOUNTER — OFFICE VISIT (OUTPATIENT)
Dept: INTERNAL MEDICINE CLINIC | Facility: CLINIC | Age: 84
End: 2024-08-23
Payer: MEDICARE

## 2024-08-23 VITALS
BODY MASS INDEX: 20.24 KG/M2 | HEIGHT: 62 IN | HEART RATE: 77 BPM | OXYGEN SATURATION: 100 % | SYSTOLIC BLOOD PRESSURE: 144 MMHG | DIASTOLIC BLOOD PRESSURE: 65 MMHG | TEMPERATURE: 97.8 F | WEIGHT: 110 LBS

## 2024-08-23 DIAGNOSIS — K91.2 POSTSURGICAL MALABSORPTION: Primary | ICD-10-CM

## 2024-08-23 DIAGNOSIS — E03.9 ACQUIRED HYPOTHYROIDISM: ICD-10-CM

## 2024-08-23 DIAGNOSIS — K29.70 GASTRITIS WITHOUT BLEEDING, UNSPECIFIED CHRONICITY, UNSPECIFIED GASTRITIS TYPE: ICD-10-CM

## 2024-08-23 DIAGNOSIS — Z23 ENCOUNTER FOR IMMUNIZATION: ICD-10-CM

## 2024-08-23 DIAGNOSIS — I10 ESSENTIAL HYPERTENSION: ICD-10-CM

## 2024-08-23 DIAGNOSIS — D69.6 PLATELETS DECREASED (HCC): ICD-10-CM

## 2024-08-23 PROCEDURE — 99214 OFFICE O/P EST MOD 30 MIN: CPT | Performed by: INTERNAL MEDICINE

## 2024-08-23 PROCEDURE — 90677 PCV20 VACCINE IM: CPT

## 2024-08-23 PROCEDURE — G0009 ADMIN PNEUMOCOCCAL VACCINE: HCPCS

## 2024-08-23 RX ORDER — FAMOTIDINE 20 MG/1
20 TABLET, FILM COATED ORAL DAILY
Qty: 90 TABLET | Refills: 1 | Status: SHIPPED | OUTPATIENT
Start: 2024-08-23

## 2024-08-23 RX ORDER — LEVOTHYROXINE SODIUM 88 UG/1
88 TABLET ORAL
Qty: 100 TABLET | Refills: 1 | Status: SHIPPED | OUTPATIENT
Start: 2024-08-23

## 2024-08-23 RX ORDER — AMLODIPINE BESYLATE 5 MG/1
5 TABLET ORAL DAILY
Qty: 90 TABLET | Refills: 1 | Status: SHIPPED | OUTPATIENT
Start: 2024-08-23
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
## 2024-08-27 ENCOUNTER — OFFICE VISIT (OUTPATIENT)
Dept: RHEUMATOLOGY | Facility: CLINIC | Age: 84
End: 2024-08-27
Payer: MEDICARE

## 2024-08-27 ENCOUNTER — TELEPHONE (OUTPATIENT)
Dept: RHEUMATOLOGY | Facility: CLINIC | Age: 84
End: 2024-08-27

## 2024-08-27 VITALS
SYSTOLIC BLOOD PRESSURE: 140 MMHG | HEIGHT: 62 IN | OXYGEN SATURATION: 98 % | BODY MASS INDEX: 20.24 KG/M2 | HEART RATE: 74 BPM | DIASTOLIC BLOOD PRESSURE: 68 MMHG | WEIGHT: 110 LBS

## 2024-08-27 DIAGNOSIS — Z79.899 HIGH RISK MEDICATION USE: ICD-10-CM

## 2024-08-27 DIAGNOSIS — M81.0 OSTEOPOROSIS, UNSPECIFIED OSTEOPOROSIS TYPE, UNSPECIFIED PATHOLOGICAL FRACTURE PRESENCE: ICD-10-CM

## 2024-08-27 DIAGNOSIS — M19.90 INFLAMMATORY ARTHRITIS: ICD-10-CM

## 2024-08-27 DIAGNOSIS — M15.4 EROSIVE OSTEOARTHRITIS OF BOTH HANDS: Primary | ICD-10-CM

## 2024-08-27 DIAGNOSIS — M19.041 ARTHRITIS OF RIGHT HAND: ICD-10-CM

## 2024-08-27 PROCEDURE — 20600 DRAIN/INJ JOINT/BURSA W/O US: CPT | Performed by: INTERNAL MEDICINE

## 2024-08-27 PROCEDURE — 99214 OFFICE O/P EST MOD 30 MIN: CPT | Performed by: INTERNAL MEDICINE

## 2024-08-27 RX ORDER — LIDOCAINE HYDROCHLORIDE 10 MG/ML
0.25 INJECTION, SOLUTION INFILTRATION; PERINEURAL ONCE
Status: COMPLETED | OUTPATIENT
Start: 2024-08-27 | End: 2024-08-27

## 2024-08-27 RX ORDER — HYDROXYCHLOROQUINE SULFATE 200 MG/1
200 TABLET, FILM COATED ORAL EVERY MORNING
Qty: 90 TABLET | Refills: 1 | Status: SHIPPED | OUTPATIENT
Start: 2024-08-27 | End: 2025-02-23

## 2024-08-27 RX ORDER — TRIAMCINOLONE ACETONIDE 40 MG/ML
10 INJECTION, SUSPENSION INTRA-ARTICULAR; INTRAMUSCULAR ONCE
Status: COMPLETED | OUTPATIENT
Start: 2024-08-27 | End: 2024-08-27

## 2024-08-27 RX ADMIN — LIDOCAINE HYDROCHLORIDE 0.25 ML: 10 INJECTION, SOLUTION INFILTRATION; PERINEURAL at 16:02

## 2024-08-27 RX ADMIN — TRIAMCINOLONE ACETONIDE 10 MG: 40 INJECTION, SUSPENSION INTRA-ARTICULAR; INTRAMUSCULAR at 16:01

## 2024-08-27 NOTE — TELEPHONE ENCOUNTER
Pt was scheduled for 7/1/25 but needs to be scheduled for mid July since pt is getting prolia on 1/13/25

## 2024-08-27 NOTE — PROGRESS NOTES
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 15.1 % Final    Platelets 03/09/2024 146 (L)  149 - 390 Thousands/uL Final    MPV 03/09/2024 11.9  8.9 - 12.7 fL Final    Sodium 03/09/2024 141  135 - 147 mmol/L Final    Potassium 03/09/2024 4.0  3.5 - 5.3 mmol/L Final    Chloride 03/09/2024 108  96 - 108 mmol/L Final    CO2 03/09/2024 26  21 - 32 mmol/L Final    ANION GAP 03/09/2024 7  mmol/L Final    BUN 03/09/2024 16  5 - 25 mg/dL Final    Creatinine 03/09/2024 0.70  0.60 - 1.30 mg/dL Final    Standardized to IDMS reference method    Glucose 03/09/2024 84  65 - 140 mg/dL Final    If the patient is fasting, the ADA then defines impaired fasting glucose as > 100 mg/dL and diabetes as > or equal to 123 mg/dL.    Calcium 03/09/2024 8.7  8.4 - 10.2 mg/dL Final    eGFR 03/09/2024 80  ml/min/1.73sq m Final    WBC 03/10/2024 4.99  4.31 - 10.16 Thousand/uL Final    RBC 03/10/2024 3.51 (L)  3.81 - 5.12 Million/uL Final    Hemoglobin 03/10/2024 10.5 (L)  11.5 - 15.4 g/dL Final    Hematocrit 03/10/2024 32.9 (L)  34.8 - 46.1 % Final    MCV 03/10/2024 94  82 - 98 fL Final    MCH 03/10/2024 29.9  26.8 - 34.3 pg Final    MCHC 03/10/2024 31.9  31.4 - 37.4 g/dL Final    RDW 03/10/2024 13.8  11.6 - 15.1 % Final    MPV 03/10/2024 11.4  8.9 - 12.7 fL Final    Platelets 03/10/2024 154  149 - 390 Thousands/uL  Final    nRBC 03/10/2024 0  /100 WBCs Final    Segmented % 03/10/2024 81 (H)  43 - 75 % Final    Immature Grans % 03/10/2024 1  0 - 2 % Final    Lymphocytes % 03/10/2024 10 (L)  14 - 44 % Final    Monocytes % 03/10/2024 8  4 - 12 % Final    Eosinophils Relative 03/10/2024 0  0 - 6 % Final    Basophils Relative 03/10/2024 0  0 - 1 % Final    Absolute Neutrophils 03/10/2024 4.05  1.85 - 7.62 Thousands/µL Final    Absolute Immature Grans 03/10/2024 0.03  0.00 - 0.20 Thousand/uL Final    Absolute Lymphocytes 03/10/2024 0.52 (L)  0.60 - 4.47 Thousands/µL Final    Absolute Monocytes 03/10/2024 0.39  0.17 - 1.22 Thousand/µL Final    Eosinophils Absolute 03/10/2024 0.00  0.00 - 0.61 Thousand/µL Final    Basophils Absolute 03/10/2024 0.00  0.00 - 0.10 Thousands/µL Final    Sodium 03/10/2024 141  135 - 147 mmol/L Final    Potassium 03/10/2024 4.2  3.5 - 5.3 mmol/L Final    Chloride 03/10/2024 108  96 - 108 mmol/L Final    CO2 03/10/2024 24  21 - 32 mmol/L Final    ANION GAP 03/10/2024 9  mmol/L Final    BUN 03/10/2024 14  5 - 25 mg/dL Final    Creatinine 03/10/2024 0.60  0.60 - 1.30 mg/dL Final    Standardized to IDMS reference method    Glucose 03/10/2024 87  65 - 140 mg/dL Final    If the patient is fasting, the ADA then defines impaired fasting glucose as > 100 mg/dL and diabetes as > or equal to 123 mg/dL.    Calcium 03/10/2024 9.1  8.4 - 10.2 mg/dL Final    eGFR 03/10/2024 85  ml/min/1.73sq m Final    Supplier Name 03/10/2024 [employer]   Final-Edited    Supplier Phone Number 03/10/2024 [phone]   Final-Edited    Order Status 03/10/2024 Delivery Successful   Final-Edited    Delivery Note 03/10/2024 Contact [phone] (Mobile)  Alternate  Vik Mendez (Spouse)[phone] (Mobile)   Final-Edited    Delivery Request Date 03/10/2024 03/10/2024   Final-Edited    Date Delivered  03/10/2024 03/10/2024   Final-Edited    Item Description 03/10/2024 Wheeled Walker, Adult    Final-Edited    Qty: 1    Item Description 03/10/2024 3 in 1 Commode   Final-Edited    Qty: 1   Appointment on 03/06/2024   Component Date Value Ref Range Status    Ventricular Rate 03/06/2024 79  BPM Final    Atrial Rate 03/06/2024 79  BPM Final    NE Interval 03/06/2024 158  ms Final    QRSD Interval 03/06/2024 76  ms Final    QT Interval 03/06/2024 386  ms Final    QTC Interval 03/06/2024 442  ms Final    P Axis 03/06/2024 90  degrees Final    QRS [place] 03/06/2024 61  degrees Final    T Wave [place] 03/06/2024 74  degrees Final   Appointment on 03/06/2024   Component Date Value Ref Range Status    Sodium 03/06/2024 136  135 - 147 mmol/L Final    Potassium 03/06/2024 4.1  3.5 - 5.3 mmol/L Final    Chloride 03/06/2024 100  96 - 108 mmol/L Final    CO2 03/06/2024 25  21 - 32 mmol/L Final    ANION GAP 03/06/2024 11  mmol/L Final    BUN 03/06/2024 16  5 - 25 mg/dL Final    Creatinine 03/06/2024 0.67  0.60 - 1.30 mg/dL Final    Standardized to IDMS reference method    Glucose, Fasting 03/06/2024 84  65 - 99 mg/dL Final    Calcium 03/06/2024 8.9  8.4 - 10.2 mg/dL Final    eGFR 03/06/2024 82  ml/min/1.73sq m Final    WBC 03/06/2024 3.63 (L)  4.31 - 10.16 Thousand/uL Final    RBC 03/06/2024 4.04  3.81 - 5.12 Million/uL Final    Hemoglobin 03/06/2024 11.9  11.5 - 15.4 g/dL Final    Hematocrit 03/06/2024 38.4  34.8 - 46.1 % Final    MCV 03/06/2024 95  82 - 98 fL Final    MCH 03/06/2024 29.5  26.8 - 34.3 pg Final    MCHC 03/06/2024 31.0 (L)  31.4 - 37.4 g/dL Final    RDW 03/06/2024 13.6  11.6 - 15.1 % Final    MPV 03/06/2024 11.8  8.9 - 12.7 fL Final    Platelets 03/06/2024 148 (L)  149 - 390 Thousands/uL Final    nRBC 03/06/2024 0  /100 WBCs Final    Segmented % 03/06/2024 54  43 - 75 % Final    Immature Grans % 03/06/2024 0  0 - 2 % Final    Lymphocytes % 03/06/2024 25  14 - 44 % Final    Monocytes % 03/06/2024 16 (H)  4 - 12 % Final    Eosinophils Relative 03/06/2024 5  0 - 6 % Final    Basophils Relative 03/06/2024 0  0 - 1  % Final    Absolute Neutrophils 03/06/2024 1.95  1.85 - 7.62 Thousands/µL Final    Absolute Immature Grans 03/06/2024 0.01  0.00 - 0.20 Thousand/uL Final    Absolute Lymphocytes 03/06/2024 0.91  0.60 - 4.47 Thousands/µL Final    Absolute Monocytes 03/06/2024 0.58  0.17 - 1.22 Thousand/µL Final    Eosinophils Absolute 03/06/2024 0.17  0.00 - 0.61 Thousand/µL Final    Basophils Absolute 03/06/2024 0.01  0.00 - 0.10 Thousands/µL Final

## 2024-08-27 NOTE — PATIENT INSTRUCTIONS
Continue Vit. D supplementation   Increase to 3 Tums daily  Do weight-bearing exercises  Have Prolia injection only visit 1/13/25  Continue hydroxychloroquine daily; continue regular eye exams  Right 4th finger steroid injection given in clinic     Return to clinic for Prolia plus provider visit in mid-July 2025

## 2024-09-23 DIAGNOSIS — E55.9 VITAMIN D DEFICIENCY DISEASE: ICD-10-CM

## 2024-09-23 RX ORDER — ERGOCALCIFEROL 1.25 MG/1
50000 CAPSULE, LIQUID FILLED ORAL
Qty: 4 CAPSULE | Refills: 3 | Status: SHIPPED | OUTPATIENT
Start: 2024-09-23

## 2024-09-23 NOTE — TELEPHONE ENCOUNTER
Medication: ergocalciferol (VITAMIN D2) 50,000 units         Pharmacy: [facility]    Office:   [x] PCP/Provider - babs  [] Speciality/Provider -     Does the patient have enough for 3 days?   [] Yes   [x] No - Send as HP to POD    
Yes

## 2024-09-30 ENCOUNTER — OFFICE VISIT (OUTPATIENT)
Dept: INTERNAL MEDICINE CLINIC | Facility: CLINIC | Age: 84
End: 2024-09-30
Payer: MEDICARE

## 2024-09-30 VITALS
TEMPERATURE: 97.4 F | SYSTOLIC BLOOD PRESSURE: 124 MMHG | OXYGEN SATURATION: 99 % | HEIGHT: 62 IN | HEART RATE: 60 BPM | DIASTOLIC BLOOD PRESSURE: 68 MMHG | WEIGHT: 107 LBS | BODY MASS INDEX: 19.69 KG/M2

## 2024-09-30 DIAGNOSIS — I77.1 STRICTURE OF ARTERY (HCC): ICD-10-CM

## 2024-09-30 DIAGNOSIS — H01.001 BLEPHARITIS OF RIGHT UPPER EYELID, UNSPECIFIED TYPE: Primary | ICD-10-CM

## 2024-09-30 DIAGNOSIS — I10 ESSENTIAL HYPERTENSION: ICD-10-CM

## 2024-09-30 DIAGNOSIS — D72.819 LEUKOPENIA, UNSPECIFIED TYPE: ICD-10-CM

## 2024-09-30 PROCEDURE — G2211 COMPLEX E/M VISIT ADD ON: HCPCS | Performed by: INTERNAL MEDICINE

## 2024-09-30 PROCEDURE — 99214 OFFICE O/P EST MOD 30 MIN: CPT | Performed by: INTERNAL MEDICINE

## 2024-09-30 RX ORDER — CEPHALEXIN 500 MG/1
500 CAPSULE ORAL EVERY 6 HOURS SCHEDULED
COMMUNITY
Start: 2024-09-26

## 2024-09-30 RX ORDER — ERYTHROMYCIN 5 MG/G
0.5 OINTMENT OPHTHALMIC EVERY 6 HOURS SCHEDULED
Qty: 3.5 G | Refills: 0 | Status: SHIPPED | OUTPATIENT
Start: 2024-09-30 | End: 2024-10-11 | Stop reason: SDUPTHER
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
## 2024-10-04 ENCOUNTER — RA CDI HCC (OUTPATIENT)
Dept: OTHER | Facility: HOSPITAL | Age: 84
End: 2024-10-04

## 2024-10-11 ENCOUNTER — OFFICE VISIT (OUTPATIENT)
Dept: INTERNAL MEDICINE CLINIC | Facility: CLINIC | Age: 84
End: 2024-10-11
Payer: MEDICARE

## 2024-10-11 VITALS
BODY MASS INDEX: 20.06 KG/M2 | HEART RATE: 71 BPM | HEIGHT: 62 IN | SYSTOLIC BLOOD PRESSURE: 118 MMHG | TEMPERATURE: 98 F | DIASTOLIC BLOOD PRESSURE: 64 MMHG | OXYGEN SATURATION: 99 % | WEIGHT: 109 LBS

## 2024-10-11 DIAGNOSIS — I10 ESSENTIAL HYPERTENSION: ICD-10-CM

## 2024-10-11 DIAGNOSIS — H01.001 BLEPHARITIS OF RIGHT UPPER EYELID, UNSPECIFIED TYPE: Primary | ICD-10-CM

## 2024-10-11 DIAGNOSIS — E03.9 ACQUIRED HYPOTHYROIDISM: ICD-10-CM

## 2024-10-11 DIAGNOSIS — D61.818 OTHER PANCYTOPENIA (HCC): ICD-10-CM

## 2024-10-11 DIAGNOSIS — K91.2 POSTSURGICAL MALABSORPTION: ICD-10-CM

## 2024-10-11 PROCEDURE — 99214 OFFICE O/P EST MOD 30 MIN: CPT | Performed by: INTERNAL MEDICINE

## 2024-10-11 PROCEDURE — G2211 COMPLEX E/M VISIT ADD ON: HCPCS | Performed by: INTERNAL MEDICINE

## 2024-10-11 RX ORDER — ERYTHROMYCIN 5 MG/G
0.5 OINTMENT OPHTHALMIC EVERY 6 HOURS SCHEDULED
Qty: 3.5 G | Refills: 0 | Status: SHIPPED | OUTPATIENT
Start: 2024-10-11
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
## 2024-10-14 ENCOUNTER — OFFICE VISIT (OUTPATIENT)
Dept: PLASTIC SURGERY | Facility: CLINIC | Age: 84
End: 2024-10-14

## 2024-10-14 DIAGNOSIS — L91.0 KELOID SCAR OF SKIN: Primary | ICD-10-CM

## 2024-10-14 PROCEDURE — 99024 POSTOP FOLLOW-UP VISIT: CPT | Performed by: PHYSICIAN ASSISTANT
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
## 2024-10-28 ENCOUNTER — APPOINTMENT (OUTPATIENT)
Dept: LAB | Facility: CLINIC | Age: 84
End: 2024-10-28
Payer: MEDICARE

## 2024-10-28 DIAGNOSIS — E03.9 ACQUIRED HYPOTHYROIDISM: ICD-10-CM

## 2024-10-28 DIAGNOSIS — I10 ESSENTIAL HYPERTENSION: ICD-10-CM

## 2024-10-28 DIAGNOSIS — K91.2 POSTSURGICAL MALABSORPTION: ICD-10-CM

## 2024-10-28 LAB
25(OH)D3 SERPL-MCNC: 60.8 NG/ML (ref 30–100)
ALBUMIN SERPL BCG-MCNC: 3.9 G/DL (ref 3.5–5)
ALP SERPL-CCNC: 60 U/L (ref 34–104)
ALT SERPL W P-5'-P-CCNC: 65 U/L (ref 7–52)
ANION GAP SERPL CALCULATED.3IONS-SCNC: 7 MMOL/L (ref 4–13)
AST SERPL W P-5'-P-CCNC: 77 U/L (ref 13–39)
BACTERIA UR QL AUTO: ABNORMAL /HPF
BASOPHILS # BLD AUTO: 0.02 THOUSANDS/ΜL (ref 0–0.1)
BASOPHILS NFR BLD AUTO: 1 % (ref 0–1)
BILIRUB SERPL-MCNC: 0.43 MG/DL (ref 0.2–1)
BILIRUB UR QL STRIP: NEGATIVE
BUN SERPL-MCNC: 19 MG/DL (ref 5–25)
CALCIUM SERPL-MCNC: 8.3 MG/DL (ref 8.4–10.2)
CHLORIDE SERPL-SCNC: 105 MMOL/L (ref 96–108)
CLARITY UR: CLEAR
CO2 SERPL-SCNC: 26 MMOL/L (ref 21–32)
COLOR UR: ABNORMAL
CREAT SERPL-MCNC: 0.58 MG/DL (ref 0.6–1.3)
EOSINOPHIL # BLD AUTO: 0.16 THOUSAND/ΜL (ref 0–0.61)
EOSINOPHIL NFR BLD AUTO: 5 % (ref 0–6)
ERYTHROCYTE [DISTWIDTH] IN BLOOD BY AUTOMATED COUNT: 14.6 % (ref 11.6–15.1)
FOLATE SERPL-MCNC: >22.3 NG/ML
GFR SERPL CREATININE-BSD FRML MDRD: 86 ML/MIN/1.73SQ M
GLUCOSE P FAST SERPL-MCNC: 85 MG/DL (ref 65–99)
GLUCOSE UR STRIP-MCNC: NEGATIVE MG/DL
HCT VFR BLD AUTO: 37.2 % (ref 34.8–46.1)
HGB BLD-MCNC: 11.5 G/DL (ref 11.5–15.4)
HGB UR QL STRIP.AUTO: NEGATIVE
IMM GRANULOCYTES # BLD AUTO: 0.01 THOUSAND/UL (ref 0–0.2)
IMM GRANULOCYTES NFR BLD AUTO: 0 % (ref 0–2)
IRON SATN MFR SERPL: 32 % (ref 15–50)
IRON SERPL-MCNC: 79 UG/DL (ref 50–212)
KETONES UR STRIP-MCNC: NEGATIVE MG/DL
LEUKOCYTE ESTERASE UR QL STRIP: ABNORMAL
LYMPHOCYTES # BLD AUTO: 0.93 THOUSANDS/ΜL (ref 0.6–4.47)
LYMPHOCYTES NFR BLD AUTO: 30 % (ref 14–44)
MCH RBC QN AUTO: 29.6 PG (ref 26.8–34.3)
MCHC RBC AUTO-ENTMCNC: 30.9 G/DL (ref 31.4–37.4)
MCV RBC AUTO: 96 FL (ref 82–98)
MONOCYTES # BLD AUTO: 0.38 THOUSAND/ΜL (ref 0.17–1.22)
MONOCYTES NFR BLD AUTO: 12 % (ref 4–12)
NEUTROPHILS # BLD AUTO: 1.61 THOUSANDS/ΜL (ref 1.85–7.62)
NEUTS SEG NFR BLD AUTO: 52 % (ref 43–75)
NITRITE UR QL STRIP: POSITIVE
NON-SQ EPI CELLS URNS QL MICRO: ABNORMAL /HPF
NRBC BLD AUTO-RTO: 0 /100 WBCS
PH UR STRIP.AUTO: 5.5 [PH]
PLATELET # BLD AUTO: 146 THOUSANDS/UL (ref 149–390)
PMV BLD AUTO: 12.1 FL (ref 8.9–12.7)
POTASSIUM SERPL-SCNC: 3.7 MMOL/L (ref 3.5–5.3)
PROT SERPL-MCNC: 6.3 G/DL (ref 6.4–8.4)
PROT UR STRIP-MCNC: ABNORMAL MG/DL
RBC # BLD AUTO: 3.88 MILLION/UL (ref 3.81–5.12)
RBC #/AREA URNS AUTO: ABNORMAL /HPF
SODIUM SERPL-SCNC: 138 MMOL/L (ref 135–147)
SP GR UR STRIP.AUTO: 1.02 (ref 1–1.03)
TIBC SERPL-MCNC: 244 UG/DL (ref 250–450)
TSH SERPL DL<=0.05 MIU/L-ACNC: 1.27 UIU/ML (ref 0.45–4.5)
UIBC SERPL-MCNC: 165 UG/DL (ref 155–355)
UROBILINOGEN UR STRIP-ACNC: <2 MG/DL
VIT B12 SERPL-MCNC: 1275 PG/ML (ref 180–914)
WBC # BLD AUTO: 3.11 THOUSAND/UL (ref 4.31–10.16)
WBC #/AREA URNS AUTO: ABNORMAL /HPF

## 2024-10-28 PROCEDURE — 82607 VITAMIN B-12: CPT

## 2024-10-28 PROCEDURE — 81001 URINALYSIS AUTO W/SCOPE: CPT

## 2024-10-28 PROCEDURE — 83540 ASSAY OF IRON: CPT

## 2024-10-28 PROCEDURE — 84590 ASSAY OF VITAMIN A: CPT

## 2024-10-28 PROCEDURE — 84425 ASSAY OF VITAMIN B-1: CPT

## 2024-10-28 PROCEDURE — 80053 COMPREHEN METABOLIC PANEL: CPT

## 2024-10-28 PROCEDURE — 83550 IRON BINDING TEST: CPT

## 2024-10-28 PROCEDURE — 36415 COLL VENOUS BLD VENIPUNCTURE: CPT

## 2024-10-28 PROCEDURE — 85025 COMPLETE CBC W/AUTO DIFF WBC: CPT

## 2024-10-28 PROCEDURE — 84443 ASSAY THYROID STIM HORMONE: CPT

## 2024-10-28 PROCEDURE — 82746 ASSAY OF FOLIC ACID SERUM: CPT

## 2024-10-28 PROCEDURE — 82306 VITAMIN D 25 HYDROXY: CPT

## 2024-10-30 ENCOUNTER — TELEPHONE (OUTPATIENT)
Dept: INTERNAL MEDICINE CLINIC | Facility: CLINIC | Age: 84
End: 2024-10-30

## 2024-10-30 DIAGNOSIS — R30.0 DYSURIA: Primary | ICD-10-CM

## 2024-10-30 NOTE — TELEPHONE ENCOUNTER
----- Message from Haley POE sent at 10/30/2024  1:03 PM EDT -----  She is not urinating as frequently as she normally does.No symptoms, no pain, does have urgency, but only does a little bit.   Has an [id number] on Monday 11/4/24  ----- Message -----  From: Terrell Schulz MD  Sent: 10/28/2024   5:55 PM EDT  To: Haley Rizzo    Please ask her if she has any urinary symptoms.

## 2024-10-31 ENCOUNTER — APPOINTMENT (OUTPATIENT)
Dept: LAB | Facility: CLINIC | Age: 84
End: 2024-10-31
Payer: MEDICARE

## 2024-10-31 DIAGNOSIS — R30.0 DYSURIA: ICD-10-CM

## 2024-10-31 LAB — VIT A SERPL-MCNC: 40 UG/DL (ref 22–69.5)

## 2024-10-31 PROCEDURE — 87186 SC STD MICRODIL/AGAR DIL: CPT

## 2024-10-31 PROCEDURE — 87077 CULTURE AEROBIC IDENTIFY: CPT

## 2024-10-31 PROCEDURE — 87086 URINE CULTURE/COLONY COUNT: CPT

## 2024-11-01 ENCOUNTER — TELEPHONE (OUTPATIENT)
Dept: INTERNAL MEDICINE CLINIC | Facility: CLINIC | Age: 84
End: 2024-11-01

## 2024-11-01 DIAGNOSIS — R30.0 DYSURIA: Primary | ICD-10-CM

## 2024-11-01 LAB — VIT B1 BLD-SCNC: 103.6 NMOL/L (ref 66.5–200)

## 2024-11-01 RX ORDER — CEPHALEXIN 500 MG/1
500 CAPSULE ORAL 3 TIMES DAILY
Qty: 15 CAPSULE | Refills: 0 | Status: SHIPPED | OUTPATIENT
Start: 2024-11-01 | End: 2024-11-06

## 2024-11-02 LAB
BACTERIA UR CULT: ABNORMAL
BACTERIA UR CULT: ABNORMAL

## 2024-11-04 ENCOUNTER — OFFICE VISIT (OUTPATIENT)
Dept: INTERNAL MEDICINE CLINIC | Facility: CLINIC | Age: 84
End: 2024-11-04
Payer: MEDICARE

## 2024-11-04 VITALS
BODY MASS INDEX: 20.24 KG/M2 | SYSTOLIC BLOOD PRESSURE: 110 MMHG | HEIGHT: 62 IN | TEMPERATURE: 97.9 F | HEART RATE: 63 BPM | WEIGHT: 110 LBS | DIASTOLIC BLOOD PRESSURE: 66 MMHG | OXYGEN SATURATION: 98 %

## 2024-11-04 DIAGNOSIS — I10 ESSENTIAL HYPERTENSION: Primary | ICD-10-CM

## 2024-11-04 DIAGNOSIS — K21.9 GASTROESOPHAGEAL REFLUX DISEASE WITHOUT ESOPHAGITIS: ICD-10-CM

## 2024-11-04 DIAGNOSIS — H01.001 BLEPHARITIS OF RIGHT UPPER EYELID, UNSPECIFIED TYPE: ICD-10-CM

## 2024-11-04 DIAGNOSIS — K29.70 GASTRITIS WITHOUT BLEEDING, UNSPECIFIED CHRONICITY, UNSPECIFIED GASTRITIS TYPE: ICD-10-CM

## 2024-11-04 DIAGNOSIS — E03.9 ACQUIRED HYPOTHYROIDISM: ICD-10-CM

## 2024-11-04 PROCEDURE — 99214 OFFICE O/P EST MOD 30 MIN: CPT | Performed by: INTERNAL MEDICINE

## 2024-11-04 PROCEDURE — 93000 ELECTROCARDIOGRAM COMPLETE: CPT | Performed by: INTERNAL MEDICINE

## 2024-11-04 RX ORDER — FAMOTIDINE 20 MG/1
20 TABLET, FILM COATED ORAL 2 TIMES DAILY
Qty: 180 TABLET | Refills: 1 | Status: SHIPPED | OUTPATIENT
Start: 2024-11-04

## 2024-11-04 RX ORDER — ANTIOX #8/OM3/DHA/EPA/LUT/ZEAX 250-2.5 MG
CAPSULE ORAL
COMMUNITY
Start: 2024-10-21
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
## 2024-11-05 NOTE — TELEPHONE ENCOUNTER
Called patient back and told her that Dr brice called in prescription on 11/01/2024 for her for the  Keflex

## 2024-11-05 NOTE — TELEPHONE ENCOUNTER
Pt called about an antibiotic that is supposed to be sent to her pharmacy yesterday 11/5 but the pharmacy didn't receive anything.Pt did  the Keflex last week and was wondering if there is a separate antibiotic she should be picking up?  Please advise

## 2024-11-13 ENCOUNTER — TRANSCRIBE ORDERS (OUTPATIENT)
Dept: LAB | Facility: CLINIC | Age: 84
End: 2024-11-13

## 2024-11-13 ENCOUNTER — APPOINTMENT (OUTPATIENT)
Dept: LAB | Facility: CLINIC | Age: 84
End: 2024-11-13

## 2024-11-13 DIAGNOSIS — Z12.11 SPECIAL SCREENING FOR MALIGNANT NEOPLASMS, COLON: Primary | ICD-10-CM

## 2024-11-14 ENCOUNTER — APPOINTMENT (OUTPATIENT)
Dept: LAB | Facility: CLINIC | Age: 84
End: 2024-11-14
Payer: MEDICARE

## 2024-11-14 DIAGNOSIS — Z12.11 SPECIAL SCREENING FOR MALIGNANT NEOPLASMS, COLON: ICD-10-CM

## 2024-11-14 LAB — HEMOCCULT STL QL IA: NEGATIVE

## 2024-11-14 PROCEDURE — G0328 FECAL BLOOD SCRN IMMUNOASSAY: HCPCS

## 2024-12-03 ENCOUNTER — ANNUAL EXAM (OUTPATIENT)
Dept: OBGYN CLINIC | Facility: CLINIC | Age: 84
End: 2024-12-03
Payer: MEDICARE

## 2024-12-03 VITALS
HEIGHT: 62 IN | BODY MASS INDEX: 20.24 KG/M2 | WEIGHT: 110 LBS | SYSTOLIC BLOOD PRESSURE: 122 MMHG | DIASTOLIC BLOOD PRESSURE: 62 MMHG

## 2024-12-03 DIAGNOSIS — N95.2 VAGINAL ATROPHY: ICD-10-CM

## 2024-12-03 DIAGNOSIS — Z12.31 ENCOUNTER FOR SCREENING MAMMOGRAM FOR MALIGNANT NEOPLASM OF BREAST: ICD-10-CM

## 2024-12-03 DIAGNOSIS — Z01.419 ENCNTR FOR GYN EXAM (GENERAL) (ROUTINE) W/O ABN FINDINGS: Primary | ICD-10-CM

## 2024-12-03 DIAGNOSIS — Z12.4 CERVICAL CANCER SCREENING: ICD-10-CM

## 2024-12-03 DIAGNOSIS — L90.0 LICHEN SCLEROSUS ET ATROPHICUS: ICD-10-CM

## 2024-12-03 PROCEDURE — G0145 SCR C/V CYTO,THINLAYER,RESCR: HCPCS | Performed by: OBSTETRICS & GYNECOLOGY

## 2024-12-03 PROCEDURE — G0101 CA SCREEN;PELVIC/BREAST EXAM: HCPCS | Performed by: OBSTETRICS & GYNECOLOGY
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
## 2024-12-06 ENCOUNTER — RESULTS FOLLOW-UP (OUTPATIENT)
Dept: OBGYN CLINIC | Facility: CLINIC | Age: 84
End: 2024-12-06

## 2024-12-06 LAB
LAB AP GYN PRIMARY INTERPRETATION: NORMAL
Lab: NORMAL

## 2024-12-09 ENCOUNTER — APPOINTMENT (OUTPATIENT)
Dept: RADIOLOGY | Facility: MEDICAL CENTER | Age: 84
End: 2024-12-09
Payer: MEDICARE

## 2024-12-09 ENCOUNTER — OFFICE VISIT (OUTPATIENT)
Dept: URGENT CARE | Facility: MEDICAL CENTER | Age: 84
End: 2024-12-09
Payer: MEDICARE

## 2024-12-09 VITALS
SYSTOLIC BLOOD PRESSURE: 160 MMHG | BODY MASS INDEX: 20.57 KG/M2 | DIASTOLIC BLOOD PRESSURE: 76 MMHG | WEIGHT: 111.8 LBS | HEIGHT: 62 IN | HEART RATE: 71 BPM | RESPIRATION RATE: 18 BRPM | TEMPERATURE: 98.8 F | OXYGEN SATURATION: 99 %

## 2024-12-09 DIAGNOSIS — M54.50 ACUTE MIDLINE LOW BACK PAIN WITHOUT SCIATICA: ICD-10-CM

## 2024-12-09 DIAGNOSIS — M54.50 ACUTE MIDLINE LOW BACK PAIN WITHOUT SCIATICA: Primary | ICD-10-CM

## 2024-12-09 PROCEDURE — G0463 HOSPITAL OUTPT CLINIC VISIT: HCPCS | Performed by: NURSE PRACTITIONER

## 2024-12-09 PROCEDURE — 72100 X-RAY EXAM L-S SPINE 2/3 VWS: CPT

## 2024-12-09 PROCEDURE — 99213 OFFICE O/P EST LOW 20 MIN: CPT | Performed by: NURSE PRACTITIONER

## 2024-12-09 PROCEDURE — 72220 X-RAY EXAM SACRUM TAILBONE: CPT

## 2024-12-09 NOTE — PATIENT INSTRUCTIONS
Tylenol as needed for pain  Heat or ice to affected area for comfort  OTC lidocaine patch directly to painful area  Sit on donut pillow for comfort until feeling better  Gentle stretching and activity as tolerated  Follow-up with PCP if symptoms or not improving
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
## 2024-12-10 ENCOUNTER — TELEPHONE (OUTPATIENT)
Age: 84
End: 2024-12-10

## 2024-12-10 ENCOUNTER — RESULTS FOLLOW-UP (OUTPATIENT)
Dept: URGENT CARE | Facility: MEDICAL CENTER | Age: 84
End: 2024-12-10

## 2024-12-10 NOTE — TELEPHONE ENCOUNTER
Pt fell twice yesterday was taken to urgent care bc pt does not remember the fall she might have lost consciousness. Very sore, xray showed no fracture or breaks please follow up if appt could be accommodated for sometime this week at either location.

## 2024-12-17 ENCOUNTER — OFFICE VISIT (OUTPATIENT)
Dept: INTERNAL MEDICINE CLINIC | Facility: CLINIC | Age: 84
End: 2024-12-17
Payer: MEDICARE

## 2024-12-17 VITALS
OXYGEN SATURATION: 98 % | HEIGHT: 62 IN | DIASTOLIC BLOOD PRESSURE: 60 MMHG | SYSTOLIC BLOOD PRESSURE: 134 MMHG | TEMPERATURE: 97.8 F | WEIGHT: 109 LBS | HEART RATE: 75 BPM | BODY MASS INDEX: 20.06 KG/M2

## 2024-12-17 DIAGNOSIS — K91.2 POSTOPERATIVE MALABSORPTION: ICD-10-CM

## 2024-12-17 DIAGNOSIS — M54.50 ACUTE BILATERAL LOW BACK PAIN WITHOUT SCIATICA: ICD-10-CM

## 2024-12-17 DIAGNOSIS — R55 SYNCOPE, UNSPECIFIED SYNCOPE TYPE: ICD-10-CM

## 2024-12-17 DIAGNOSIS — M81.0 AGE-RELATED OSTEOPOROSIS WITHOUT CURRENT PATHOLOGICAL FRACTURE: ICD-10-CM
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
## 2024-12-17 PROCEDURE — 93000 ELECTROCARDIOGRAM COMPLETE: CPT | Performed by: INTERNAL MEDICINE

## 2024-12-17 PROCEDURE — 99214 OFFICE O/P EST MOD 30 MIN: CPT | Performed by: INTERNAL MEDICINE

## 2024-12-17 RX ORDER — KETOCONAZOLE 20 MG/ML
SHAMPOO, SUSPENSION TOPICAL ONCE
COMMUNITY
Start: 2024-11-05

## 2024-12-17 RX ORDER — TRAMADOL HYDROCHLORIDE 50 MG/1
50 TABLET ORAL EVERY 8 HOURS PRN
Qty: 30 TABLET | Refills: 0 | Status: SHIPPED | OUTPATIENT
Start: 2024-12-17
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
## 2024-12-24 ENCOUNTER — HOSPITAL ENCOUNTER (OUTPATIENT)
Dept: NON INVASIVE DIAGNOSTICS | Facility: HOSPITAL | Age: 84
Discharge: HOME | End: 2024-12-24
Attending: INTERNAL MEDICINE
Payer: MEDICARE

## 2024-12-24 DIAGNOSIS — R55 SYNCOPE, UNSPECIFIED SYNCOPE TYPE: ICD-10-CM

## 2024-12-24 PROCEDURE — 93225 XTRNL ECG REC<48 HRS REC: CPT

## 2024-12-24 PROCEDURE — 93226 XTRNL ECG REC<48 HR SCAN A/R: CPT

## 2024-12-26 ENCOUNTER — EVALUATION (OUTPATIENT)
Dept: PHYSICAL THERAPY | Facility: CLINIC | Age: 84
End: 2024-12-26
Payer: MEDICARE
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
## 2024-12-26 DIAGNOSIS — S30.0XXS LUMBAR CONTUSION, SEQUELA: Primary | ICD-10-CM
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
## 2024-12-26 PROCEDURE — 97162 PT EVAL MOD COMPLEX 30 MIN: CPT

## 2024-12-26 PROCEDURE — 97110 THERAPEUTIC EXERCISES: CPT

## 2024-12-26 NOTE — PROGRESS NOTES
PT Evaluation     Today's date: 2024  Patient name: Yajaira Mendez  : 1940  MRN: [id number]  Referring provider: Terrell Schulz MD  Dx:   Encounter Diagnosis     ICD-10-CM    1. Lumbar contusion, sequela  S30.0XXS       2. Fall at home, initial encounter  W19.XXXA Ambulatory Referral to Physical Therapy    Y92.009     Physical therapy advised.          Start Time: 1445  Stop Time: 1530  Total time in clinic (min): 45 minutes    Assessment  Impairments: abnormal gait, abnormal muscle firing, abnormal or restricted ROM, abnormal movement, activity intolerance, impaired physical strength, lacks appropriate home exercise program, pain with function, poor posture  and poor body mechanics  Symptom irritability: high    Assessment details: Patient is a 83 y/o female presenting to PT with complaints of low back pain beginning 24 s/p fall. Upon clinical exam, patient presents with decreased lumbar AROM, decreased core stability, increased muscle tightness noted in her lumbar musculature, and abnormal gait. These impairments limit the patient with getting up from a chair, getting out of bed, standing up straight, prolonged standing, and cooking. Exam findings and clinical signs and symptoms are suggestive of a lumbar contusion. Patient will benefit from physical therapy to address the above impairments and maximize functional mobility.     Understanding of Dx/Px/POC: good     Prognosis: good    Goals  STG - to be met by week 4  1. Patient will be independent with HEP throughout therapy to prepare for eventual transition to maintenance program.  2. Patient will improve subjective pain to <=6/10 at worst to improve QOL.  3. Patient will improve lumbar rotation AROM to minimal restriction to perform ADLs with less difficulty.  4. Patient will be able to walk without an AD without difficulty to return to PLOF.    LTG - to be met by discharge   1. Patient will improve lumbar AROM to grossly WFL to perform ADLs  with less difficulty.  2. Patient will improve BLE strength to >=4/5 to improve functional mobility.  3. Patient will improve FOTO score to age matched prediction to demonstrate functional improvements.  4. Patient will be able to perform a sit to stand transfer without difficulty to return to PLOF.  5. Patient will be able to cook without difficulty to return to PLOF.        Plan  Patient would benefit from: skilled physical therapy  Referral necessary: No  Planned modality interventions: cryotherapy and thermotherapy: hydrocollator packs    Planned therapy interventions: abdominal trunk stabilization, activity modification, balance/weight bearing training, body mechanics training, flexibility, functional ROM exercises, graded activity, graded exercise, home exercise program, manual therapy, neuromuscular re-education, patient/caregiver education, postural training, strengthening, stretching, therapeutic activities and therapeutic exercise    Frequency: 2x week  Duration in weeks: 12  Treatment plan discussed with: patient        Subjective Evaluation    History of Present Illness  Date of onset: 12/9/2024  Mechanism of injury: Patient reports she fell on her back when getting out of bed, losing consciousness, and losing her balance backwards on 12/9/24. Notes she is following with cardiology for this and was wearing a Holter monitor. Notes she also lost consciousness the day before and fell landing on her knees but was not injured. Notes this has not happened again since. Notes she did not hit her head but she landed on her back her low back has been hurting since. Notes initially the pain was all over but now it is more localized in her L lower back. Notes she went to Urgent care and had x-rays for her back which were negative for a fx. Patient reports difficulty with getting up from a chair, getting out of bed, standing up straight, prolonged standing, and cooking due to her pain.  Denies N/T. Reports  medication, leaning forward, and supporting her back helps with her pain. Reports she has been using a walker or SPC since due to the pain. Reports it seems her pain is improving gradually since the initial fall. Reports she has an MD follow up 1/10/25.     Patient Goals  Patient goals for therapy: decreased pain, increased motion, improved balance, increased strength, independence with ADLs/IADLs and return to sport/leisure activities  Patient goal: stop the pain  Pain  Current pain ratin  At best pain rating: 3  At worst pain rating: 10  Location: low back  Quality: sharp and tight    Social Support  Lives with: spouse    Employment status: not working    Diagnostic Tests  X-ray: normal  Treatments  Current treatment: physical therapy        Objective     Concurrent Complaints  Negative for night pain, disturbed sleep and saddle (S4) numbness    Postural Observations  Standing posture: fair    Additional Postural Observation Details  Increased forward trunk leaning posture    Palpation     Additional Palpation Details  TTP B lumbar paraspinals, QL, PSIS    Active Range of Motion     Lumbar   Flexion:  with pain Restriction level: moderate  Extension:  with pain Restriction level: maximal  Left lateral flexion:  Restriction level: moderate  Right lateral flexion:  Restriction level: moderate  Left rotation:  Restriction level: moderate  Right rotation:  with pain Restriction level: maximal    Strength/Myotome Testing     Left Hip   Planes of Motion   Flexion: 3+    Right Hip   Planes of Motion   Flexion: 3+    Left Knee   Flexion: 4  Extension: 4-    Right Knee   Flexion: 4  Extension: 4-    Muscle Activation   Patient unable to activate left transverse abdominals and right transverse abdominals.     Tests     Lumbar     Left   Negative passive SLR.     Right   Negative passive SLR.     Left Hip   Negative EDDIE and FADIR.     Right Hip   Positive EDDIE.   Negative FADIR.     Additional Tests Details  Prone  knee bend test: negative    General Comments:      Lumbar Comments  Gait analysis: antalgic with SPC in RUE             Precautions: HTN, hx fracture (left patellar, ORIF performed 3/9/24), osteoporosis, fall risk       Manuals 12/26            Lumbar STM                                                    Neuro Re-Ed             Tandem balance             Bridges             Side stepping              TrA contraction HEP            Standing lumbar ext forearms on wall HEP            Clam shells             SLR with TrA             Ther Ex             Bike             LTR HEP            Leg press             Supine HS stretch             Seated QL stretch HEP                                                   Ther Activity             STS             Step ups             Gait Training                                       Modalities             [facility]

## 2024-12-30 ENCOUNTER — OFFICE VISIT (OUTPATIENT)
Dept: PHYSICAL THERAPY | Facility: CLINIC | Age: 84
End: 2024-12-30
Payer: MEDICARE

## 2024-12-30 DIAGNOSIS — S30.0XXS LUMBAR CONTUSION, SEQUELA: Primary | ICD-10-CM
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
## 2024-12-30 PROCEDURE — 97110 THERAPEUTIC EXERCISES: CPT

## 2024-12-30 PROCEDURE — 97140 MANUAL THERAPY 1/> REGIONS: CPT

## 2024-12-30 PROCEDURE — 97112 NEUROMUSCULAR REEDUCATION: CPT

## 2024-12-30 NOTE — PROGRESS NOTES
"Daily Note     Today's date: 2024  Patient name: Yajaira Mendez  : 1940  MRN: [id number]  Referring provider: Terrell Schulz MD  Dx:   Encounter Diagnosis     ICD-10-CM    1. Lumbar contusion, sequela  S30.0XXS       2. Fall at home, initial encounter  W19.XXXA     Y92.009           Start Time: 1400  Stop Time: 1440  Total time in clinic (min): 40 minutes    Subjective: Patient reports the pain in her back has been intermittent but is still severe at times mostly on her L side and tailbone. Notes HEP is going well. Notes she is walking better and not relying on the cane as much. Notes she took Tramadol today due to having more pain.      Objective: See treatment diary below      Assessment: Tolerated treatment well. Increased tension noted in her L lumbar paraspinals and QL improved following manuals. Cueing required for TrA and glute activation during bridges and STS to support back. Pain noted with bridges so regressed to glute sets. Relief noted following MHP post session. Patient demonstrated fatigue post treatment, exhibited good technique with therapeutic exercises, and would benefit from continued PT.      Plan: Continue per plan of care.  Progress treatment as tolerated.       Precautions: HTN, hx fracture (left patellar, ORIF performed 3/9/24), osteoporosis, fall risk       Manuals            Lumbar STM  MK                                                  Neuro Re-Ed             Tandem balance             Bridges  2x10 glute set           Side stepping              TrA contraction HEP 5\"x10    1x10 ea with march           Standing lumbar ext forearms on wall HEP 5\"x10           Clam shells  1x15 ea           SLR with TrA  1x15 ea           Paloff press             Rows             Ther Ex             Bike  NS 5'           LTR HEP 5\"x10 ea           Leg press             Supine HS stretch             Seated QL stretch HEP 5x10\" ea                                                "   Ther Activity             STS  1 foam 1x8           Step ups             Gait Training                                       Modalities             [facility] 8' post

## 2025-01-01 PROCEDURE — 93227 XTRNL ECG REC<48 HR R&I: CPT | Performed by: STUDENT IN AN ORGANIZED HEALTH CARE EDUCATION/TRAINING PROGRAM

## 2025-01-02 ENCOUNTER — OFFICE VISIT (OUTPATIENT)
Dept: PHYSICAL THERAPY | Facility: CLINIC | Age: 85
End: 2025-01-02
Payer: MEDICARE

## 2025-01-02 DIAGNOSIS — S30.0XXS LUMBAR CONTUSION, SEQUELA: Primary | ICD-10-CM
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
## 2025-01-02 PROCEDURE — 97112 NEUROMUSCULAR REEDUCATION: CPT

## 2025-01-02 PROCEDURE — 97110 THERAPEUTIC EXERCISES: CPT

## 2025-01-02 PROCEDURE — 97140 MANUAL THERAPY 1/> REGIONS: CPT

## 2025-01-02 NOTE — PROGRESS NOTES
"Daily Note     Today's date: 2025  Patient name: Yajaira Mendez  : 1940  MRN: [id number]  Referring provider: Terrell Schulz MD  Dx:   Encounter Diagnosis     ICD-10-CM    1. Lumbar contusion, sequela  S30.0XXS       2. Fall at home, initial encounter  W19.XXXA     Y92.009           Start Time: 1400  Stop Time: 1440  Total time in clinic (min): 40 minutes    Subjective: Patient reports her back has been feeling better but still struggles with lifting things while sitting such as eating or when at a desk and is unable to sit without a pillow behind her back. Notes she has been using a heating pad and is taking less pain medication. Also notes she is able to walk without using a SPC now.      Objective: See treatment diary below      Assessment: Tolerated treatment well. TTP to her thoracolumbar spinous processes likely due to contusion as x-rays were negative, educated to keep pillow behind her back when sitting to avoid increased pressure to this region. Progressed core and B hip stability without adverse reaction noted. Improving lumbar mobility noted overall with most soft tissue restrictions noted in her L QL mx. Patient demonstrated fatigue post treatment, exhibited good technique with therapeutic exercises, and would benefit from continued PT.      Plan: Continue per plan of care.  Progress treatment as tolerated.       Precautions: HTN, hx fracture (left patellar, ORIF performed 3/9/24), osteoporosis, fall risk       Manuals  1/2          Lumbar STM  MK MK                                                 Neuro Re-Ed             Tandem balance             Bridges  2x10 glute set 2x10 glute set          Side stepping              TrA contraction HEP 5\"x10    1x10 ea with \"x10          Standing lumbar ext forearms on wall HEP 5\"x10 5\"x10          Clam shells  1x15 ea 5\"X20 ea          SLR with TrA  1x15 ea 2x10 ea          Paloff press             Rows   Scap retractions 5\"x20       " "   Ther Ex             Bike  NS 5' NS 5'          LTR HEP 5\"x10 ea 5\"x10 ea          Leg press             Supine HS stretch             Seated QL stretch HEP 5x10\" ea 5x10\" ea          Standing hip abd/ext                                       Ther Activity             STS  1 foam 1x8 2 foam 2x10          Step ups             Gait Training                                       Modalities             [facility] 8' post 8' post                            "

## 2025-01-06 ENCOUNTER — APPOINTMENT (OUTPATIENT)
Dept: PHYSICAL THERAPY | Facility: CLINIC | Age: 85
End: 2025-01-06
Payer: MEDICARE

## 2025-01-07 ENCOUNTER — OFFICE VISIT (OUTPATIENT)
Dept: PHYSICAL THERAPY | Facility: CLINIC | Age: 85
End: 2025-01-07
Payer: MEDICARE

## 2025-01-07 DIAGNOSIS — S30.0XXS LUMBAR CONTUSION, SEQUELA: Primary | ICD-10-CM
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
## 2025-01-07 PROCEDURE — 97110 THERAPEUTIC EXERCISES: CPT | Performed by: PHYSICAL THERAPIST

## 2025-01-07 PROCEDURE — 97112 NEUROMUSCULAR REEDUCATION: CPT | Performed by: PHYSICAL THERAPIST

## 2025-01-07 PROCEDURE — 97140 MANUAL THERAPY 1/> REGIONS: CPT | Performed by: PHYSICAL THERAPIST

## 2025-01-07 NOTE — PROGRESS NOTES
"Daily Note     Today's date: 2025  Patient name: Yajaira Mendez  : 1940  MRN: [id number]  Referring provider: Terrell Schulz MD  Dx:   Encounter Diagnosis     ICD-10-CM    1. Lumbar contusion, sequela  S30.0XXS       2. Fall at home, initial encounter  W19.XXXA     Y92.009                      Subjective: patient reports she is feeling a little better.      Objective: See treatment diary below      Assessment: Tolerated treatment well. Continued with program as noted below. Continues to be more tender on the left compared to the right during manual therapy. Patient exhibited good technique with therapeutic exercises and would benefit from continued PT      Plan: Continue per plan of care.      Precautions: HTN, hx fracture (left patellar, ORIF performed 3/9/24), osteoporosis, fall risk       Manuals          Lumbar STM  MK MK SK                                                Neuro Re-Ed             Tandem balance             Bridges  2x10 glute set 2x10 glute set 2x10 glute set         Side stepping              TrA contraction HEP 5\"x10    1x10 ea with \"x10 5\"x15         Standing lumbar ext forearms on wall HEP 5\"x10 5\"x10 5\"x10         Clam shells  1x15 ea 5\"X20 ea 5\" x20 ea         SLR with TrA  1x15 ea 2x10 ea 2x15 ea         Paloff press             Rows   Scap retractions 5\"x20 Scap Retract  5\" x20         Ther Ex             Bike  NS 5' NS 5' NS 5'         LTR HEP 5\"x10 ea 5\"x10 ea 5\"x10 ea         Leg press             Supine HS stretch             Seated QL stretch HEP 5x10\" ea 5x10\" ea 5x10\" ea         Standing hip abd/ext                                       Ther Activity             STS  1 foam 1x8 2 foam 2x10 2 foam 2x10         Step ups             Gait Training                                       Modalities             [facility] 8' post 8' post                              "

## 2025-01-08 ENCOUNTER — OFFICE VISIT (OUTPATIENT)
Dept: INTERNAL MEDICINE CLINIC | Facility: CLINIC | Age: 85
End: 2025-01-08
Payer: MEDICARE

## 2025-01-08 VITALS
DIASTOLIC BLOOD PRESSURE: 78 MMHG | OXYGEN SATURATION: 92 % | HEIGHT: 62 IN | TEMPERATURE: 98.2 F | BODY MASS INDEX: 19.51 KG/M2 | SYSTOLIC BLOOD PRESSURE: 116 MMHG | HEART RATE: 76 BPM | WEIGHT: 106 LBS

## 2025-01-08 DIAGNOSIS — K91.2 POSTSURGICAL MALABSORPTION: ICD-10-CM

## 2025-01-08 DIAGNOSIS — E03.9 ACQUIRED HYPOTHYROIDISM: ICD-10-CM

## 2025-01-08 DIAGNOSIS — M54.50 ACUTE BILATERAL LOW BACK PAIN WITHOUT SCIATICA: ICD-10-CM

## 2025-01-08 DIAGNOSIS — M81.0 AGE-RELATED OSTEOPOROSIS WITHOUT CURRENT PATHOLOGICAL FRACTURE: ICD-10-CM

## 2025-01-08 DIAGNOSIS — Z00.00 MEDICARE ANNUAL WELLNESS VISIT, SUBSEQUENT: ICD-10-CM

## 2025-01-08 DIAGNOSIS — I10 ESSENTIAL HYPERTENSION: Primary | ICD-10-CM

## 2025-01-08 PROCEDURE — G0439 PPPS, SUBSEQ VISIT: HCPCS | Performed by: INTERNAL MEDICINE

## 2025-01-08 PROCEDURE — 99214 OFFICE O/P EST MOD 30 MIN: CPT | Performed by: INTERNAL MEDICINE

## 2025-01-08 NOTE — PROGRESS NOTES
Name: Yajaira Mendez      : 1940      MRN: [id number]  Encounter Provider: Terrell Schulz MD  Encounter Date: 2025   Encounter department: [facility] INTERNAL MEDICINE    Assessment & Plan  Essential hypertension         Postsurgical malabsorption    Orders:    CBC and differential; Future    Comprehensive metabolic panel; Future    UA (URINE) with reflex to Scope; Future    Age-related osteoporosis without current pathological fracture         Acquired hypothyroidism         Acute bilateral low back pain without sciatica         Medicare annual wellness visit, subsequent            1. Essential hypertension      Stable continue current regimen.      2. Postsurgical malabsorption  CBC and differential    Comprehensive metabolic panel    UA (URINE) with reflex to Scope    Continue supplementation with minerals and vitamins.      3. Age-related osteoporosis without current pathological fracture      Continue Prolia calcium and vitamin D.      4. Acquired hypothyroidism      Stable continue levothyroxine.      5. Acute bilateral low back pain without sciatica        6. Medicare annual wellness visit, subsequent           Preventive health issues were discussed with patient, and age appropriate screening tests were ordered as noted in patient's After Visit Summary. Personalized health advice and appropriate referrals for health education or preventive services given if needed, as noted in patient's After Visit Summary.    History of Present Illness     HPI   Patient Care Team:  Terrell Schulz MD as PCP - General (Internal Medicine)  Melchor Portillo DPM (Podiatry)  Merrill Palmer MD (Oncology)    Review of Systems   Constitutional:  Negative for appetite change, chills, fatigue and fever.   HENT:  Negative for sore throat and trouble swallowing.    Eyes:  Negative for redness.   Respiratory:  Negative for shortness of breath.    Cardiovascular:  Negative for chest pain and palpitations.    Gastrointestinal:  Negative for abdominal pain, constipation and diarrhea.   Genitourinary:  Negative for dysuria and hematuria.   Musculoskeletal:  Positive for gait problem. Negative for back pain and neck pain.   Skin:  Negative for rash.   Neurological:  Negative for seizures, weakness and headaches.   Hematological:  Negative for adenopathy.   Psychiatric/Behavioral:  Negative for confusion. The patient is not nervous/anxious.      Medical History Reviewed by provider this encounter:       Annual Wellness Visit Questionnaire   Yajaira is here for her Subsequent Wellness visit. Last Medicare Wellness visit information reviewed, patient interviewed and updates made to the record today.      Health Risk Assessment:   Patient rates overall health as good. Patient feels that their physical health rating is same. Patient is very satisfied with their life. Eyesight was rated as same. Hearing was rated as same. Patient feels that their emotional and mental health rating is same. Patients states they are never, rarely angry. Patient states they are sometimes unusually tired/fatigued. Pain experienced in the last 7 days has been a lot. Patient's pain rating has been 5/10. Patient states that she has experienced no weight loss or gain in last 6 months.     Depression Screening:   PHQ-2 Score: 0      Fall Risk Screening:   In the past year, patient has experienced: history of falling in past year      Urinary Incontinence Screening:   Patient has not leaked urine accidently in the last six months.     Home Safety:  Patient does not have trouble with stairs inside or outside of their home. Patient has working smoke alarms and has working carbon monoxide detector. Home safety hazards include: none.     Nutrition:   Current diet is Regular.     Medications:   Patient is not currently taking any over-the-counter supplements. Patient is able to manage medications.     Activities of Daily Living (ADLs)/Instrumental Activities  of Daily Living (IADLs):   Walk and transfer into and out of bed and chair?: Yes  Dress and groom yourself?: Yes    Bathe or shower yourself?: Yes    Feed yourself? Yes  Do your laundry/housekeeping?: Yes  Manage your money, pay your bills and track your expenses?: Yes  Make your own meals?: Yes    Do your own shopping?: Yes    Previous Hospitalizations:   Any hospitalizations or ED visits within the last 12 months?: Yes    How many hospitalizations have you had in the last year?: 1-2    Advance Care Planning:   Living will: Yes    Durable POA for healthcare: No    Advanced directive: Yes      PREVENTIVE SCREENINGS      Cardiovascular Screening:    General: Screening Current      Diabetes Screening:     General: Screening Current      Breast Cancer Screening:     General: Screening Current      Cervical Cancer Screening:    General: Screening Not Indicated      Osteoporosis Screening:    General: Screening Not Indicated and History Osteoporosis      Lung Cancer Screening:     General: Screening Not Indicated    Screening, Brief Intervention, and Referral to Treatment (SBIRT)    Screening  Typical number of drinks in a day: 0  Typical number of drinks in a week: 0  Interpretation: Low risk drinking behavior.    AUDIT-C Screenin) How often did you have a drink containing alcohol in the past year? never  2) How many drinks did you have on a typical day when you were drinking in the past year? 0  3) How often did you have 6 or more drinks on one occasion in the past year? never    AUDIT-C Score: 0  Interpretation: Score 0-2 (female): Negative screen for alcohol misuse    Single Item Drug Screening:  How often have you used an illegal drug (including marijuana) or a prescription medication for non-medical reasons in the past year? never    Single Item Drug Screen Score: 0  Interpretation: Negative screen for possible drug use disorder    Review of Current Opioid Use    Opioid Risk Tool (ORT) Interpretation: Complete  "Opioid Risk Tool (ORT)    Other Counseling Topics:   Car/seat belt/driving safety, skin self-exam, sunscreen and calcium and vitamin D intake and regular weightbearing exercise.     Social Drivers of Health     Financial Resource Strain: Low Risk  (12/15/2023)    Overall Financial Resource Strain (CARDIA)     Difficulty of Paying Living Expenses: Not hard at all   Food Insecurity: No Food Insecurity (1/1/2025)    Hunger Vital Sign     Worried About Running Out of Food in the Last Year: Never true     Ran Out of Food in the Last Year: Never true   Transportation Needs: No Transportation Needs (1/1/2025)    PRAPARE - Transportation     Lack of Transportation (Medical): No     Lack of Transportation (Non-Medical): No   Housing Stability: Low Risk  (1/1/2025)    Housing Stability Vital Sign     Unable to Pay for Housing in the Last Year: No     Number of Times Moved in the Last Year: 0     Homeless in the Last Year: No   Utilities: Not At Risk (1/1/2025)    [facility] Utilities     Threatened with loss of utilities: No     No results found.    Objective   /78 (BP Location: Left arm, Patient Position: Sitting, Cuff Size: Adult)   Pulse 76   Temp 98.2 °F (36.8 °C) (Temporal)   Ht 5' 2\" (1.575 m)   Wt 48.1 kg (106 lb)   LMP  (LMP Unknown)   SpO2 92%   BMI 19.39 kg/m²     Physical Exam  Vitals and nursing note reviewed.   Constitutional:       General: She is not in acute distress.     Appearance: She is well-developed.   HENT:      Head: Normocephalic and atraumatic.      Right Ear: Tympanic membrane normal.      Left Ear: Tympanic membrane normal.      Mouth/Throat:      Mouth: Mucous membranes are moist.   Eyes:      Conjunctiva/sclera: Conjunctivae normal.   Cardiovascular:      Rate and Rhythm: Normal rate and regular rhythm.      Heart sounds: No murmur heard.  Pulmonary:      Effort: Pulmonary effort is normal. No respiratory distress.      Breath sounds: Normal breath sounds.   Abdominal:      Palpations: " Abdomen is soft.      Tenderness: There is no abdominal tenderness.   Musculoskeletal:         General: No swelling.      Cervical back: Neck supple.   Skin:     General: Skin is warm and dry.      Capillary Refill: Capillary refill takes less than 2 seconds.   Neurological:      Mental Status: She is alert.      Gait: Gait abnormal.   Psychiatric:         Mood and Affect: Mood normal.

## 2025-01-09 ENCOUNTER — OFFICE VISIT (OUTPATIENT)
Dept: PHYSICAL THERAPY | Facility: CLINIC | Age: 85
End: 2025-01-09
Payer: MEDICARE
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
## 2025-01-09 DIAGNOSIS — S30.0XXS LUMBAR CONTUSION, SEQUELA: Primary | ICD-10-CM

## 2025-01-09 PROCEDURE — 97110 THERAPEUTIC EXERCISES: CPT

## 2025-01-09 PROCEDURE — 97140 MANUAL THERAPY 1/> REGIONS: CPT

## 2025-01-09 PROCEDURE — 97112 NEUROMUSCULAR REEDUCATION: CPT

## 2025-01-09 NOTE — PROGRESS NOTES
"Daily Note     Today's date: 2025  Patient name: Yajaira Mendez  : 1940  MRN: [id number]  Referring provider: Terrell Schulz MD  Dx:   Encounter Diagnosis     ICD-10-CM    1. Lumbar contusion, sequela  S30.0XXS       2. Fall at home, initial encounter  W19.XXXA     Y92.009                      Subjective:  Patient states she's in a lot of pain today.       Objective: See treatment diary below      Assessment: Tolerated treatment fair. Modified POC today secondary to subjective. Good response to manuals she noted she felt mild relief leaving therapy. Continue with POC next visit.  Patient demonstrated fatigue post treatment and would benefit from continued PT      Plan: Continue per plan of care.      Precautions: HTN, hx fracture (left patellar, ORIF performed 3/9/24), osteoporosis, fall risk       Manuals         Lumbar STM  MK MK SK RA                                               Neuro Re-Ed             Tandem balance             Bridges  2x10 glute set 2x10 glute set 2x10 glute set 2x10 glut set         Side stepping              TrA contraction HEP 5\"x10    1x10 ea with \"x10 5\"x15 5\"x 15         Standing lumbar ext forearms on wall HEP 5\"x10 5\"x10 5\"x10         Clam shells  1x15 ea 5\"X20 ea 5\" x20 ea 5\" x 20        SLR with TrA  1x15 ea 2x10 ea 2x15 ea 2x15 ea        Paloff press             Rows   Scap retractions 5\"x20 Scap Retract  5\" x20 Scap retraction 5\" x 20        Ther Ex             Bike  NS 5' NS 5' NS 5' NV        LTR HEP 5\"x10 ea 5\"x10 ea 5\"x10 ea 5\" x 10         Leg press             Supine HS stretch             Seated QL stretch HEP 5x10\" ea 5x10\" ea 5x10\" ea         Standing hip abd/ext                                       Ther Activity             STS  1 foam 1x8 2 foam 2x10 2 foam 2x10 NV        Step ups             Gait Training                                       Modalities             [facility] 8' post 8' post  8 min pre                   " Normal vision: sees adequately in most situations; can see medication labels, newsprint

## 2025-01-13 ENCOUNTER — CLINICAL SUPPORT (OUTPATIENT)
Dept: RHEUMATOLOGY | Facility: CLINIC | Age: 85
End: 2025-01-13

## 2025-01-13 DIAGNOSIS — M81.0 OSTEOPOROSIS, UNSPECIFIED OSTEOPOROSIS TYPE, UNSPECIFIED PATHOLOGICAL FRACTURE PRESENCE: Primary | ICD-10-CM

## 2025-01-13 NOTE — PROGRESS NOTES
Assessment/Plan:    Yajaira Mendez came into the [facility] Office today 01/13/25 to receive Prolia injection.      Verbal consent obtained.  Consent given by: patient    patient states patient has been medically healthy with no underlining concerns/complications.      Yajaira Mendez presents with no symptoms today.       All insturctions were reviewed with the patient.    If the patient should have any questions/concerns, advised patient to contacted [facility].       Subjective:     History provided by: patient    Patient ID: Yajaira Mendez is a 84 y.o. female      Objective:    There were no vitals filed for this visit.    Patient tolerated the injection well without any complications.  Injection site/s left arm.  Medication was provided by provider stock.    Patient signed consent form no   Patient signed ABN form no (If no patient is not a medicare patient).   Patient waited 15 minutes after injection no (This only applies to patient's receiving first time injection).       Last Visit: 8/27/2024  Next visit:Visit date not found

## 2025-01-14 ENCOUNTER — OFFICE VISIT (OUTPATIENT)
Dept: PHYSICAL THERAPY | Facility: CLINIC | Age: 85
End: 2025-01-14
Payer: MEDICARE
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
## 2025-01-14 DIAGNOSIS — S30.0XXS LUMBAR CONTUSION, SEQUELA: Primary | ICD-10-CM
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
## 2025-01-14 PROCEDURE — 97140 MANUAL THERAPY 1/> REGIONS: CPT | Performed by: PHYSICAL THERAPIST

## 2025-01-14 PROCEDURE — 97112 NEUROMUSCULAR REEDUCATION: CPT | Performed by: PHYSICAL THERAPIST

## 2025-01-14 PROCEDURE — 97110 THERAPEUTIC EXERCISES: CPT | Performed by: PHYSICAL THERAPIST

## 2025-01-14 NOTE — PROGRESS NOTES
"Daily Note     Today's date: 2025  Patient name: Yajaira Mendez  : 1940  MRN: [id number]  Referring provider: Terrell Schulz MD  Dx:   Encounter Diagnosis     ICD-10-CM    1. Lumbar contusion, sequela  S30.0XXS       2. Fall at home, initial encounter  W19.XXXA     Y92.009                      Subjective: The patient states that she still has pain but gets more depending on how she moves.  Increased pain with bending forward.       Objective: See treatment diary below      Assessment: Tolerated treatment well.  She had increased tenderness noted along L lumbar paraspinals.  Good tolerance to manual therapy and reports less pain after along with improved tissue quality.  Patient demonstrated fatigue post treatment and would benefit from continued PT to improve function and mobility.        Plan: Continue per plan of care.      Precautions: HTN, hx fracture (left patellar, ORIF performed 3/9/24), osteoporosis, fall risk       Manuals        Lumbar STM  MK MK SK RA ML                                              Neuro Re-Ed             Tandem balance             Bridges  2x10 glute set 2x10 glute set 2x10 glute set 2x10 glut set  2x10 glut set       Side stepping              TrA contraction HEP 5\"x10    1x10 ea with \"x10 5\"x15 5\"x 15  5\"x15       Standing lumbar ext forearms on wall HEP 5\"x10 5\"x10 5\"x10         Clam shells  1x15 ea 5\"X20 ea 5\" x20 ea 5\" x 20 5\"x20       SLR with TrA  1x15 ea 2x10 ea 2x15 ea 2x15 ea 2x15 ea       Paloff press             Rows   Scap retractions 5\"x20 Scap Retract  5\" x20 Scap retraction 5\" x 20 Scap ret 5\"x20       Ther Ex             Bike  NS 5' NS 5' NS 5' NV NS 5'       LTR HEP 5\"x10 ea 5\"x10 ea 5\"x10 ea 5\" x 10  5\"x10       Leg press             Supine HS stretch             Seated QL stretch HEP 5x10\" ea 5x10\" ea 5x10\" ea         Standing hip abd/ext                                       Ther Activity             STS  1 foam 1x8 2 " foam 2x10 2 foam 2x10 NV 1 foam 2x10       Step ups             Gait Training                                       Modalities             P Edu 8' post 8' post  8 min pre 8 min pre

## 2025-01-16 ENCOUNTER — OFFICE VISIT (OUTPATIENT)
Dept: PHYSICAL THERAPY | Facility: CLINIC | Age: 85
End: 2025-01-16
Payer: MEDICARE

## 2025-01-16 DIAGNOSIS — S30.0XXS LUMBAR CONTUSION, SEQUELA: Primary | ICD-10-CM
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
## 2025-01-16 PROCEDURE — 97112 NEUROMUSCULAR REEDUCATION: CPT

## 2025-01-16 PROCEDURE — 97110 THERAPEUTIC EXERCISES: CPT

## 2025-01-16 PROCEDURE — 97140 MANUAL THERAPY 1/> REGIONS: CPT

## 2025-01-16 NOTE — PROGRESS NOTES
"Daily Note     Today's date: 2025  Patient name: Yajaira Mendez  : 1940  MRN: [id number]  Referring provider: Terrell Schulz MD  Dx:   Encounter Diagnosis     ICD-10-CM    1. Lumbar contusion, sequela  S30.0XXS       2. Fall at home, initial encounter  W19.XXXA     Y92.009                      Subjective:  Patient offers no new complaints arriving to PT today.      Objective: See treatment diary below      Assessment: Tolerated treatment well. Continued with POC. Patient demonstrated fatigue post treatment, exhibited good technique with therapeutic exercises, and would benefit from continued PT      Plan: Continue per plan of care.      Precautions: HTN, hx fracture (left patellar, ORIF performed 3/9/24), osteoporosis, fall risk       Manuals       Lumbar STM  MK MK SK RA ML                                              Neuro Re-Ed             Tandem balance             Bridges  2x10 glute set 2x10 glute set 2x10 glute set 2x10 glut set  2x10 glut set 2x10 glut set       Side stepping              TrA contraction HEP 5\"x10    1x10 ea with \"x10 5\"x15 5\"x 15  5\"x15 5\" x 15      Standing lumbar ext forearms on wall HEP 5\"x10 5\"x10 5\"x10         Clam shells  1x15 ea 5\"X20 ea 5\" x20 ea 5\" x 20 5\"x20 5\" x 20      SLR with TrA  1x15 ea 2x10 ea 2x15 ea 2x15 ea 2x15 ea 2x15 ea      Paloff press             Rows   Scap retractions 5\"x20 Scap Retract  5\" x20 Scap retraction 5\" x 20 Scap ret 5\"x20 Scap ret 5\"x20       Ther Ex             Bike  NS 5' NS 5' NS 5' NV NS 5' NS 5'       LTR HEP 5\"x10 ea 5\"x10 ea 5\"x10 ea 5\" x 10  5\"x10 5\" x 10      Leg press             Supine HS stretch             Seated QL stretch HEP 5x10\" ea 5x10\" ea 5x10\" ea         Standing hip abd/ext                                       Ther Activity             STS  1 foam 1x8 2 foam 2x10 2 foam 2x10 NV 1 foam 2x10 1 foam 2x10       Step ups             Gait Training                                     "   Modalities             [facility] 8' post 8' post  8 min pre 8 min pre 8 min post

## 2025-01-17 NOTE — ASSESSMENT & PLAN NOTE
Orders:    CBC and differential; Future    Comprehensive metabolic panel; Future    UA (URINE) with reflex to Scope; Future

## 2025-01-21 ENCOUNTER — OFFICE VISIT (OUTPATIENT)
Dept: PHYSICAL THERAPY | Facility: CLINIC | Age: 85
End: 2025-01-21
Payer: MEDICARE
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
## 2025-01-21 DIAGNOSIS — S30.0XXS LUMBAR CONTUSION, SEQUELA: Primary | ICD-10-CM

## 2025-01-21 PROCEDURE — 97140 MANUAL THERAPY 1/> REGIONS: CPT | Performed by: PHYSICAL THERAPIST

## 2025-01-21 PROCEDURE — 97112 NEUROMUSCULAR REEDUCATION: CPT | Performed by: PHYSICAL THERAPIST

## 2025-01-21 PROCEDURE — 97110 THERAPEUTIC EXERCISES: CPT | Performed by: PHYSICAL THERAPIST

## 2025-01-21 NOTE — PROGRESS NOTES
"Daily Note     Today's date: 2025  Patient name: Yajaira Mendez  : 1940  MRN: [id number]  Referring provider: Terrell Schulz MD  Dx:   Encounter Diagnosis     ICD-10-CM    1. Lumbar contusion, sequela  S30.0XXS       2. Fall at home, initial encounter  W19.XXXA     Y92.009                      Subjective:  Patient notes pain with sitting and having arms up for awhile. Overall improvement in symptoms.      Objective: See treatment diary below      Assessment: Tolerated treatment well. Continued with POC. Patient demonstrated fatigue post treatment, exhibited good technique with therapeutic exercises, and would benefit from continued PT      Plan: Continue per plan of care.      Precautions: HTN, hx fracture (left patellar, ORIF performed 3/9/24), osteoporosis, fall risk       Manuals      Lumbar STM  MK MK SK RA ML  MK                                            Neuro Re-Ed             Tandem balance             Bridges  2x10 glute set 2x10 glute set 2x10 glute set 2x10 glut set  2x10 glut set 2x10 glut set  2x12      Side stepping              TrA contraction HEP 5\"x10    1x10 ea with \"x10 5\"x15 5\"x 15  5\"x15 5\" x 15 15x 5\" holds     Standing lumbar ext forearms on wall HEP 5\"x10 5\"x10 5\"x10         Clam shells  1x15 ea 5\"X20 ea 5\" x20 ea 5\" x 20 5\"x20 5\" x 20 20x 5\" holds      SLR with TrA  1x15 ea 2x10 ea 2x15 ea 2x15 ea 2x15 ea 2x15 ea 3x12 B      Paloff press             Rows   Scap retractions 5\"x20 Scap Retract  5\" x20 Scap retraction 5\" x 20 Scap ret 5\"x20 Scap ret 5\"x20  20x 5\" holds     Ther Ex             Bike  NS 5' NS 5' NS 5' NV NS 5' NS 5'  5 min      LTR HEP 5\"x10 ea 5\"x10 ea 5\"x10 ea 5\" x 10  5\"x10 5\" x 10 10x 5\" holds      Leg press             Supine HS stretch             Seated QL stretch HEP 5x10\" ea 5x10\" ea 5x10\" ea         Standing hip abd/ext                                       Ther Activity             STS  1 foam 1x8 2 foam 2x10 " 2 foam 2x10 NV 1 foam 2x10 1 foam 2x10  2x12 1 foam      Step ups             Gait Training                                       Modalities             [facility] Edu 8' post 8' post  8 min pre 8 min pre 8 min post post

## 2025-01-23 ENCOUNTER — APPOINTMENT (OUTPATIENT)
Dept: PHYSICAL THERAPY | Facility: CLINIC | Age: 85
End: 2025-01-23
Payer: MEDICARE

## 2025-01-28 ENCOUNTER — OFFICE VISIT (OUTPATIENT)
Dept: PHYSICAL THERAPY | Facility: CLINIC | Age: 85
End: 2025-01-28
Payer: MEDICARE

## 2025-01-28 ENCOUNTER — APPOINTMENT (OUTPATIENT)
Dept: PHYSICAL THERAPY | Facility: CLINIC | Age: 85
End: 2025-01-28
Payer: MEDICARE
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
## 2025-01-28 DIAGNOSIS — S30.0XXS LUMBAR CONTUSION, SEQUELA: Primary | ICD-10-CM
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
## 2025-01-28 PROCEDURE — 97110 THERAPEUTIC EXERCISES: CPT | Performed by: PHYSICAL THERAPIST

## 2025-01-28 PROCEDURE — 97140 MANUAL THERAPY 1/> REGIONS: CPT | Performed by: PHYSICAL THERAPIST

## 2025-01-28 PROCEDURE — 97112 NEUROMUSCULAR REEDUCATION: CPT | Performed by: PHYSICAL THERAPIST

## 2025-01-28 NOTE — PROGRESS NOTES
"Daily Note     Today's date: 2025  Patient name: Yajaira Mendez  : 1940  MRN: [id number]  Referring provider: Terrell Schulz MD  Dx:   Encounter Diagnosis     ICD-10-CM    1. Lumbar contusion, sequela  S30.0XXS       2. Fall at home, initial encounter  W19.XXXA     Y92.009                      Subjective:  Patient notes has most pain with stooping.      Objective: See treatment diary below    Some pain post prone exercises  -if sore for prolonged time after visit reduce prone exercises    Assessment: Tolerated treatment well. Continued with POC. Patient demonstrated fatigue post treatment, exhibited good technique with therapeutic exercises, and would benefit from continued PT      Plan: Continue per plan of care.      Precautions: HTN, hx fracture (left patellar, ORIF performed 3/9/24), osteoporosis, fall risk       Manuals     Lumbar STM SK RA ML  MK MK                                  Neuro Re-Ed          Tandem balance          Bridges 2x10 glute set 2x10 glut set  2x10 glut set 2x10 glut set  2x12  2x12    Side stepping           TrA contraction 5\"x15 5\"x 15  5\"x15 5\" x 15 15x 5\" holds -    Standing lumbar ext forearms on wall 5\"x10         Clam shells 5\" x20 ea 5\" x 20 5\"x20 5\" x 20 20x 5\" holds  20x 5\" holds     SLR with TrA 2x15 ea 2x15 ea 2x15 ea 2x15 ea 3x12 B  3x12 B    Paloff press          Rows Scap Retract  5\" x20 Scap retraction 5\" x 20 Scap ret 5\"x20 Scap ret 5\"x20  20x 5\" holds     Ther Ex          Bike NS 5' NV NS 5' NS 5'  5 min  5 min    LTR 5\"x10 ea 5\" x 10  5\"x10 5\" x 10 10x 5\" holds  10x 5\" holds     Leg press          Supine HS stretch          Seated QL stretch 5x10\" ea         Standing hip abd/ext          Prone altern LEs      2x10 B    Prone on elbows      10x     Ther Activity          STS 2 foam 2x10 NV 1 foam 2x10 1 foam 2x10  2x12 1 foam  12x, 9x 1 foam     Step ups          Gait Training                              Modalities          MHP  8 " Mom called our clinic with referral from Dr. Ramirez. She stated that she was looking to schedule an Autism evaluation. I spoke with her about our process and our current wait time and she grew very upset with me about how long our wait is. I tried my best to explain to her about why the wait time is the way it is. She was still very upset and hung the phone up.   min pre 8 min pre 8 min post post Pre this visit

## 2025-01-30 ENCOUNTER — OFFICE VISIT (OUTPATIENT)
Dept: PHYSICAL THERAPY | Facility: CLINIC | Age: 85
End: 2025-01-30
Payer: MEDICARE

## 2025-01-30 DIAGNOSIS — S30.0XXS LUMBAR CONTUSION, SEQUELA: Primary | ICD-10-CM
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
## 2025-01-30 PROCEDURE — 97140 MANUAL THERAPY 1/> REGIONS: CPT

## 2025-01-30 PROCEDURE — 97110 THERAPEUTIC EXERCISES: CPT

## 2025-01-30 PROCEDURE — 97112 NEUROMUSCULAR REEDUCATION: CPT

## 2025-01-30 NOTE — PROGRESS NOTES
"Daily Note     Today's date: 2025  Patient name: Yajaira Mendez  : 1940  MRN: [id number]  Referring provider: Terrell Schulz MD  Dx:   Encounter Diagnosis     ICD-10-CM    1. Lumbar contusion, sequela  S30.0XXS       2. Fall at home, initial encounter  W19.XXXA     Y92.009                      Subjective:  Patient reports that she is doing well today denying any pain or stiffness at the start of session.       Objective: See treatment diary below        Assessment: Tolerated treatment well. Continued with POC. Pt was able to perform all exercises with some difficulty and discomfort noted with prone LE extensions but able to complete. Patient demonstrated fatigue post treatment, exhibited good technique with therapeutic exercises, and would benefit from continued PT      Plan: Continue per plan of care.      Precautions: HTN, hx fracture (left patellar, ORIF performed 3/9/24), osteoporosis, fall risk       Manuals    Lumbar STM SK RA ML  MK MK MM                                 Neuro Re-Ed          Tandem balance          Bridges 2x10 glute set 2x10 glut set  2x10 glut set 2x10 glut set  2x12  2x12 2x12   Side stepping           TrA contraction 5\"x15 5\"x 15  5\"x15 5\" x 15 15x 5\" holds -    Standing lumbar ext forearms on wall 5\"x10         Clam shells 5\" x20 ea 5\" x 20 5\"x20 5\" x 20 20x 5\" holds  20x 5\" holds  20x 5\" holds    SLR with TrA 2x15 ea 2x15 ea 2x15 ea 2x15 ea 3x12 B  3x12 B 3x12 B   Paloff press          Rows Scap Retract  5\" x20 Scap retraction 5\" x 20 Scap ret 5\"x20 Scap ret 5\"x20  20x 5\" holds     Ther Ex          Bike NS 5' NV NS 5' NS 5'  5 min  5 min NS 5'   LTR 5\"x10 ea 5\" x 10  5\"x10 5\" x 10 10x 5\" holds  10x 5\" holds  10x 5\" holds    Leg press          Supine HS stretch          Seated QL stretch 5x10\" ea         Standing hip abd/ext          Prone altern LEs      2x10 B 2x10 B   Prone on elbows      10x  10x   Ther Activity          STS 2 foam 2x10 " NV 1 foam 2x10 1 foam 2x10  2x12 1 foam  12x, 9x 1 foam  2x12 1 foam    Step ups          Gait Training                              Modalities          MHP  8 min pre 8 min pre 8 min post post Pre this visit 8 min pre

## 2025-02-04 ENCOUNTER — APPOINTMENT (OUTPATIENT)
Dept: LAB | Facility: CLINIC | Age: 85
End: 2025-02-04
Payer: MEDICARE

## 2025-02-04 ENCOUNTER — OFFICE VISIT (OUTPATIENT)
Dept: PHYSICAL THERAPY | Facility: CLINIC | Age: 85
End: 2025-02-04
Payer: MEDICARE
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
## 2025-02-04 DIAGNOSIS — S30.0XXS LUMBAR CONTUSION, SEQUELA: Primary | ICD-10-CM

## 2025-02-04 DIAGNOSIS — K91.2 POSTSURGICAL MALABSORPTION: ICD-10-CM

## 2025-02-04 LAB
ALBUMIN SERPL BCG-MCNC: 3.9 G/DL (ref 3.5–5)
ALP SERPL-CCNC: 60 U/L (ref 34–104)
ALT SERPL W P-5'-P-CCNC: 53 U/L (ref 7–52)
AMORPH URATE CRY URNS QL MICRO: ABNORMAL
ANION GAP SERPL CALCULATED.3IONS-SCNC: 7 MMOL/L (ref 4–13)
AST SERPL W P-5'-P-CCNC: 62 U/L (ref 13–39)
BACTERIA UR QL AUTO: ABNORMAL /HPF
BASOPHILS # BLD AUTO: 0.03 THOUSANDS/ΜL (ref 0–0.1)
BASOPHILS NFR BLD AUTO: 1 % (ref 0–1)
BILIRUB SERPL-MCNC: 0.41 MG/DL (ref 0.2–1)
BILIRUB UR QL STRIP: NEGATIVE
BUN SERPL-MCNC: 22 MG/DL (ref 5–25)
CALCIUM SERPL-MCNC: 8.7 MG/DL (ref 8.4–10.2)
CHLORIDE SERPL-SCNC: 102 MMOL/L (ref 96–108)
CLARITY UR: CLEAR
CO2 SERPL-SCNC: 26 MMOL/L (ref 21–32)
COLOR UR: ABNORMAL
CREAT SERPL-MCNC: 0.73 MG/DL (ref 0.6–1.3)
EOSINOPHIL # BLD AUTO: 0.16 THOUSAND/ΜL (ref 0–0.61)
EOSINOPHIL NFR BLD AUTO: 4 % (ref 0–6)
ERYTHROCYTE [DISTWIDTH] IN BLOOD BY AUTOMATED COUNT: 15.2 % (ref 11.6–15.1)
GFR SERPL CREATININE-BSD FRML MDRD: 76 ML/MIN/1.73SQ M
GLUCOSE SERPL-MCNC: 93 MG/DL (ref 65–140)
GLUCOSE UR STRIP-MCNC: NEGATIVE MG/DL
HCT VFR BLD AUTO: 37.9 % (ref 34.8–46.1)
HGB BLD-MCNC: 11.8 G/DL (ref 11.5–15.4)
HGB UR QL STRIP.AUTO: NEGATIVE
IMM GRANULOCYTES # BLD AUTO: 0.02 THOUSAND/UL (ref 0–0.2)
IMM GRANULOCYTES NFR BLD AUTO: 1 % (ref 0–2)
KETONES UR STRIP-MCNC: NEGATIVE MG/DL
LEUKOCYTE ESTERASE UR QL STRIP: ABNORMAL
LYMPHOCYTES # BLD AUTO: 1.15 THOUSANDS/ΜL (ref 0.6–4.47)
LYMPHOCYTES NFR BLD AUTO: 26 % (ref 14–44)
MCH RBC QN AUTO: 30.2 PG (ref 26.8–34.3)
MCHC RBC AUTO-ENTMCNC: 31.1 G/DL (ref 31.4–37.4)
MCV RBC AUTO: 97 FL (ref 82–98)
MONOCYTES # BLD AUTO: 0.45 THOUSAND/ΜL (ref 0.17–1.22)
MONOCYTES NFR BLD AUTO: 10 % (ref 4–12)
MUCOUS THREADS UR QL AUTO: ABNORMAL
NEUTROPHILS # BLD AUTO: 2.59 THOUSANDS/ΜL (ref 1.85–7.62)
NEUTS SEG NFR BLD AUTO: 58 % (ref 43–75)
NITRITE UR QL STRIP: POSITIVE
NON-SQ EPI CELLS URNS QL MICRO: ABNORMAL /HPF
NRBC BLD AUTO-RTO: 0 /100 WBCS
PH UR STRIP.AUTO: 6 [PH]
PLATELET # BLD AUTO: 151 THOUSANDS/UL (ref 149–390)
PMV BLD AUTO: 11.8 FL (ref 8.9–12.7)
POTASSIUM SERPL-SCNC: 4.3 MMOL/L (ref 3.5–5.3)
PROT SERPL-MCNC: 6.4 G/DL (ref 6.4–8.4)
PROT UR STRIP-MCNC: NEGATIVE MG/DL
RBC # BLD AUTO: 3.91 MILLION/UL (ref 3.81–5.12)
RBC #/AREA URNS AUTO: ABNORMAL /HPF
SODIUM SERPL-SCNC: 135 MMOL/L (ref 135–147)
SP GR UR STRIP.AUTO: 1.01 (ref 1–1.03)
UROBILINOGEN UR STRIP-ACNC: <2 MG/DL
WBC # BLD AUTO: 4.4 THOUSAND/UL (ref 4.31–10.16)
WBC #/AREA URNS AUTO: ABNORMAL /HPF

## 2025-02-04 PROCEDURE — 80053 COMPREHEN METABOLIC PANEL: CPT

## 2025-02-04 PROCEDURE — 36415 COLL VENOUS BLD VENIPUNCTURE: CPT

## 2025-02-04 PROCEDURE — 97140 MANUAL THERAPY 1/> REGIONS: CPT | Performed by: PHYSICAL THERAPIST

## 2025-02-04 PROCEDURE — 97112 NEUROMUSCULAR REEDUCATION: CPT | Performed by: PHYSICAL THERAPIST

## 2025-02-04 PROCEDURE — 85025 COMPLETE CBC W/AUTO DIFF WBC: CPT

## 2025-02-04 PROCEDURE — 81001 URINALYSIS AUTO W/SCOPE: CPT

## 2025-02-04 PROCEDURE — 97110 THERAPEUTIC EXERCISES: CPT | Performed by: PHYSICAL THERAPIST

## 2025-02-04 NOTE — PROGRESS NOTES
"Daily Note     Today's date: 2025  Patient name: Yajaira Mendez  : 1940  MRN: [id number]  Referring provider: Terrell Schulz MD  Dx:   Encounter Diagnosis     ICD-10-CM    1. Lumbar contusion, sequela  S30.0XXS       2. Fall at home, initial encounter  W19.XXXA     Y92.009                      Subjective:  Patient reports newer exercises causing pain for a few days.      Objective: See treatment diary below        Assessment: Tolerated treatment well.  Patient demonstrated fatigue post treatment, exhibited good technique with therapeutic exercises, and would benefit from continued PT. Discontinued prone exercises.      Plan: Continue per plan of care.      Precautions: HTN, hx fracture (left patellar, ORIF performed 3/9/24), osteoporosis, fall risk       Manuals    Lumbar STM ML  MK MK MM MK                              Neuro Re-Ed         Tandem balance         Bridges 2x10 glut set 2x10 glut set  2x12  2x12 2x12 3x10    Side stepping          TrA contraction 5\"x15 5\" x 15 15x 5\" holds -     Standing lumbar ext forearms on wall         Clam shells 5\"x20 5\" x 20 20x 5\" holds  20x 5\" holds  20x 5\" holds  20x 5\" holds ylw   SLR with TrA 2x15 ea 2x15 ea 3x12 B  3x12 B 3x12 B 3x12 B   Paloff press         Rows Scap ret 5\"x20 Scap ret 5\"x20  20x 5\" holds      Ther Ex         Bike NS 5' NS 5'  5 min  5 min NS 5' 5 min bike    LTR 5\"x10 5\" x 10 10x 5\" holds  10x 5\" holds  10x 5\" holds  10x 5 holds w/ ball squeeze    Leg press         Supine HS stretch         Seated QL stretch         Standing hip abd/ext         Prone altern LEs    2x10 B 2x10 B D/C   Prone on elbows    10x  10x D/C   Ther Activity         STS 1 foam 2x10 1 foam 2x10  2x12 1 foam  12x, 9x 1 foam  2x12 1 foam  2x12 1 foam    Step ups         Gait Training                           Modalities         MHP 8 min pre 8 min post post Pre this visit 8 min pre Post                          "

## 2025-02-10 ENCOUNTER — OFFICE VISIT (OUTPATIENT)
Dept: INTERNAL MEDICINE CLINIC | Facility: CLINIC | Age: 85
End: 2025-02-10
Payer: MEDICARE

## 2025-02-10 ENCOUNTER — OFFICE VISIT (OUTPATIENT)
Dept: PHYSICAL THERAPY | Facility: CLINIC | Age: 85
End: 2025-02-10
Payer: MEDICARE

## 2025-02-10 VITALS
WEIGHT: 109.8 LBS | BODY MASS INDEX: 20.2 KG/M2 | DIASTOLIC BLOOD PRESSURE: 60 MMHG | HEART RATE: 63 BPM | SYSTOLIC BLOOD PRESSURE: 122 MMHG | OXYGEN SATURATION: 98 % | HEIGHT: 62 IN | TEMPERATURE: 97.8 F

## 2025-02-10 DIAGNOSIS — Z98.890 HISTORY OF CAROTID ENDARTERECTOMY: Chronic | ICD-10-CM

## 2025-02-10 DIAGNOSIS — R79.89 ABNORMAL LFTS: ICD-10-CM

## 2025-02-10 DIAGNOSIS — K91.2 POSTSURGICAL MALABSORPTION: ICD-10-CM

## 2025-02-10 DIAGNOSIS — K21.9 GASTROESOPHAGEAL REFLUX DISEASE WITHOUT ESOPHAGITIS: Primary | ICD-10-CM

## 2025-02-10 DIAGNOSIS — R26.2 AMBULATORY DYSFUNCTION: ICD-10-CM

## 2025-02-10 DIAGNOSIS — K58.0 IRRITABLE BOWEL SYNDROME WITH DIARRHEA: ICD-10-CM

## 2025-02-10 DIAGNOSIS — S30.0XXS LUMBAR CONTUSION, SEQUELA: Primary | ICD-10-CM

## 2025-02-10 DIAGNOSIS — M54.50 ACUTE BILATERAL LOW BACK PAIN WITHOUT SCIATICA: ICD-10-CM
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
## 2025-02-10 PROCEDURE — 97112 NEUROMUSCULAR REEDUCATION: CPT | Performed by: PHYSICAL THERAPIST

## 2025-02-10 PROCEDURE — 99214 OFFICE O/P EST MOD 30 MIN: CPT | Performed by: INTERNAL MEDICINE

## 2025-02-10 PROCEDURE — 97140 MANUAL THERAPY 1/> REGIONS: CPT | Performed by: PHYSICAL THERAPIST

## 2025-02-10 PROCEDURE — 97110 THERAPEUTIC EXERCISES: CPT | Performed by: PHYSICAL THERAPIST

## 2025-02-10 PROCEDURE — G2211 COMPLEX E/M VISIT ADD ON: HCPCS | Performed by: INTERNAL MEDICINE

## 2025-02-10 RX ORDER — FAMOTIDINE 20 MG/1
20 TABLET, FILM COATED ORAL 2 TIMES DAILY
Qty: 180 TABLET | Refills: 1 | Status: SHIPPED | OUTPATIENT
Start: 2025-02-10
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
## 2025-02-10 NOTE — PROGRESS NOTES
"Daily Note     Today's date: 2/10/2025  Patient name: Yajaira Mendez  : 1940  MRN: [id number]  Referring provider: Terrell Schulz MD  Dx:   Encounter Diagnosis     ICD-10-CM    1. Lumbar contusion, sequela  S30.0XXS       2. Fall at home, initial encounter  W19.XXXA     Y92.009                      Subjective:  Patient reports feeling better.      Objective: See treatment diary below        Assessment: Tolerated treatment well.  Patient demonstrated fatigue post treatment, exhibited good technique with therapeutic exercises, and would benefit from continued PT. Discontinued prone exercises.      Plan: Continue per plan of care.      Precautions: HTN, hx fracture (left patellar, ORIF performed 3/9/24), osteoporosis, fall risk       Manuals 1/14 1/16 1/21 1/28 1/30 2/4 2/10   Lumbar STM ML  MK MK MM MK MK                                  Neuro Re-Ed          Tandem balance          Bridges 2x10 glut set 2x10 glut set  2x12  2x12 2x12 3x10  3x10   Side stepping           TrA contraction 5\"x15 5\" x 15 15x 5\" holds -      Standing lumbar ext forearms on wall          Clam shells 5\"x20 5\" x 20 20x 5\" holds  20x 5\" holds  20x 5\" holds  20x 5\" holds ylw 20x 5\" holds ylw    SLR with TrA 2x15 ea 2x15 ea 3x12 B  3x12 B 3x12 B 3x12 B 3x12 B   Paloff press          Rows Scap ret 5\"x20 Scap ret 5\"x20  20x 5\" holds       Ther Ex          Bike NS 5' NS 5'  5 min  5 min NS 5' 5 min bike  5 min bike    LTR 5\"x10 5\" x 10 10x 5\" holds  10x 5\" holds  10x 5\" holds  10x 5 holds w/ ball squeeze  10x 5\" holds w/ ball sueeze    Leg press          Supine HS stretch          Seated QL stretch          Standing hip abd/ext          Prone altern LEs    2x10 B 2x10 B D/C    Prone on elbows    10x  10x D/C    Ther Activity          STS 1 foam 2x10 1 foam 2x10  2x12 1 foam  12x, 9x 1 foam  2x12 1 foam  2x12 1 foam  2x12 1 foam    Step ups          Gait Training                              Modalities          MHP 8 min pre 8 min post post " Recd pt on vent:  AC VC+, Rate=20, Ot=906, Fio2=80%, Peep=8 
--Vent check complete, clear sunitha tube 3 cc 
 
1000-Wean Fio2 = 70% Pre this visit 8 min pre Post  post

## 2025-02-13 ENCOUNTER — APPOINTMENT (OUTPATIENT)
Dept: PHYSICAL THERAPY | Facility: CLINIC | Age: 85
End: 2025-02-13
Payer: MEDICARE

## 2025-02-15 ENCOUNTER — HOSPITAL ENCOUNTER (OUTPATIENT)
Dept: ULTRASOUND IMAGING | Facility: HOSPITAL | Age: 85
Discharge: HOME | End: 2025-02-15
Attending: INTERNAL MEDICINE
Payer: MEDICARE

## 2025-02-15 DIAGNOSIS — R79.89 ABNORMAL LFTS: ICD-10-CM

## 2025-02-15 PROCEDURE — 76705 ECHO EXAM OF ABDOMEN: CPT
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
## 2025-02-20 ENCOUNTER — RESULTS FOLLOW-UP (OUTPATIENT)
Dept: INTERNAL MEDICINE CLINIC | Facility: CLINIC | Age: 85
End: 2025-02-20

## 2025-02-20 ENCOUNTER — TELEPHONE (OUTPATIENT)
Dept: RHEUMATOLOGY | Facility: CLINIC | Age: 85
End: 2025-02-20

## 2025-02-20 NOTE — TELEPHONE ENCOUNTER
----- Message from Terrell Schulz MD sent at 2/20/2025  2:58 PM EST -----  Please tell her a follow-up ultrasound should be done in 3 months and I can give her the order when she comes in to see me.  ----- Message -----  From: Interface, Radiology Results In  Sent: 2/20/2025  10:02 AM EST  To: Terrell Schulz MD

## 2025-02-20 NOTE — TELEPHONE ENCOUNTER
Spoke to the patient and explained the provider is going  to be in the office on July 15th and patient expressed understanding, patient rescheduled for next soonest appointment on aug 6th at 2:00 .

## 2025-02-25 ENCOUNTER — CONSULT (OUTPATIENT)
Dept: CARDIOLOGY CLINIC | Facility: CLINIC | Age: 85
End: 2025-02-25
Payer: MEDICARE

## 2025-02-25 VITALS
DIASTOLIC BLOOD PRESSURE: 62 MMHG | WEIGHT: 107 LBS | BODY MASS INDEX: 19.69 KG/M2 | HEART RATE: 68 BPM | HEIGHT: 62 IN | SYSTOLIC BLOOD PRESSURE: 126 MMHG

## 2025-02-25 DIAGNOSIS — I10 ESSENTIAL HYPERTENSION: ICD-10-CM

## 2025-02-25 DIAGNOSIS — I73.9 PVD (PERIPHERAL VASCULAR DISEASE) (HCC): ICD-10-CM

## 2025-02-25 DIAGNOSIS — R55 SYNCOPE AND COLLAPSE: Primary | ICD-10-CM

## 2025-02-25 PROCEDURE — 99204 OFFICE O/P NEW MOD 45 MIN: CPT | Performed by: INTERNAL MEDICINE

## 2025-02-25 PROCEDURE — 93000 ELECTROCARDIOGRAM COMPLETE: CPT | Performed by: INTERNAL MEDICINE

## 2025-02-28 ENCOUNTER — HOSPITAL ENCOUNTER (OUTPATIENT)
Dept: NON INVASIVE DIAGNOSTICS | Facility: HOSPITAL | Age: 85
Discharge: HOME | End: 2025-02-28
Payer: MEDICARE

## 2025-02-28 DIAGNOSIS — Z98.890 HISTORY OF CAROTID ENDARTERECTOMY: Chronic | ICD-10-CM

## 2025-02-28 DIAGNOSIS — I65.21 SYMPTOMATIC CAROTID ARTERY STENOSIS WITHOUT INFARCTION, RIGHT: ICD-10-CM

## 2025-02-28 PROCEDURE — 93880 EXTRACRANIAL BILAT STUDY: CPT

## 2025-03-01 PROCEDURE — 93880 EXTRACRANIAL BILAT STUDY: CPT | Performed by: SURGERY

## (undated) DEVICE — BAG DECANTER

## (undated) DEVICE — BLADE SAGITTAL 63.0 X 19.5MM

## (undated) DEVICE — SUT NYLON 5-0 P-3 18 IN 690G

## (undated) DEVICE — TELFA NON-ADHERENT ABSORBENT DRESSING: Brand: TELFA

## (undated) DEVICE — GAUZE SPONGES,16 PLY: Brand: CURITY

## (undated) DEVICE — PENCIL ELECTROSURG E-Z CLEAN -0035H

## (undated) DEVICE — 3M™ TEGADERM™ TRANSPARENT FILM DRESSING FRAME STYLE, 1627, 4 IN X 10 IN (10 CM X 25 CM), 20/CT 4CT/CASE: Brand: 3M™ TEGADERM™

## (undated) DEVICE — PADDING CAST 4 IN  COTTON STRL

## (undated) DEVICE — DRAPE ADOLESCENT LAPAROTOMY

## (undated) DEVICE — SUT VICRYL 1 CT-1 36 IN J947H

## (undated) DEVICE — CHLORAPREP HI-LITE 26ML ORANGE

## (undated) DEVICE — 3M™ STERI-STRIP™ REINFORCED ADHESIVE SKIN CLOSURES, R1547, 1/2 IN X 4 IN (12 MM X 100 MM), 6 STRIPS/ENVELOPE: Brand: 3M™ STERI-STRIP™

## (undated) DEVICE — GLOVE INDICATOR PI UNDERGLOVE SZ 8 BLUE

## (undated) DEVICE — SUT ETHIBOND 5 V-37 30 IN MB66G

## (undated) DEVICE — SCD SEQUENTIAL COMPRESSION COMFORT SLEEVE MEDIUM KNEE LENGTH: Brand: KENDALL SCD

## (undated) DEVICE — 3000CC GUARDIAN II: Brand: GUARDIAN

## (undated) DEVICE — TIBURON SPLIT SHEET: Brand: CONVERTORS

## (undated) DEVICE — PACK PBDS PLASTIC HEAD AND NECK RF

## (undated) DEVICE — PACK UNIVERSAL DRAPES SUB-Q ICD

## (undated) DEVICE — THE SIMPULSE SOLO SYSTEM WITH ULTREX RETRACTABLE SPLASH SHIELD TIP: Brand: SIMPULSE SOLO

## (undated) DEVICE — SUT VICRYL 2-0 CT-1 36 IN J945H

## (undated) DEVICE — 1.6MM THREADED GUIDE WIRE 150MM

## (undated) DEVICE — BETHLEHEM TOTAL HIP, KIT: Brand: CARDINAL HEALTH

## (undated) DEVICE — ELECTRODE BLADE MOD E-Z CLEAN  2.75IN 7CM -0012AM

## (undated) DEVICE — BETHL CAROTID ENDARTERECTOMY: Brand: CARDINAL HEALTH

## (undated) DEVICE — INTENDED FOR TISSUE SEPARATION, AND OTHER PROCEDURES THAT REQUIRE A SHARP SURGICAL BLADE TO PUNCTURE OR CUT.: Brand: BARD-PARKER ® CARBON RIB-BACK BLADES

## (undated) DEVICE — DRAPE PROBE NEO-PROBE/ULTRASOUND

## (undated) DEVICE — INTENDED FOR TISSUE SEPARATION, AND OTHER PROCEDURES THAT REQUIRE A SHARP SURGICAL BLADE TO PUNCTURE OR CUT.: Brand: BARD-PARKER SAFETY BLADES SIZE 15, STERILE

## (undated) DEVICE — DRESSING MEPILEX BORDER 4 X 8 IN

## (undated) DEVICE — ELECTRODE BLADE MOD E-Z CLEAN 2.5IN 6.4CM -0012M

## (undated) DEVICE — CUFF TOURNIQUET 24 X 4 IN QUICK CONNECT DISP 1BLA

## (undated) DEVICE — CAPIT RVRS TM TOTAL SHOULDER

## (undated) DEVICE — SUT PROLENE 6-0 BV130 30 IN 8709H

## (undated) DEVICE — DRAPE C-ARMOUR

## (undated) DEVICE — SUT MONOCRYL 3-0 SH 27 IN Y416H

## (undated) DEVICE — SURGICEL FIBRILLAR 1 X 2

## (undated) DEVICE — SYRINGE 1ML SLIP TIP

## (undated) DEVICE — SURGICEL 4 X 8

## (undated) DEVICE — SUT FIBERWIRE #2 1/2 CIRCLE T-5 38IN AR-7200

## (undated) DEVICE — 3M™ IOBAN™ 2 ANTIMICROBIAL INCISE DRAPE 6640EZ: Brand: IOBAN™ 2

## (undated) DEVICE — SUT STRATAFIX SPIRAL PLUS 3-0 PS-2 30 X 30 CM SXMP2B408

## (undated) DEVICE — TRAY FOLEY 16FR URIMETER SURESTEP

## (undated) DEVICE — GLOVE SRG BIOGEL 6.5

## (undated) DEVICE — 3M™ TEGADERM™ TRANSPARENT FILM DRESSING FRAME STYLE, 1626W, 4 IN X 4-3/4 IN (10 CM X 12 CM), 50/CT 4CT/CASE: Brand: 3M™ TEGADERM™

## (undated) DEVICE — DRAPE SURGIKIT SADDLE BAG

## (undated) DEVICE — JP CHAN DRN SIL HUBLESS 15FR W/TRO: Brand: CARDINAL HEALTH

## (undated) DEVICE — SUT VICRYL 0 CT-1 27 IN J260H

## (undated) DEVICE — SUT STRATAFIX SPIRAL MONOCRYL PLUS 3-0 PS-2 45CM SXMP1B107

## (undated) DEVICE — DRAPE C-ARM X-RAY

## (undated) DEVICE — ABDOMINAL PAD: Brand: DERMACEA

## (undated) DEVICE — NEEDLE 25G X 1 1/2

## (undated) DEVICE — 40601 PROLONGED POSITIONING SYSTEM: Brand: 40601 PROLONGED POSITIONING SYSTEM

## (undated) DEVICE — BETHLEHEM UNIVERSAL MINOR GEN: Brand: CARDINAL HEALTH

## (undated) DEVICE — PLUMEPEN PRO 10FT

## (undated) DEVICE — TUBING SUCTION 5MM X 12 FT

## (undated) DEVICE — CEMENT MIXING BOWL W/SPATULA

## (undated) DEVICE — SUT MONOCRYL 4-0 PS-2 18 IN Y496G

## (undated) DEVICE — COBAN 6 IN STERILE

## (undated) DEVICE — ANTIBACTERIAL UNDYED BRAIDED (POLYGLACTIN 910), SYNTHETIC ABSORBABLE SUTURE: Brand: COATED VICRYL

## (undated) DEVICE — SUT MONOCRYL 3-0 PS-2 27 IN Y427H

## (undated) DEVICE — GLOVE SRG BIOGEL 7.5

## (undated) DEVICE — SUT SILK 2-0 18 IN A185H

## (undated) DEVICE — PREP SURGICAL PURPREP 26ML

## (undated) DEVICE — 1200CC GUARDIAN II: Brand: GUARDIAN

## (undated) DEVICE — ELECTRODE NEEDLE MOD E-Z CLEAN 2.75IN 7CM -0013M

## (undated) DEVICE — PAD GROUNDING ADULT

## (undated) DEVICE — GAUZE SPONGES,USP TYPE VII GAUZE, 12 PLY: Brand: CURITY

## (undated) DEVICE — ADHESIVE SKIN HIGH VISCOSITY EXOFIN 1ML

## (undated) DEVICE — 3.2MM CANNULATED DRILL BIT/QC 170MM

## (undated) DEVICE — SUT STRATAFIX SPIRAL MONOCRYL PLUS 4-0 PS-2 45CM SXMP1B118

## (undated) DEVICE — ADHESIVE SKIN CLOSURE SYS EXOFIN FUSION 22CM

## (undated) DEVICE — PROXIMATE PLUS MD MULTI-DIRECTIONAL RELEASE SKIN STAPLERS CONTAINS 35 STAINLESS STEEL STAPLES APPROXIMATE CLOSED DIMENSIONS: 6.9MM X 3.9MM WIDE: Brand: PROXIMATE

## (undated) DEVICE — PAD GROUNDING DUAL ADULT

## (undated) DEVICE — 1.6MM NON-THREADED GUIDE WIRE- SPADE POINT ON ONE END 150MM

## (undated) DEVICE — 10FR FRAZIER SUCTION HANDLE: Brand: CARDINAL HEALTH

## (undated) DEVICE — SUT SILK 4-0 18 IN A183H

## (undated) DEVICE — GLOVE SRG BIOGEL 8

## (undated) DEVICE — BETHLEHEM UNIV TOTAL KNEE, KIT: Brand: CARDINAL HEALTH

## (undated) DEVICE — IMPERVIOUS STOCKINETTE: Brand: DEROYAL

## (undated) DEVICE — ADHESIVE SKIN CLOSR DERMABOND PRINEO

## (undated) DEVICE — BETHLEHEM UNIVERSAL OUTPATIENT: Brand: CARDINAL HEALTH

## (undated) DEVICE — ACE WRAP 6 IN UNSTERILE

## (undated) DEVICE — LIGACLIP MCA MULTIPLE CLIP APPLIERS, 20 MEDIUM CLIPS: Brand: LIGACLIP

## (undated) DEVICE — PROXIMATE SKIN STAPLERS (35 WIDE) CONTAINS 35 STAINLESS STEEL STAPLES (FIXED HEAD): Brand: PROXIMATE

## (undated) DEVICE — ADHESIVE SKN CLSR HISTOACRYL FLEX 0.5ML LF

## (undated) DEVICE — OCCLUSIVE GAUZE STRIP,3% BISMUTH TRIBROMOPHENATE IN PETROLATUM BLEND: Brand: XEROFORM